# Patient Record
Sex: MALE | Race: WHITE | NOT HISPANIC OR LATINO | Employment: FULL TIME | URBAN - METROPOLITAN AREA
[De-identification: names, ages, dates, MRNs, and addresses within clinical notes are randomized per-mention and may not be internally consistent; named-entity substitution may affect disease eponyms.]

---

## 2017-03-28 ENCOUNTER — OFFICE VISIT (OUTPATIENT)
Dept: INTERNAL MEDICINE | Facility: CLINIC | Age: 57
End: 2017-03-28
Payer: COMMERCIAL

## 2017-03-28 VITALS
HEART RATE: 69 BPM | DIASTOLIC BLOOD PRESSURE: 74 MMHG | OXYGEN SATURATION: 96 % | BODY MASS INDEX: 33.79 KG/M2 | RESPIRATION RATE: 12 BRPM | HEIGHT: 74 IN | SYSTOLIC BLOOD PRESSURE: 118 MMHG | WEIGHT: 263.25 LBS

## 2017-03-28 DIAGNOSIS — Z72.0 TOBACCO ABUSE: ICD-10-CM

## 2017-03-28 DIAGNOSIS — D68.61 ANTIPHOSPHOLIPID SYNDROME: ICD-10-CM

## 2017-03-28 DIAGNOSIS — Z71.6 TOBACCO ABUSE COUNSELING: ICD-10-CM

## 2017-03-28 DIAGNOSIS — E78.5 HYPERLIPIDEMIA LDL GOAL <70: ICD-10-CM

## 2017-03-28 DIAGNOSIS — Z86.010 HISTORY OF COLON POLYPS: ICD-10-CM

## 2017-03-28 DIAGNOSIS — E66.9 OBESITY (BMI 30-39.9): ICD-10-CM

## 2017-03-28 PROCEDURE — 99204 OFFICE O/P NEW MOD 45 MIN: CPT | Mod: S$GLB,,, | Performed by: NURSE PRACTITIONER

## 2017-03-28 PROCEDURE — 99999 PR PBB SHADOW E&M-EST. PATIENT-LVL IV: CPT | Mod: PBBFAC,,, | Performed by: NURSE PRACTITIONER

## 2017-03-28 PROCEDURE — 3046F HEMOGLOBIN A1C LEVEL >9.0%: CPT | Mod: S$GLB,,, | Performed by: NURSE PRACTITIONER

## 2017-03-28 PROCEDURE — 4010F ACE/ARB THERAPY RXD/TAKEN: CPT | Mod: S$GLB,,, | Performed by: NURSE PRACTITIONER

## 2017-03-28 PROCEDURE — 1160F RVW MEDS BY RX/DR IN RCRD: CPT | Mod: S$GLB,,, | Performed by: NURSE PRACTITIONER

## 2017-03-28 PROCEDURE — 2022F DILAT RTA XM EVC RTNOPTHY: CPT | Mod: S$GLB,,, | Performed by: NURSE PRACTITIONER

## 2017-03-28 RX ORDER — ATORVASTATIN CALCIUM 20 MG/1
20 TABLET, FILM COATED ORAL NIGHTLY
Qty: 30 TABLET | Refills: 5 | Status: SHIPPED | OUTPATIENT
Start: 2017-03-28 | End: 2017-04-25 | Stop reason: SDUPTHER

## 2017-03-28 RX ORDER — ACETAMINOPHEN 500 MG
1 TABLET ORAL DAILY
COMMUNITY
End: 2023-06-22

## 2017-03-28 RX ORDER — AMOXICILLIN 500 MG
2 CAPSULE ORAL DAILY
COMMUNITY
End: 2023-06-19

## 2017-03-28 RX ORDER — METFORMIN HYDROCHLORIDE 500 MG/1
500 TABLET ORAL 2 TIMES DAILY WITH MEALS
COMMUNITY
End: 2017-04-28

## 2017-03-28 RX ORDER — RIVAROXABAN 20 MG/1
20 TABLET, FILM COATED ORAL DAILY
Refills: 3 | COMMUNITY
Start: 2017-02-17 | End: 2018-01-04 | Stop reason: SDUPTHER

## 2017-03-28 RX ORDER — LISINOPRIL 5 MG/1
5 TABLET ORAL DAILY
Qty: 30 TABLET | Refills: 5 | Status: SHIPPED | OUTPATIENT
Start: 2017-03-28 | End: 2017-09-15 | Stop reason: SDUPTHER

## 2017-03-28 RX ORDER — NAPROXEN SODIUM 220 MG/1
81 TABLET, FILM COATED ORAL DAILY
COMMUNITY
End: 2017-04-28

## 2017-03-28 NOTE — PROGRESS NOTES
Subjective:       Patient ID: North Najera is a 56 y.o. male.    Chief Complaint: Establish Care and Diabetes    Patient is unknown, to me and presents with   Chief Complaint   Patient presents with    Establish Care    Diabetes   .  Denies chest pain and shortness of breath.  Patient who is new to me presents to be established to clinic. He was seeing doctors in [facility] and now wants to be seen here. He was recently diagnosed with DM and wants to really try with diet and exercise but will stay on medications for now. Patient also would like to see diabetes instructor and also tobacco cessation. Needs colonoscopy and also is up to date with prevnar He does see cardiology and is on xerelto for antiphospholipid syndrome    HPI  Review of Systems   Constitutional: Negative.  Negative for activity change, appetite change, chills, diaphoresis, fatigue, fever and unexpected weight change.   HENT: Negative.  Negative for congestion, ear discharge, ear pain, facial swelling, hearing loss, nosebleeds, postnasal drip, rhinorrhea, sinus pressure, sneezing, sore throat, tinnitus, trouble swallowing and voice change.    Eyes: Negative.  Negative for photophobia, pain, discharge, redness, itching and visual disturbance.   Respiratory: Negative.  Negative for apnea, cough, choking, chest tightness, shortness of breath, wheezing and stridor.    Cardiovascular: Negative.  Negative for chest pain, palpitations and leg swelling.   Gastrointestinal: Negative for abdominal distention, abdominal pain, anal bleeding, blood in stool, constipation, diarrhea, nausea and vomiting.   Genitourinary: Negative.  Negative for difficulty urinating, discharge, dysuria, enuresis, flank pain, frequency, hematuria, penile pain, penile swelling, scrotal swelling, testicular pain and urgency.   Musculoskeletal: Negative.  Negative for arthralgias, back pain, gait problem, joint swelling, myalgias, neck pain and neck stiffness.   Skin: Negative.   Negative for color change, pallor, rash and wound.   Neurological: Negative for dizziness, tremors, seizures, syncope, facial asymmetry, speech difficulty, weakness, light-headedness, numbness and headaches.   Hematological: Negative for adenopathy. Does not bruise/bleed easily.   Psychiatric/Behavioral: Negative.  Negative for agitation, sleep disturbance and suicidal ideas. The patient is not nervous/anxious.        Objective:      Physical Exam   Constitutional: He is oriented to person, place, and time. He appears well-developed and well-nourished. No distress.   HENT:   Head: Normocephalic and atraumatic.   Right Ear: External ear normal.   Left Ear: External ear normal.   Nose: Nose normal.   Mouth/Throat: Oropharynx is clear and moist. No oropharyngeal exudate.   Eyes: Conjunctivae and EOM are normal. Pupils are equal, round, and reactive to light. Right eye exhibits no discharge. Left eye exhibits no discharge. No scleral icterus.   Neck: Normal range of motion. No JVD present. No tracheal deviation present. No thyromegaly present.   Cardiovascular: Normal rate, regular rhythm, normal heart sounds and intact distal pulses.  Exam reveals no gallop and no friction rub.    No murmur heard.  Pulmonary/Chest: Effort normal and breath sounds normal. No stridor. No respiratory distress. He has no wheezes. He has no rales. He exhibits no tenderness.   Abdominal: Soft. Bowel sounds are normal. He exhibits no distension and no mass. There is no tenderness. There is no rebound and no guarding.   Musculoskeletal: Normal range of motion. He exhibits no edema or tenderness.   Lymphadenopathy:     He has no cervical adenopathy.   Neurological: He is alert and oriented to person, place, and time. He has normal reflexes. He displays normal reflexes. No cranial nerve deficit. He exhibits normal muscle tone. Coordination normal.   Skin: Skin is warm and dry. No rash noted. He is not diaphoretic. No erythema. No pallor.  "  Psychiatric: He has a normal mood and affect. His behavior is normal. Judgment and thought content normal.   Nursing note and vitals reviewed.      Assessment:       1. Uncontrolled type 2 diabetes mellitus without complication, without long-term current use of insulin    2. Hyperlipidemia LDL goal <70    3. Antiphospholipid syndrome    4. Tobacco abuse    5. Tobacco abuse counseling    6. Obesity (BMI 30-39.9)    7. History of colon polyps        Plan:   North was seen today for establish care and diabetes.    Diagnoses and all orders for this visit:    Uncontrolled type 2 diabetes mellitus without complication, without long-term current use of insulin  -     CBC auto differential; Future  -     Comprehensive metabolic panel; Future  -     Microalbumin/creatinine urine ratio; Future  -     Hemoglobin A1c; Future  -     Ambulatory consult to Diabetic Education  -     lisinopril (PRINIVIL,ZESTRIL) 5 MG tablet; Take 1 tablet (5 mg total) by mouth once daily.    Hyperlipidemia LDL goal <70  -     CBC auto differential; Future  -     Comprehensive metabolic panel; Future  -     TSH; Future  -     Lipid panel; Future  -     atorvastatin (LIPITOR) 20 MG tablet; Take 1 tablet (20 mg total) by mouth every evening.  -     Hepatic function panel; Future    Antiphospholipid syndrome  -     CBC auto differential; Future  -     Comprehensive metabolic panel; Future    Tobacco abuse  -     CBC auto differential; Future  -     Comprehensive metabolic panel; Future  -     Ambulatory referral to Smoking Cessation Program    Tobacco abuse counseling  -     Ambulatory referral to Smoking Cessation Program    Obesity (BMI 30-39.9)  Sending to diabetes educator and he will begin exercising and eating healthier     History of colon polyps  -     Case request GI: COLONOSCOPY    "This note will not be shared with the patient."  Check CBC, CMP, TSH, Fasting lipid profile, HgA1c, Microalbuminuria in three months.  Medication compliance was " discussed with the patient.  The patient was instructed to monitor glucoses closely using glucometer at home and to record the values in a log.  The patient was instructed to obtain an Optometry exam and microalbumin q year.  The patient was instructed to obtain a hemoglobin A1C q 3-4 months.  The patient was instructed to check the feet visually daily and to monitor for infection.  The patient was instructed to exercise at least 3 times a week.  The patient was instructed to follow a 1800 ADA diet.  The patient was instructed to monitor weight daily and try to keep it as close to ideal body weight as possible.  The patient was instructed on using exercise frequently to reduce BP.  The patient was instructed on using a low salt diet.  Monitor BP at home and to record the values in a log.  Will get glucometer with Sarah DOMINIQUE in three months.

## 2017-03-28 NOTE — PATIENT INSTRUCTIONS
4 Steps for Eating Healthier  Changing the way you eat can improve your health. It can lower your cholesterol and blood pressure, and help you stay at a healthy weight. Your diet doesnt have to be bland and boring to be healthy. Just watch your calories and follow these steps:    1. Eat fewer unhealthy fats  · Choose more fish and lean meats instead of fatty cuts of meat.  · Skip butter and lard, and use less margarine.  · Pass on foods that have palm, coconut, or hydrogenated oils.  · Eat fewer high-fat dairy foods like cheese, ice cream, and whole milk.  · Get a heart-healthy cookbook and try some low-fat recipes.  2. Go light on salt  · Keep the saltshaker off the table.  · Limit high-salt ingredients, such as soy sauce, bouillon, and garlic salt.  · Instead of adding salt when cooking, season your food with herbs and flavorings. Try lemon, garlic, and onion.  · Limit convenience foods, such as boxed or canned foods and restaurant food.  · Read food labels and choose lower-sodium options.  3. Limit sugar  · Pause before you add sugars to pancakes, cereal, coffee, or tea. This includes white and brown table sugar, syrup, honey, and molasses. Cut your usual amount by half.  · Use non-sugar sweeteners. Stevia, aspartame, and sucralose can satisfy a sweet tooth without adding calories.  · Swap out sugar-filled soda and other drinks. Buy sugar-free or low-calorie beverages. Remember water is always the best choice.  · Read labels and choose foods with less added sugar. Keep in mind that dairy foods and foods with fruit will have some natural sugar.  · Cut the sugar in recipes by 1/3 to 1/2. Boost the flavor with extracts like almond, vanilla, or orange. Or add spices such as cinnamon or nutmeg.  4. Eat more fiber  · Eat fresh fruits and vegetables every day.  · Boost your diet with whole grains. Go for oats, whole-grain rice, and bran.  · Add beans and lentils to your meals.  · Drink more water to match your fiber  increase. This is to help prevent constipation.  Date Last Reviewed: 5/11/2015  © 3387-6177 [employer]. [address] [place] [address], [place] [address]. All rights reserved. This information is not intended as a substitute for professional medical care. Always follow your healthcare professional's instructions.        Diabetes and Heart Disease     Take your medicines as directed each day, even if you feel fine.   If you have diabetes, you are two to four times more likely to have heart disease than someone without diabetes. This higher risk is due to diabetes, but it is also due to other risk factors for heart disease that happen in people with diabetes. But theres good news. You can help control your health risks by making some changes in your life. You can take steps to reduce your risk of heart disease by half--similar to the risk in people who don't have diabetes.  Your main risk factors  Three major risk factors for heart disease are high blood sugar, high blood pressure, and high levels of lipids. By keeping risk factors under control, you can help keep your heart and arteries healthy. This may reduce your chances of a heart attack.  · Blood sugar. High blood sugar can make artery walls tough and rough. Plaque (waxy material in the blood) can then build up along the artery walls, making it harder for blood to flow through the arteries. Having high blood sugar increases the chances of having high blood pressure and high cholesterol.  · Blood pressure. When blood pressure is high all the time it causes your heart to work harder to pump blood. Artery walls become damaged. This increases the risk for plaque build up.  · Lipids. The body needs some lipids in the blood to stay healthy. But lipid levels that are too high can damage the artery walls. Lipids include cholesterol and triglycerides. There are two kinds of cholesterol. LDL (bad) cholesterol can damage the arteries. But HDL (good) cholesterol  helps clear LDL cholesterol from the blood vessels. This helps keep the arteries healthy. When blood sugar is high, the level of triglycerides in the blood may also be high. High blood triglyceride levels can cause plaque to form.   Other risk factors  Certain lifestyle factors can increase levels of your blood sugar, blood pressure, and lipids. Such increases raise your risk of heart disease:  · Smoking damages the lining of your arteries. This allows plaque to build up in the artery walls. Smoking also constricts (narrows) the arteries. This can raise blood pressure and cause chest pain or angina. Smoking also increases your risk of getting type 2 diabetes.  · Not being active makes it harder for your heart to do its work. Inactivity is linked to many other risk factors, such as high blood pressure and poor cholesterol levels. Inactivity also increases your risk of getting type 2 diabetes.  · Being overweight makes it harder for your body to use insulin. It also makes your heart work too hard. Being overweight is also the main contributor to the development of type 2 diabetes,   Changes you can make  Following a few simple steps can help keep your risk factors under control. Work with your healthcare team to reach your goals.  · Quitting smoking could save your life. Smoking damages the lining of the blood vessels and raises blood pressure. Smoking also affects how your body uses insulin. This makes it harder to keep blood sugar under control. If you smoke and need help quitting, talk to your healthcare team.   · Testing your blood sugar is the only way to know whether it is under control. Be sure to test your blood sugar yourself. Also get your blood tested in the lab, as directed.  · Monitoring your blood pressure and lipid levels can help you achieve safe levels. Visit your healthcare team as scheduled.  · Taking medicines as directed can help control blood sugar, blood pressure, blood clotting, and/or  cholesterol levels.  · Eating right can reduce your risk factors and help you lose weight. Try to limit the amount of processed or refined carbohydrates you eat at one time. Cut back on your total calorie intake. Eat foods low in saturated fat and cholesterol. Eat fiber, including vegetables and whole grains, and cut down on salt. A dietitian or diabetes educator can help form a meal plan that works for you--even if you are on a low budget.   · Being active can help reduce your weight, strengthen your heart, and lower your lipid levels and blood pressure. Exercise and activity are good for your whole body. Talk to your healthcare team about increasing your activity safely over time.  · Keeping your appointments with your healthcare provider helps you stay healthy. Go in for checkups and lab tests as scheduled.  Date Last Reviewed: 5/19/2016  © 2741-0489 [employer]. [address] [place] [address], [place] [address]. All rights reserved. This information is not intended as a substitute for professional medical care. Always follow your healthcare professional's instructions.

## 2017-03-28 NOTE — MR AVS SNAPSHOT
[facility]  [address]  [facility] [address]  Phone: [phone]  Fax: [phone]                  [place]   3/28/2017 11:15 AM   Office Visit    Description:  Male : 1960   Provider:  Irma Biswas NP   Department:  [facility]           Reason for Visit     Establish Care     Diabetes           Diagnoses this Visit        Comments    Uncontrolled type 2 diabetes mellitus without complication, without long-term current use of insulin    -  Primary     Hyperlipidemia LDL goal <70         Antiphospholipid syndrome         Tobacco abuse         Tobacco abuse counseling         Obesity (BMI 30-39.9)         History of colon polyps                To Do List           Goals (5 Years of Data)     None      Follow-Up and Disposition     Return in about 3 months (around 2017).       These Medications        Disp Refills Start End    lisinopril (PRINIVIL,ZESTRIL) 5 MG tablet 30 tablet 5 3/28/2017 3/28/2018    Take 1 tablet (5 mg total) by mouth once daily. - Oral    Pharmacy: [id number] - [place] - [address] Ph #: [phone]       atorvastatin (LIPITOR) 20 MG tablet 30 tablet 5 3/28/2017 2017    Take 1 tablet (20 mg total) by mouth every evening. - Oral    Pharmacy: [id number] - [place] - [address] Ph #: [phone]         [facility]     [facility] Care [facility] -  Assistance  Registered nurses in the [facility] provide clinical advisement, health education, appointment booking, and other advisory services.  Call for this free service at [phone].             Medications           Message regarding Medications     Verify the changes and/or additions to your medication regime listed below are the same as discussed with your clinician today.  If any of these changes or additions are incorrect, please notify your healthcare provider.        START taking these NEW  "medications        Refills    lisinopril (PRINIVIL,ZESTRIL) 5 MG tablet 5    Sig: Take 1 tablet (5 mg total) by mouth once daily.    Class: Normal    Route: Oral    atorvastatin (LIPITOR) 20 MG tablet 5    Sig: Take 1 tablet (20 mg total) by mouth every evening.    Class: Normal    Route: Oral           Verify that the below list of medications is an accurate representation of the medications you are currently taking.  If none reported, the list may be blank. If incorrect, please contact your healthcare provider. Carry this list with you in case of emergency.           Current Medications     aspirin 81 MG Chew Take 81 mg by mouth once daily.    cholecalciferol, vitamin D3, (VITAMIN D3) 2,000 unit Cap Take 1 capsule by mouth once daily.    fish oil-omega-3 fatty acids 300-1,000 mg capsule Take 2 g by mouth once daily.    metformin (GLUCOPHAGE) 500 MG tablet Take 500 mg by mouth 2 (two) times daily with meals.    atorvastatin (LIPITOR) 20 MG tablet Take 1 tablet (20 mg total) by mouth every evening.    lisinopril (PRINIVIL,ZESTRIL) 5 MG tablet Take 1 tablet (5 mg total) by mouth once daily.    XARELTO 20 mg Tab Take 20 mg by mouth once daily.           Clinical Reference Information           Your Vitals Were     BP Pulse Resp Height Weight SpO2    118/74 (BP Location: Right arm, Patient Position: Sitting, BP Method: Manual) 69 12 6' 2" (1.88 m) 119.4 kg (263 lb 3.7 oz) 96%    BMI                33.8 kg/m2          Blood Pressure          Most Recent Value    BP  118/74      Allergies as of 3/28/2017     Codeine      Immunizations Administered on Date of Encounter - 3/28/2017     None      Orders Placed During Today's Visit      Normal Orders This Visit    Ambulatory consult to Diabetic Education     Ambulatory referral to Smoking Cessation Program     Case request GI: COLONOSCOPY     Future Labs/Procedures Expected by Expires    Hepatic function panel  5/9/2017 5/27/2018    CBC auto differential  6/26/2017 5/27/2018 "    Comprehensive metabolic panel  6/26/2017 5/27/2018    Hemoglobin A1c  6/26/2017 5/27/2018    Lipid panel  6/26/2017 5/27/2018    Microalbumin/creatinine urine ratio  6/26/2017 3/28/2018    TSH  6/26/2017 5/27/2018      [facility] Sign-Up     Activating your [facility] account is as easy as 1-2-3!     1) Visit [email or web address], select Sign Up Now, enter this activation code and your date of birth, then select Next.  [email or web address]  Expires: 5/12/2017 11:42 AM      2) Create a username and password to use when you visit [facility] in the future and select a security question in case you lose your password and select Next.    3) Enter your e-mail address and click Sign Up!    Additional Information  If you have questions, please e-mail [email or web address] or call [phone] to talk to our [facility] staff. Remember, [facility] is NOT to be used for urgent needs. For medical emergencies, dial 911.         Instructions      4 Steps for Eating Healthier  Changing the way you eat can improve your health. It can lower your cholesterol and blood pressure, and help you stay at a healthy weight. Your diet doesnt have to be bland and boring to be healthy. Just watch your calories and follow these steps:    1. Eat fewer unhealthy fats  · Choose more fish and lean meats instead of fatty cuts of meat.  · Skip butter and lard, and use less margarine.  · Pass on foods that have palm, coconut, or hydrogenated oils.  · Eat fewer high-fat dairy foods like cheese, ice cream, and whole milk.  · Get a heart-healthy cookbook and try some low-fat recipes.  2. Go light on salt  · Keep the saltshaker off the table.  · Limit high-salt ingredients, such as soy sauce, bouillon, and garlic salt.  · Instead of adding salt when cooking, season your food with herbs and flavorings. Try lemon, garlic, and onion.  · Limit convenience foods, such as boxed or canned foods and restaurant food.  · Read food labels and choose lower-sodium options.  3.  Limit sugar  · Pause before you add sugars to pancakes, cereal, coffee, or tea. This includes white and brown table sugar, syrup, honey, and molasses. Cut your usual amount by half.  · Use non-sugar sweeteners. Stevia, aspartame, and sucralose can satisfy a sweet tooth without adding calories.  · Swap out sugar-filled soda and other drinks. Buy sugar-free or low-calorie beverages. Remember water is always the best choice.  · Read labels and choose foods with less added sugar. Keep in mind that dairy foods and foods with fruit will have some natural sugar.  · Cut the sugar in recipes by 1/3 to 1/2. Boost the flavor with extracts like almond, vanilla, or orange. Or add spices such as cinnamon or nutmeg.  4. Eat more fiber  · Eat fresh fruits and vegetables every day.  · Boost your diet with whole grains. Go for oats, whole-grain rice, and bran.  · Add beans and lentils to your meals.  · Drink more water to match your fiber increase. This is to help prevent constipation.  Date Last Reviewed: 5/11/2015  © 4093-3775 [employer]. [address] [place] [address], [place] [address]. All rights reserved. This information is not intended as a substitute for professional medical care. Always follow your healthcare professional's instructions.        Diabetes and Heart Disease     Take your medicines as directed each day, even if you feel fine.   If you have diabetes, you are two to four times more likely to have heart disease than someone without diabetes. This higher risk is due to diabetes, but it is also due to other risk factors for heart disease that happen in people with diabetes. But theres good news. You can help control your health risks by making some changes in your life. You can take steps to reduce your risk of heart disease by half--similar to the risk in people who don't have diabetes.  Your main risk factors  Three major risk factors for heart disease are high blood sugar, high blood pressure, and high  levels of lipids. By keeping risk factors under control, you can help keep your heart and arteries healthy. This may reduce your chances of a heart attack.  · Blood sugar. High blood sugar can make artery walls tough and rough. Plaque (waxy material in the blood) can then build up along the artery walls, making it harder for blood to flow through the arteries. Having high blood sugar increases the chances of having high blood pressure and high cholesterol.  · Blood pressure. When blood pressure is high all the time it causes your heart to work harder to pump blood. Artery walls become damaged. This increases the risk for plaque build up.  · Lipids. The body needs some lipids in the blood to stay healthy. But lipid levels that are too high can damage the artery walls. Lipids include cholesterol and triglycerides. There are two kinds of cholesterol. LDL (bad) cholesterol can damage the arteries. But HDL (good) cholesterol helps clear LDL cholesterol from the blood vessels. This helps keep the arteries healthy. When blood sugar is high, the level of triglycerides in the blood may also be high. High blood triglyceride levels can cause plaque to form.   Other risk factors  Certain lifestyle factors can increase levels of your blood sugar, blood pressure, and lipids. Such increases raise your risk of heart disease:  · Smoking damages the lining of your arteries. This allows plaque to build up in the artery walls. Smoking also constricts (narrows) the arteries. This can raise blood pressure and cause chest pain or angina. Smoking also increases your risk of getting type 2 diabetes.  · Not being active makes it harder for your heart to do its work. Inactivity is linked to many other risk factors, such as high blood pressure and poor cholesterol levels. Inactivity also increases your risk of getting type 2 diabetes.  · Being overweight makes it harder for your body to use insulin. It also makes your heart work too hard.  Being overweight is also the main contributor to the development of type 2 diabetes,   Changes you can make  Following a few simple steps can help keep your risk factors under control. Work with your healthcare team to reach your goals.  · Quitting smoking could save your life. Smoking damages the lining of the blood vessels and raises blood pressure. Smoking also affects how your body uses insulin. This makes it harder to keep blood sugar under control. If you smoke and need help quitting, talk to your healthcare team.   · Testing your blood sugar is the only way to know whether it is under control. Be sure to test your blood sugar yourself. Also get your blood tested in the lab, as directed.  · Monitoring your blood pressure and lipid levels can help you achieve safe levels. Visit your healthcare team as scheduled.  · Taking medicines as directed can help control blood sugar, blood pressure, blood clotting, and/or cholesterol levels.  · Eating right can reduce your risk factors and help you lose weight. Try to limit the amount of processed or refined carbohydrates you eat at one time. Cut back on your total calorie intake. Eat foods low in saturated fat and cholesterol. Eat fiber, including vegetables and whole grains, and cut down on salt. A dietitian or diabetes educator can help form a meal plan that works for you--even if you are on a low budget.   · Being active can help reduce your weight, strengthen your heart, and lower your lipid levels and blood pressure. Exercise and activity are good for your whole body. Talk to your healthcare team about increasing your activity safely over time.  · Keeping your appointments with your healthcare provider helps you stay healthy. Go in for checkups and lab tests as scheduled.  Date Last Reviewed: 5/19/2016 © 2000-2016 [employer]. [address] [place] [address], [place] [address]. All rights reserved. This information is not intended as a substitute for  professional medical care. Always follow your healthcare professional's instructions.             Smoking Cessation     If you would like to quit smoking:   You may be eligible for free services if you are a [place] resident and started smoking cigarettes before September 1, 1988.  Call the Smoking Cessation Trust (SCT) toll free at (118) 401-9567 or (134) 327-1084.   Call 1-800-QUIT-NOW if you do not meet the above criteria.            Language Assistance Services     ATTENTION: Language assistance services are available, free of charge. Please call [phone].      ATENCIÓN: Si habla español, tiene a felipe disposición servicios gratuitos de asistencia lingüística. Llame al [phone].     CHÚ Ý: N?u b?n nói Ti?ng Vi?t, có các d?ch v? h? tr? ngôn ng? mi?n phí dành cho b?n. G?i s? [phone].         [facility] complies with applicable Federal civil rights laws and does not discriminate on the basis of race, color, national origin, age, disability, or sex.

## 2017-04-03 ENCOUNTER — CLINICAL SUPPORT (OUTPATIENT)
Dept: SMOKING CESSATION | Facility: CLINIC | Age: 57
End: 2017-04-03
Payer: COMMERCIAL

## 2017-04-03 DIAGNOSIS — F17.200 NICOTINE DEPENDENCE: Primary | ICD-10-CM

## 2017-04-03 PROCEDURE — 99404 PREV MED CNSL INDIV APPRX 60: CPT | Mod: S$GLB,,,

## 2017-04-03 RX ORDER — BUPROPION HYDROCHLORIDE 150 MG/1
150 TABLET, EXTENDED RELEASE ORAL 2 TIMES DAILY
Qty: 60 TABLET | Refills: 0 | Status: SHIPPED | OUTPATIENT
Start: 2017-04-03 | End: 2017-05-03 | Stop reason: SDUPTHER

## 2017-04-04 ENCOUNTER — HOSPITAL ENCOUNTER (OUTPATIENT)
Dept: DIABETES | Facility: HOSPITAL | Age: 57
Discharge: HOME | End: 2017-04-04
Attending: NURSE PRACTITIONER
Payer: COMMERCIAL

## 2017-04-04 ENCOUNTER — TELEPHONE (OUTPATIENT)
Dept: INTERNAL MEDICINE | Facility: CLINIC | Age: 57
End: 2017-04-04

## 2017-04-04 RX ORDER — LANCETS
1 EACH MISCELLANEOUS 2 TIMES DAILY
Qty: 100 EACH | Refills: 3 | Status: SHIPPED | OUTPATIENT
Start: 2017-04-04 | End: 2018-07-06

## 2017-04-04 NOTE — PROGRESS NOTES
04/04/17 0000   Diabetes Education Visit   Diabetes Education Record Assessment/Progress Initial   Diabetes Type   Diabetes Type  Type II   Diabetes History   Diabetes Diagnosis 0-1 year  (dx 2 weeks ago)   Nutrition   Meal Planning water;3 meals per day;eats out often;snacks between meal   Monitoring    Monitoring Con Next EZ  (Meter has been ordered for him. 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 Is currently enrolled in [facility])   Alcohol Use Rarely   Barriers to Change   Barriers to Change None   Learning Challenges  None   Readiness to Learn    Readiness to Learn  Acceptance   Cultural Influences   Cultural Influences No   Diabetes Education Assessment/Progress   Acute Complications (preventing, detecting, and treating acute complications) L;DC;IS;1;W   Chronic Complications (preventing, detecting, and treating chronic complications) L;DC;IS;1;W   Diabetes Disease Process (diabetes disease process and treatment options) L;DC;IS;1;W   Nutrition (Incorporating nutritional management into one's lifestyle) DC  (Is meeting with Dietician following this session.)   Physical Activity (incorporating physical activity into one's lifestyle) L;DC;D;IS;1;W   Medications (states correct name, dose, onset, peak, duration, side effects & timing of meds)  L;DC;IS;1;W   Monitoring (monitoring blood glucose/other parameters & using results) L;DC;IS;1;W   Goal Setting and Problem Solving (verbalizes behavior change strategies & sets realistic goals) L;DC;IS;1;W   Behavior Change (developing personal strategies to health & behavior change) L;DC;IS;1   Psychosocial Issues (developing personal srategies to address psychosocial concerns) L;DC;1;IS   Goals   Physical Activity Set  (will walk 30/minutes/day at least 5 days per week)   Start Date 04/04/17   Target Date 04/04/17   Diabetes Self-Management Support Plan   Diabetes Learning DM websites   Exercise/Nutrition TOPS/Weight Watchers  (has knowledge from his college degree in  Management)   Stress Management family;M[employer]onism   Medication (Medication is currently covered by his insurance)   Review Status Patient has selected and agrees to support plan.   Diabetes Care Plan/Intervention   Education Plan/Intervention In F/U MNT;Eye Exam   Education Units of Time    Time Spent 60 min

## 2017-04-04 NOTE — TELEPHONE ENCOUNTER
Sarah called from hospital regarding pt visit with her stating she is out of meter to check his diabetes so needing a Rx for Contour EZ mete with test stripes and lancets  Sent to [facility]  Please advise  Thanks!

## 2017-04-05 ENCOUNTER — TELEPHONE (OUTPATIENT)
Dept: INTERNAL MEDICINE | Facility: CLINIC | Age: 57
End: 2017-04-05

## 2017-04-05 ENCOUNTER — ANESTHESIA EVENT (OUTPATIENT)
Dept: ENDOSCOPY | Facility: HOSPITAL | Age: 57
End: 2017-04-05
Payer: COMMERCIAL

## 2017-04-05 NOTE — TELEPHONE ENCOUNTER
----- Message from Kg Gutiérrez sent at 2017  1:29 PM CDT -----  Contact: SELF  [place]  MRN: [id number]  : 1960  PCP: Irma Biswas  Home Phone      [phone]  Work Phone      Not on file.  Mobile          [phone]      MESSAGE: PT IS STILL HAVING TROUBLE WITH DIABETES MEDICATION. FEELS NAUSEATED AND HAS HEADACHES. PLEASE CALL    PHONE: [phone]

## 2017-04-05 NOTE — TELEPHONE ENCOUNTER
Pt states he has been having headahces, dizziness and nausea with Metformin. He is currently taking 500mg BID.   He states doesn't know if dose can be adjusted.     He just received meter yesterday, states BS have been within normal range.   Fasting this AM 92  2 hours after lunch 114    Please advise

## 2017-04-06 NOTE — TELEPHONE ENCOUNTER
Patient notified and voiced understanding. Will monitor blood sugars and will let us know if the new dosage works OK for him

## 2017-04-11 ENCOUNTER — CLINICAL SUPPORT (OUTPATIENT)
Dept: SMOKING CESSATION | Facility: CLINIC | Age: 57
End: 2017-04-11
Payer: COMMERCIAL

## 2017-04-11 DIAGNOSIS — F17.200 NICOTINE DEPENDENCE: Primary | ICD-10-CM

## 2017-04-11 PROCEDURE — 90853 GROUP PSYCHOTHERAPY: CPT | Mod: S$GLB,,,

## 2017-04-12 NOTE — PROGRESS NOTES
Site: [facility]  Date:  4/11/2017  Clinical Status of Patient: Outpatient   Length of Service and Code: 90 minutes - 65792   Number in Attendance: 9  Group Activities/Focus of Group:  orientation, client introductions, completion of TCRS (Tobacco Cessation Rating Scale) learned addiction model, cues/triggers, personal reasons for quitting, medications, goals, quit date    Target symptoms:  withdrawal and medication side effects             The following were rated moderate (3) to severe (4) on TCRS:       Moderate 3:  Desire or crave tobacco; discussed with patient this is a normal withdrawal symptom. Headaches; discussed with patient this is a normal side effect of Wellbutrin.      Severe 4:   None   Patient's Response to Intervention:  Patient is smoking 1 pack per day and remains on the prescribed tobacco cessation medication regimen of 10 mg nicotine inhaler and 150 mg Wellbutrin BID without any negative side effects at this time. CO level = 12 ppm.   Progress Toward Goals and Other Mental Status Changes: The patient denies any abnormal behavioral or mental changes at this time.       Diagnosis: Z72.0  Plan:The patient will continue with group therapy sessions and medication monitoring by CTTS. Prescribed medication management will be by physician.     Return to Clinic: 1 week

## 2017-04-18 ENCOUNTER — CLINICAL SUPPORT (OUTPATIENT)
Dept: SMOKING CESSATION | Facility: CLINIC | Age: 57
End: 2017-04-18
Payer: COMMERCIAL

## 2017-04-18 DIAGNOSIS — F17.200 NICOTINE DEPENDENCE: Primary | ICD-10-CM

## 2017-04-18 PROCEDURE — 90853 GROUP PSYCHOTHERAPY: CPT | Mod: S$GLB,,,

## 2017-04-19 NOTE — PROGRESS NOTES
Smoking Cessation Group Session #2    Site: [facility]  Date:  4/18/2017  Clinical Status of Patient: Outpatient   Length of Service and Code: 90 minutes - 68935   Number in Attendance: 6  Group Activities/Focus of Group: completion of TCRS (Tobacco Cessation Rating Scale) reviewed strategies, cues, and triggers. Introduced the negative impact of tobacco on health, the health advantages of discontinuing the use of tobacco, time line improved health changes after a quit, withdrawal issues to expect from nicotine and habit, and ways to achieve the goal of a quit.      Target symptoms:  withdrawal and medication side effects             The following were rated moderate (3) to severe (4) on TCRS:       Moderate 3:  Desire or crave tobacco; discussed with patient this is a normal withdrawal symptom.      Severe 4:   None   Patient's Response to Intervention:  Patient is smoke free since 4/12/2017 and remains on the prescribed tobacco cessation medication regimen of 10 mg nicotine inhaler and 150 mg Wellbutrin BID without any negative side effects at this time. CO level = 6 ppm.  Progress Toward Goals and Other Mental Status Changes: The patient denies any abnormal behavioral or mental changes at this time.     Diagnosis: Z72.0  Plan: The patient will continue with group therapy sessions and medication monitoring by CTTS. Prescribed medication management will be by physician.     Return to Clinic: 1 week    Quit Date: 4/12/2017

## 2017-04-25 DIAGNOSIS — E78.5 HYPERLIPIDEMIA LDL GOAL <70: ICD-10-CM

## 2017-04-26 RX ORDER — ATORVASTATIN CALCIUM 20 MG/1
TABLET, FILM COATED ORAL
Qty: 30 TABLET | Refills: 5 | Status: SHIPPED | OUTPATIENT
Start: 2017-04-26 | End: 2017-10-24 | Stop reason: SDUPTHER

## 2017-04-28 ENCOUNTER — TELEPHONE (OUTPATIENT)
Dept: INTERNAL MEDICINE | Facility: CLINIC | Age: 57
End: 2017-04-28

## 2017-04-28 ENCOUNTER — HOSPITAL ENCOUNTER (OUTPATIENT)
Dept: PULMONOLOGY | Facility: HOSPITAL | Age: 57
Discharge: HOME | End: 2017-04-28
Attending: NURSE PRACTITIONER
Payer: COMMERCIAL

## 2017-04-28 ENCOUNTER — HOSPITAL ENCOUNTER (OUTPATIENT)
Dept: PREADMISSION TESTING | Facility: HOSPITAL | Age: 57
Discharge: HOME | End: 2017-04-28
Attending: NURSE PRACTITIONER
Payer: COMMERCIAL

## 2017-04-28 VITALS — HEIGHT: 74 IN | BODY MASS INDEX: 33.37 KG/M2 | WEIGHT: 260 LBS

## 2017-04-28 DIAGNOSIS — Z01.818 PREOP TESTING: ICD-10-CM

## 2017-04-28 DIAGNOSIS — Z01.818 PREOP TESTING: Primary | ICD-10-CM

## 2017-04-28 PROCEDURE — 93005 ELECTROCARDIOGRAM TRACING: CPT

## 2017-04-28 PROCEDURE — 93010 ELECTROCARDIOGRAM REPORT: CPT | Mod: ,,, | Performed by: INTERNAL MEDICINE

## 2017-04-28 RX ORDER — ASPIRIN 81 MG/1
81 TABLET ORAL DAILY
COMMUNITY
End: 2023-11-08

## 2017-04-28 NOTE — DISCHARGE INSTRUCTIONS
Colonoscopy Outpatient procedure instructions    Prep Review  Nothing to eat or drink after midnight unless your doctor tells you differently.    Bring your medication in the original containers.   Take medications as instructed by your doctor.    Wear something comfortable that is easy for you to take off and put on.   Do not wear any makeup, jewelry, or body piercings. Leave valuables at home or let your family member keep them for you. Do not bring them to the Surgery area.     Date/Day of Procedure: Thursday 5-4-2017    Arrival time: Someone will call you the workday day before the procedure between 1pm and 4pm to give you an arrival time   If asked to report to the hospital before 7:00 am report to the Emergency Room.  If asked to report to the hospital at 7:00 a.m. or later report to Patient Registration  It is not necessary to report earlier than the time you are told.   Ignore any automated/computer generated calls telling to what time to report to the hospital.   Plan to be at the hospital for about 4 hours, however, it could be longer.       Diabetics  If you are diabetic do not take your diabetes medication the morning of the procedure unless otherwise instructed by your doctor.  It is important to monitor your blood sugar levels the day you are doing your prep to make sure your blood sugar levels are maintained.

## 2017-04-28 NOTE — IP AVS SNAPSHOT
[address] [address]  Phone: [phone]           Patient Discharge Instructions   Our goal is to set you up for success. This packet includes information on your condition, medications, and your home care.  It will help you care for yourself to prevent having to return to the hospital.     Please ask your nurse if you have any questions.      There are many details to remember when preparing to leave the hospital. Here is what you will need to do:    1. Take your medicine. If you are prescribed medications, review your Medication List on the following pages. You may have new medications to  at the pharmacy and others that you'll need to stop taking. Review the instructions for how and when to take your medications. Talk with your doctor or nurses if you are unsure of what to do.     2. Go to your follow-up appointments. Specific follow-up information is listed in the following pages. Your may be contacted by a nurse or clinical provider about future appointments. Be sure we have all of the phone numbers to reach you. Please contact your provider's office if you are unable to make an appointment.     3. Watch for warning signs. Your doctor or nurse will give you detailed warning signs to watch for and when to call for assistance. These instructions may also include educational information about your condition. If you experience any of warning signs to your health, call your doctor.           [facility]  Unless otherwise directed by your provider, please   contact [facility], our nurse care line   that is available for 24/7 assistance.     [phone] (toll-free)     Registered nurses in the [facility]   provide: appointment scheduling, clinical advisement, health education, and other advisory services.                  ** Verify the list of medication(s) below is accurate and up to date. Carry this with you in case of emergency. If  your medications have changed, please notify your healthcare provider.             Medication List      TAKE these medications        Additional Info                      ASPIRIN LOW DOSE 81 MG EC tablet   Refills:  0   Dose:  81 mg   What changed:  Another medication with the same name was removed. Continue taking this medication, and follow the directions you see here.   Generic drug:  aspirin    Instructions:  Take 81 mg by mouth once daily.     Begin Date    AM    Noon    PM    Bedtime       atorvastatin 20 MG tablet   Commonly known as:  LIPITOR   Quantity:  30 tablet   Refills:  5    Instructions:  TAKE 1 TABLET (20 MG TOTAL) BY MOUTH EVERY EVENING.     Begin Date    AM    Noon    PM    Bedtime       blood sugar diagnostic Strp   Quantity:  100 each   Refills:  3   Dose:  1 strip    Instructions:  1 strip by Misc.(Non-Drug; Combo Route) route 2 (two) times daily.     Begin Date    AM    Noon    PM    Bedtime       buPROPion 150 MG TBSR 12 hr tablet   Commonly known as:  WELLBUTRIN SR   Quantity:  60 tablet   Refills:  0   Dose:  150 mg    Instructions:  Take 1 tablet (150 mg total) by mouth 2 (two) times daily.     Begin Date    AM    Noon    PM    Bedtime       fish oil-omega-3 fatty acids 300-1,000 mg capsule   Refills:  0   Dose:  2 g    Instructions:  Take 2 g by mouth once daily.     Begin Date    AM    Noon    PM    Bedtime       lancets Misc   Commonly known as:  LANCETS, SUPER THIN   Quantity:  100 each   Refills:  3   Dose:  1 Stick    Instructions:  1 Stick by Misc.(Non-Drug; Combo Route) route 2 (two) times daily.     Begin Date    AM    Noon    PM    Bedtime       lisinopril 5 MG tablet   Commonly known as:  PRINIVIL,ZESTRIL   Quantity:  30 tablet   Refills:  5   Dose:  5 mg    Instructions:  Take 1 tablet (5 mg total) by mouth once daily.     Begin Date    AM    Noon    PM    Bedtime       VITAMIN D3 2,000 unit Cap   Refills:  0   Dose:  1 capsule   Generic drug:  cholecalciferol (vitamin D3)     Instructions:  Take 1 capsule by mouth once daily.     Begin Date    AM    Noon    PM    Bedtime       XARELTO 20 mg Tab   Refills:  3   Dose:  20 mg   Generic drug:  rivaroxaban    Instructions:  Take 20 mg by mouth once daily.     Begin Date    AM    Noon    PM    Bedtime                  Please bring to all follow up appointments:    1. A copy of your discharge instructions.  2. All medicines you are currently taking in their original bottles.  3. Identification and insurance card.    Please arrive 15 minutes ahead of scheduled appointment time.    Please call 24 hours in advance if you must reschedule your appointment and/or time.        Your Scheduled Appointments     Apr 28, 2017 12:00 PM CDT   Pre-Admit Testing Visit with [facility]-ADMIT, [facility] ([facility])    [address] [place] [id number]   [phone]            Jun 29, 2017  8:45 AM CDT   Nurse Visit with [facility]   [place] [facility] ([facility])    [address] [facility] [address]   [phone]            Jul 06, 2017 10:30 AM CDT   Established Patient Visit with [facility] [place] [facility] ([facility])    [address] [facility] [address]   [phone]              Your Future Surgeries/Procedures     May 04, 2017   Surgery with Gabriel Saini MD   [facility] ([facility])    [address] [place] [address]   [phone]                  Discharge Instructions       Colonoscopy Outpatient procedure instructions    Prep Review  Nothing to eat or drink after midnight unless your doctor tells you differently.    Bring your medication in the original containers.   Take medications as instructed by your doctor.    Wear something comfortable that is easy for you to take off and put on.   Do not wear any makeup, jewelry, or body piercings. Leave valuables at home or let your family  "member keep them for you. Do not bring them to the Surgery area.     Date/Day of Procedure: Thursday 5-4-2017    Arrival time: Someone will call you the workday day before the procedure between 1pm and 4pm to give you an arrival time   If asked to report to the hospital before 7:00 am report to the Emergency Room.  If asked to report to the hospital at 7:00 a.m. or later report to Patient Registration  It is not necessary to report earlier than the time you are told.   Ignore any automated/computer generated calls telling to what time to report to the hospital.   Plan to be at the hospital for about 4 hours, however, it could be longer.       Diabetics  If you are diabetic do not take your diabetes medication the morning of the procedure unless otherwise instructed by your doctor.  It is important to monitor your blood sugar levels the day you are doing your prep to make sure your blood sugar levels are maintained.       Admission Information     Date & Time Provider Department CSN    4/28/2017 12:00 PM Irma Biswas NP [facility] [id number]      Care Providers     Provider Role Specialty Primary office phone    Irma Biswas NP Attending Provider Family Medicine [phone]      Your Vitals Were     Height Weight BMI          6' 2" (1.88 m) 117.9 kg (260 lb) 33.38 kg/m2        Recent Lab Values     No lab values to display.      Allergies as of 4/28/2017        Reactions    Codeine Diarrhea    Patient can not tolerate Tylenol #3, states he does okay with Codeine with cough medications       Advance Directives     An advance directive is a document which, in the event you are no longer able to make decisions for yourself, tells your healthcare team what kind of treatment you do or do not want to receive, or who you would like to make those decisions for you.  If you do not currently have an advance directive, [facility] encourages you to create one.  For more information call:  [phone]), [phone] ([phone]),  or log on to [email or web address].        Smoking Cessation     If you would like to quit smoking:   You may be eligible for free services if you are a [place] resident and started smoking cigarettes before September 1, 1988.  Call the Smoking Cessation [employer] ([facility]) toll free at (914) 787-4644 or (250) 214-1318.   Call 3-800-QUIT-NOW if you do not meet the above criteria.   Contact us via email: [email or web address]   View our website for more information: [email or web address]        Language Assistance Services     ATTENTION: Language assistance services are available, free of charge. Please call [phone].      ATENCIÓN: Si habla joe, tiene a felipe disposición servicios gratuitos de asistencia lingüística. Llame al [phone].     CHÚ Ý: N?u b?n nói Ti?ng Vi?t, có các d?ch v? h? tr? ngôn ng? mi?n phí dành cho b?n. G?i s? [phone].        Diabetes Discharge Instructions                                   [facility] Information           [facility] Sign-Up     Activating your [facility] account is as easy as 1-2-3!     1) Visit [email or web address], select Sign Up Now, enter this activation code and your date of birth, then select Next.  [email or web address]  Expires: 5/12/2017 11:42 AM      2) Create a username and password to use when you visit [facility] in the future and select a security question in case you lose your password and select Next.    3) Enter your e-mail address and click Sign Up!    Additional Information  If you have questions, please e-mail [email or web address] or call [phone] to talk to our [facility] staff. Remember, [facility] is NOT to be used for urgent needs. For medical emergencies, dial 911.          [facility] complies with applicable Federal civil rights laws and does not discriminate on the basis of race, color, national origin, age, disability, or sex.

## 2017-04-28 NOTE — ANESTHESIA PREPROCEDURE EVALUATION
04/28/2017  North Najera is a 57 y.o., male.    Anesthesia Evaluation    I have reviewed the Patient Summary Reports.    I have reviewed the Nursing Notes.   I have reviewed the Medications.     Review of Systems  Anesthesia Hx:  No problems with previous Anesthesia    Social:  Former Smoker, No Alcohol Use    Hematology/Oncology:  Hematology Normal   Oncology Normal     EENT/Dental:EENT/Dental Normal   Cardiovascular:   Exercise tolerance: good    Pulmonary:  Pulmonary Normal    Renal/:  Renal/ Normal     Hepatic/GI:  Hepatic/GI Normal    Musculoskeletal:  Musculoskeletal Normal    Neurological:  Neurology Normal    Endocrine:   Diabetes, well controlled    Dermatological:  Skin Normal    Psych:  Psychiatric Normal           Physical Exam  General:  Well nourished    Airway/Jaw/Neck:  Airway Findings: Mouth Opening: Normal Tongue: Normal  General Airway Assessment: Adult  Mallampati: II  TM Distance: Normal, at least 6 cm  Jaw/Neck Findings:  Limited Ability to Prognath  Neck ROM: Normal ROM      Dental:  Dental Findings: In tact        Mental Status:  Mental Status Findings:  Cooperative         Anesthesia Plan  Type of Anesthesia, risks & benefits discussed:  Anesthesia Type:  general  Patient's Preference:   Intra-op Monitoring Plan: standard ASA monitors  Intra-op Monitoring Plan Comments:   Post Op Pain Control Plan:   Post Op Pain Control Plan Comments:   Induction:   IV  Beta Blocker:  Patient is not currently on a Beta-Blocker (No further documentation required).       Informed Consent: Patient understands risks and agrees with Anesthesia plan.  Questions answered. Anesthesia consent signed with patient.  ASA Score: 2     Day of Surgery Review of History & Physical: I have interviewed and examined the patient. I have reviewed the patient's H&P dated: 5/4/17. There are no significant changes.   H&P update referred to the surgeon.         Ready For Surgery From Anesthesia Perspective.      patient has Antiphospholipid syndrome and hx of DVT, currrently taking xarelto managed by [facility], will have [facility] give recommendations for xarelto prior to colonoscopy

## 2017-04-28 NOTE — TELEPHONE ENCOUNTER
----- Message from Lisa Robledo MA sent at 2017  1:51 PM CDT -----  Contact: Patient  North Najera  MRN: [id number]  : 1960  PCP: Irma Biswas  Home Phone      [phone]  Work Phone      Not on file.  Mobile          [phone]      MESSAGE: Patient is scheduled for a colonoscopy on Wednesday, May 4, 2017.  A letter needs to be faxed to the hospital stating he can stop Coumadin.  Fax to Erika in Pre-Admit @ [facility]

## 2017-05-02 ENCOUNTER — CLINICAL SUPPORT (OUTPATIENT)
Dept: SMOKING CESSATION | Facility: CLINIC | Age: 57
End: 2017-05-02
Payer: COMMERCIAL

## 2017-05-02 DIAGNOSIS — F17.200 NICOTINE DEPENDENCE: Primary | ICD-10-CM

## 2017-05-02 PROCEDURE — 90853 GROUP PSYCHOTHERAPY: CPT | Mod: S$GLB,,,

## 2017-05-03 RX ORDER — BUPROPION HYDROCHLORIDE 150 MG/1
150 TABLET, EXTENDED RELEASE ORAL 2 TIMES DAILY
Qty: 60 TABLET | Refills: 0 | Status: SHIPPED | OUTPATIENT
Start: 2017-05-03 | End: 2017-05-24 | Stop reason: SDUPTHER

## 2017-05-03 NOTE — PROGRESS NOTES
Smoking Cessation Group Session #3    Site: [facility]  Date:  5/2/2017  Clinical Status of Patient: Outpatient   Length of Service and Code: 90 minutes - 19562   Number in Attendance: 7  Group Activities/Focus of Group:  completion of TCRS (Tobacco Cessation Rating Scale) reviewed strategies, controlling environment, cues, triggers, new goals set. Introduced high risk situations with preparation interventions, caffeine similarities with withdrawal issues of habit and nicotine, Alcohol, Understanding urges, cravings, stress and relaxation. Open discussion with intervention discussion.      Target symptoms:  withdrawal and medication side effects             The following were rated moderate (3) to severe (4) on TCRS:       Moderate 3:  Desire or crave tobacco; discussed with patient this is a normal withdrawal symptom     Severe 4:   None   Patient's Response to Intervention:  Patient is smoke free since 4/12/2017 and remains on the prescribed tobacco cessation medication regimen of 10 mg nicotine inhaler and 150 mg Wellbutrin BID without any negative side effects at this time. CO level = 6 ppm.   Progress Toward Goals and Other Mental Status Changes: The patient denies any abnormal behavioral or mental changes at this time.       Diagnosis: Z72.0  Plan: The patient will continue with group therapy sessions and medication monitoring by CTTS. Prescribed medication management will be by physician.     Return to Clinic: 1 week    Quit Date: 4/12/2017

## 2017-05-04 ENCOUNTER — HOSPITAL ENCOUNTER (OUTPATIENT)
Facility: HOSPITAL | Age: 57
Discharge: HOME | End: 2017-05-04
Attending: COLON & RECTAL SURGERY | Admitting: COLON & RECTAL SURGERY
Payer: COMMERCIAL

## 2017-05-04 ENCOUNTER — ANESTHESIA (OUTPATIENT)
Dept: ENDOSCOPY | Facility: HOSPITAL | Age: 57
End: 2017-05-04
Payer: COMMERCIAL

## 2017-05-04 ENCOUNTER — SURGERY (OUTPATIENT)
Age: 57
End: 2017-05-04

## 2017-05-04 VITALS — RESPIRATION RATE: 29 BRPM

## 2017-05-04 DIAGNOSIS — Z12.11 SCREENING FOR COLON CANCER: ICD-10-CM

## 2017-05-04 PROCEDURE — 37000008 HC ANESTHESIA 1ST 15 MINUTES: Performed by: COLON & RECTAL SURGERY

## 2017-05-04 PROCEDURE — 27200120 HC KIT IV START (RUSH ONLY): Performed by: NURSE ANESTHETIST, CERTIFIED REGISTERED

## 2017-05-04 PROCEDURE — 37000009 HC ANESTHESIA EA ADD 15 MINS: Performed by: COLON & RECTAL SURGERY

## 2017-05-04 PROCEDURE — 25000003 PHARM REV CODE 250: Performed by: COLON & RECTAL SURGERY

## 2017-05-04 PROCEDURE — 00810 *PRA ANESTH,INTESTINE,SCOPE,LOW: CPT | Mod: QZ,,P2 | Performed by: NURSE ANESTHETIST, CERTIFIED REGISTERED

## 2017-05-04 PROCEDURE — G0105 COLORECTAL SCRN; HI RISK IND: HCPCS | Performed by: COLON & RECTAL SURGERY

## 2017-05-04 PROCEDURE — G0105 COLORECTAL SCRN; HI RISK IND: HCPCS | Mod: ,,, | Performed by: COLON & RECTAL SURGERY

## 2017-05-04 PROCEDURE — 25000003 PHARM REV CODE 250: Performed by: NURSE ANESTHETIST, CERTIFIED REGISTERED

## 2017-05-04 PROCEDURE — 63600175 PHARM REV CODE 636 W HCPCS: Performed by: NURSE ANESTHETIST, CERTIFIED REGISTERED

## 2017-05-04 PROCEDURE — 27000494 *HC OXYGEN SET UP (STAH ONLY): Performed by: NURSE ANESTHETIST, CERTIFIED REGISTERED

## 2017-05-04 RX ORDER — PROPOFOL 10 MG/ML
INJECTION, EMULSION INTRAVENOUS
Status: DISCONTINUED | OUTPATIENT
Start: 2017-05-04 | End: 2017-05-04

## 2017-05-04 RX ORDER — HYDROCORTISONE ACETATE PRAMOXINE HCL 2.5; 1 G/100G; G/100G
CREAM TOPICAL 2 TIMES DAILY
Qty: 30 G | Refills: 5 | Status: SHIPPED | OUTPATIENT
Start: 2017-05-04 | End: 2017-05-14

## 2017-05-04 RX ORDER — SODIUM CHLORIDE, SODIUM LACTATE, POTASSIUM CHLORIDE, CALCIUM CHLORIDE 600; 310; 30; 20 MG/100ML; MG/100ML; MG/100ML; MG/100ML
INJECTION, SOLUTION INTRAVENOUS CONTINUOUS
Status: DISCONTINUED | OUTPATIENT
Start: 2017-05-04 | End: 2017-05-04 | Stop reason: HOSPADM

## 2017-05-04 RX ORDER — LIDOCAINE HCL/PF 100 MG/5ML
SYRINGE (ML) INTRAVENOUS
Status: DISCONTINUED | OUTPATIENT
Start: 2017-05-04 | End: 2017-05-04

## 2017-05-04 RX ADMIN — PROPOFOL 30 MG: 10 INJECTION, EMULSION INTRAVENOUS at 10:05

## 2017-05-04 RX ADMIN — PROPOFOL 50 MG: 10 INJECTION, EMULSION INTRAVENOUS at 10:05

## 2017-05-04 RX ADMIN — PROPOFOL 70 MG: 10 INJECTION, EMULSION INTRAVENOUS at 10:05

## 2017-05-04 RX ADMIN — PROPOFOL 100 MG: 10 INJECTION, EMULSION INTRAVENOUS at 10:05

## 2017-05-04 RX ADMIN — SODIUM CHLORIDE, SODIUM LACTATE, POTASSIUM CHLORIDE, AND CALCIUM CHLORIDE 500 ML: .6; .31; .03; .02 INJECTION, SOLUTION INTRAVENOUS at 09:05

## 2017-05-04 RX ADMIN — LIDOCAINE HYDROCHLORIDE 80 MG: 20 INJECTION, SOLUTION INTRAVENOUS at 10:05

## 2017-05-04 NOTE — OP NOTE
Patient Name: North Najera   Procedure Date: 2017  MRN: [id number]  : 1960  Age: 57 y.o.  Gender: male   Referring Physician :  Irma Biswas NP  Plan for Procedure: Monitored anaesthesia care  Indication: personal history of colon polyps  Procedure:   Colonoscopy    Surgeon(s) and Role:     * Gabriel Saini MD - Primary    Complications: None     Medicines: monitored anesthesia care    Procedure:  Prior to the procedure, a History and Physical was performed, and patient medications, allergies and sensitivities were reviewed.  The patient's tolerance of previous anesthesia was reviewed. The risks and benefits of the procedure and the sedation options and risks were discussed with the patient.  All questions were answered and informed consent was obtained.    After I obtained informed consent, the scope was passed under direct vision.  Throughout the procedure, the patient's blood pressure, pulse, and oxygen saturations were monitored continuously.  The Olympus scope CF - [id number] was introduced through the anus and advanced to the cecum, identified by appendiceal orifice and ileocecal valve.  The colonoscopy was performed without difficulty.  The patient tolerated the procedure well.  The quality of the bowel preparation was good     Findings:  hemorrhoids internal and external, Moderate in size    EBL: none  Impression:  Moderate internal hemorrhoids  Moderate external hemorrhoids  Otherwise normal colonoscopy to the cecum.    Recommendation:  Repeat exam: 5 years  Return to my office prn  Analpram bid for hemorrhoids  High fiber diet

## 2017-05-04 NOTE — H&P
Procedure : Colonoscopy    Indication(s):  personal history of colon polyps    Review of patient's allergies indicates:   Allergen Reactions    Codeine Diarrhea     Patient can not tolerate Tylenol #3, states he does okay with Codeine with cough medications        Past Medical History:   Diagnosis Date    Anticoagulant long-term use     APS (antiphospholipid syndrome)     Deep vein thrombosis     Diabetes mellitus     Elevated homocysteine     History of DVT (deep vein thrombosis) 2002    Obesity     Screening for colon cancer 5/4/2017       Prior to Admission medications    Medication Sig Start Date End Date Taking? Authorizing Provider   aspirin (ASPIRIN LOW DOSE) 81 MG EC tablet Take 81 mg by mouth once daily.   Yes Historical Provider, MD   atorvastatin (LIPITOR) 20 MG tablet TAKE 1 TABLET (20 MG TOTAL) BY MOUTH EVERY EVENING. 4/26/17  Yes Irma Biswas NP   buPROPion (WELLBUTRIN SR) 150 MG TBSR 12 hr tablet Take 1 tablet (150 mg total) by mouth 2 (two) times daily. 5/3/17 6/2/17 Yes Rox Begum NP   cholecalciferol, vitamin D3, (VITAMIN D3) 2,000 unit Cap Take 1 capsule by mouth once daily.   Yes Historical Provider, MD   fish oil-omega-3 fatty acids 300-1,000 mg capsule Take 2 g by mouth once daily.   Yes Historical Provider, MD   lancets (LANCETS, SUPER THIN) Misc 1 Stick by Misc.(Non-Drug; Combo Route) route 2 (two) times daily. 4/4/17  Yes Irma Biswas NP   lisinopril (PRINIVIL,ZESTRIL) 5 MG tablet Take 1 tablet (5 mg total) by mouth once daily. 3/28/17 3/28/18 Yes Iram Biswas NP   XARELTO 20 mg Tab Take 20 mg by mouth once daily. 2/17/17  Yes Historical Provider, MD   blood sugar diagnostic Strp 1 strip by Misc.(Non-Drug; Combo Route) route 2 (two) times daily. 4/4/17   Irma Biswas NP       Sedation Problems: NO    Family History   Problem Relation Age of Onset    No Known Problems Mother     No Known Problems Father     Diabetes Maternal Grandmother         Fam Hx of Sedation Problems: NO    Social History     Social History    Marital status: Single     Spouse name: N/A    Number of children: N/A    Years of education: N/A     Occupational History    Not on file.     Social History Main Topics    Smoking status: Former Smoker     Packs/day: 1.00     Years: 30.00     Types: Cigarettes     Start date: 3/28/1987     Quit date: 4/12/2017    Smokeless tobacco: Never Used    Alcohol use Yes      Comment: Occasionally 3-4 drinks per month at the most    Drug use: No    Sexual activity: Yes      Comment: single-in a relationship     Other Topics Concern    Not on file     Social History Narrative       Review of Systems -     Respiratory ROS: no cough, shortness of breath, or wheezing  Cardiovascular ROS: no chest pain or dyspnea on exertion  Gastrointestinal ROS: no abdominal pain, change in bowel habits, or black or bloody stools  Musculoskeletal ROS: negative  Neurological ROS: no TIA or stroke symptoms        Physical Exam:  General: no distress  Head: normocephalic  Airway:  normal oropharynx, airway normal  Neck: supple, symmetrical, trachea midline  Lungs:  normal respiratory effort  Heart: regular rate and rhythm  Abdomen: soft, non-tender non-distented; bowel sounds normal; no masses,  no organomegaly  Extremities: no cyanosis or edema, or clubbing       Deep Sedation: Mallampati Score per anesthesia     SedationPlan :Moderate     ASA : II    Patient is medically cleared for anesthesia.    Anesthesia/Surgery risks, benefits and alternative options discussed and understood by patient/family.

## 2017-05-04 NOTE — ANESTHESIA POSTPROCEDURE EVALUATION
Anesthesia Post Evaluation    Patient: North Najera    Procedure(s) Performed: Procedure(s) (LRB):  COLONOSCOPY (N/A)    Final Anesthesia Type: general  Patient location during evaluation: PACU  Patient participation: Yes- Able to Participate  Level of consciousness: awake and alert and oriented  Post-procedure vital signs: reviewed and stable  Pain management: adequate  Airway patency: patent  PONV status at discharge: No PONV  Anesthetic complications: no      Cardiovascular status: blood pressure returned to baseline  Respiratory status: unassisted  Hydration status: euvolemic  Follow-up not needed.        Visit Vitals    /78    Temp 36.1 °C (97 °F)    Resp 18    SpO2 96%       Pain/Jackeline Score: Presence of Pain: denies (5/4/2017 11:25 AM)  Jackeline Score: 10 (5/4/2017 11:25 AM)

## 2017-05-04 NOTE — IP AVS SNAPSHOT
[address] [address]  Phone: [phone]           Patient Discharge Instructions   Our goal is to set you up for success. This packet includes information on your condition, medications, and your home care.  It will help you care for yourself to prevent having to return to the hospital.     Please ask your nurse if you have any questions.      There are many details to remember when preparing to leave the hospital. Here is what you will need to do:    1. Take your medicine. If you are prescribed medications, review your Medication List on the following pages. You may have new medications to  at the pharmacy and others that you'll need to stop taking. Review the instructions for how and when to take your medications. Talk with your doctor or nurses if you are unsure of what to do.     2. Go to your follow-up appointments. Specific follow-up information is listed in the following pages. Your may be contacted by a nurse or clinical provider about future appointments. Be sure we have all of the phone numbers to reach you. Please contact your provider's office if you are unable to make an appointment.     3. Watch for warning signs. Your doctor or nurse will give you detailed warning signs to watch for and when to call for assistance. These instructions may also include educational information about your condition. If you experience any of warning signs to your health, call your doctor.           [facility]  Unless otherwise directed by your provider, please   contact [facility], our nurse care line   that is available for 24/7 assistance.     [phone] (toll-free)     Registered nurses in the [facility]   provide: appointment scheduling, clinical advisement, health education, and other advisory services.                  ** Verify the list of medication(s) below is accurate and up to date. Carry this with you in case of emergency. If  your medications have changed, please notify your healthcare provider.             Medication List      START taking these medications        Additional Info                      hydrocortisone-pramoxine 2.5-1 % Crea   Commonly known as:  ANALPRAM-HC   Quantity:  30 g   Refills:  5    Instructions:  Place rectally 2 (two) times daily.     Begin Date    AM    Noon    PM    Bedtime         CONTINUE taking these medications        Additional Info                      ASPIRIN LOW DOSE 81 MG EC tablet   Refills:  0   Dose:  81 mg   Generic drug:  aspirin    Instructions:  Take 81 mg by mouth once daily.     Begin Date    AM    Noon    PM    Bedtime       atorvastatin 20 MG tablet   Commonly known as:  LIPITOR   Quantity:  30 tablet   Refills:  5    Instructions:  TAKE 1 TABLET (20 MG TOTAL) BY MOUTH EVERY EVENING.     Begin Date    AM    Noon    PM    Bedtime       blood sugar diagnostic Strp   Quantity:  100 each   Refills:  3   Dose:  1 strip    Instructions:  1 strip by Misc.(Non-Drug; Combo Route) route 2 (two) times daily.     Begin Date    AM    Noon    PM    Bedtime       buPROPion 150 MG TBSR 12 hr tablet   Commonly known as:  WELLBUTRIN SR   Quantity:  60 tablet   Refills:  0   Dose:  150 mg    Instructions:  Take 1 tablet (150 mg total) by mouth 2 (two) times daily.     Begin Date    AM    Noon    PM    Bedtime       fish oil-omega-3 fatty acids 300-1,000 mg capsule   Refills:  0   Dose:  2 g    Instructions:  Take 2 g by mouth once daily.     Begin Date    AM    Noon    PM    Bedtime       lancets Misc   Commonly known as:  LANCETS, SUPER THIN   Quantity:  100 each   Refills:  3   Dose:  1 Stick    Instructions:  1 Stick by Misc.(Non-Drug; Combo Route) route 2 (two) times daily.     Begin Date    AM    Noon    PM    Bedtime       lisinopril 5 MG tablet   Commonly known as:  PRINIVIL,ZESTRIL   Quantity:  30 tablet   Refills:  5   Dose:  5 mg    Instructions:  Take 1 tablet (5 mg total) by mouth once daily.      Begin Date    AM    Noon    PM    Bedtime       VITAMIN D3 2,000 unit Cap   Refills:  0   Dose:  1 capsule   Generic drug:  cholecalciferol (vitamin D3)    Instructions:  Take 1 capsule by mouth once daily.     Begin Date    AM    Noon    PM    Bedtime       XARELTO 20 mg Tab   Refills:  3   Dose:  20 mg   Generic drug:  rivaroxaban    Instructions:  Take 20 mg by mouth once daily.     Begin Date    AM    Noon    PM    Bedtime            Where to Get Your Medications      These medications were sent to [facility]/pharmacy #[id number] - [place] - [address] [address]     Phone:  [phone]     hydrocortisone-pramoxine 2.5-1 % Crea                  Please bring to all follow up appointments:    1. A copy of your discharge instructions.  2. All medicines you are currently taking in their original bottles.  3. Identification and insurance card.    Please arrive 15 minutes ahead of scheduled appointment time.    Please call 24 hours in advance if you must reschedule your appointment and/or time.        Your Scheduled Appointments     Jun 29, 2017  8:45 AM CDT   Nurse Visit with [facility]   [place] [facility] ([facility])    [address] [facility] [address]   [phone]            Jul 06, 2017 10:30 AM CDT   Established Patient Visit with Irma Biswas NP   [place] [facility] ([facility])    [address] [facility] [address]   [phone]                Discharge Instructions     Future Orders    Activity as tolerated     Diet general     Questions:    Total calories:      Fat restriction, if any:      Protein restriction, if any:      Na restriction, if any:      Fluid restriction:      Additional restrictions:          Discharge Instructions         If sedated do not drive, smoke or drink alcohol for 10 hours  Avoid heavy lifting,straining or running for 3 days  You may not have a bowel movement for 1-3 days after procedure  You may return to  normal activities the day after  You may experience some bloating and excessive gas.  This can be relieved by walking, eating lightly,or a heating pad can be applied to the abdomen.   A small amount of blood from the return is not serious, especially if hemorrhoids are present,  Go to ER if you notice any;   Chills and/or fever over 101   Persistent vomiting or vomiting blood   Severe abdominal pain, other gas cramp   Severe chest pain   Black tarry stools   Bright red bleeding from your rectum (greater than 1 tablespoon    If EGD, please do not eat or drink until  _________________    Call your doctor for any unusual pain,bleeding or fever.      Admission Information     Date & Time Provider Department CSN    5/4/2017  8:12 AM Gabriel Saini MD [facility] [id number]      Care Providers     Provider Role Specialty Primary office phone    Gabriel Saini MD Attending Provider Colon and Rectal Surgery [phone]    Gabriel Saini MD Surgeon  Colon and Rectal Surgery [phone]      Your Vitals Were     BP Temp Resp SpO2          117/72 97 °F (36.1 °C) 17 96%        Recent Lab Values     No lab values to display.      Allergies as of 5/4/2017        Reactions    Codeine Diarrhea    Patient can not tolerate Tylenol #3, states he does okay with Codeine with cough medications       Advance Directives     An advance directive is a document which, in the event you are no longer able to make decisions for yourself, tells your healthcare team what kind of treatment you do or do not want to receive, or who you would like to make those decisions for you.  If you do not currently have an advance directive, [facility] encourages you to create one.  For more information call:  [phone]), [phone] ([phone]),  or log on to [email or web address].        Smoking Cessation     If you would like to quit smoking:   You may be eligible for free services if you are a [place] resident and  started smoking cigarettes before September 1, 1988.  Call the [employer]oking [employer]essation [employer] (SCT) toll free at (159) 603-7225 or (829) 165-0151.   Call 1-800-QUIT-NOW if you do not meet the above criteria.   Contact us via email: [email or web address]   View our website for more information: [email or web address]        Language Assistance Services     ATTENTION: Language assistance services are available, free of charge. Please call [phone].      ATENCIÓN: Si habla español, tiene a felipe disposición servicios gratuitos de asistencia lingüística. Llame al [phone].     CHÚ Ý: N?u b?n nói Ti?ng Vi?t, có các d?ch v? h? tr? ngôn ng? mi?n phí dành cho b?n. G?i s? [phone].        Diabetes Discharge Instructions                                   [facility] Information           [facility] Sign-Up     Activating your [facility] account is as easy as 1-2-3!     1) Visit [email or web address], select Sign Up Now, enter this activation code and your date of birth, then select Next.  [email or web address]  Expires: 5/12/2017 11:42 AM      2) Create a username and password to use when you visit [facility] in the future and select a security question in case you lose your password and select Next.    3) Enter your e-mail address and click Sign Up!    Additional Information  If you have questions, please e-mail [email or web address] or call [phone] to talk to our [facility] staff. Remember, [facility] is NOT to be used for urgent needs. For medical emergencies, dial 911.          [facility] complies with applicable Federal civil rights laws and does not discriminate on the basis of race, color, national origin, age, disability, or sex.

## 2017-05-04 NOTE — DISCHARGE SUMMARY
Discharge Note  Short Stay      SUMMARY     Admit Date: 5/4/2017    Attending Physician: Gabriel Saini MD     Discharge Physician: Gabriel Saini MD    Discharge Date: 5/4/2017 11:21 AM    Admission Diagnosis: personal history of colon polyps    Final Diagnosis:  Hemorrhoids    Procedures Performed:    Colonoscopy    Disposition: Home or Self Care    Condition at Discharge:  Stable    Patient Instructions:   Current Discharge Medication List      START taking these medications    Details   hydrocortisone-pramoxine (ANALPRAM-HC) 2.5-1 % Crea Place rectally 2 (two) times daily.  Qty: 30 g, Refills: 5         CONTINUE these medications which have NOT CHANGED    Details   aspirin (ASPIRIN LOW DOSE) 81 MG EC tablet Take 81 mg by mouth once daily.      atorvastatin (LIPITOR) 20 MG tablet TAKE 1 TABLET (20 MG TOTAL) BY MOUTH EVERY EVENING.  Qty: 30 tablet, Refills: 5    Associated Diagnoses: Hyperlipidemia LDL goal <70      buPROPion (WELLBUTRIN SR) 150 MG TBSR 12 hr tablet Take 1 tablet (150 mg total) by mouth 2 (two) times daily.  Qty: 60 tablet, Refills: 0    Associated Diagnoses: Nicotine dependence      cholecalciferol, vitamin D3, (VITAMIN D3) 2,000 unit Cap Take 1 capsule by mouth once daily.      fish oil-omega-3 fatty acids 300-1,000 mg capsule Take 2 g by mouth once daily.      lancets (LANCETS, SUPER THIN) Misc 1 Stick by Misc.(Non-Drug; Combo Route) route 2 (two) times daily.  Qty: 100 each, Refills: 3    Associated Diagnoses: Uncontrolled type 2 diabetes mellitus without complication, without long-term current use of insulin      lisinopril (PRINIVIL,ZESTRIL) 5 MG tablet Take 1 tablet (5 mg total) by mouth once daily.  Qty: 30 tablet, Refills: 5    Associated Diagnoses: Uncontrolled type 2 diabetes mellitus without complication, without long-term current use of insulin      XARELTO 20 mg Tab Take 20 mg by mouth once daily.  Refills: 3      blood sugar diagnostic Strp 1 strip by Misc.(Non-Drug; Combo Route)  route 2 (two) times daily.  Qty: 100 each, Refills: 3    Associated Diagnoses: Uncontrolled type 2 diabetes mellitus without complication, without long-term current use of insulin             Discharge Procedure Orders (must include Diet, Follow-up, Activity)    Discharge Procedure Orders (must include Diet, Follow-up, Activity)  Diet general     Activity as tolerated          Repeat colonoscopy 5 years.  High fiber diet  Analpram bid for hemorrhoids  RTO prn

## 2017-05-04 NOTE — TRANSFER OF CARE
Anesthesia Transfer of Care Note    Patient: North Najera    Procedure(s) Performed: Procedure(s) (LRB):  COLONOSCOPY (N/A)    Patient location: PACU    Anesthesia Type: MAC    Transport from OR: Transported from OR on room air with adequate spontaneous ventilation    Post pain: adequate analgesia    Post assessment: no apparent anesthetic complications    Post vital signs: stable    Level of consciousness: awake    Nausea/Vomiting: no nausea/vomiting    Complications: none    Transfer of care protocol was followed      Last vitals:   Visit Vitals    /66    Temp 36.1 °C (97 °F)    Resp 16    SpO2 (!) 94%

## 2017-05-09 ENCOUNTER — CLINICAL SUPPORT (OUTPATIENT)
Dept: SMOKING CESSATION | Facility: CLINIC | Age: 57
End: 2017-05-09
Payer: COMMERCIAL

## 2017-05-09 VITALS
OXYGEN SATURATION: 96 % | RESPIRATION RATE: 18 BRPM | DIASTOLIC BLOOD PRESSURE: 78 MMHG | TEMPERATURE: 97 F | SYSTOLIC BLOOD PRESSURE: 118 MMHG

## 2017-05-09 DIAGNOSIS — F17.200 NICOTINE DEPENDENCE: Primary | ICD-10-CM

## 2017-05-09 PROCEDURE — 90853 GROUP PSYCHOTHERAPY: CPT | Mod: S$GLB,,,

## 2017-05-10 NOTE — PROGRESS NOTES
Smoking Cessation Group Session #4    Site: [facility]  Date:  5/9/2017  Clinical Status of Patient: Outpatient   Length of Service and Code: 90 minutes - 07922   Number in Attendance: 7  Group Activities/Focus of Group:  completion of TCRS (Tobacco Cessation Rating Scale) reviewed strategies, habitual behavior, stress, and high risk situations. Introduced stress with addition interventions, SOLVE, relaxation with interventions, nutrition, exercise, weight gain, and the importance of rewarding oneself for accomplishments toward becoming tobacco free. Open discussion of all items with interventions.     Target symptoms:  withdrawal and medication side effects             The following were rated moderate (3) to severe (4) on TCRS:       Moderate 3:  None      Severe 4:    None   Patient's Response to Intervention:  Patient is smoke free since 4/12/2017 and remains on the prescribed tobacco cessation medication regimen of 10 mg nicotine inhaler and 150 mg Wellbutrin BID without any negative side effects at this time. CO level = 4 ppm.   Progress Toward Goals and Other Mental Status Changes: The patient denies any abnormal behavioral or mental changes at this time.     Diagnosis: Z72.0  Plan: The patient will continue with group therapy sessions and medication monitoring by CTTS. Prescribed medication management will be by physician.     Return to Clinic: 1 week    Quit Date: 4/12/2017

## 2017-05-15 ENCOUNTER — HOSPITAL ENCOUNTER (OUTPATIENT)
Dept: RADIOLOGY | Facility: HOSPITAL | Age: 57
Discharge: HOME | End: 2017-05-15
Attending: NURSE PRACTITIONER
Payer: COMMERCIAL

## 2017-05-15 ENCOUNTER — OFFICE VISIT (OUTPATIENT)
Dept: INTERNAL MEDICINE | Facility: CLINIC | Age: 57
End: 2017-05-15
Payer: COMMERCIAL

## 2017-05-15 ENCOUNTER — TELEPHONE (OUTPATIENT)
Dept: INTERNAL MEDICINE | Facility: CLINIC | Age: 57
End: 2017-05-15

## 2017-05-15 VITALS
HEART RATE: 60 BPM | BODY MASS INDEX: 32.98 KG/M2 | OXYGEN SATURATION: 97 % | HEIGHT: 74 IN | DIASTOLIC BLOOD PRESSURE: 74 MMHG | WEIGHT: 257 LBS | RESPIRATION RATE: 20 BRPM | SYSTOLIC BLOOD PRESSURE: 112 MMHG

## 2017-05-15 DIAGNOSIS — J34.89 FRONTAL SINUS PAIN: ICD-10-CM

## 2017-05-15 DIAGNOSIS — T14.90XA INJURY: ICD-10-CM

## 2017-05-15 DIAGNOSIS — R51.9 FACE PAIN: Primary | ICD-10-CM

## 2017-05-15 DIAGNOSIS — R51.9 FACE PAIN: ICD-10-CM

## 2017-05-15 PROCEDURE — 3046F HEMOGLOBIN A1C LEVEL >9.0%: CPT | Mod: 8P,S$GLB,, | Performed by: NURSE PRACTITIONER

## 2017-05-15 PROCEDURE — 70150 X-RAY EXAM OF FACIAL BONES: CPT | Mod: TC

## 2017-05-15 PROCEDURE — 99213 OFFICE O/P EST LOW 20 MIN: CPT | Mod: S$GLB,,, | Performed by: NURSE PRACTITIONER

## 2017-05-15 PROCEDURE — 4010F ACE/ARB THERAPY RXD/TAKEN: CPT | Mod: S$GLB,,, | Performed by: NURSE PRACTITIONER

## 2017-05-15 PROCEDURE — 1160F RVW MEDS BY RX/DR IN RCRD: CPT | Mod: S$GLB,,, | Performed by: NURSE PRACTITIONER

## 2017-05-15 PROCEDURE — 99999 PR PBB SHADOW E&M-EST. PATIENT-LVL IV: CPT | Mod: PBBFAC,,, | Performed by: NURSE PRACTITIONER

## 2017-05-15 PROCEDURE — 70150 X-RAY EXAM OF FACIAL BONES: CPT | Mod: 26,,, | Performed by: RADIOLOGY

## 2017-05-15 RX ORDER — BUTALBITAL, ACETAMINOPHEN AND CAFFEINE 50; 325; 40 MG/1; MG/1; MG/1
1 TABLET ORAL EVERY 4 HOURS PRN
Qty: 30 TABLET | Refills: 0 | Status: SHIPPED | OUTPATIENT
Start: 2017-05-15 | End: 2017-06-14

## 2017-05-15 RX ORDER — PIOGLITAZONEHYDROCHLORIDE 15 MG/1
15 TABLET ORAL DAILY
Qty: 30 TABLET | Refills: 5 | Status: SHIPPED | OUTPATIENT
Start: 2017-05-15 | End: 2017-07-06

## 2017-05-15 RX ORDER — LANCETS 28 GAUGE
EACH MISCELLANEOUS
Refills: 3 | COMMUNITY
Start: 2017-04-04 | End: 2021-12-03
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
## 2017-05-15 NOTE — MR AVS SNAPSHOT
[facility]  [address] [place] [address]  Phone: [phone]  Fax: [phone]                  [place]   5/15/2017 10:45 AM   Office Visit    Description:  Male : 1960   Provider:  Irma Biswas NP   Department:  [facility]           Reason for Visit     Facial Injury           Diagnoses this Visit        Comments    Face pain    -  Primary     Frontal sinus pain         Injury         Uncontrolled type 2 diabetes mellitus without complication, without long-term current use of insulin                To Do List           Future Appointments        Provider Department Dept Phone    2017 8:45 AM [place], [facility] [phone]    2017 10:30 AM Irma Biswas NP [facility] [phone]      Goals (5 Years of Data)     None      Follow-Up and Disposition     Return if symptoms worsen or fail to improve.       These Medications        Disp Refills Start End    pioglitazone (ACTOS) 15 MG tablet 30 tablet 5 5/15/2017 2017    Take 1 tablet (15 mg total) by mouth once daily. - Oral    Pharmacy: [facility]/pharmacy #[id number] - [place] - [address]  #: [phone]         [facility]     [facility] -  Assistance  Unless otherwise directed by your provider, please contact [facility], our nurse care line that is available for  assistance.     Registered nurses in the [facility] provide: appointment scheduling, clinical advisement, health education, and other advisory services.  Call: [phone] (toll free)               Medications           Message regarding Medications     Verify the changes and/or additions to your medication regime listed below are the same as discussed with your clinician today.  If any of these changes or additions are incorrect, please notify your healthcare provider.        START taking these NEW medications   "      Refills    pioglitazone (ACTOS) 15 MG tablet 5    Sig: Take 1 tablet (15 mg total) by mouth once daily.    Class: Normal    Route: Oral           Verify that the below list of medications is an accurate representation of the medications you are currently taking.  If none reported, the list may be blank. If incorrect, please contact your healthcare provider. Carry this list with you in case of emergency.           Current Medications     aspirin (ASPIRIN LOW DOSE) 81 MG EC tablet Take 81 mg by mouth once daily.    atorvastatin (LIPITOR) 20 MG tablet TAKE 1 TABLET (20 MG TOTAL) BY MOUTH EVERY EVENING.    blood sugar diagnostic Strp 1 strip by Misc.(Non-Drug; Combo Route) route 2 (two) times daily.    buPROPion (WELLBUTRIN SR) 150 MG TBSR 12 hr tablet Take 1 tablet (150 mg total) by mouth 2 (two) times daily.    cholecalciferol, vitamin D3, (VITAMIN D3) 2,000 unit Cap Take 1 capsule by mouth once daily.    fish oil-omega-3 fatty acids 300-1,000 mg capsule Take 2 g by mouth once daily.    FREESTYLE LANCETS 28 gauge lancets 1 STICK BY MISC.(NON-DRUG COMBO ROUTE) ROUTE 2 (TWO) TIMES DAILY.    lancets (LANCETS, SUPER THIN) Misc 1 Stick by Misc.(Non-Drug; Combo Route) route 2 (two) times daily.    lisinopril (PRINIVIL,ZESTRIL) 5 MG tablet Take 1 tablet (5 mg total) by mouth once daily.    XARELTO 20 mg Tab Take 20 mg by mouth once daily.    pioglitazone (ACTOS) 15 MG tablet Take 1 tablet (15 mg total) by mouth once daily.           Clinical Reference Information           Your Vitals Were     BP Pulse Resp Height Weight SpO2    112/74 60 20 6' 2" (1.88 m) 116.6 kg (257 lb) 97%    BMI                33 kg/m2          Blood Pressure          Most Recent Value    BP  112/74      Allergies as of 5/15/2017     Codeine      Immunizations Administered on Date of Encounter - 5/15/2017     None      Orders Placed During Today's Visit     Future Labs/Procedures Expected by Expires    X-Ray Facial Bones  3 Or More View  " 5/15/2017 5/15/2018      Instructions      Diabetes and Heart Disease     Take your medicines as directed each day, even if you feel fine.   If you have diabetes, you are two to four times more likely to have heart disease than someone without diabetes. This higher risk is due to diabetes, but it is also due to other risk factors for heart disease that happen in people with diabetes. But theres good news. You can help control your health risks by making some changes in your life. You can take steps to reduce your risk of heart disease by half--similar to the risk in people who don't have diabetes.  Your main risk factors  Three major risk factors for heart disease are high blood sugar, high blood pressure, and high levels of lipids. By keeping risk factors under control, you can help keep your heart and arteries healthy. This may reduce your chances of a heart attack.  · Blood sugar. High blood sugar can make artery walls tough and rough. Plaque (waxy material in the blood) can then build up along the artery walls, making it harder for blood to flow through the arteries. Having high blood sugar increases the chances of having high blood pressure and high cholesterol.  · Blood pressure. When blood pressure is high all the time it causes your heart to work harder to pump blood. Artery walls become damaged. This increases the risk for plaque build up.  · Lipids. The body needs some lipids in the blood to stay healthy. But lipid levels that are too high can damage the artery walls. Lipids include cholesterol and triglycerides. There are two kinds of cholesterol. LDL (bad) cholesterol can damage the arteries. But HDL (good) cholesterol helps clear LDL cholesterol from the blood vessels. This helps keep the arteries healthy. When blood sugar is high, the level of triglycerides in the blood may also be high. High blood triglyceride levels can cause plaque to form.   Other risk factors  Certain lifestyle factors can  increase levels of your blood sugar, blood pressure, and lipids. Such increases raise your risk of heart disease:  · Smoking damages the lining of your arteries. This allows plaque to build up in the artery walls. Smoking also constricts (narrows) the arteries. This can raise blood pressure and cause chest pain or angina. Smoking also increases your risk of getting type 2 diabetes.  · Not being active makes it harder for your heart to do its work. Inactivity is linked to many other risk factors, such as high blood pressure and poor cholesterol levels. Inactivity also increases your risk of getting type 2 diabetes.  · Being overweight makes it harder for your body to use insulin. It also makes your heart work too hard. Being overweight is also the main contributor to the development of type 2 diabetes,   Changes you can make  Following a few simple steps can help keep your risk factors under control. Work with your healthcare team to reach your goals.  · Quitting smoking could save your life. Smoking damages the lining of the blood vessels and raises blood pressure. Smoking also affects how your body uses insulin. This makes it harder to keep blood sugar under control. If you smoke and need help quitting, talk to your healthcare team.   · Testing your blood sugar is the only way to know whether it is under control. Be sure to test your blood sugar yourself. Also get your blood tested in the lab, as directed.  · Monitoring your blood pressure and lipid levels can help you achieve safe levels. Visit your healthcare team as scheduled.  · Taking medicines as directed can help control blood sugar, blood pressure, blood clotting, and/or cholesterol levels.  · Eating right can reduce your risk factors and help you lose weight. Try to limit the amount of processed or refined carbohydrates you eat at one time. Cut back on your total calorie intake. Eat foods low in saturated fat and cholesterol. Eat fiber, including  vegetables and whole grains, and cut down on salt. A dietitian or diabetes educator can help form a meal plan that works for you--even if you are on a low budget.   · Being active can help reduce your weight, strengthen your heart, and lower your lipid levels and blood pressure. Exercise and activity are good for your whole body. Talk to your healthcare team about increasing your activity safely over time.  · Keeping your appointments with your healthcare provider helps you stay healthy. Go in for checkups and lab tests as scheduled.  Date Last Reviewed: 5/19/2016  © 4640-9702 [employer]. [address] [place] [address]. All rights reserved. This information is not intended as a substitute for professional medical care. Always follow your healthcare professional's instructions.             Language Assistance Services     ATTENTION: Language assistance services are available, free of charge. Please call [phone].      ATENCIÓN: Si habla español, tiene a felipe disposición servicios gratuitos de asistencia lingüística. Llame al [phone].     SARAVANAN Ý: N?u b?n nói Ti?ng Vi?t, có các d?ch v? h? tr? ngôn ng? mi?n phí dành cho b?n. G?i s? [phone].         [facility] complies with applicable Federal civil rights laws and does not discriminate on the basis of race, color, national origin, age, disability, or sex.

## 2017-05-15 NOTE — PATIENT INSTRUCTIONS
Diabetes and Heart Disease     Take your medicines as directed each day, even if you feel fine.   If you have diabetes, you are two to four times more likely to have heart disease than someone without diabetes. This higher risk is due to diabetes, but it is also due to other risk factors for heart disease that happen in people with diabetes. But theres good news. You can help control your health risks by making some changes in your life. You can take steps to reduce your risk of heart disease by half--similar to the risk in people who don't have diabetes.  Your main risk factors  Three major risk factors for heart disease are high blood sugar, high blood pressure, and high levels of lipids. By keeping risk factors under control, you can help keep your heart and arteries healthy. This may reduce your chances of a heart attack.  · Blood sugar. High blood sugar can make artery walls tough and rough. Plaque (waxy material in the blood) can then build up along the artery walls, making it harder for blood to flow through the arteries. Having high blood sugar increases the chances of having high blood pressure and high cholesterol.  · Blood pressure. When blood pressure is high all the time it causes your heart to work harder to pump blood. Artery walls become damaged. This increases the risk for plaque build up.  · Lipids. The body needs some lipids in the blood to stay healthy. But lipid levels that are too high can damage the artery walls. Lipids include cholesterol and triglycerides. There are two kinds of cholesterol. LDL (bad) cholesterol can damage the arteries. But HDL (good) cholesterol helps clear LDL cholesterol from the blood vessels. This helps keep the arteries healthy. When blood sugar is high, the level of triglycerides in the blood may also be high. High blood triglyceride levels can cause plaque to form.   Other risk factors  Certain lifestyle factors can increase levels of your blood sugar, blood  pressure, and lipids. Such increases raise your risk of heart disease:  · Smoking damages the lining of your arteries. This allows plaque to build up in the artery walls. Smoking also constricts (narrows) the arteries. This can raise blood pressure and cause chest pain or angina. Smoking also increases your risk of getting type 2 diabetes.  · Not being active makes it harder for your heart to do its work. Inactivity is linked to many other risk factors, such as high blood pressure and poor cholesterol levels. Inactivity also increases your risk of getting type 2 diabetes.  · Being overweight makes it harder for your body to use insulin. It also makes your heart work too hard. Being overweight is also the main contributor to the development of type 2 diabetes,   Changes you can make  Following a few simple steps can help keep your risk factors under control. Work with your healthcare team to reach your goals.  · Quitting smoking could save your life. Smoking damages the lining of the blood vessels and raises blood pressure. Smoking also affects how your body uses insulin. This makes it harder to keep blood sugar under control. If you smoke and need help quitting, talk to your healthcare team.   · Testing your blood sugar is the only way to know whether it is under control. Be sure to test your blood sugar yourself. Also get your blood tested in the lab, as directed.  · Monitoring your blood pressure and lipid levels can help you achieve safe levels. Visit your healthcare team as scheduled.  · Taking medicines as directed can help control blood sugar, blood pressure, blood clotting, and/or cholesterol levels.  · Eating right can reduce your risk factors and help you lose weight. Try to limit the amount of processed or refined carbohydrates you eat at one time. Cut back on your total calorie intake. Eat foods low in saturated fat and cholesterol. Eat fiber, including vegetables and whole grains, and cut down on salt. A  dietitian or diabetes educator can help form a meal plan that works for you--even if you are on a low budget.   · Being active can help reduce your weight, strengthen your heart, and lower your lipid levels and blood pressure. Exercise and activity are good for your whole body. Talk to your healthcare team about increasing your activity safely over time.  · Keeping your appointments with your healthcare provider helps you stay healthy. Go in for checkups and lab tests as scheduled.  Date Last Reviewed: 5/19/2016  © 4789-3630 [employer]. [address] [place] [address], [place] [address]. All rights reserved. This information is not intended as a substitute for professional medical care. Always follow your healthcare professional's instructions.

## 2017-05-16 ENCOUNTER — CLINICAL SUPPORT (OUTPATIENT)
Dept: SMOKING CESSATION | Facility: CLINIC | Age: 57
End: 2017-05-16
Payer: COMMERCIAL

## 2017-05-16 DIAGNOSIS — F17.200 NICOTINE DEPENDENCE: Primary | ICD-10-CM

## 2017-05-16 PROCEDURE — 90853 GROUP PSYCHOTHERAPY: CPT | Mod: S$GLB,,,

## 2017-05-17 NOTE — PROGRESS NOTES
Smoking Cessation Group Session #5    Site: [facility]  Date:  5/16/2017  Clinical Status of Patient: Outpatient   Length of Service and Code: 90 minutes - 11740   Number in Attendance: 6  Group Activities/Focus of Group: completion of TCRS (Tobacco Cessation Rating Scale) reviewed strategies, habitual behavior, high risks situations, understanding urges and cravings, stress and relaxation with open discussion and additional interventions, Introduced lapses, relapses, understanding them and analyzing the situation of a lapse, conflict issues that may be linked to a lapse.     Target symptoms:  withdrawal and medication side effects             The following were rated moderate (3) to severe (4) on TCRS:       Moderate 3:  None      Severe 4:    None   Patient's Response to Intervention:  Patient is smoke free since 4/12/2017 and remains on the prescribed tobacco cessation medication regimen of 150 mg Wellbutrin BID and 10 mg nicotine inhaler without any negative side effects at this time. CO level = 4 ppm.   Progress Toward Goals and Other Mental Status Changes: The patient denies any abnormal behavioral or mental changes at this time.     Diagnosis: Z72.0  Plan: The patient will continue with group therapy sessions and medication monitoring by CTTS. Prescribed medication management will be by physician.     Return to Clinic: 1 week    Quit Date: 4/12/2017

## 2017-05-23 ENCOUNTER — CLINICAL SUPPORT (OUTPATIENT)
Dept: SMOKING CESSATION | Facility: CLINIC | Age: 57
End: 2017-05-23
Payer: COMMERCIAL

## 2017-05-23 DIAGNOSIS — F17.200 NICOTINE DEPENDENCE: Primary | ICD-10-CM

## 2017-05-23 PROCEDURE — 90853 GROUP PSYCHOTHERAPY: CPT | Mod: S$GLB,,,

## 2017-05-24 RX ORDER — BUPROPION HYDROCHLORIDE 150 MG/1
150 TABLET, EXTENDED RELEASE ORAL 2 TIMES DAILY
Qty: 60 TABLET | Refills: 0 | Status: SHIPPED | OUTPATIENT
Start: 2017-05-24 | End: 2017-06-23

## 2017-05-24 NOTE — PROGRESS NOTES
Smoking Cessation Group Session #6    Site: [facility]  Date:  5/23/2017  Clinical Status of Patient: Outpatient   Length of Service and Code: 90 minutes - 94991   Number in Attendance: 7  Group Activities/Focus of Group:  completion of TCRS (Tobacco Cessation Rating Scale) reviewed strategies, cues, triggers, high risk situations, lapses, relapses, diet, exercise, stress, relaxation, sleep, habitual behavior, and life style changes.    Target symptoms:  withdrawal and medication side effects             The following were rated moderate (3) to severe (4) on TCRS:       Moderate 3:  Desire or crave tobacco; discussed with patient this is a normal withdrawal symptom.      Severe 4:   None   Patient's Response to Intervention:  Patient is smoke free since 4/12/2017 and remains on the prescribed tobacco cessation medication regimen of 10 mg nicotine inhaler and 150 mg Wellbutrin BID without any negative side effects at this time. CO level = 4 ppm.   Progress Toward Goals and Other Mental Status Changes: The patient denies any abnormal behavioral or mental changes at this time.       Diagnosis: Z72.0  Plan: The patient will continue with group therapy sessions and medication monitoring by CTTS. Prescribed medication management will be by physician.     Return to Clinic: 2 weeks    Quit Date: 4/12/2017

## 2017-06-06 ENCOUNTER — CLINICAL SUPPORT (OUTPATIENT)
Dept: SMOKING CESSATION | Facility: CLINIC | Age: 57
End: 2017-06-06
Payer: COMMERCIAL

## 2017-06-06 DIAGNOSIS — F17.200 NICOTINE DEPENDENCE: Primary | ICD-10-CM

## 2017-06-06 PROCEDURE — 99407 BEHAV CHNG SMOKING > 10 MIN: CPT | Mod: S$GLB,,,

## 2017-06-06 RX ORDER — VARENICLINE TARTRATE 1 MG/1
1 TABLET, FILM COATED ORAL 2 TIMES DAILY
Qty: 60 TABLET | Refills: 0 | Status: SHIPPED | OUTPATIENT
Start: 2017-06-06 | End: 2017-07-06

## 2017-06-24 DIAGNOSIS — F17.200 NICOTINE DEPENDENCE: ICD-10-CM

## 2017-06-26 RX ORDER — BUPROPION HYDROCHLORIDE 150 MG/1
150 TABLET, EXTENDED RELEASE ORAL 2 TIMES DAILY
Qty: 60 TABLET | Refills: 0 | Status: SHIPPED | OUTPATIENT
Start: 2017-06-26 | End: 2017-07-31 | Stop reason: SDUPTHER

## 2017-06-29 ENCOUNTER — CLINICAL SUPPORT (OUTPATIENT)
Dept: INTERNAL MEDICINE | Facility: CLINIC | Age: 57
End: 2017-06-29
Payer: COMMERCIAL

## 2017-06-29 ENCOUNTER — TELEPHONE (OUTPATIENT)
Dept: INTERNAL MEDICINE | Facility: CLINIC | Age: 57
End: 2017-06-29

## 2017-06-29 ENCOUNTER — CLINICAL SUPPORT (OUTPATIENT)
Dept: SMOKING CESSATION | Facility: CLINIC | Age: 57
End: 2017-06-29
Payer: COMMERCIAL

## 2017-06-29 DIAGNOSIS — D68.61 ANTIPHOSPHOLIPID SYNDROME: ICD-10-CM

## 2017-06-29 DIAGNOSIS — E78.5 HYPERLIPIDEMIA LDL GOAL <70: ICD-10-CM

## 2017-06-29 DIAGNOSIS — F17.200 NICOTINE DEPENDENCE: Primary | ICD-10-CM

## 2017-06-29 DIAGNOSIS — Z72.0 TOBACCO ABUSE: ICD-10-CM

## 2017-06-29 LAB
ALBUMIN SERPL BCP-MCNC: 4.3 G/DL
ALP SERPL-CCNC: 60 U/L
ALT SERPL W/O P-5'-P-CCNC: 29 U/L
ANION GAP SERPL CALC-SCNC: 7 MMOL/L
AST SERPL-CCNC: 29 U/L
BASOPHILS # BLD AUTO: 0.02 K/UL
BASOPHILS NFR BLD: 0.2 %
BILIRUB SERPL-MCNC: 1.2 MG/DL
BUN SERPL-MCNC: 17 MG/DL
CALCIUM SERPL-MCNC: 10.1 MG/DL
CHLORIDE SERPL-SCNC: 103 MMOL/L
CHOLEST/HDLC SERPL: 3.5 {RATIO}
CO2 SERPL-SCNC: 26 MMOL/L
CREAT SERPL-MCNC: 0.9 MG/DL
CREAT UR-MCNC: 105 MG/DL
DIFFERENTIAL METHOD: ABNORMAL
EOSINOPHIL # BLD AUTO: 0.2 K/UL
EOSINOPHIL NFR BLD: 2.1 %
ERYTHROCYTE [DISTWIDTH] IN BLOOD BY AUTOMATED COUNT: 13.8 %
EST. GFR  (AFRICAN AMERICAN): >60 ML/MIN/1.73 M^2
EST. GFR  (NON AFRICAN AMERICAN): >60 ML/MIN/1.73 M^2
GLUCOSE SERPL-MCNC: 112 MG/DL
HCT VFR BLD AUTO: 44.2 %
HDL/CHOLESTEROL RATIO: 28.2 %
HDLC SERPL-MCNC: 124 MG/DL
HDLC SERPL-MCNC: 35 MG/DL
HGB BLD-MCNC: 14.9 G/DL
LDLC SERPL CALC-MCNC: 67 MG/DL
LYMPHOCYTES # BLD AUTO: 3 K/UL
LYMPHOCYTES NFR BLD: 31.6 %
MCH RBC QN AUTO: 32 PG
MCHC RBC AUTO-ENTMCNC: 33.7 %
MCV RBC AUTO: 95 FL
MICROALBUMIN UR DL<=1MG/L-MCNC: 33 UG/ML
MICROALBUMIN/CREATININE RATIO: 31.4 UG/MG
MONOCYTES # BLD AUTO: 0.9 K/UL
MONOCYTES NFR BLD: 10 %
NEUTROPHILS # BLD AUTO: 5.2 K/UL
NEUTROPHILS NFR BLD: 55.7 %
NONHDLC SERPL-MCNC: 89 MG/DL
PLATELET # BLD AUTO: 255 K/UL
PMV BLD AUTO: 9.9 FL
POTASSIUM SERPL-SCNC: 5.4 MMOL/L
PROT SERPL-MCNC: 8.1 G/DL
RBC # BLD AUTO: 4.65 M/UL
SODIUM SERPL-SCNC: 136 MMOL/L
TRIGL SERPL-MCNC: 110 MG/DL
TSH SERPL DL<=0.005 MIU/L-ACNC: 2.02 UIU/ML
WBC # BLD AUTO: 9.39 K/UL

## 2017-06-29 PROCEDURE — 36415 COLL VENOUS BLD VENIPUNCTURE: CPT

## 2017-06-29 PROCEDURE — 82570 ASSAY OF URINE CREATININE: CPT

## 2017-06-29 PROCEDURE — 85025 COMPLETE CBC W/AUTO DIFF WBC: CPT

## 2017-06-29 PROCEDURE — 99407 BEHAV CHNG SMOKING > 10 MIN: CPT | Mod: S$GLB,,,

## 2017-06-29 PROCEDURE — 84443 ASSAY THYROID STIM HORMONE: CPT

## 2017-06-29 PROCEDURE — 80053 COMPREHEN METABOLIC PANEL: CPT

## 2017-06-29 PROCEDURE — 83036 HEMOGLOBIN GLYCOSYLATED A1C: CPT

## 2017-06-29 PROCEDURE — 80061 LIPID PANEL: CPT

## 2017-06-29 PROCEDURE — 36415 COLL VENOUS BLD VENIPUNCTURE: CPT | Mod: S$GLB,,, | Performed by: NURSE PRACTITIONER

## 2017-06-29 RX ORDER — VARENICLINE TARTRATE 1 MG/1
1 TABLET, FILM COATED ORAL 2 TIMES DAILY
Qty: 60 TABLET | Refills: 0 | Status: SHIPPED | OUTPATIENT
Start: 2017-06-29 | End: 2017-07-06

## 2017-06-29 NOTE — TELEPHONE ENCOUNTER
----- Message from Lisa Robledo MA sent at 2017  8:49 AM CDT -----  Contact: Patient  `[place]  MRN: [id number]  : 1960  PCP: Irma Biswas  Home Phone      [phone]  Work Phone      Not on file.  Mobile          [phone]      MESSAGE: Patient needs a new script for test strips, 90 day script.  Free Style Lite..  Send to [facility]

## 2017-06-30 LAB
ESTIMATED AVG GLUCOSE: 154 MG/DL
HBA1C MFR BLD HPLC: 7 %

## 2017-07-06 ENCOUNTER — OFFICE VISIT (OUTPATIENT)
Dept: INTERNAL MEDICINE | Facility: CLINIC | Age: 57
End: 2017-07-06
Payer: COMMERCIAL

## 2017-07-06 ENCOUNTER — TELEPHONE (OUTPATIENT)
Dept: INTERNAL MEDICINE | Facility: CLINIC | Age: 57
End: 2017-07-06

## 2017-07-06 VITALS
HEIGHT: 74 IN | BODY MASS INDEX: 33.11 KG/M2 | SYSTOLIC BLOOD PRESSURE: 124 MMHG | DIASTOLIC BLOOD PRESSURE: 78 MMHG | HEART RATE: 71 BPM | RESPIRATION RATE: 20 BRPM | WEIGHT: 258 LBS | OXYGEN SATURATION: 97 %

## 2017-07-06 DIAGNOSIS — R17 ELEVATED BILIRUBIN: ICD-10-CM

## 2017-07-06 DIAGNOSIS — Z86.718 HISTORY OF DVT (DEEP VEIN THROMBOSIS): ICD-10-CM

## 2017-07-06 DIAGNOSIS — E78.5 HYPERLIPIDEMIA LDL GOAL <70: ICD-10-CM

## 2017-07-06 DIAGNOSIS — E87.5 HYPERKALEMIA: ICD-10-CM

## 2017-07-06 DIAGNOSIS — D68.61 ANTIPHOSPHOLIPID SYNDROME: ICD-10-CM

## 2017-07-06 DIAGNOSIS — E66.9 OBESITY (BMI 30-39.9): ICD-10-CM

## 2017-07-06 LAB — POTASSIUM SERPL-SCNC: 4.3 MMOL/L

## 2017-07-06 PROCEDURE — 4010F ACE/ARB THERAPY RXD/TAKEN: CPT | Mod: S$GLB,,, | Performed by: NURSE PRACTITIONER

## 2017-07-06 PROCEDURE — 3045F PR MOST RECENT HEMOGLOBIN A1C LEVEL 7.0-9.0%: CPT | Mod: S$GLB,,, | Performed by: NURSE PRACTITIONER

## 2017-07-06 PROCEDURE — 99214 OFFICE O/P EST MOD 30 MIN: CPT | Mod: S$GLB,,, | Performed by: NURSE PRACTITIONER

## 2017-07-06 PROCEDURE — 99999 PR PBB SHADOW E&M-EST. PATIENT-LVL V: CPT | Mod: PBBFAC,,, | Performed by: NURSE PRACTITIONER

## 2017-07-06 PROCEDURE — 84132 ASSAY OF SERUM POTASSIUM: CPT

## 2017-07-06 RX ORDER — METFORMIN HYDROCHLORIDE 500 MG/1
500 TABLET ORAL
Qty: 30 TABLET | Refills: 5 | Status: SHIPPED | OUTPATIENT
Start: 2017-07-06 | End: 2017-10-24 | Stop reason: SDUPTHER

## 2017-07-06 RX ORDER — PIOGLITAZONEHYDROCHLORIDE 30 MG/1
30 TABLET ORAL DAILY
Qty: 30 TABLET | Refills: 5 | Status: SHIPPED | OUTPATIENT
Start: 2017-07-06 | End: 2017-10-24 | Stop reason: SDUPTHER

## 2017-07-06 NOTE — TELEPHONE ENCOUNTER
----- Message from Lisa Robledo MA sent at 2017  2:26 PM CDT -----  Contact: Patient  [place]  MRN: [id number]  : 1960  PCP: Irma Biswas  Home Phone      [phone]  Work Phone      Not on file.  Mobile          [phone]      MESSAGE: Patient states that Irma told him that his potassium was high.  He says he eats greek yogurt every morning for breakfast. He thinks that's causing his potassium to be elevated.  Please [phone]

## 2017-07-06 NOTE — PATIENT INSTRUCTIONS
4 Steps for Eating Healthier  Changing the way you eat can improve your health. It can lower your cholesterol and blood pressure, and help you stay at a healthy weight. Your diet doesnt have to be bland and boring to be healthy. Just watch your calories and follow these steps:    1. Eat fewer unhealthy fats  · Choose more fish and lean meats instead of fatty cuts of meat.  · Skip butter and lard, and use less margarine.  · Pass on foods that have palm, coconut, or hydrogenated oils.  · Eat fewer high-fat dairy foods like cheese, ice cream, and whole milk.  · Get a heart-healthy cookbook and try some low-fat recipes.  2. Go light on salt  · Keep the saltshaker off the table.  · Limit high-salt ingredients, such as soy sauce, bouillon, and garlic salt.  · Instead of adding salt when cooking, season your food with herbs and flavorings. Try lemon, garlic, and onion.  · Limit convenience foods, such as boxed or canned foods and restaurant food.  · Read food labels and choose lower-sodium options.  3. Limit sugar  · Pause before you add sugars to pancakes, cereal, coffee, or tea. This includes white and brown table sugar, syrup, honey, and molasses. Cut your usual amount by half.  · Use non-sugar sweeteners. Stevia, aspartame, and sucralose can satisfy a sweet tooth without adding calories.  · Swap out sugar-filled soda and other drinks. Buy sugar-free or low-calorie beverages. Remember water is always the best choice.  · Read labels and choose foods with less added sugar. Keep in mind that dairy foods and foods with fruit will have some natural sugar.  · Cut the sugar in recipes by 1/3 to 1/2. Boost the flavor with extracts like almond, vanilla, or orange. Or add spices such as cinnamon or nutmeg.  4. Eat more fiber  · Eat fresh fruits and vegetables every day.  · Boost your diet with whole grains. Go for oats, whole-grain rice, and bran.  · Add beans and lentils to your meals.  · Drink more water to match your fiber  increase. This is to help prevent constipation.  Date Last Reviewed: 5/11/2015  © 3958-3491 [employer]. [address] [place] [address], [place] [address]. All rights reserved. This information is not intended as a substitute for professional medical care. Always follow your healthcare professional's instructions.

## 2017-07-06 NOTE — PROGRESS NOTES
Subjective:       Patient ID: North Najera is a 57 y.o. male.    Chief Complaint: Follow-up (3 month check up with results)    Patient is known, to me and presents with   Chief Complaint   Patient presents with    Follow-up     3 month check up with results   .  Denies chest pain and shortness of breath.  Patient presents with check up and lab review. A1c is down to 7. He is up to date with screenings. Not due to have psa until next spring He does see [facility] annually. Also has been told he has a fatty liver before.  No  Longer smoking. Has not been eating foods high in potassium. He is having issues with his fasting blood sugars being elevated and evening is much better. Keeps a very good log of am and pm logs. See scanned sheet on logs  HPI  Review of Systems   Constitutional: Negative.  Negative for activity change, appetite change, chills, diaphoresis, fatigue, fever and unexpected weight change.   HENT: Negative.  Negative for congestion, ear discharge, ear pain, facial swelling, hearing loss, nosebleeds, postnasal drip, rhinorrhea, sinus pressure, sneezing, sore throat, tinnitus, trouble swallowing and voice change.    Eyes: Negative.  Negative for photophobia, pain, discharge, redness, itching and visual disturbance.   Respiratory: Negative.  Negative for apnea, cough, choking, chest tightness, shortness of breath, wheezing and stridor.    Cardiovascular: Negative.  Negative for chest pain, palpitations and leg swelling.   Gastrointestinal: Negative for abdominal distention, abdominal pain, anal bleeding, blood in stool, constipation, diarrhea, nausea and vomiting.   Genitourinary: Negative.  Negative for difficulty urinating, discharge, dysuria, enuresis, flank pain, frequency, hematuria, penile pain, penile swelling, scrotal swelling, testicular pain and urgency.   Musculoskeletal: Negative.  Negative for arthralgias, back pain, gait problem, joint swelling, myalgias, neck pain and neck stiffness.    Skin: Negative.  Negative for color change, pallor, rash and wound.   Neurological: Negative for dizziness, tremors, seizures, syncope, facial asymmetry, speech difficulty, weakness, light-headedness, numbness and headaches.   Hematological: Negative for adenopathy. Does not bruise/bleed easily.   Psychiatric/Behavioral: Negative.  Negative for agitation, sleep disturbance and suicidal ideas. The patient is not nervous/anxious.        Objective:      Physical Exam   Constitutional: He is oriented to person, place, and time. He appears well-developed and well-nourished. No distress.   HENT:   Head: Normocephalic and atraumatic.   Right Ear: External ear normal.   Left Ear: External ear normal.   Nose: Nose normal.   Mouth/Throat: Oropharynx is clear and moist. No oropharyngeal exudate.   Eyes: Conjunctivae and EOM are normal. Pupils are equal, round, and reactive to light. Right eye exhibits no discharge. Left eye exhibits no discharge. No scleral icterus.   Neck: Normal range of motion. No JVD present. No tracheal deviation present. No thyromegaly present.   Cardiovascular: Normal rate, regular rhythm, normal heart sounds and intact distal pulses.  Exam reveals no gallop and no friction rub.    No murmur heard.  Pulmonary/Chest: Effort normal and breath sounds normal. No stridor. No respiratory distress. He has no wheezes. He has no rales. He exhibits no tenderness.   Abdominal: Soft. Bowel sounds are normal. He exhibits no distension and no mass. There is no tenderness. There is no rebound and no guarding.   Musculoskeletal: Normal range of motion. He exhibits no edema or tenderness.   Lymphadenopathy:     He has no cervical adenopathy.   Neurological: He is alert and oriented to person, place, and time. He has normal reflexes. He displays normal reflexes. No cranial nerve deficit. He exhibits normal muscle tone. Coordination normal.   Skin: Skin is warm and dry. Capillary refill takes less than 2 seconds. No  "rash noted. He is not diaphoretic. No erythema. No pallor.   Psychiatric: He has a normal mood and affect. His behavior is normal. Judgment and thought content normal.   Nursing note and vitals reviewed.      Assessment:       1. Uncontrolled type 2 diabetes mellitus without complication, without long-term current use of insulin    2. Hyperlipidemia LDL goal <70    3. Antiphospholipid syndrome    4. Hyperkalemia    5. Obesity (BMI 30-39.9)    6. History of DVT (deep vein thrombosis)        Plan:   North was seen today for follow-up.    Diagnoses and all orders for this visit:    Uncontrolled type 2 diabetes mellitus without complication, without long-term current use of insulin  -     CBC auto differential; Future  -     Comprehensive metabolic panel; Future  -     Microalbumin/creatinine urine ratio; Future  -     Hemoglobin A1c; Future  -     pioglitazone (ACTOS) 30 MG tablet; Take 1 tablet (30 mg total) by mouth once daily.  -     metformin (GLUCOPHAGE) 500 MG tablet; Take 1 tablet (500 mg total) by mouth daily with breakfast.    Hyperlipidemia LDL goal <70  -     CBC auto differential; Future  -     Comprehensive metabolic panel; Future  -     TSH; Future  -     Lipid panel; Future    Antiphospholipid syndrome  -     CBC auto differential; Future  -     Comprehensive metabolic panel; Future    Hyperkalemia  -     Potassium; Future  -     Potassium    Obesity (BMI 30-39.9)  -     CBC auto differential; Future  -     Comprehensive metabolic panel; Future    History of DVT (deep vein thrombosis)  -     CBC auto differential; Future  -     Comprehensive metabolic panel; Future    Elevated bilirubin  -     Hepatic function panel; Future    "This note will not be shared with the patient."  Check CBC, CMP, TSH, Fasting lipid profile, HgA1c, Microalbuminuria in 6 months.  Medication compliance was discussed with the patient.  The patient was instructed to monitor glucoses closely using glucometer at home and to record " the values in a log.  The patient was instructed to obtain an Optometry exam and microalbumin q year.  The patient was instructed to obtain a hemoglobin A1C q 3-4 months.  The patient was instructed to check the feet visually daily and to monitor for infection.  The patient was instructed to exercise at least 3 times a week.  The patient was instructed to follow a 1800 ADA diet.  The patient was instructed to monitor weight daily and try to keep it as close to ideal body weight as possible.  The patient was instructed on using exercise frequently to reduce BP.  The patient was instructed on using a low salt diet.  Monitor BP at home and to record the values in a log.  Will increase actos and take metformin 250 mg bid to see if this helps him not have headaches  Will recheck bilirubin in one month  Also will recheck potassium today  RTC as scheduled

## 2017-07-31 DIAGNOSIS — F17.200 NICOTINE DEPENDENCE: ICD-10-CM

## 2017-07-31 RX ORDER — BUPROPION HYDROCHLORIDE 150 MG/1
150 TABLET, EXTENDED RELEASE ORAL 2 TIMES DAILY
Qty: 60 TABLET | Refills: 0 | Status: SHIPPED | OUTPATIENT
Start: 2017-07-31 | End: 2018-08-27 | Stop reason: SDUPTHER

## 2017-08-07 ENCOUNTER — TELEPHONE (OUTPATIENT)
Dept: INTERNAL MEDICINE | Facility: CLINIC | Age: 57
End: 2017-08-07

## 2017-08-07 NOTE — TELEPHONE ENCOUNTER
----- Message from Lisa Robledo MA sent at 2017  1:46 PM CDT -----  Contact: Patient  North Najera  MRN: [id number]  : 1960  PCP: Irma Biswas  Home Phone      [phone]  Work Phone      Not on file.  Mobile          [phone]      MESSAGE: Patient is requesting medication for anxiety.   Patient informed that he may need to come in to talk with her, Please advise.  Send to [facility] ([facility])   His phone # [phone]

## 2017-08-08 ENCOUNTER — CLINICAL SUPPORT (OUTPATIENT)
Dept: SMOKING CESSATION | Facility: CLINIC | Age: 57
End: 2017-08-08
Payer: COMMERCIAL

## 2017-08-08 DIAGNOSIS — F17.200 NICOTINE DEPENDENCE: Primary | ICD-10-CM

## 2017-08-08 PROCEDURE — 99407 BEHAV CHNG SMOKING > 10 MIN: CPT | Mod: S$GLB,,,

## 2017-08-09 ENCOUNTER — CLINICAL SUPPORT (OUTPATIENT)
Dept: INTERNAL MEDICINE | Facility: CLINIC | Age: 57
End: 2017-08-09
Payer: COMMERCIAL

## 2017-08-09 DIAGNOSIS — R17 ELEVATED BILIRUBIN: ICD-10-CM

## 2017-08-09 LAB
ALBUMIN SERPL BCP-MCNC: 4.2 G/DL
ALP SERPL-CCNC: 52 U/L
ALT SERPL W/O P-5'-P-CCNC: 22 U/L
AST SERPL-CCNC: 23 U/L
BILIRUB DIRECT SERPL-MCNC: 0.5 MG/DL
BILIRUB SERPL-MCNC: 1.3 MG/DL
PROT SERPL-MCNC: 7.5 G/DL

## 2017-08-09 PROCEDURE — 80076 HEPATIC FUNCTION PANEL: CPT

## 2017-08-09 PROCEDURE — 36415 COLL VENOUS BLD VENIPUNCTURE: CPT | Mod: S$GLB,,, | Performed by: NURSE PRACTITIONER

## 2017-09-15 ENCOUNTER — OFFICE VISIT (OUTPATIENT)
Dept: INTERNAL MEDICINE | Facility: CLINIC | Age: 57
End: 2017-09-15
Payer: COMMERCIAL

## 2017-09-15 VITALS
HEIGHT: 74 IN | WEIGHT: 270.5 LBS | DIASTOLIC BLOOD PRESSURE: 64 MMHG | BODY MASS INDEX: 34.72 KG/M2 | SYSTOLIC BLOOD PRESSURE: 124 MMHG | OXYGEN SATURATION: 95 % | RESPIRATION RATE: 18 BRPM | HEART RATE: 62 BPM

## 2017-09-15 DIAGNOSIS — R06.02 SOB (SHORTNESS OF BREATH): Primary | ICD-10-CM

## 2017-09-15 DIAGNOSIS — H10.9 CONJUNCTIVITIS OF LEFT EYE, UNSPECIFIED CONJUNCTIVITIS TYPE: ICD-10-CM

## 2017-09-15 DIAGNOSIS — R06.2 WHEEZE: ICD-10-CM

## 2017-09-15 PROCEDURE — 99214 OFFICE O/P EST MOD 30 MIN: CPT | Mod: S$GLB,,, | Performed by: NURSE PRACTITIONER

## 2017-09-15 PROCEDURE — 3008F BODY MASS INDEX DOCD: CPT | Mod: S$GLB,,, | Performed by: NURSE PRACTITIONER

## 2017-09-15 PROCEDURE — 99999 PR PBB SHADOW E&M-EST. PATIENT-LVL IV: CPT | Mod: PBBFAC,,, | Performed by: NURSE PRACTITIONER

## 2017-09-15 RX ORDER — LISINOPRIL 5 MG/1
5 TABLET ORAL DAILY
Qty: 30 TABLET | Refills: 5 | Status: SHIPPED | OUTPATIENT
Start: 2017-09-15 | End: 2018-03-01 | Stop reason: SDUPTHER

## 2017-09-15 RX ORDER — MOXIFLOXACIN 5 MG/ML
1 SOLUTION/ DROPS OPHTHALMIC 3 TIMES DAILY
Qty: 3 ML | Refills: 0 | Status: SHIPPED | OUTPATIENT
Start: 2017-09-15 | End: 2017-12-05

## 2017-09-15 RX ORDER — ALBUTEROL SULFATE 90 UG/1
2 AEROSOL, METERED RESPIRATORY (INHALATION) EVERY 6 HOURS PRN
Qty: 18 G | Refills: 1 | Status: SHIPPED | OUTPATIENT
Start: 2017-09-15 | End: 2018-03-21

## 2017-09-15 NOTE — PROGRESS NOTES
Subjective:       Patient ID: North Najera is a 57 y.o. male.    Chief Complaint: Shortness of Breath (promblems for about a week) and Eye Pain (feels likt trash in left eye)    Patient is known, to me and presents with   Chief Complaint   Patient presents with    Shortness of Breath     promblems for about a week    Eye Pain     feels likt trash in left eye   .  Denies chest pain and shortness of breath.  Patient presents with itchy watery eyes and some sob with cough. Patient states that he could be sitting down and has sob. States that he has issues with exhaling at times.    HPI  Review of Systems   Constitutional: Negative.  Negative for activity change, appetite change, chills, diaphoresis, fatigue, fever and unexpected weight change.   HENT: Positive for ear pain, rhinorrhea and sore throat. Negative for congestion, ear discharge, facial swelling, hearing loss, nosebleeds, postnasal drip, sinus pressure, sneezing, tinnitus, trouble swallowing and voice change.    Eyes: Negative.  Negative for photophobia, pain, discharge, redness, itching and visual disturbance.   Respiratory: Positive for cough and shortness of breath. Negative for apnea, choking, chest tightness, wheezing and stridor.    Cardiovascular: Negative.  Negative for chest pain, palpitations and leg swelling.   Gastrointestinal: Negative for abdominal distention, abdominal pain, anal bleeding, blood in stool, constipation, diarrhea, nausea and vomiting.   Genitourinary: Negative.  Negative for difficulty urinating, discharge, dysuria, enuresis, flank pain, frequency, hematuria, penile pain, penile swelling, scrotal swelling, testicular pain and urgency.   Musculoskeletal: Negative.  Negative for arthralgias, back pain, gait problem, joint swelling, myalgias, neck pain and neck stiffness.   Skin: Negative.  Negative for color change, pallor, rash and wound.   Neurological: Negative for dizziness, tremors, seizures, syncope, facial asymmetry,  speech difficulty, weakness, light-headedness, numbness and headaches.   Hematological: Negative for adenopathy. Does not bruise/bleed easily.   Psychiatric/Behavioral: Negative.  Negative for agitation, sleep disturbance and suicidal ideas. The patient is not nervous/anxious.        Objective:      Physical Exam   Constitutional: He is oriented to person, place, and time. He appears well-developed and well-nourished. No distress.   HENT:   Head: Normocephalic and atraumatic.   Mouth/Throat: No oropharyngeal exudate.   Eyes: EOM are normal. Pupils are equal, round, and reactive to light. Right eye exhibits no discharge. Left eye exhibits no discharge. Right conjunctiva is not injected. Right conjunctiva has no hemorrhage. Left conjunctiva is injected. Left conjunctiva has no hemorrhage. No scleral icterus.   Neck: Normal range of motion. No JVD present. No tracheal deviation present. No thyromegaly present.   Cardiovascular: Normal rate, regular rhythm, normal heart sounds and intact distal pulses.  Exam reveals no gallop and no friction rub.    No murmur heard.  Pulmonary/Chest: Effort normal. No stridor. No respiratory distress. He has wheezes in the right lower field and the left lower field. He has no rales. He exhibits no tenderness.   Abdominal: Soft. Bowel sounds are normal. He exhibits no distension and no mass. There is no tenderness. There is no rebound and no guarding.   Musculoskeletal: Normal range of motion. He exhibits no edema or tenderness.   Lymphadenopathy:     He has no cervical adenopathy.   Neurological: He is alert and oriented to person, place, and time. He has normal reflexes. He displays normal reflexes. No cranial nerve deficit. He exhibits normal muscle tone. Coordination normal.   Skin: Skin is warm and dry. No rash noted. He is not diaphoretic. No erythema. No pallor.   Psychiatric: He has a normal mood and affect. His behavior is normal. Judgment and thought content normal.   Nursing  "note and vitals reviewed.      Assessment:       1. SOB (shortness of breath)    2. Wheeze    3. Conjunctivitis of left eye, unspecified conjunctivitis type        Plan:   North was seen today for shortness of breath and eye pain.    Diagnoses and all orders for this visit:    SOB (shortness of breath)  -     Complete PFT with bronchodilator; Future  -     PULSE OXIMETRY WITH REST - PULM; Future  -     albuterol 90 mcg/actuation inhaler; Inhale 2 puffs into the lungs every 6 (six) hours as needed for Wheezing. Rescue    Wheeze  -     Complete PFT with bronchodilator; Future  -     PULSE OXIMETRY WITH REST - PULM; Future  -     albuterol 90 mcg/actuation inhaler; Inhale 2 puffs into the lungs every 6 (six) hours as needed for Wheezing. Rescue    Conjunctivitis of left eye, unspecified conjunctivitis type  -     moxifloxacin (VIGAMOX) 0.5 % ophthalmic solution; Place 1 drop into the left eye 3 (three) times daily.    "This note will not be shared with the patient."  Will do pft's to get a baseline on lungs  Start albuterol prn  RTC as scheduled    "

## 2017-09-21 RX ORDER — BLOOD-GLUCOSE METER
1 KIT MISCELLANEOUS 2 TIMES DAILY
Qty: 100 STRIP | Refills: 3 | Status: SHIPPED | OUTPATIENT
Start: 2017-09-21 | End: 2018-03-02 | Stop reason: SDUPTHER

## 2017-10-24 DIAGNOSIS — E78.5 HYPERLIPIDEMIA LDL GOAL <70: ICD-10-CM

## 2017-10-24 RX ORDER — PIOGLITAZONEHYDROCHLORIDE 30 MG/1
30 TABLET ORAL DAILY
Qty: 90 TABLET | Refills: 1 | Status: SHIPPED | OUTPATIENT
Start: 2017-10-24 | End: 2018-05-04 | Stop reason: SDUPTHER

## 2017-10-24 RX ORDER — ATORVASTATIN CALCIUM 20 MG/1
20 TABLET, FILM COATED ORAL NIGHTLY
Qty: 90 TABLET | Refills: 1 | Status: SHIPPED | OUTPATIENT
Start: 2017-10-24 | End: 2018-04-19 | Stop reason: SDUPTHER

## 2017-10-24 RX ORDER — METFORMIN HYDROCHLORIDE 500 MG/1
500 TABLET ORAL
Qty: 90 TABLET | Refills: 1 | Status: SHIPPED | OUTPATIENT
Start: 2017-10-24 | End: 2018-03-07 | Stop reason: SDUPTHER

## 2017-12-05 ENCOUNTER — OFFICE VISIT (OUTPATIENT)
Dept: INTERNAL MEDICINE | Facility: CLINIC | Age: 57
End: 2017-12-05
Payer: COMMERCIAL

## 2017-12-05 VITALS
RESPIRATION RATE: 16 BRPM | HEART RATE: 61 BPM | SYSTOLIC BLOOD PRESSURE: 128 MMHG | OXYGEN SATURATION: 98 % | BODY MASS INDEX: 34.86 KG/M2 | DIASTOLIC BLOOD PRESSURE: 84 MMHG | WEIGHT: 271.63 LBS | HEIGHT: 74 IN

## 2017-12-05 DIAGNOSIS — B97.89 VIRAL RESPIRATORY INFECTION: Primary | ICD-10-CM

## 2017-12-05 DIAGNOSIS — J98.8 VIRAL RESPIRATORY INFECTION: Primary | ICD-10-CM

## 2017-12-05 DIAGNOSIS — L30.9 DERMATITIS: ICD-10-CM

## 2017-12-05 DIAGNOSIS — M25.551 RIGHT HIP PAIN: ICD-10-CM

## 2017-12-05 PROCEDURE — 99999 PR PBB SHADOW E&M-EST. PATIENT-LVL IV: CPT | Mod: PBBFAC,,, | Performed by: NURSE PRACTITIONER

## 2017-12-05 PROCEDURE — 96372 THER/PROPH/DIAG INJ SC/IM: CPT | Mod: S$GLB,,, | Performed by: INTERNAL MEDICINE

## 2017-12-05 PROCEDURE — 99213 OFFICE O/P EST LOW 20 MIN: CPT | Mod: S$GLB,,, | Performed by: NURSE PRACTITIONER

## 2017-12-05 RX ORDER — METHYLPREDNISOLONE ACETATE 80 MG/ML
80 INJECTION, SUSPENSION INTRA-ARTICULAR; INTRALESIONAL; INTRAMUSCULAR; SOFT TISSUE
Status: COMPLETED | OUTPATIENT
Start: 2017-12-05 | End: 2017-12-05

## 2017-12-05 RX ORDER — CLOTRIMAZOLE AND BETAMETHASONE DIPROPIONATE 10; .64 MG/G; MG/G
CREAM TOPICAL 2 TIMES DAILY
Qty: 45 G | Refills: 1 | Status: SHIPPED | OUTPATIENT
Start: 2017-12-05 | End: 2018-10-29 | Stop reason: SDUPTHER

## 2017-12-05 RX ADMIN — METHYLPREDNISOLONE ACETATE 80 MG: 80 INJECTION, SUSPENSION INTRA-ARTICULAR; INTRALESIONAL; INTRAMUSCULAR; SOFT TISSUE at 02:12

## 2017-12-05 NOTE — PROGRESS NOTES
Subjective:       Patient ID: North Najera is a 57 y.o. male.    Chief Complaint: Hip Pain (Right side ); Sinus Problem; and Rash (Umbilical area)    Patient is known, to me and presents with   Chief Complaint   Patient presents with    Hip Pain     Right side     Sinus Problem    Rash     Umbilical area   .  Denies chest pain and shortness of breath.  Patient presents with right hip pain secondary to stepping down a ladder and missed the step. Landed on the right hip. Also with dermatitis to mid abdomen from nickel allergy and sinus congestion    HPI  Review of Systems   Constitutional: Negative.    HENT: Positive for congestion, postnasal drip and sore throat.    Eyes: Negative.    Respiratory: Positive for cough.    Cardiovascular: Negative.    Musculoskeletal: Positive for arthralgias.   Skin: Positive for rash.   Hematological: Negative.        Objective:      Physical Exam   Constitutional: He is oriented to person, place, and time. He appears well-developed and well-nourished. No distress.   HENT:   Head: Normocephalic and atraumatic.   Right Ear: Tympanic membrane mobility is abnormal.   Left Ear: Tympanic membrane mobility is abnormal.   Nose: Mucosal edema present.   Mouth/Throat: Posterior oropharyngeal erythema present. No oropharyngeal exudate.   Eyes: Conjunctivae are normal. Right eye exhibits no discharge. Left eye exhibits no discharge.   Neck: Normal range of motion. Neck supple. No JVD present. No tracheal deviation present. No thyromegaly present.   Cardiovascular: Normal rate, regular rhythm and normal heart sounds.    No murmur heard.  Pulmonary/Chest: Effort normal and breath sounds normal. No stridor. He has no wheezes.   Musculoskeletal: He exhibits tenderness. He exhibits no edema or deformity.   Tenderness palpated to right hip with some limitations in right hip. No bruising or swelling   Lymphadenopathy:     He has no cervical adenopathy.   Neurological: He is alert and oriented to  "person, place, and time. He displays normal reflexes. No cranial nerve deficit or sensory deficit. He exhibits normal muscle tone. Coordination normal.   Skin: Skin is dry. Capillary refill takes less than 2 seconds. Rash noted. He is not diaphoretic. There is erythema. No pallor.       Assessment:       1. Viral respiratory infection    2. Dermatitis    3. Right hip pain        Plan:   North was seen today for hip pain, sinus problem and rash.    Diagnoses and all orders for this visit:    Viral respiratory infection  -     methylPREDNISolone acetate injection 80 mg; Inject 1 mL (80 mg total) into the muscle one time.    Dermatitis  -     methylPREDNISolone acetate injection 80 mg; Inject 1 mL (80 mg total) into the muscle one time.  -     clotrimazole-betamethasone 1-0.05% (LOTRISONE) cream; Apply topically 2 (two) times daily.    Right hip pain  -     methylPREDNISolone acetate injection 80 mg; Inject 1 mL (80 mg total) into the muscle one time.    "This note will not be shared with the patient."  Take claritin and flonase  Watch diet due to steroid shot  Take naprosyn for hip pain and if no improvement in four weeks may need imaging    rtc as scheduled  "

## 2018-01-02 ENCOUNTER — CLINICAL SUPPORT (OUTPATIENT)
Dept: INTERNAL MEDICINE | Facility: CLINIC | Age: 58
End: 2018-01-02
Payer: COMMERCIAL

## 2018-01-02 DIAGNOSIS — E66.9 OBESITY (BMI 30-39.9): ICD-10-CM

## 2018-01-02 DIAGNOSIS — E78.5 HYPERLIPIDEMIA LDL GOAL <70: ICD-10-CM

## 2018-01-02 DIAGNOSIS — D68.61 ANTIPHOSPHOLIPID SYNDROME: ICD-10-CM

## 2018-01-02 DIAGNOSIS — Z86.718 HISTORY OF DVT (DEEP VEIN THROMBOSIS): ICD-10-CM

## 2018-01-02 LAB
ALBUMIN SERPL BCP-MCNC: 4 G/DL
ALP SERPL-CCNC: 60 U/L
ALT SERPL W/O P-5'-P-CCNC: 22 U/L
ANION GAP SERPL CALC-SCNC: 11 MMOL/L
AST SERPL-CCNC: 24 U/L
BASOPHILS # BLD AUTO: 0.06 K/UL
BASOPHILS NFR BLD: 0.5 %
BILIRUB SERPL-MCNC: 1.4 MG/DL
BUN SERPL-MCNC: 15 MG/DL
CALCIUM SERPL-MCNC: 9.5 MG/DL
CHLORIDE SERPL-SCNC: 106 MMOL/L
CHOLEST SERPL-MCNC: 117 MG/DL
CHOLEST/HDLC SERPL: 3 {RATIO}
CO2 SERPL-SCNC: 23 MMOL/L
CREAT SERPL-MCNC: 0.8 MG/DL
CREAT UR-MCNC: 240 MG/DL
DIFFERENTIAL METHOD: ABNORMAL
EOSINOPHIL # BLD AUTO: 0.2 K/UL
EOSINOPHIL NFR BLD: 1.9 %
ERYTHROCYTE [DISTWIDTH] IN BLOOD BY AUTOMATED COUNT: 13.8 %
EST. GFR  (AFRICAN AMERICAN): >60 ML/MIN/1.73 M^2
EST. GFR  (NON AFRICAN AMERICAN): >60 ML/MIN/1.73 M^2
ESTIMATED AVG GLUCOSE: 134 MG/DL
GLUCOSE SERPL-MCNC: 99 MG/DL
HBA1C MFR BLD HPLC: 6.3 %
HCT VFR BLD AUTO: 44.5 %
HDLC SERPL-MCNC: 39 MG/DL
HDLC SERPL: 33.3 %
HGB BLD-MCNC: 14.6 G/DL
IMM GRANULOCYTES # BLD AUTO: 0.09 K/UL
IMM GRANULOCYTES NFR BLD AUTO: 0.8 %
LDLC SERPL CALC-MCNC: 54.6 MG/DL
LYMPHOCYTES # BLD AUTO: 3 K/UL
LYMPHOCYTES NFR BLD: 26.1 %
MCH RBC QN AUTO: 31.5 PG
MCHC RBC AUTO-ENTMCNC: 32.8 G/DL
MCV RBC AUTO: 96 FL
MICROALBUMIN UR DL<=1MG/L-MCNC: 48 UG/ML
MICROALBUMIN/CREATININE RATIO: 20 UG/MG
MONOCYTES # BLD AUTO: 1.1 K/UL
MONOCYTES NFR BLD: 9.3 %
NEUTROPHILS # BLD AUTO: 6.9 K/UL
NEUTROPHILS NFR BLD: 61.4 %
NONHDLC SERPL-MCNC: 78 MG/DL
NRBC BLD-RTO: 0 /100 WBC
PLATELET # BLD AUTO: 304 K/UL
PMV BLD AUTO: 9.6 FL
POTASSIUM SERPL-SCNC: 4.5 MMOL/L
PROT SERPL-MCNC: 7.4 G/DL
RBC # BLD AUTO: 4.63 M/UL
SODIUM SERPL-SCNC: 140 MMOL/L
TRIGL SERPL-MCNC: 117 MG/DL
TSH SERPL DL<=0.005 MIU/L-ACNC: 1.2 UIU/ML
WBC # BLD AUTO: 11.32 K/UL

## 2018-01-02 PROCEDURE — 36415 COLL VENOUS BLD VENIPUNCTURE: CPT | Mod: S$GLB,,, | Performed by: NURSE PRACTITIONER

## 2018-01-02 PROCEDURE — 36415 COLL VENOUS BLD VENIPUNCTURE: CPT

## 2018-01-02 PROCEDURE — 80061 LIPID PANEL: CPT

## 2018-01-02 PROCEDURE — 80053 COMPREHEN METABOLIC PANEL: CPT

## 2018-01-02 PROCEDURE — 83036 HEMOGLOBIN GLYCOSYLATED A1C: CPT

## 2018-01-02 PROCEDURE — 84443 ASSAY THYROID STIM HORMONE: CPT

## 2018-01-02 PROCEDURE — 82043 UR ALBUMIN QUANTITATIVE: CPT

## 2018-01-02 PROCEDURE — 85025 COMPLETE CBC W/AUTO DIFF WBC: CPT

## 2018-01-04 ENCOUNTER — OFFICE VISIT (OUTPATIENT)
Dept: INTERNAL MEDICINE | Facility: CLINIC | Age: 58
End: 2018-01-04
Payer: COMMERCIAL

## 2018-01-04 VITALS
DIASTOLIC BLOOD PRESSURE: 72 MMHG | RESPIRATION RATE: 18 BRPM | WEIGHT: 273.38 LBS | BODY MASS INDEX: 35.08 KG/M2 | HEART RATE: 63 BPM | HEIGHT: 74 IN | OXYGEN SATURATION: 96 % | SYSTOLIC BLOOD PRESSURE: 116 MMHG

## 2018-01-04 DIAGNOSIS — Z72.0 TOBACCO ABUSE: ICD-10-CM

## 2018-01-04 DIAGNOSIS — E66.9 OBESITY (BMI 30-39.9): ICD-10-CM

## 2018-01-04 DIAGNOSIS — Z86.718 HISTORY OF DVT (DEEP VEIN THROMBOSIS): ICD-10-CM

## 2018-01-04 DIAGNOSIS — D68.61 ANTIPHOSPHOLIPID SYNDROME: ICD-10-CM

## 2018-01-04 DIAGNOSIS — E78.5 HYPERLIPIDEMIA LDL GOAL <70: ICD-10-CM

## 2018-01-04 DIAGNOSIS — Z71.6 TOBACCO ABUSE COUNSELING: ICD-10-CM

## 2018-01-04 DIAGNOSIS — Z12.5 SCREENING FOR PROSTATE CANCER: ICD-10-CM

## 2018-01-04 PROCEDURE — 99999 PR PBB SHADOW E&M-EST. PATIENT-LVL V: CPT | Mod: PBBFAC,,, | Performed by: NURSE PRACTITIONER

## 2018-01-04 PROCEDURE — 99214 OFFICE O/P EST MOD 30 MIN: CPT | Mod: S$GLB,,, | Performed by: NURSE PRACTITIONER

## 2018-01-04 RX ORDER — RIVAROXABAN 20 MG/1
20 TABLET, FILM COATED ORAL DAILY
Qty: 30 TABLET | Refills: 5 | Status: SHIPPED | OUTPATIENT
Start: 2018-01-04 | End: 2018-07-06 | Stop reason: SDUPTHER

## 2018-01-04 RX ORDER — BENZONATATE 100 MG/1
100 CAPSULE ORAL 3 TIMES DAILY PRN
Refills: 0 | COMMUNITY
Start: 2017-12-27 | End: 2018-03-21

## 2018-01-04 RX ORDER — MOXIFLOXACIN HYDROCHLORIDE 400 MG/1
TABLET ORAL
Refills: 0 | COMMUNITY
Start: 2017-12-27 | End: 2018-03-21

## 2018-01-04 RX ORDER — VARENICLINE TARTRATE 0.5 (11)-1
KIT ORAL
Qty: 1 PACKAGE | Refills: 0 | Status: SHIPPED | OUTPATIENT
Start: 2018-01-04 | End: 2018-01-31 | Stop reason: SDUPTHER

## 2018-01-04 NOTE — PROGRESS NOTES
Subjective:       Patient ID: North Najera is a 57 y.o. male.    Chief Complaint: Follow-up (x 6 months- results)    Patient is known, to me and presents with   Chief Complaint   Patient presents with    Follow-up     x 6 months- results   .  Denies chest pain and shortness of breath.  Patient presents with check up and lab review. He is currently up to date with screenings. He is having postnasal drip and did see ENT. Does drink once a week but not more than that. Requesting to take chantix   HPI  Review of Systems   Constitutional: Negative.  Negative for activity change, appetite change, chills, diaphoresis, fatigue, fever and unexpected weight change.   HENT: Positive for rhinorrhea. Negative for congestion, ear discharge, ear pain, facial swelling, hearing loss, nosebleeds, postnasal drip, sinus pressure, sneezing, sore throat, tinnitus, trouble swallowing and voice change.    Eyes: Negative.  Negative for photophobia, pain, discharge, redness, itching and visual disturbance.   Respiratory: Negative.  Negative for apnea, cough, choking, chest tightness, shortness of breath, wheezing and stridor.    Cardiovascular: Negative.  Negative for chest pain, palpitations and leg swelling.   Gastrointestinal: Negative for abdominal distention, abdominal pain, anal bleeding, blood in stool, constipation, diarrhea, nausea and vomiting.   Endocrine: Negative for polydipsia and polyuria.   Genitourinary: Negative.  Negative for difficulty urinating, discharge, dysuria, enuresis, flank pain, frequency, hematuria, penile pain, penile swelling, scrotal swelling, testicular pain and urgency.   Musculoskeletal: Negative.  Negative for arthralgias, back pain, gait problem, joint swelling, myalgias, neck pain and neck stiffness.   Skin: Negative.  Negative for color change, pallor, rash and wound.   Neurological: Negative for dizziness, tremors, seizures, syncope, facial asymmetry, speech difficulty, weakness,  light-headedness, numbness and headaches.   Hematological: Negative for adenopathy. Does not bruise/bleed easily.   Psychiatric/Behavioral: Negative.  Negative for agitation, confusion, dysphoric mood, sleep disturbance and suicidal ideas. The patient is not nervous/anxious.        Objective:      Physical Exam   Constitutional: He is oriented to person, place, and time. He appears well-developed and well-nourished. No distress.   HENT:   Head: Normocephalic and atraumatic.   Right Ear: External ear normal.   Left Ear: External ear normal.   Nose: Nose normal.   Mouth/Throat: Oropharynx is clear and moist. No oropharyngeal exudate.   Eyes: Conjunctivae and EOM are normal. Pupils are equal, round, and reactive to light. Right eye exhibits no discharge. Left eye exhibits no discharge.   Neck: Normal range of motion. Neck supple. No JVD present. No tracheal deviation present. No thyromegaly present.   Cardiovascular: Normal rate, regular rhythm, normal heart sounds and intact distal pulses.  Exam reveals no gallop and no friction rub.    No murmur heard.  Pulmonary/Chest: Effort normal and breath sounds normal. No stridor. No respiratory distress. He has no wheezes. He has no rales. He exhibits no tenderness.   Abdominal: Soft. Bowel sounds are normal. He exhibits no distension. There is no tenderness. There is no rebound and no guarding.   Musculoskeletal: Normal range of motion. He exhibits no edema or tenderness.   Lymphadenopathy:     He has no cervical adenopathy.   Neurological: He is alert and oriented to person, place, and time. He has normal reflexes. He displays normal reflexes. No cranial nerve deficit. He exhibits normal muscle tone. Coordination normal.   Skin: Skin is warm and dry. Capillary refill takes less than 2 seconds. No rash noted. He is not diaphoretic. No erythema. No pallor.   Psychiatric: He has a normal mood and affect. His behavior is normal. Judgment and thought content normal.   Nursing  "note and vitals reviewed.      Assessment:       1. Uncontrolled type 2 diabetes mellitus without complication, without long-term current use of insulin    2. Hyperlipidemia LDL goal <70    3. Antiphospholipid syndrome    4. History of DVT (deep vein thrombosis)    5. Obesity (BMI 30-39.9)    6. Screening for prostate cancer    7. Tobacco abuse    8. Tobacco abuse counseling        Plan:   North was seen today for follow-up.    Diagnoses and all orders for this visit:    Uncontrolled type 2 diabetes mellitus without complication, without long-term current use of insulin  -     CBC auto differential; Future  -     Comprehensive metabolic panel; Future  -     Microalbumin/creatinine urine ratio; Future  -     Hemoglobin A1c; Future    Hyperlipidemia LDL goal <70  -     CBC auto differential; Future  -     Comprehensive metabolic panel; Future  -     TSH; Future  -     Lipid panel; Future    Antiphospholipid syndrome  -     CBC auto differential; Future  -     Comprehensive metabolic panel; Future  -     XARELTO 20 mg Tab; Take 1 tablet (20 mg total) by mouth once daily.    History of DVT (deep vein thrombosis)  -     CBC auto differential; Future  -     Comprehensive metabolic panel; Future  -     XARELTO 20 mg Tab; Take 1 tablet (20 mg total) by mouth once daily.    Obesity (BMI 30-39.9)  -     CBC auto differential; Future  -     Comprehensive metabolic panel; Future    Screening for prostate cancer  -     PSA, Screening; Future    Tobacco abuse  -     varenicline (CHANTIX STARTING MONTH BOX) 0.5 mg (11)- 1 mg (42) tablet; Take one 0.5mg tab by mouth once daily X3 days,then increase to one 0.5mg tab twice daily X4 days,then increase to one 1mg tab twice daily    Tobacco abuse counseling  -     varenicline (CHANTIX STARTING MONTH BOX) 0.5 mg (11)- 1 mg (42) tablet; Take one 0.5mg tab by mouth once daily X3 days,then increase to one 0.5mg tab twice daily X4 days,then increase to one 1mg tab twice daily    "This note " "will not be shared with the patient."  Check CBC, CMP, TSH, Fasting lipid profile, HgA1c, Microalbuminuria in 6 months.  Medication compliance was discussed with the patient.  The patient was instructed to monitor glucoses closely using glucometer at home and to record the values in a log.  The patient was instructed to obtain an Optometry exam and microalbumin q year.  The patient was instructed to obtain a hemoglobin A1C q 3-4 months.  The patient was instructed to check the feet visually daily and to monitor for infection.  The patient was instructed to exercise at least 3 times a week.  The patient was instructed to follow a 1800 ADA diet.  The patient was instructed to monitor weight daily and try to keep it as close to ideal body weight as possible.  The patient was instructed on using exercise frequently to reduce BP.  The patient was instructed on using a low salt diet.  Monitor BP at home and to record the values in a log.  Continue with plan of care   if any thoughts of suicide or homicide occurs with chantix stop meds and go to ER  RTC in 6 months.  "

## 2018-01-04 NOTE — PATIENT INSTRUCTIONS
4 Steps for Eating Healthier  Changing the way you eat can improve your health. It can lower your cholesterol and blood pressure, and help you stay at a healthy weight. Your diet doesnt have to be bland and boring to be healthy. Just watch your calories and follow these steps:    1. Eat fewer unhealthy fats  · Choose more fish and lean meats instead of fatty cuts of meat.  · Skip butter and lard, and use less margarine.  · Pass on foods that have palm, coconut, or hydrogenated oils.  · Eat fewer high-fat dairy foods like cheese, ice cream, and whole milk.  · Get a heart-healthy cookbook and try some low-fat recipes.  2. Go light on salt  · Keep the saltshaker off the table.  · Limit high-salt ingredients, such as soy sauce, bouillon, and garlic salt.  · Instead of adding salt when cooking, season your food with herbs and flavorings. Try lemon, garlic, and onion.  · Limit convenience foods, such as boxed or canned foods and restaurant food.  · Read food labels and choose lower-sodium options.  3. Limit sugar  · Pause before you add sugars to pancakes, cereal, coffee, or tea. This includes white and brown table sugar, syrup, honey, and molasses. Cut your usual amount by half.  · Use non-sugar sweeteners. Stevia, aspartame, and sucralose can satisfy a sweet tooth without adding calories.  · Swap out sugar-filled soda and other drinks. Buy sugar-free or low-calorie beverages. Remember water is always the best choice.  · Read labels and choose foods with less added sugar. Keep in mind that dairy foods and foods with fruit will have some natural sugar.  · Cut the sugar in recipes by 1/3 to 1/2. Boost the flavor with extracts like almond, vanilla, or orange. Or add spices such as cinnamon or nutmeg.  4. Eat more fiber  · Eat fresh fruits and vegetables every day.  · Boost your diet with whole grains. Go for oats, whole-grain rice, and bran.  · Add beans and lentils to your meals.  · Drink more water to match your fiber  increase. This is to help prevent constipation.  Date Last Reviewed: 5/11/2015  © 1973-3457 [employer]. [address] [place] [address], [place] [address]. All rights reserved. This information is not intended as a substitute for professional medical care. Always follow your healthcare professional's instructions.        Tips for Quitting Smoking (Cardiovascular)  Quitting smoking is a gift to yourself, one of the best things you can do to keep your heart disease from getting worse. Smoking reduces oxygen flow to your heart by speeding the buildup of plaque and changing the health of your blood vessels. This increases your risk for heart attack, also known as acute myocardial infarction, or AMI. Quitting helps reduce smoking's harmful effects. You may have tried to quit before, but dont give up. Try again. Many smokers try 4 or 5 times before they succeed. It is never too early to benefit from smoking cessation, especially if you already have chronic conditions such as high blood pressure and high cholesterol that put you at increased risk for cardiovascular disease.     Youll have the best chance of success if you join a stop-smoking group and have the support of your doctor, family and friends.     Line up help  · Ask for the support of your family and friends.  · Join a smoking cessation class, or ask your healthcare provider for a referral to a psychologist who specializes in helping people quit smoking.   · Ask your healthcare provider about nicotine replacement products and prescription medicines that can help you quit.  Set a quit date  · Choose a date within the next 2 to 4 weeks.  · After picking a day, amn it in bold letters on a calendar.     Your quit list  Ideas to stop smoking include:  1. Start by giving up cigarettes at the times you least need them.  2. Keeping a piece of fruit close by at the times you are most vulnerable to reach for a cigarette.  3. Using a nicotine replacement product  instead of a cigarette.  Write down a few more ideas.     Set limits  · Limit where you can smoke. Pick one room or a porch, and smoke only in that place.  · Make smoking outdoors a house rule. Other smokers wont tempt you as much.  · Speak to smokers around you about your intent to stop smoking so they can show consideration for you and limit their smoking around you.  · Hang a list of  quit benefits in the spot where you smoke. Put one on the refrigerator and one on your car dashboard.     For more information  · smokefree.gov/bkqf-fa-su-expert  · National [employer] [place] Smoking Quitline: 877-44U-QUIT (242-109-3481)      Date Last Reviewed: 3/26/2016  © 2851-2294 [employer], [employer]. [address] [place] [address], [place] [address]. All rights reserved. This information is not intended as a substitute for professional medical care. Always follow your healthcare professional's instructions.

## 2018-01-31 DIAGNOSIS — Z72.0 TOBACCO ABUSE: ICD-10-CM

## 2018-01-31 DIAGNOSIS — Z71.6 TOBACCO ABUSE COUNSELING: ICD-10-CM

## 2018-01-31 RX ORDER — VARENICLINE TARTRATE 0.5 (11)-1
KIT ORAL
Qty: 53 TABLET | Refills: 0 | Status: SHIPPED | OUTPATIENT
Start: 2018-01-31 | End: 2018-02-27 | Stop reason: SDUPTHER

## 2018-02-27 DIAGNOSIS — Z72.0 TOBACCO ABUSE: ICD-10-CM

## 2018-02-27 DIAGNOSIS — Z71.6 TOBACCO ABUSE COUNSELING: ICD-10-CM

## 2018-02-27 RX ORDER — VARENICLINE TARTRATE 0.5 (11)-1
KIT ORAL
Qty: 53 TABLET | Refills: 0 | Status: SHIPPED | OUTPATIENT
Start: 2018-02-27 | End: 2018-04-05 | Stop reason: SDUPTHER

## 2018-03-01 RX ORDER — LISINOPRIL 5 MG/1
5 TABLET ORAL DAILY
Qty: 30 TABLET | Refills: 5 | Status: SHIPPED | OUTPATIENT
Start: 2018-03-01 | End: 2018-08-14 | Stop reason: SDUPTHER

## 2018-03-02 RX ORDER — BLOOD-GLUCOSE METER
1 KIT MISCELLANEOUS 2 TIMES DAILY
Qty: 100 STRIP | Refills: 3 | Status: SHIPPED | OUTPATIENT
Start: 2018-03-02 | End: 2018-06-18 | Stop reason: SDUPTHER

## 2018-03-07 RX ORDER — METFORMIN HYDROCHLORIDE 500 MG/1
500 TABLET ORAL
Qty: 90 TABLET | Refills: 0 | Status: SHIPPED | OUTPATIENT
Start: 2018-03-07 | End: 2018-07-06 | Stop reason: SDUPTHER

## 2018-03-21 ENCOUNTER — TELEPHONE (OUTPATIENT)
Dept: INTERNAL MEDICINE | Facility: CLINIC | Age: 58
End: 2018-03-21

## 2018-03-21 ENCOUNTER — OFFICE VISIT (OUTPATIENT)
Dept: INTERNAL MEDICINE | Facility: CLINIC | Age: 58
End: 2018-03-21
Payer: COMMERCIAL

## 2018-03-21 ENCOUNTER — LAB VISIT (OUTPATIENT)
Dept: LAB | Facility: HOSPITAL | Age: 58
End: 2018-03-21
Attending: INTERNAL MEDICINE
Payer: COMMERCIAL

## 2018-03-21 VITALS
RESPIRATION RATE: 20 BRPM | HEART RATE: 74 BPM | OXYGEN SATURATION: 98 % | BODY MASS INDEX: 32.98 KG/M2 | WEIGHT: 257 LBS | DIASTOLIC BLOOD PRESSURE: 78 MMHG | TEMPERATURE: 98 F | SYSTOLIC BLOOD PRESSURE: 122 MMHG | HEIGHT: 74 IN

## 2018-03-21 DIAGNOSIS — N30.01 ACUTE CYSTITIS WITH HEMATURIA: ICD-10-CM

## 2018-03-21 DIAGNOSIS — N30.01 ACUTE CYSTITIS WITH HEMATURIA: Primary | ICD-10-CM

## 2018-03-21 PROCEDURE — 99213 OFFICE O/P EST LOW 20 MIN: CPT | Mod: S$GLB,,, | Performed by: INTERNAL MEDICINE

## 2018-03-21 PROCEDURE — 87086 URINE CULTURE/COLONY COUNT: CPT

## 2018-03-21 PROCEDURE — 99999 PR PBB SHADOW E&M-EST. PATIENT-LVL III: CPT | Mod: PBBFAC,,, | Performed by: INTERNAL MEDICINE

## 2018-03-21 RX ORDER — IBUPROFEN 800 MG/1
TABLET ORAL
Refills: 0 | COMMUNITY
Start: 2018-01-05 | End: 2018-03-21

## 2018-03-21 RX ORDER — CIPROFLOXACIN 500 MG/1
500 TABLET ORAL EVERY 12 HOURS
Qty: 28 TABLET | Refills: 0 | Status: SHIPPED | OUTPATIENT
Start: 2018-03-21 | End: 2018-03-31

## 2018-03-21 RX ORDER — HYDROCODONE BITARTRATE AND ACETAMINOPHEN 5; 325 MG/1; MG/1
TABLET ORAL
Refills: 0 | COMMUNITY
Start: 2018-01-05 | End: 2018-03-21

## 2018-03-21 NOTE — TELEPHONE ENCOUNTER
Got it. OK. Continue for now and let the antibiotics work. If still bleeding after antibiotics we may stop it and see if it clears off the medications but for now continue it.

## 2018-03-21 NOTE — PROGRESS NOTES
"Subjective:       Patient ID: North Najera is a 57 y.o. male.    Chief Complaint: Fever (x sunday- chills and diarrhea- ); Hematuria; and Ear Fullness (fluid in L. ear )      HPI:  Patient is new to me but known to clinic and presents with fevers and myalgias. Sx started 4 days ago. He is feeling better today but he reports he was having difficulty urinating and then "it was like a champagne cork" and then started urinating blood. No dysuria.     Past Medical History:   Diagnosis Date    Anticoagulant long-term use     APS (antiphospholipid syndrome)     Deep vein thrombosis     Diabetes mellitus     Elevated homocysteine     History of DVT (deep vein thrombosis) 2002    Obesity     Screening for colon cancer 5/4/2017       Family History   Problem Relation Age of Onset    No Known Problems Mother     No Known Problems Father     Diabetes Maternal Grandmother        Social History     Social History    Marital status: Single     Spouse name: N/A    Number of children: N/A    Years of education: N/A     Occupational History    Not on file.     Social History Main Topics    Smoking status: Current Every Day Smoker     Packs/day: 1.00     Years: 30.00     Types: Cigarettes     Start date: 3/28/1987     Last attempt to quit: 4/12/2017    Smokeless tobacco: Never Used    Alcohol use Yes      Comment: Occasionally 3-4 drinks per month at the most    Drug use: No    Sexual activity: Yes      Comment: single-in a relationship     Other Topics Concern    Not on file     Social History Narrative    No narrative on file       Review of Systems   Constitutional: Positive for fever. Negative for activity change, fatigue and unexpected weight change.   HENT: Negative for congestion, ear pain, hearing loss, rhinorrhea and sore throat.    Eyes: Negative for redness and visual disturbance.   Respiratory: Negative for cough, shortness of breath and wheezing.    Cardiovascular: Negative for chest pain, " palpitations and leg swelling.   Gastrointestinal: Negative for abdominal pain, constipation, diarrhea, nausea and vomiting.   Genitourinary: Positive for hematuria. Negative for decreased urine volume, dysuria, frequency and urgency.   Musculoskeletal: Negative for back pain, joint swelling and neck pain.   Skin: Negative for color change, rash and wound.   Neurological: Negative for dizziness, light-headedness and headaches.         Objective:      Physical Exam   Constitutional: He is oriented to person, place, and time. He appears well-developed and well-nourished. No distress.   HENT:   Head: Normocephalic and atraumatic.   Right Ear: External ear normal.   Left Ear: External ear normal.   Eyes: Conjunctivae and EOM are normal. Pupils are equal, round, and reactive to light. Right eye exhibits no discharge. Left eye exhibits no discharge.   Neck: Neck supple. No tracheal deviation present.   Cardiovascular: Normal rate and regular rhythm.  Exam reveals no gallop and no friction rub.    No murmur heard.  Pulmonary/Chest: Effort normal and breath sounds normal. No respiratory distress. He has no wheezes. He has no rales.   Abdominal: Soft. Bowel sounds are normal. He exhibits no distension. There is no tenderness. There is no rebound and no guarding.   Musculoskeletal: He exhibits no tenderness.   Neurological: He is alert and oriented to person, place, and time. No cranial nerve deficit.   Skin: Skin is warm and dry.   Psychiatric: He has a normal mood and affect. His behavior is normal.   Vitals reviewed.      Assessment:       1. Acute cystitis with hematuria        Plan:       North was seen today for fever, hematuria and ear fullness.    Diagnoses and all orders for this visit:    Acute cystitis with hematuria  -     ciprofloxacin HCl (CIPRO) 500 MG tablet; Take 1 tablet (500 mg total) by mouth every 12 (twelve) hours.  -     Urine culture; Future     UA +LE, nitrite and blood  Send for cx  Treat with  cipro  Call if no improvement despite antibx treatment

## 2018-03-21 NOTE — TELEPHONE ENCOUNTER
He can continue for now. If the blood doesn't stop then we can consider stopping but it depends why he is taking it. Can we confirm why he takes it--is it just for history of DVT because that's what I see in his chart. If it is how long ago was the DVT and how many has he had? That way if the bleeding doesn't stop I know what to do with his meds.Thanks!

## 2018-03-21 NOTE — TELEPHONE ENCOUNTER
----- Message from Lisa Robledo MA sent at 3/21/2018  3:02 PM CDT -----  Contact: Patient  North Najera  MRN: [id number]  : 1960  PCP: Irma Biswas  Home Phone      [phone]  Work Phone      Not on file.  Mobile          Not on file.      MESSAGE: Patient wants to know if he should continue Xarelto or stop until this problem clears up?  Please Advise--[phone]

## 2018-03-21 NOTE — TELEPHONE ENCOUNTER
Pt is on it for h/o DVT. DVT was in 2002, but then they found out he has antiphospholipid syndrome (i may have botched that) and that he is prone to clotting. Please advise.

## 2018-03-22 LAB — BACTERIA UR CULT: NO GROWTH

## 2018-04-05 ENCOUNTER — CLINICAL SUPPORT (OUTPATIENT)
Dept: SMOKING CESSATION | Facility: CLINIC | Age: 58
End: 2018-04-05
Payer: COMMERCIAL

## 2018-04-05 DIAGNOSIS — Z71.6 TOBACCO ABUSE COUNSELING: ICD-10-CM

## 2018-04-05 DIAGNOSIS — F17.200 NICOTINE DEPENDENCE: Primary | ICD-10-CM

## 2018-04-05 DIAGNOSIS — Z72.0 TOBACCO ABUSE: ICD-10-CM

## 2018-04-05 PROCEDURE — 99407 BEHAV CHNG SMOKING > 10 MIN: CPT | Mod: S$GLB,,,

## 2018-04-05 RX ORDER — VARENICLINE TARTRATE 0.5 (11)-1
KIT ORAL
Qty: 53 TABLET | Refills: 0 | Status: SHIPPED | OUTPATIENT
Start: 2018-04-05 | End: 2018-07-06

## 2018-04-09 ENCOUNTER — CLINICAL SUPPORT (OUTPATIENT)
Dept: SMOKING CESSATION | Facility: CLINIC | Age: 58
End: 2018-04-09
Payer: COMMERCIAL

## 2018-04-09 VITALS — OXYGEN SATURATION: 98 % | HEART RATE: 76 BPM

## 2018-04-09 DIAGNOSIS — F17.210 HEAVY SMOKER (MORE THAN 20 CIGARETTES PER DAY): Primary | ICD-10-CM

## 2018-04-09 PROCEDURE — 99404 PREV MED CNSL INDIV APPRX 60: CPT | Mod: S$GLB,,,

## 2018-04-09 PROCEDURE — 99999 PR PBB SHADOW E&M-EST. PATIENT-LVL II: CPT | Mod: PBBFAC,,,

## 2018-04-09 RX ORDER — IBUPROFEN 200 MG
1 TABLET ORAL DAILY
Qty: 14 PATCH | Refills: 0 | Status: SHIPPED | OUTPATIENT
Start: 2018-04-09 | End: 2018-05-04 | Stop reason: SDUPTHER

## 2018-04-16 ENCOUNTER — TELEPHONE (OUTPATIENT)
Dept: SMOKING CESSATION | Facility: CLINIC | Age: 58
End: 2018-04-16

## 2018-04-19 DIAGNOSIS — E78.5 HYPERLIPIDEMIA LDL GOAL <70: ICD-10-CM

## 2018-04-19 RX ORDER — ATORVASTATIN CALCIUM 20 MG/1
20 TABLET, FILM COATED ORAL NIGHTLY
Qty: 90 TABLET | Refills: 1 | Status: SHIPPED | OUTPATIENT
Start: 2018-04-19 | End: 2018-09-14 | Stop reason: SDUPTHER

## 2018-04-30 ENCOUNTER — CLINICAL SUPPORT (OUTPATIENT)
Dept: SMOKING CESSATION | Facility: CLINIC | Age: 58
End: 2018-04-30
Payer: COMMERCIAL

## 2018-04-30 VITALS — HEART RATE: 84 BPM | OXYGEN SATURATION: 97 %

## 2018-04-30 DIAGNOSIS — F17.210 HEAVY SMOKER (MORE THAN 20 CIGARETTES PER DAY): Primary | ICD-10-CM

## 2018-04-30 PROCEDURE — 90853 GROUP PSYCHOTHERAPY: CPT | Mod: S$GLB,,,

## 2018-04-30 PROCEDURE — 99999 PR PBB SHADOW E&M-EST. PATIENT-LVL II: CPT | Mod: PBBFAC,,,

## 2018-04-30 NOTE — Clinical Note
he patient is smoking 10-15 cigarettes/day using nicotine patches 21 mg. The patient states he still has cravings for the morning cigarette x 2 with coffee. Suggested to try to delay the time between the first cigarette and the second and to learn to wait. The patient realizes most of this is habit and will start moving things and making changes to quit. Progress Toward Goals and Other Mental Status Changes: The patient denies any abnormal behavioral or mental changes at this time.rientation, client introductions, completion of TCRS (Tobacco Cessation Rating Scale) learned addiction model, cues/triggers, personal reasons for quitting, medications, goals, quit date

## 2018-05-01 NOTE — PROGRESS NOTES
Site: [facility]  Date:  4/30/2018  Clinical Status of Patient: Outpatient   Length of Service and Code: 90 minutes - 72417   Number in Attendance: 3  Group Activities/Focus of Group:  orientation, client introductions, completion of TCRS (Tobacco Cessation Rating Scale) learned addiction model, cues/triggers, personal reasons for quitting, medications, goals, quit date    Target symptoms:  withdrawal and medication side effects             The following were rated moderate (3) to severe (4) on TCRS:       Moderate 3: angry desire crave nicotine withdrawal habit     Severe 4:   none  Patient's Response to Intervention: The patient is smoking 10-15 cigarettes/day using nicotine patches 21 mg. The patient states he still has cravings for the morning cigarette x 2 with coffee. Suggested to try to delay the time between the first cigarette and the second and to learn to wait. The patient realizes most of this is habit and will start moving things and making changes to quit.  Progress Toward Goals and Other Mental Status Changes: The patient denies any abnormal behavioral or mental changes at this time.      Diagnosis: F17.210    Plan: The patient will continue with group therapy sessions and medication regimen prescribed with management by physician or Cessation Clinic Provider. Patient will inform Smoking Clinic Cessation Counselor of symptoms as rated high on TCRS.    Return to Clinic: 1 week

## 2018-05-04 DIAGNOSIS — F17.210 HEAVY SMOKER (MORE THAN 20 CIGARETTES PER DAY): ICD-10-CM

## 2018-05-04 RX ORDER — PIOGLITAZONEHYDROCHLORIDE 30 MG/1
30 TABLET ORAL DAILY
Qty: 90 TABLET | Refills: 1 | Status: SHIPPED | OUTPATIENT
Start: 2018-05-04 | End: 2018-10-28 | Stop reason: SDUPTHER

## 2018-05-08 ENCOUNTER — CLINICAL SUPPORT (OUTPATIENT)
Dept: SMOKING CESSATION | Facility: CLINIC | Age: 58
End: 2018-05-08
Payer: COMMERCIAL

## 2018-05-08 DIAGNOSIS — F17.210 HEAVY SMOKER (MORE THAN 20 CIGARETTES PER DAY): Primary | ICD-10-CM

## 2018-05-08 PROCEDURE — 90853 GROUP PSYCHOTHERAPY: CPT | Mod: S$GLB,,,

## 2018-05-08 PROCEDURE — 99999 PR PBB SHADOW E&M-EST. PATIENT-LVL I: CPT | Mod: PBBFAC,,,

## 2018-05-08 RX ORDER — IBUPROFEN 200 MG
1 TABLET ORAL DAILY
Qty: 14 PATCH | Refills: 0 | Status: SHIPPED | OUTPATIENT
Start: 2018-05-08 | End: 2018-07-06

## 2018-05-08 NOTE — Clinical Note
The patient is smoking 25 cigarettes/day using the 1mg Chantix BID, Wellbutrin 150 mg BID, 21 mg nicotine patches. The patient states he is very stressed at work. He feels things are piling up on him, but has a great desire to quit. Will reorder patches, ok on Chantix and Wellbutrin. Will work on diversion and relaxation for next visit . Try to reduce at lease 3 cigarettes by next session.

## 2018-05-09 RX ORDER — IBUPROFEN 200 MG
1 TABLET ORAL DAILY
Qty: 14 PATCH | Refills: 0 | Status: SHIPPED | OUTPATIENT
Start: 2018-05-09 | End: 2018-07-06

## 2018-05-09 NOTE — PROGRESS NOTES
Smoking Cessation Group Session #3    Site: [facility]  Date:  5/8/18  Clinical Status of Patient: Outpatient   Length of Service and Code: 90 minutes - 80323   Number in Attendance: 5  Group Activities/Focus of Group:  Sharing last weeks challenges, triggers, and coping activities to remain quit and/ or keep making progress toward cessation, completion of TCRS (Tobacco Cessation Rating Scale) learned addiction model, personal reasons for quitting, medications, goals, quit date.    Specific session focus: completion of TCRS (Tobacco Cessation Rating Scale) reviewed strategies, cues, and triggers. Introduced the negative impact of tobacco on health, the health advantages of discontinuing the use of tobacco, time line improved health changes after a quit, withdrawal issues to expect from nicotine and habit, and ways to achieve the goal of a quit.      Target symptoms:  withdrawal and medication side effects             The following were rated moderate (3) to severe (4) on TCRS:       Moderate 3: depressed anxious, tremor     Severe 4:   none  Patient's Response to Intervention: The patient is smoking 25 cigarettes/day using the 1mg Chantix BID, Wellbutrin 150 mg BID, 21 mg nicotine patches. The patient states he is very stressed at work. He feels things are piling up on him, but has a great desire to quit. Will reorder patches, ok on Chantix and Wellbutrin. Will work on diversion and relaxation for next visit requested pt. Try to reduce at lease 3 cigarettes by next session.    Progress Toward Goals and Other Mental Status Changes: The patient denies any abnormal behavioral or mental changes at this time.      Diagnosis: F17.210    Plan: The patient will continue with group therapy sessions and medication regimen prescribed with management by physician or by the Cessation Clinic Provider. Patient will inform Smoking Cessation Counselor of symptoms as rated high on TCRS.    Return to Clinic: 1 week    Quit Date: TBD

## 2018-05-15 ENCOUNTER — CLINICAL SUPPORT (OUTPATIENT)
Dept: SMOKING CESSATION | Facility: CLINIC | Age: 58
End: 2018-05-15
Payer: COMMERCIAL

## 2018-05-15 DIAGNOSIS — F17.210 HEAVY SMOKER (MORE THAN 20 CIGARETTES PER DAY): Primary | ICD-10-CM

## 2018-05-15 PROCEDURE — 90853 GROUP PSYCHOTHERAPY: CPT | Mod: S$GLB,,,

## 2018-05-15 PROCEDURE — 99999 PR PBB SHADOW E&M-EST. PATIENT-LVL I: CPT | Mod: PBBFAC,,,

## 2018-05-15 NOTE — Clinical Note
completion of TCRS (Tobacco Cessation Rating Scale) reviewed strategies, controlling environment, cues, triggers, new goals set. Introduced high risk situations with preparation interventions, caffeine similarities with withdrawal issues of habit and nicotine, Alcohol, Understanding urges, cravings, stress and relaxation. Open discussion with intervention discussion.The patient is smoking a 1.5 pk /day and sill has desires. He states boredom is a cause and not focusing on the cues. He  Is certain he wants to quit and will continue with the sessions.The patient denies any abnormal behavioral or mental changes at this time.

## 2018-05-16 NOTE — PROGRESS NOTES
Smoking Cessation Group Session #3    Site: [facility]  Date:  5/15/2018  Clinical Status of Patient: Outpatient   Length of Service and Code: 90 minutes - 88769   Number in Attendance: 9  Group Activities/Focus of Group:  Sharing last weeks challenges, triggers, and coping activities to remain quit and/ or keep making progress toward cessation, completion of TCRS (Tobacco Cessation Rating Scale) learned addiction model, personal reasons for quitting, medications, goals, quit date.    Specific session focus: completion of TCRS (Tobacco Cessation Rating Scale) reviewed strategies, controlling environment, cues, triggers, new goals set. Introduced high risk situations with preparation interventions, caffeine similarities with withdrawal issues of habit and nicotine, Alcohol, Understanding urges, cravings, stress and relaxation. Open discussion with intervention discussion.      Target symptoms:  withdrawal and medication side effects             The following were rated moderate (3) to severe (4) on TCRS:       Moderate 3: desire/crave habit withdrawal symptoms     Severe 4:   none  Patient's Response to Intervention: The patient is smoking a 1.5 pk /day and sill has desires. He states boredom is a cause and not focusing on the cues. He  Is certain he wants to quit and will continue with the sessions.  Progress Toward Goals and Other Mental Status Changes: The patient denies any abnormal behavioral or mental changes at this time.    Diagnosis: 17.210  Plan: The patient will continue with group therapy sessions and medication regimen prescribed with management by physician or by the Cessation Clinic Provider. Patient will inform Smoking Cessation Counselor of symptoms as rated high on TCRS.    Return to Clinic: 1 week    Quit Date: TBD

## 2018-05-30 ENCOUNTER — CLINICAL SUPPORT (OUTPATIENT)
Dept: SMOKING CESSATION | Facility: CLINIC | Age: 58
End: 2018-05-30
Payer: COMMERCIAL

## 2018-05-30 DIAGNOSIS — F17.210 HEAVY SMOKER (MORE THAN 20 CIGARETTES PER DAY): Primary | ICD-10-CM

## 2018-05-30 PROCEDURE — 99406 BEHAV CHNG SMOKING 3-10 MIN: CPT | Mod: S$GLB,,,

## 2018-05-30 NOTE — PROGRESS NOTES
Called patient missed group session. The patient stated he has dropped to 1 pack/day and actually had 2 days where he di not smoke, but the cravings got very intense. He is trying to remodel a house and may have some frustrations with this. The patient is taking 1 mg Chantix BID and does have plenty of Chantix. Was still using the 21 mg patch, but will ask patient to discontinue since  He has  The Chantix on board. He expressed frustration about not being able to quir smoking and was disappointed he could not go further than 2  Days. Explained going from a pack to 0 is rough and is  Too much of a drop to let his body adjust to being nicotine free. Suggested trying to cut back to 5 then 0 per day first, then try for a trial run. Congratulated and praised pateint for coming this far and for attempting the trial run. Will try to attend next group session.

## 2018-06-05 ENCOUNTER — CLINICAL SUPPORT (OUTPATIENT)
Dept: SMOKING CESSATION | Facility: CLINIC | Age: 58
End: 2018-06-05
Payer: COMMERCIAL

## 2018-06-05 DIAGNOSIS — F17.210 HEAVY SMOKER (MORE THAN 20 CIGARETTES PER DAY): Primary | ICD-10-CM

## 2018-06-05 PROCEDURE — 90853 GROUP PSYCHOTHERAPY: CPT | Mod: S$GLB,,,

## 2018-06-05 PROCEDURE — 99999 PR PBB SHADOW E&M-EST. PATIENT-LVL I: CPT | Mod: PBBFAC,,,

## 2018-06-05 NOTE — Clinical Note
The patient is smoking 1 pack/day and is frustrated with progress,but is stressed at work. He can smoke at work freely with a co-worker , which makes quit difficult. Has been able to do a trial for 2 days at 0, but ended back at a pack. He is not using the 21 mg patches and is uisng the 1 mg Chantix BID. He is going to explore  hypopnosis via teena. To relax and to add positive thoughts. He stated this helped him before an he knows he has got to start relaxing to be able to quit.

## 2018-06-06 NOTE — PROGRESS NOTES
Smoking Cessation Group Session #4    Site: [facility]     Date: 6/5/18  Date: Clinical Status of Patient: Outpatient   Length of Service and Code: 90 minutes - 67593   Number in Attendance: 5  Group Activities/Focus of Group:  Sharing last weeks challenges, triggers, and coping activities to remain quit and/ or keep making progress toward cessation, completion of TCRS (Tobacco Cessation Rating Scale) learned addiction model, personal reasons for quitting, medications, goals, quit date.    Specific session focus: completion of TCRS (Tobacco Cessation Rating Scale) reviewed strategies, habitual behavior, stress, and high risk situations. Introduced stress with addition interventions, SOLVE, relaxation with interventions, nutrition, exercise, weight gain, and the importance of rewarding oneself for accomplishments toward becoming tobacco free. Open discussion of all items with interventions.       Target symptoms:  withdrawal and medication side effects             The following were rated moderate (3) to severe (4) on TCRS:       Moderate 3: none     Severe 4:   none  Patient's Response to Intervention: The patient is smoking 1 pack/day and is frustrated with progress,but is stressed at work. He can smoke at work freely with a co-worker , which makes quit difficult. Has been able to do a trial for 2 days at 0, but ended back at a pack. He is not using the 21 mg patches and is uisng the 1 mg Chantix BID. He is going to explore  hypopnosis via teena. To relax and to add positive thoughts. He stated this helped him before an he knows he has got to start relaxing to be able to quit.   Progress Toward Goals and Other Mental Status Changes: The patient denies any abnormal behavioral or mental changes at this time.    Diagnosis: F17.210      Plan: The patient will continue with group therapy sessions and medication regimen prescribed with management by physician or by the Cessation Clinic Provider. Patient will inform Smoking  Cessation Counselor of symptoms as rated high on TCRS.    Return to Clinic: 1 week    Quit Date: TBD

## 2018-06-07 DIAGNOSIS — Z13.5 DIABETIC RETINOPATHY SCREENING: ICD-10-CM

## 2018-06-12 ENCOUNTER — CLINICAL SUPPORT (OUTPATIENT)
Dept: SMOKING CESSATION | Facility: CLINIC | Age: 58
End: 2018-06-12
Payer: COMMERCIAL

## 2018-06-12 DIAGNOSIS — F17.210 HEAVY SMOKER (MORE THAN 20 CIGARETTES PER DAY): Primary | ICD-10-CM

## 2018-06-12 PROCEDURE — 90853 GROUP PSYCHOTHERAPY: CPT | Mod: S$GLB,,,

## 2018-06-12 PROCEDURE — 99999 PR PBB SHADOW E&M-EST. PATIENT-LVL II: CPT | Mod: PBBFAC,,,

## 2018-06-12 NOTE — Clinical Note
he patient is smoking 10 cigarettes/day using the Chantix 150 mg BID, 21 mg Nicotine patch till Chantix working, and hypinosis through an teena that helps him feel relaxed. The Chantix was refilled. He was to think about a quit date and how to prepare for quitting. He does have a smoker at work  And some stressful situations he will need to devise a plan.   Progress Toward Goals and Other Mental Status Change: The patient denies any abnormal behavioral or mental changes at this time.

## 2018-06-13 VITALS — OXYGEN SATURATION: 98 % | HEART RATE: 76 BPM

## 2018-06-13 RX ORDER — VARENICLINE TARTRATE 1 MG/1
1 TABLET, FILM COATED ORAL 2 TIMES DAILY
Qty: 60 TABLET | Refills: 0 | Status: SHIPPED | OUTPATIENT
Start: 2018-06-13 | End: 2018-07-06

## 2018-06-13 NOTE — PROGRESS NOTES
Smoking Cessation Group Session #5    Site: [facility]  Date:  6/13/2018  Clinical Status of Patient: Outpatient   Length of Service and Code: 90 minutes - 13904   Number in Attendance: 8  Group Activities/Focus of Group:  Sharing last weeks challenges, triggers, and coping activities to remain quit and/ or keep making progress toward cessation, completion of TCRS (Tobacco Cessation Rating Scale) learned addiction model, personal reasons for quitting, medications, goals, quit date.    Specific session focus: completion of TCRS (Tobacco Cessation Rating Scale) reviewed strategies, habitual behavior, high risks situations, understanding urges and cravings, stress and relaxation with open discussion and additional interventions, Introduced lapses, relapses, understanding them and analyzing the situation of a lapse, conflict issues that may be linked to a lapse.       Target symptoms:  withdrawal and medication side effects             The following were rated moderate (3) to severe (4) on TCRS:       Moderate 3: none     Severe 4:   none  Patient's Response to Intervention: The patient is smoking 10 cigarettes/day using the Chantix 150 mg BID, 21 mg Nicotine patch till Chantix working, and hypinosis through an teena that helps him feel relaxed. The Chantix was refilled. He was to think about a quit date and how to prepare for quitting. He does have a smoker at work  And some stressful situations he will need to devise a plan.    Progress Toward Goals and Other Mental Status Change: The patient denies any abnormal behavioral or mental changes at this time.     Diagnosis: F17.210      Plan: The patient will continue with group therapy sessions and medication regimen prescribed with management by physician or by the Cessation Clinic Provider. Patient will inform Smoking Cessation Counselor of symptoms as rated high on TCRS.    Return to Clinic: 1 week    Quit Date: TBD

## 2018-06-18 RX ORDER — BLOOD-GLUCOSE METER
1 KIT MISCELLANEOUS 2 TIMES DAILY
Qty: 100 STRIP | Refills: 3 | Status: SHIPPED | OUTPATIENT
Start: 2018-06-18 | End: 2018-10-10 | Stop reason: SDUPTHER

## 2018-06-19 ENCOUNTER — CLINICAL SUPPORT (OUTPATIENT)
Dept: SMOKING CESSATION | Facility: CLINIC | Age: 58
End: 2018-06-19
Payer: COMMERCIAL

## 2018-06-19 DIAGNOSIS — F17.210 HEAVY SMOKER (MORE THAN 20 CIGARETTES PER DAY): Primary | ICD-10-CM

## 2018-06-19 PROCEDURE — 99999 PR PBB SHADOW E&M-EST. PATIENT-LVL I: CPT | Mod: PBBFAC,,,

## 2018-06-19 PROCEDURE — 99404 PREV MED CNSL INDIV APPRX 60: CPT | Mod: S$GLB,,,

## 2018-06-19 NOTE — Clinical Note
The patient is smoking 10 cigarettes/day and is still having lots of stress at work and  Remodeling his house. The patient states he is trying more relaxation/ hypnosis at work where he can smoke inside to quit. He finds it very relaxing. NOt using the 21 mg nicotine patches due to Chantix. Was able to sit with the group for a period. completion of TCRS (Tobacco Cessation Rating Scale) reviewed strategies, controlling environment, cues, triggers, new goals set. Introduced high risk situations with preparation interventions, caffeine similarities with withdrawal issues of habit and nicotine, Alcohol, Understanding urges, cravings, stress and relaxation. The patient denies any abnormal behavioral or mental changes at this time.The patient will continue individual sessions and medication monitoring by CTTS. Prescribed medication management will be by practitoner.

## 2018-06-20 NOTE — PROGRESS NOTES
Individual Follow-Up Form    6/19/18    Quit Date: TBD    Clinical Status of Patient: Outpatient    Length of Service: 60 minutes    Continuing Medication: yes  Chantix    Other Medications: none     Target Symptoms: Withdrawal and medication side effects. The following were  rated moderate (3) to severe (4) on TCRS:  · Moderate (3): none   · Severe (4): none    Comments: The patient is smoking 10 cigarettes/day and is still having lots of stress at work and  Remodeling his house. The patient states he is trying more relaxation/ hypnosis at work where he can smoke inside to quit. He finds it very relaxing. Not using the 21 mg nicotine patches due to Chantix. Was able to sit with the group for a period. completion of TCRS (Tobacco Cessation Rating Scale) reviewed strategies, controlling environment, cues, triggers, new goals set. Introduced high risk situations with preparation interventions, caffeine similarities with withdrawal issues of habit and nicotine, Alcohol, Understanding urges, cravings, stress and relaxation. The patient denies any abnormal behavioral or mental changes at this time.The patient will continue individual sessions and medication monitoring by CTTS. Prescribed medication management will be by practitoner.    Diagnosis: F17.210    Next Visit: 1 week

## 2018-06-20 NOTE — PROGRESS NOTES
Smoking Cessation Group Session #{NUMBERS; 1-6:90662}    Site: ***  Date:  6/20/2018  Clinical Status of Patient: Outpatient   Length of Service and Code: {minutes 60 90 120:34420} - 37461   Number in Attendance: {numbers 2-16:70691}  Group Activities/Focus of Group:  Sharing last weeks challenges, triggers, and coping activities to remain quit and/ or keep making progress toward cessation, completion of TCRS (Tobacco Cessation Rating Scale) learned addiction model, personal reasons for quitting, medications, goals, quit date.    Specific session focus: ***    Target symptoms:  withdrawal and medication side effects             The following were rated moderate (3) to severe (4) on TCRS:       Moderate 3: ***     Severe 4:   ***  Patient's Response to Intervention: ***  Progress Toward Goals and Other Mental Status Changes: ***  Interval History: ***    Diagnosis: Z72.0  Plan: The patient will continue with group therapy sessions and medication regimen prescribed with management by physician or by the [facility]essation [facility] Provider. Patient will inform Smoking Cessation Counselor of symptoms as rated high on TCRS.  ***  Return to Clinic: {1-6 weeks:29721}    Quit Date: ***   Planned Quit Date: ***

## 2018-06-26 ENCOUNTER — CLINICAL SUPPORT (OUTPATIENT)
Dept: SMOKING CESSATION | Facility: CLINIC | Age: 58
End: 2018-06-26
Payer: COMMERCIAL

## 2018-06-26 DIAGNOSIS — F17.210 MODERATE SMOKER (20 OR LESS PER DAY): Primary | ICD-10-CM

## 2018-06-26 PROCEDURE — 90853 GROUP PSYCHOTHERAPY: CPT | Mod: S$GLB,,,

## 2018-06-26 PROCEDURE — 99999 PR PBB SHADOW E&M-EST. PATIENT-LVL II: CPT | Mod: PBBFAC,,,

## 2018-06-26 NOTE — Clinical Note
The patient is smoking 15 cigarettes/day up from 10 cigarettes. He states he is frustrated at work and with remodeling his home. Concerned about the lack of progress, but will try to decrease by 2- Possibly might consider added the Wellbutrin 150 mg BID iwith the Chantix  that he was taking at one time.to help him reduce and quit.-- completion of TCRS (Tobacco Cessation Rating Scale) reviewed strategies, habitual behavior, stress, and high risk situations. Introduced stress with addition interventions, SOLVE, relaxation with interventions, nutrition, exercise, weight gain, and the importance of rewarding oneself for accomplishments toward becoming tobacco free. Open discussion of all items with interventions. The CO measurement was 19 ppm. The patient denies any abnormal behavioral or mental changes at this time.The patient will continue individual sessions and medication monitoring by CTTS. Prescribed medication management will be by practitoner.

## 2018-06-27 VITALS — OXYGEN SATURATION: 97 % | HEART RATE: 79 BPM

## 2018-06-27 NOTE — PROGRESS NOTES
Individual Follow-Up Form    6/26/2018    Quit Date: TBD    Clinical Status of Patient: Outpatient    Length of Service: 60 minutes    Continuing Medication: yes  Chantix    Other Medications: none     Target Symptoms: Withdrawal and medication side effects. The following were  rated moderate (3) to severe (4) on TCRS:  · Moderate (3): angry, desire/crave withdrawal symptoms  · Severe (4): none    Comments: The patient is smoking 15 cigarettes/day up from 10 cigarettes. He states he is frustrated at work and with remodeling his home. Concerned about the lack of progress, but will try to decrease by 2. Possibly might consider added the Wellbutrin 150 mg BID iwith the Chantix  that he was taking at one time.to help him reduce and quit.-- completion of TCRS (Tobacco Cessation Rating Scale) reviewed strategies, habitual behavior, stress, and high risk situations. Introduced stress with addition interventions, SOLVE, relaxation with interventions, nutrition, exercise, weight gain, and the importance of rewarding oneself for accomplishments toward becoming tobacco free. Open discussion of all items with interventions. The CO measurement was 19 ppm. The patient denies any abnormal behavioral or mental changes at this time.The patient will continue individual sessions and medication monitoring by CTTS. Prescribed medication management will be by practitoner.          Diagnosis: F17.210    Next Visit: 2 weeks

## 2018-06-29 ENCOUNTER — CLINICAL SUPPORT (OUTPATIENT)
Dept: INTERNAL MEDICINE | Facility: CLINIC | Age: 58
End: 2018-06-29
Payer: COMMERCIAL

## 2018-06-29 DIAGNOSIS — D68.61 ANTIPHOSPHOLIPID SYNDROME: ICD-10-CM

## 2018-06-29 DIAGNOSIS — Z12.5 SCREENING FOR PROSTATE CANCER: ICD-10-CM

## 2018-06-29 DIAGNOSIS — E78.5 HYPERLIPIDEMIA LDL GOAL <70: ICD-10-CM

## 2018-06-29 DIAGNOSIS — E66.9 OBESITY (BMI 30-39.9): ICD-10-CM

## 2018-06-29 DIAGNOSIS — Z86.718 HISTORY OF DVT (DEEP VEIN THROMBOSIS): ICD-10-CM

## 2018-06-29 LAB
ALBUMIN SERPL BCP-MCNC: 3.8 G/DL
ALP SERPL-CCNC: 56 U/L
ALT SERPL W/O P-5'-P-CCNC: 19 U/L
ANION GAP SERPL CALC-SCNC: 7 MMOL/L
AST SERPL-CCNC: 20 U/L
BASOPHILS # BLD AUTO: 0.04 K/UL
BASOPHILS NFR BLD: 0.5 %
BILIRUB SERPL-MCNC: 1 MG/DL
BUN SERPL-MCNC: 13 MG/DL
CALCIUM SERPL-MCNC: 9.2 MG/DL
CHLORIDE SERPL-SCNC: 109 MMOL/L
CHOLEST SERPL-MCNC: 122 MG/DL
CHOLEST/HDLC SERPL: 3.9 {RATIO}
CO2 SERPL-SCNC: 24 MMOL/L
COMPLEXED PSA SERPL-MCNC: 0.6 NG/ML
CREAT SERPL-MCNC: 0.9 MG/DL
CREAT UR-MCNC: 203 MG/DL
DIFFERENTIAL METHOD: ABNORMAL
EOSINOPHIL # BLD AUTO: 0.2 K/UL
EOSINOPHIL NFR BLD: 2.3 %
ERYTHROCYTE [DISTWIDTH] IN BLOOD BY AUTOMATED COUNT: 14 %
EST. GFR  (AFRICAN AMERICAN): >60 ML/MIN/1.73 M^2
EST. GFR  (NON AFRICAN AMERICAN): >60 ML/MIN/1.73 M^2
ESTIMATED AVG GLUCOSE: 134 MG/DL
GLUCOSE SERPL-MCNC: 97 MG/DL
HBA1C MFR BLD HPLC: 6.3 %
HCT VFR BLD AUTO: 44.9 %
HDLC SERPL-MCNC: 31 MG/DL
HDLC SERPL: 25.4 %
HGB BLD-MCNC: 14.8 G/DL
IMM GRANULOCYTES # BLD AUTO: 0.04 K/UL
IMM GRANULOCYTES NFR BLD AUTO: 0.5 %
LDLC SERPL CALC-MCNC: 57.8 MG/DL
LYMPHOCYTES # BLD AUTO: 2.9 K/UL
LYMPHOCYTES NFR BLD: 34.9 %
MCH RBC QN AUTO: 32.4 PG
MCHC RBC AUTO-ENTMCNC: 33 G/DL
MCV RBC AUTO: 98 FL
MICROALBUMIN UR DL<=1MG/L-MCNC: 38 UG/ML
MICROALBUMIN/CREATININE RATIO: 18.7 UG/MG
MONOCYTES # BLD AUTO: 0.7 K/UL
MONOCYTES NFR BLD: 8.5 %
NEUTROPHILS # BLD AUTO: 4.4 K/UL
NEUTROPHILS NFR BLD: 53.3 %
NONHDLC SERPL-MCNC: 91 MG/DL
NRBC BLD-RTO: 0 /100 WBC
PLATELET # BLD AUTO: 240 K/UL
PMV BLD AUTO: 10.2 FL
POTASSIUM SERPL-SCNC: 4.3 MMOL/L
PROT SERPL-MCNC: 6.8 G/DL
RBC # BLD AUTO: 4.57 M/UL
SODIUM SERPL-SCNC: 140 MMOL/L
TRIGL SERPL-MCNC: 166 MG/DL
TSH SERPL DL<=0.005 MIU/L-ACNC: 1.49 UIU/ML
WBC # BLD AUTO: 8.33 K/UL

## 2018-06-29 PROCEDURE — 82043 UR ALBUMIN QUANTITATIVE: CPT

## 2018-06-29 PROCEDURE — 83036 HEMOGLOBIN GLYCOSYLATED A1C: CPT

## 2018-06-29 PROCEDURE — 80053 COMPREHEN METABOLIC PANEL: CPT

## 2018-06-29 PROCEDURE — 36415 COLL VENOUS BLD VENIPUNCTURE: CPT

## 2018-06-29 PROCEDURE — 80061 LIPID PANEL: CPT

## 2018-06-29 PROCEDURE — 85025 COMPLETE CBC W/AUTO DIFF WBC: CPT

## 2018-06-29 PROCEDURE — 84153 ASSAY OF PSA TOTAL: CPT

## 2018-06-29 PROCEDURE — 84443 ASSAY THYROID STIM HORMONE: CPT

## 2018-06-29 PROCEDURE — 99999 PR PBB SHADOW E&M-EST. PATIENT-LVL I: CPT | Mod: PBBFAC,,,

## 2018-07-03 ENCOUNTER — TELEPHONE (OUTPATIENT)
Dept: SMOKING CESSATION | Facility: CLINIC | Age: 58
End: 2018-07-03

## 2018-07-03 NOTE — TELEPHONE ENCOUNTER
Called to get update on how Chantix is working Check to seeif possibly Wellbutrin may be good. No answer, could not leave message

## 2018-07-06 ENCOUNTER — OFFICE VISIT (OUTPATIENT)
Dept: INTERNAL MEDICINE | Facility: CLINIC | Age: 58
End: 2018-07-06
Payer: COMMERCIAL

## 2018-07-06 VITALS
DIASTOLIC BLOOD PRESSURE: 80 MMHG | HEIGHT: 74 IN | HEART RATE: 65 BPM | SYSTOLIC BLOOD PRESSURE: 122 MMHG | WEIGHT: 277 LBS | RESPIRATION RATE: 20 BRPM | BODY MASS INDEX: 35.55 KG/M2 | OXYGEN SATURATION: 97 %

## 2018-07-06 DIAGNOSIS — Z71.6 TOBACCO ABUSE COUNSELING: ICD-10-CM

## 2018-07-06 DIAGNOSIS — R42 VERTIGO: ICD-10-CM

## 2018-07-06 DIAGNOSIS — E66.9 OBESITY (BMI 30-39.9): ICD-10-CM

## 2018-07-06 DIAGNOSIS — Z72.0 TOBACCO ABUSE: ICD-10-CM

## 2018-07-06 DIAGNOSIS — E78.5 HYPERLIPIDEMIA LDL GOAL <70: ICD-10-CM

## 2018-07-06 DIAGNOSIS — D68.61 ANTIPHOSPHOLIPID SYNDROME: ICD-10-CM

## 2018-07-06 DIAGNOSIS — Z86.718 HISTORY OF DVT (DEEP VEIN THROMBOSIS): ICD-10-CM

## 2018-07-06 PROCEDURE — 3008F BODY MASS INDEX DOCD: CPT | Mod: CPTII,S$GLB,, | Performed by: NURSE PRACTITIONER

## 2018-07-06 PROCEDURE — 3044F HG A1C LEVEL LT 7.0%: CPT | Mod: CPTII,S$GLB,, | Performed by: NURSE PRACTITIONER

## 2018-07-06 PROCEDURE — 99999 PR PBB SHADOW E&M-EST. PATIENT-LVL IV: CPT | Mod: PBBFAC,,, | Performed by: NURSE PRACTITIONER

## 2018-07-06 PROCEDURE — 99214 OFFICE O/P EST MOD 30 MIN: CPT | Mod: S$GLB,,, | Performed by: NURSE PRACTITIONER

## 2018-07-06 RX ORDER — RIVAROXABAN 20 MG/1
20 TABLET, FILM COATED ORAL DAILY
Qty: 90 TABLET | Refills: 1 | Status: SHIPPED | OUTPATIENT
Start: 2018-07-06 | End: 2019-01-14 | Stop reason: SDUPTHER

## 2018-07-06 RX ORDER — MECLIZINE HYDROCHLORIDE 25 MG/1
25 TABLET ORAL 3 TIMES DAILY PRN
Qty: 30 TABLET | Refills: 2 | Status: SHIPPED | OUTPATIENT
Start: 2018-07-06 | End: 2019-01-14

## 2018-07-06 RX ORDER — METFORMIN HYDROCHLORIDE 500 MG/1
500 TABLET ORAL
Qty: 90 TABLET | Refills: 0 | Status: SHIPPED | OUTPATIENT
Start: 2018-07-06 | End: 2018-10-20 | Stop reason: SDUPTHER

## 2018-07-06 NOTE — PATIENT INSTRUCTIONS
4 Steps for Eating Healthier  Changing the way you eat can improve your health. It can lower your cholesterol and blood pressure, and help you stay at a healthy weight. Your diet doesnt have to be bland and boring to be healthy. Just watch your calories and follow these steps:    1. Eat fewer unhealthy fats  · Choose more fish and lean meats instead of fatty cuts of meat.  · Skip butter and lard, and use less margarine.  · Pass on foods that have palm, coconut, or hydrogenated oils.  · Eat fewer high-fat dairy foods like cheese, ice cream, and whole milk.  · Get a heart-healthy cookbook and try some low-fat recipes.  2. Go light on salt  · Keep the saltshaker off the table.  · Limit high-salt ingredients, such as soy sauce, bouillon, and garlic salt.  · Instead of adding salt when cooking, season your food with herbs and flavorings. Try lemon, garlic, and onion.  · Limit convenience foods, such as boxed or canned foods and restaurant food.  · Read food labels and choose lower-sodium options.  3. Limit sugar  · Pause before you add sugars to pancakes, cereal, coffee, or tea. This includes white and brown table sugar, syrup, honey, and molasses. Cut your usual amount by half.  · Use non-sugar sweeteners. Stevia, aspartame, and sucralose can satisfy a sweet tooth without adding calories.  · Swap out sugar-filled soda and other drinks. Buy sugar-free or low-calorie beverages. Remember water is always the best choice.  · Read labels and choose foods with less added sugar. Keep in mind that dairy foods and foods with fruit will have some natural sugar.  · Cut the sugar in recipes by 1/3 to 1/2. Boost the flavor with extracts like almond, vanilla, or orange. Or add spices such as cinnamon or nutmeg.  4. Eat more fiber  · Eat fresh fruits and vegetables every day.  · Boost your diet with whole grains. Go for oats, whole-grain rice, and bran.  · Add beans and lentils to your meals.  · Drink more water to match your fiber  increase. This is to help prevent constipation.  Date Last Reviewed: 5/11/2015  © 5383-4847 [employer]. [address] [place] [address], [place] [address]. All rights reserved. This information is not intended as a substitute for professional medical care. Always follow your healthcare professional's instructions.

## 2018-07-06 NOTE — PROGRESS NOTES
Subjective:       Patient ID: North Najera is a 58 y.o. male.    Chief Complaint: Follow-up (x 6 months- results) and Dizziness (x 1 month- with sudden movements)    Patient is known, to me and presents with   Chief Complaint   Patient presents with    Follow-up     x 6 months- results    Dizziness     x 1 month- with sudden movements   .  Denies chest pain and shortness of breath.  Patient presents with check up and labs. Up to date with screenings. Having some issues with dizziness at times and is requesting antivert. Otherwise doing well.    HPI  Review of Systems   Constitutional: Negative for activity change, appetite change, chills, diaphoresis, fatigue, fever and unexpected weight change.   HENT: Negative.  Negative for congestion, ear discharge, ear pain, facial swelling, hearing loss, nosebleeds, postnasal drip, rhinorrhea, sinus pressure, sneezing, sore throat, tinnitus, trouble swallowing and voice change.    Eyes: Negative.  Negative for photophobia, pain, discharge, redness, itching and visual disturbance.   Respiratory: Negative.  Negative for apnea, cough, choking, chest tightness, shortness of breath, wheezing and stridor.    Cardiovascular: Negative.  Negative for chest pain, palpitations and leg swelling.   Gastrointestinal: Negative for abdominal distention, abdominal pain, anal bleeding, blood in stool, constipation, diarrhea, nausea and vomiting.   Endocrine: Positive for polyuria. Negative for polydipsia and polyphagia.   Genitourinary: Negative.  Negative for difficulty urinating, discharge, dysuria, enuresis, flank pain, frequency, hematuria, penile pain, penile swelling, scrotal swelling, testicular pain and urgency.   Musculoskeletal: Negative.  Negative for arthralgias, back pain, gait problem, joint swelling, myalgias, neck pain and neck stiffness.   Skin: Negative.  Negative for color change, pallor, rash and wound.   Neurological: Positive for dizziness, weakness and headaches.  Negative for tremors, seizures, syncope, facial asymmetry, speech difficulty, light-headedness and numbness.   Hematological: Negative for adenopathy. Does not bruise/bleed easily.   Psychiatric/Behavioral: Negative.  Negative for agitation, confusion, sleep disturbance and suicidal ideas. The patient is not nervous/anxious.        Objective:      Physical Exam   Constitutional: He is oriented to person, place, and time. He appears well-developed and well-nourished. No distress.   HENT:   Head: Normocephalic and atraumatic.   Right Ear: External ear normal.   Left Ear: External ear normal.   Nose: Nose normal.   Mouth/Throat: Oropharynx is clear and moist. No oropharyngeal exudate.   Eyes: Conjunctivae and EOM are normal. Pupils are equal, round, and reactive to light. Right eye exhibits no discharge. Left eye exhibits no discharge.   Neck: Normal range of motion. Neck supple. No JVD present. No tracheal deviation present. No thyromegaly present.   Cardiovascular: Normal rate, regular rhythm, normal heart sounds and intact distal pulses.  Exam reveals no gallop and no friction rub.    No murmur heard.  Pulmonary/Chest: Effort normal and breath sounds normal. No stridor. No respiratory distress. He has no wheezes. He has no rales. He exhibits no tenderness.   Abdominal: Soft. Bowel sounds are normal. He exhibits no distension. There is no tenderness. There is no rebound and no guarding.   Musculoskeletal: Normal range of motion. He exhibits no edema or tenderness.   Lymphadenopathy:     He has no cervical adenopathy.   Neurological: He is alert and oriented to person, place, and time. He has normal reflexes. He displays normal reflexes. No cranial nerve deficit. He exhibits normal muscle tone. Coordination normal.   Skin: Skin is warm and dry. Capillary refill takes less than 2 seconds. No rash noted. He is not diaphoretic. No erythema. No pallor.   Psychiatric: He has a normal mood and affect. His behavior is  "normal. Judgment and thought content normal.   Nursing note and vitals reviewed.      Assessment:       1. Uncontrolled type 2 diabetes mellitus without complication, without long-term current use of insulin    2. Hyperlipidemia LDL goal <70    3. Antiphospholipid syndrome    4. History of DVT (deep vein thrombosis)    5. Vertigo    6. Obesity (BMI 30-39.9)    7. Tobacco abuse    8. Tobacco abuse counseling        Plan:   North was seen today for follow-up and dizziness.    Diagnoses and all orders for this visit:    Uncontrolled type 2 diabetes mellitus without complication, without long-term current use of insulin  -     CBC auto differential; Future  -     Comprehensive metabolic panel; Future  -     Microalbumin/creatinine urine ratio; Future  -     Hemoglobin A1c; Future  -     metFORMIN (GLUCOPHAGE) 500 MG tablet; Take 1 tablet (500 mg total) by mouth daily with breakfast.    Hyperlipidemia LDL goal <70  -     CBC auto differential; Future  -     Comprehensive metabolic panel; Future  -     TSH; Future  -     Lipid panel; Future    Antiphospholipid syndrome  -     CBC auto differential; Future  -     Comprehensive metabolic panel; Future  -     XARELTO 20 mg Tab; Take 1 tablet (20 mg total) by mouth once daily.    History of DVT (deep vein thrombosis)  -     CBC auto differential; Future  -     Comprehensive metabolic panel; Future  -     XARELTO 20 mg Tab; Take 1 tablet (20 mg total) by mouth once daily.    Vertigo  -     meclizine (ANTIVERT) 25 mg tablet; Take 1 tablet (25 mg total) by mouth 3 (three) times daily as needed.    Obesity (BMI 30-39.9)  -     CBC auto differential; Future  -     Comprehensive metabolic panel; Future    Tobacco abuse    Tobacco abuse counseling    "This note will not be shared with the patient."  Will place on antivert  Check CBC, CMP, TSH, Fasting lipid profile, HgA1c, Microalbuminuria in 6 months.  Medication compliance was discussed with the patient.  The patient was " instructed to monitor glucoses closely using glucometer at home and to record the values in a log.  The patient was instructed to obtain an Optometry exam and microalbumin q year.  The patient was instructed to obtain a hemoglobin A1C q 3-4 months.  The patient was instructed to check the feet visually daily and to monitor for infection.  The patient was instructed to exercise at least 3 times a week.  The patient was instructed to follow a 1800 ADA diet.  The patient was instructed to monitor weight daily and try to keep it as close to ideal body weight as possible.  The patient was instructed on using exercise frequently to reduce BP.  The patient was instructed on using a low salt diet.  Monitor BP at home and to record the values in a log.  RTC in 6 months.

## 2018-07-10 ENCOUNTER — TELEPHONE (OUTPATIENT)
Dept: SMOKING CESSATION | Facility: CLINIC | Age: 58
End: 2018-07-10

## 2018-07-23 ENCOUNTER — CLINICAL SUPPORT (OUTPATIENT)
Dept: SMOKING CESSATION | Facility: CLINIC | Age: 58
End: 2018-07-23
Payer: COMMERCIAL

## 2018-07-23 DIAGNOSIS — F17.200 NICOTINE DEPENDENCE: Primary | ICD-10-CM

## 2018-07-23 PROCEDURE — 99406 BEHAV CHNG SMOKING 3-10 MIN: CPT | Mod: S$GLB,,,

## 2018-07-23 RX ORDER — VARENICLINE TARTRATE 1 MG/1
1 TABLET, FILM COATED ORAL 2 TIMES DAILY
Qty: 60 TABLET | Refills: 0 | Status: SHIPPED | OUTPATIENT
Start: 2018-07-23 | End: 2018-08-27 | Stop reason: SDUPTHER

## 2018-07-23 NOTE — PROGRESS NOTES
Called patient missed appt.and needed follow -up. The patient has not had a cigarette in 15 days using the 1mg Chantix BID. He has been very stressed at work and can smoke at the workplace which made it difficult for him to quit. He stated the cigarettes do not taste as good and he quit for 3 days and decided to try 1 and after 2 puffs put it out cause it made him sick. He feels very good about the position he is in with the quit, but would like to go Chantix again. He did ask about the Wellbutrin that was not reordered after 3/21/18. Explained usually at this point only Chantix or Wellbutrin are indicated. Congratulated on quit and pateint said he wants to continue group. Appointment made for 7/24/18.

## 2018-07-24 ENCOUNTER — CLINICAL SUPPORT (OUTPATIENT)
Dept: SMOKING CESSATION | Facility: CLINIC | Age: 58
End: 2018-07-24
Payer: COMMERCIAL

## 2018-07-24 DIAGNOSIS — F17.200 NICOTINE DEPENDENCE: Primary | ICD-10-CM

## 2018-07-24 PROCEDURE — 90853 GROUP PSYCHOTHERAPY: CPT | Mod: S$GLB,,,

## 2018-07-24 PROCEDURE — 99999 PR PBB SHADOW E&M-EST. PATIENT-LVL I: CPT | Mod: PBBFAC,,,

## 2018-07-24 NOTE — Clinical Note
The patient has been smoke free since 7/13/18 and did have 1 lapse. He stated he took 1 puff and it made him sick. He states the smell of the cigarette is terrible. He has  a co-worker who smokes inside at work and he asked him not too. He is still taking 1 mg of Chantix and 150 mg Wellbutrin that he had left over before this quit. Congratulated on his success and  discussed that when he comes off the medication, the urges may come back so get prepared now. Asked about getting more Wellbutrin, but needs to try to wean of medication he has now. CO  Measurement = 2 ppm.

## 2018-07-25 NOTE — PROGRESS NOTES
Smoking Cessation Group Session #6    Site: [facility]  Date:  7/24/2018  Clinical Status of Patient: Outpatient   Length of Service and Code: 90 minutes - 91856   Number in Attendance: 4  Group Activities/Focus of Group:  Sharing last weeks challenges, triggers, and coping activities to remain quit and/ or keep making progress toward cessation, completion of TCRS (Tobacco Cessation Rating Scale) learned addiction model, personal reasons for quitting, medications, goals, quit date.    Specific session focus: completion of TCRS (Tobacco Cessation Rating Scale) reviewed strategies, cues, triggers, high risk situations, lapses, relapses, diet, exercise, stress, relaxation, sleep, habitual behavior, and life style changes.      Target symptoms:  withdrawal and medication side effects             The following were rated moderate (3) to severe (4) on TCRS:       Moderate 3: none     Severe 4:   none  Patient's Response to Intervention:The patient has been smoke free since 7/13/18 and did have 1 lapse. He stated he took 1 puff and it made him sick. He states the smell of the cigarette is terrible. He has  a co-worker who smokes inside at work and he asked him not too. He is still taking 1 mg of Chantix and 150 mg Wellbutrin that he had left over before this quit. Congratulated on his success and  discussed that when he comes off the medication, the urges may come back so get prepared now. Asked about getting more Wellbutrin, but needs to try to wean of medication he has now. CO  Measurement = 2 ppm.            Progress Toward Goals and Other Mental Status Changes: The patient denies any abnormal behavioral or mental changes at this time.    Diagnosis: F17.210    Plan: The patient will continue with group therapy sessions and medication regimen prescribed with management by physician or by the Cessation Clinic Provider. Patient will inform Smoking Cessation Counselor of symptoms as rated high on TCRS.    Return to Clinic: 1  week    Quit Date: 7/13/18

## 2018-08-06 ENCOUNTER — TELEPHONE (OUTPATIENT)
Dept: SMOKING CESSATION | Facility: CLINIC | Age: 58
End: 2018-08-06

## 2018-08-06 NOTE — TELEPHONE ENCOUNTER
Spoke with patient . Trust had . Pt . Is not smoking x 3 weeks but states the urges are there. Is using Chantix with out complications. Does attend sessions when can due to work .

## 2018-08-14 ENCOUNTER — CLINICAL SUPPORT (OUTPATIENT)
Dept: SMOKING CESSATION | Facility: CLINIC | Age: 58
End: 2018-08-14
Payer: COMMERCIAL

## 2018-08-14 DIAGNOSIS — F17.200 NICOTINE DEPENDENCE: Primary | ICD-10-CM

## 2018-08-14 PROCEDURE — 99407 BEHAV CHNG SMOKING > 10 MIN: CPT | Mod: S$GLB,,,

## 2018-08-14 RX ORDER — LISINOPRIL 5 MG/1
5 TABLET ORAL DAILY
Qty: 30 TABLET | Refills: 5 | Status: SHIPPED | OUTPATIENT
Start: 2018-08-14 | End: 2018-11-26 | Stop reason: SDUPTHER

## 2018-08-15 NOTE — PROGRESS NOTES
The patient had been smoke free since 7/13/18 till the loss of his mentor and good friend. He was very depressed and sad and smoked 20 cigarettes a day.for several days. He got where said the taste was bad and he got where the smell was bothering him again. He had cut back to 5-10 cigarettes again. He is ready to not smoke again. Discussed cures and triggers that involve emotions. And how to deal with coping. The patient will continue with group therapy sessions and medication monitoring by CTTS. Prescribed medication management will be by practitioner.

## 2018-08-22 DIAGNOSIS — F17.200 NICOTINE DEPENDENCE: ICD-10-CM

## 2018-08-22 RX ORDER — VARENICLINE TARTRATE 1 MG/1
TABLET, FILM COATED ORAL
Qty: 60 TABLET | Refills: 0 | OUTPATIENT
Start: 2018-08-22

## 2018-08-27 ENCOUNTER — TELEPHONE (OUTPATIENT)
Dept: SMOKING CESSATION | Facility: CLINIC | Age: 58
End: 2018-08-27

## 2018-08-27 ENCOUNTER — CLINICAL SUPPORT (OUTPATIENT)
Dept: SMOKING CESSATION | Facility: CLINIC | Age: 58
End: 2018-08-27
Payer: COMMERCIAL

## 2018-08-27 DIAGNOSIS — F17.200 NICOTINE DEPENDENCE: ICD-10-CM

## 2018-08-27 DIAGNOSIS — F17.210 CIGARETTE NICOTINE DEPENDENCE WITHOUT COMPLICATION: ICD-10-CM

## 2018-08-27 PROCEDURE — 99406 BEHAV CHNG SMOKING 3-10 MIN: CPT | Mod: S$GLB,,, | Performed by: INTERNAL MEDICINE

## 2018-08-27 RX ORDER — VARENICLINE TARTRATE 1 MG/1
1 TABLET, FILM COATED ORAL 2 TIMES DAILY
Qty: 60 TABLET | Refills: 0 | Status: SHIPPED | OUTPATIENT
Start: 2018-08-27 | End: 2018-10-24 | Stop reason: SDUPTHER

## 2018-08-27 RX ORDER — BUPROPION HYDROCHLORIDE 150 MG/1
150 TABLET, EXTENDED RELEASE ORAL 2 TIMES DAILY
Qty: 60 TABLET | Refills: 0 | Status: SHIPPED | OUTPATIENT
Start: 2018-08-27 | End: 2018-10-01 | Stop reason: SDUPTHER

## 2018-08-27 NOTE — PROGRESS NOTES
The patient had to miss meeting due to working late. Tried to get Chantix refilled.but [facility] stated insurance would not pay for it, had exceeded benefit limit. Will try one more time. The patient had been smoke free and is only smoking 5-6 cigarettes/day, but continues to have cravings. Would like to try 150 mg Wellbutrin BID which did help before, if ok with AILYN Leon, EMILEE. If cannot get Chantix. He states he is feeling calmer after the death of his friend and feels it is time to get back on track with his smoking. Will attend group meeting 8/28/18.

## 2018-08-28 ENCOUNTER — CLINICAL SUPPORT (OUTPATIENT)
Dept: SMOKING CESSATION | Facility: CLINIC | Age: 58
End: 2018-08-28
Payer: COMMERCIAL

## 2018-08-28 DIAGNOSIS — F17.200 NICOTINE DEPENDENCE: Primary | ICD-10-CM

## 2018-08-28 PROCEDURE — 99999 PR PBB SHADOW E&M-EST. PATIENT-LVL I: CPT | Mod: PBBFAC,,,

## 2018-08-28 PROCEDURE — 90853 GROUP PSYCHOTHERAPY: CPT | Mod: S$GLB,,,

## 2018-08-28 RX ORDER — MICONAZOLE NITRATE 2 %
2 CREAM (GRAM) TOPICAL
Qty: 310 EACH | Refills: 0 | Status: SHIPPED | OUTPATIENT
Start: 2018-08-28 | End: 2019-01-14

## 2018-08-28 NOTE — Clinical Note
The patient is smoking (  15   ) cigarettes / day using ( 1 mg Chantix BID,  )The patient is smoking 15 cigarettes/day and feels he is getting back on track from the loss of his friend. He realizes he was not prepared for increased stress, anxiety, and depression that can come from unpleasant events. Discussed how to get prepared now, how will you cope with emotional stress in the future. Had requested to use Wellbutrin 150 mg BID with the 1 mg Chantix again and 2 mg nicotine gum to help get back on track and when quitting. Will be working on quit date. Medications were ordered. The patient denies any abnormal behavioral or mental changes at this time.

## 2018-08-29 NOTE — PROGRESS NOTES
Smoking Cessation Group Session #2    Site: [facility]  Date:  8/28/2018  Clinical Status of Patient: Outpatient   Length of Service and Code: 90 minutes - 49529   Number in Attendance: 3  Group Activities/Focus of Group:  Sharing last weeks challenges, triggers, and coping activities to remain quit and/ or keep making progress toward cessation, completion of TCRS (Tobacco Cessation Rating Scale) learned addiction model, personal reasons for quitting, medications, goals, quit date.    Specific session focus: completion of TCRS (Tobacco Cessation Rating Scale) reviewed strategies, cues, and triggers. Introduced the negative impact of tobacco on health, the health advantages of discontinuing the use of tobacco, time line improved health changes after a quit, withdrawal issues to expect from nicotine and habit, and ways to achieve the goal of a quit.      Target symptoms:  withdrawal and medication side effects             The following were rated moderate (3) to severe (4) on TCRS:       Moderate 3: desire/crave, withdrawal symptoms     Severe 4:   none  Patient's Response to Intervention: The patient is smoking (  15   ) cigarettes / day using ( 1 mg Chantix BID,  )  The patient is smoking 15 cigarettes/day and feels he is getting back on track from the loss of his friend. He realizes he was not prepared for increased stress, anxiety, and depression that can come from unpleasant events. Discussed how to get prepared now, how will you cope with emotional stress in the future. Had requested to use Wellbutrin 150 mg BID with the 1 mg Chantix again and 2 mg nicotine gum to help get back on track and when quitting. Will be working on quit date. Medications were ordered. The patient denies any abnormal behavioral or mental changes at this time.      Diagnosis: F17.210    Plan: The patient will continue with group therapy sessions and medication regimen prescribed with management by physician or by the Cessation Clinic Provider.  Patient will inform Smoking Cessation Counselor of symptoms as rated high on TCRS.    Return to Clinic: 1 week    Quit Date: TBD

## 2018-09-04 ENCOUNTER — CLINICAL SUPPORT (OUTPATIENT)
Dept: SMOKING CESSATION | Facility: CLINIC | Age: 58
End: 2018-09-04
Payer: COMMERCIAL

## 2018-09-04 DIAGNOSIS — F17.210 MODERATE SMOKER (20 OR LESS PER DAY): Primary | ICD-10-CM

## 2018-09-04 PROCEDURE — 99406 BEHAV CHNG SMOKING 3-10 MIN: CPT | Mod: S$GLB,,,

## 2018-09-04 NOTE — PROGRESS NOTES
The patient is not coming to session tonight due to bad weather. Did phone follow-up. He is still smoking 15 cigarettes/day but wants to quit by this week-end using the 150 mg Wellbutrin BID. and 1 mg Chantix BID. He states the urges are getting less, but not like when he quit 7/13/18. He states he feels anxious about smoking and quitting. He does realize now that he was not prepared to cope for things in the future and does need to stay with sessions. If he cannot quit this week-end ,set max limit at 10 cigarettes/ day Encouraged to look for cues and to get ready to quit such as get rifd of ashtrays, etc. . The patient will continue with group therapy sessions and medication monitoring by CTTS. Prescribed medication management will be by practioner..

## 2018-09-14 DIAGNOSIS — E78.5 HYPERLIPIDEMIA LDL GOAL <70: ICD-10-CM

## 2018-09-14 RX ORDER — ATORVASTATIN CALCIUM 20 MG/1
20 TABLET, FILM COATED ORAL NIGHTLY
Qty: 90 TABLET | Refills: 1 | Status: SHIPPED | OUTPATIENT
Start: 2018-09-14 | End: 2018-11-26 | Stop reason: SDUPTHER

## 2018-09-22 DIAGNOSIS — F17.210 CIGARETTE NICOTINE DEPENDENCE WITHOUT COMPLICATION: ICD-10-CM

## 2018-09-24 RX ORDER — BUPROPION HYDROCHLORIDE 150 MG/1
TABLET, EXTENDED RELEASE ORAL
Qty: 60 TABLET | Refills: 0 | OUTPATIENT
Start: 2018-09-24

## 2018-09-25 ENCOUNTER — CLINICAL SUPPORT (OUTPATIENT)
Dept: SMOKING CESSATION | Facility: CLINIC | Age: 58
End: 2018-09-25
Payer: COMMERCIAL

## 2018-09-25 DIAGNOSIS — F17.210 MODERATE SMOKER (20 OR LESS PER DAY): Primary | ICD-10-CM

## 2018-09-25 PROCEDURE — 99403 PREV MED CNSL INDIV APPRX 45: CPT | Mod: S$GLB,,,

## 2018-09-25 NOTE — PROGRESS NOTES
Individual Follow-Up Form    9/25/2018    Quit Date: TBD    Clinical Status of Patient: Outpatient    Length of Service: 45 minutes    Continuing Medication: yes  Chantix    Other Medications: 150 mg Wellbutrin BID 2 mg nicotine gum     Target Symptoms: Withdrawal and medication side effects. The following were  rated moderate (3) to severe (4) on TCRS:  · Moderate (3): desire/craving, habit   · Severe (4): none    Comments: The patient is smoking 15 cigarettes/day using the above medications. Discussed the need to quit smoking to give the Chantix time to work since it is a nicotine blocker. The patient is frustrated with some work aspects, but admits he is not really trying. Will decrease to 10 next week. completion of TCRS (Tobacco Cessation Rating Scale) reviewed strategies, habitual behavior, stress, and high risk situations. Introduced stress with addition interventions, SOLVE, relaxation with interventions, nutrition, exercise, weight gain, and the importance of rewarding oneself for accomplishments toward becoming tobacco free. Open discussion of all items with interventions. The patient denies any abnormal behavioral or mental changes at this time.The patient denies any abnormal behavioral or mental changes at this time.        Diagnosis: F17.210    Next Visit: 1 week

## 2018-09-25 NOTE — Clinical Note
The patient is smoking 15 cigarettes/day using the above medications. Discussed the need to quit smoking to give the Chantix time to work since it is a nicotine blocker. The patient is frustrated with some work aspects, but admits he is not really trying. Will decrease to 10 next week. completion of TCRS (Tobacco Cessation Rating Scale) reviewed strategies, habitual behavior, stress, and high risk situations. Introduced stress with addition interventions, SOLVE, relaxation with interventions, nutrition, exercise, weight gain, and the importance of rewarding oneself for accomplishments toward becoming tobacco free. Open discussion of all items with interventions. The patient denies any abnormal behavioral or mental changes at this time.The patient denies any abnormal behavioral or mental changes at this time.

## 2018-10-01 ENCOUNTER — TELEPHONE (OUTPATIENT)
Dept: SMOKING CESSATION | Facility: CLINIC | Age: 58
End: 2018-10-01

## 2018-10-01 DIAGNOSIS — F17.210 CIGARETTE NICOTINE DEPENDENCE WITHOUT COMPLICATION: ICD-10-CM

## 2018-10-01 RX ORDER — BUPROPION HYDROCHLORIDE 150 MG/1
150 TABLET, EXTENDED RELEASE ORAL 2 TIMES DAILY
Qty: 60 TABLET | Refills: 5 | Status: SHIPPED | OUTPATIENT
Start: 2018-10-01 | End: 2018-10-31

## 2018-10-02 ENCOUNTER — CLINICAL SUPPORT (OUTPATIENT)
Dept: SMOKING CESSATION | Facility: CLINIC | Age: 58
End: 2018-10-02
Payer: COMMERCIAL

## 2018-10-02 VITALS — HEART RATE: 65 BPM | OXYGEN SATURATION: 96 %

## 2018-10-02 DIAGNOSIS — F17.210 MODERATE SMOKER (20 OR LESS PER DAY): Primary | ICD-10-CM

## 2018-10-02 PROCEDURE — 99999 PR PBB SHADOW E&M-EST. PATIENT-LVL II: CPT | Mod: PBBFAC,,,

## 2018-10-02 PROCEDURE — 99403 PREV MED CNSL INDIV APPRX 45: CPT | Mod: S$GLB,,,

## 2018-10-02 NOTE — PROGRESS NOTES
Individual Follow-Up Form    10/2/2018    Quit Date: 10/15/18    Clinical Status of Patient: Outpatient    Length of Service: 45 minutes    Continuing Medication: yes  Chantix    Other Medications: 150 mg Wellbutrin 2 mg nicotine gum     Target Symptoms: Withdrawal and medication side effects. The following were  rated moderate (3) to severe (4) on TCRS:  · Moderate (3): none  · Severe (4): none    Comments: The patient has decrease to 8 cigarettes/day using the 1 mg Chantix BID and 150 mg Wellbutrin BID. He states he went 1 day smoke free then started back at 8 cigarettes/day. Will try to decrease to 5 cigarettes/dy then try another dry run. The patient would like to be smoke free in 2 weeks. Discussed being prepared not to smoke, how to handle stress, and when to leave or avoid stressful situations. The patient will continue individual sessions and medication monitoring by CTTS. Prescribed medication management will be by practitoner. The patient denies any abnormal behavioral or mental changes at this time.        Diagnosis: F17.210    Next Visit: 3 weeks .

## 2018-10-02 NOTE — PROGRESS NOTES
Individual Follow-Up Form    10/2/2018    Quit Date: ***    Clinical Status of Patient: Outpatient    Length of Service: {Smoking Length of Service:67311}    Continuing Medication: {Smoking Cessation Yes/No with medication:06871}    Other Medications: ***     Target Symptoms: Withdrawal and medication side effects. The following were  rated moderate (3) to severe (4) on TCRS:  · Moderate (3): ***  · Severe (4): ***    Comments: ***    Diagnosis: {Smoking Cessation Diagnosis:05236}    Next Visit: {Smoking Cessation Next Phone Intervention:67215}

## 2018-10-02 NOTE — Clinical Note
The patient has decrease to 8 cigarettes/day using the 1 mg Chantix BID and 150 mg Wellbutrin BID. He states he went 1 day smoke free then started back at 8 cigarettes/day. Will try to decrease to 5 cigarettes/dy then try another dry run. The patient would like to be smoke free in 2 weeks. Discussed being prepared not to smoke, how to handle stress, and when to leave or avoid stressful situations. The patient will continue individual sessions and medication monitoring by CTTS. Prescribed medication management will be by practitoner. The patient denies any abnormal behavioral or mental changes at this time.

## 2018-10-11 RX ORDER — BLOOD-GLUCOSE METER
1 KIT MISCELLANEOUS 2 TIMES DAILY
Qty: 100 STRIP | Refills: 3 | Status: SHIPPED | OUTPATIENT
Start: 2018-10-11 | End: 2019-02-10 | Stop reason: SDUPTHER

## 2018-10-18 ENCOUNTER — CLINICAL SUPPORT (OUTPATIENT)
Dept: SMOKING CESSATION | Facility: CLINIC | Age: 58
End: 2018-10-18
Payer: COMMERCIAL

## 2018-10-18 DIAGNOSIS — F17.200 NICOTINE DEPENDENCE: Primary | ICD-10-CM

## 2018-10-18 PROCEDURE — 99407 BEHAV CHNG SMOKING > 10 MIN: CPT | Mod: S$GLB,,,

## 2018-10-18 NOTE — PROGRESS NOTES
Successful contact with patient regarding tobacco cessastion quit #2. Pt states, he continue to work on quitting, he has cut down tremendously, and he remain in the program. Pt is scheduled to follow up with his CTTS on 10/23/2018. Pt commended on the accomplishment thus far, informed of his benefit status and future telephone follow ups. Will update the tobacco cessation smart form for 6 months on quit #2.

## 2018-10-22 RX ORDER — METFORMIN HYDROCHLORIDE 500 MG/1
TABLET ORAL
Qty: 90 TABLET | Refills: 0 | Status: SHIPPED | OUTPATIENT
Start: 2018-10-22 | End: 2018-11-25 | Stop reason: SDUPTHER

## 2018-10-23 ENCOUNTER — CLINICAL SUPPORT (OUTPATIENT)
Dept: SMOKING CESSATION | Facility: CLINIC | Age: 58
End: 2018-10-23
Payer: COMMERCIAL

## 2018-10-23 DIAGNOSIS — F17.210 CIGARETTE NICOTINE DEPENDENCE WITHOUT COMPLICATION: ICD-10-CM

## 2018-10-23 DIAGNOSIS — F17.200 NICOTINE DEPENDENCE: ICD-10-CM

## 2018-10-23 PROCEDURE — 99406 BEHAV CHNG SMOKING 3-10 MIN: CPT | Mod: S$GLB,,,

## 2018-10-24 RX ORDER — VARENICLINE TARTRATE 1 MG/1
1 TABLET, FILM COATED ORAL 2 TIMES DAILY
Qty: 60 TABLET | Refills: 0 | Status: SHIPPED | OUTPATIENT
Start: 2018-10-24 | End: 2018-11-05 | Stop reason: SDUPTHER

## 2018-10-24 NOTE — PROGRESS NOTES
The patient could not come to session, had to take relative to MD. States he is doing better, smoking 5-10 cigarettes/day again and feels the combination of  150 mg Wellbutrin BID and 1 mg  Chantix BID has helped. He has been stating busy and admits he smokes more when he is not. He feels he is loosing interest in smoking again. He started back due to the death of a friend and admits to not really trying to quit before. He is actively trying to quit and is ready. He still ahs trouble at work with other smokers, but has asked them to smoke outside. Reordered 1 mg Chantix BID, but should have 4 refills on the Wellbutrin from JAYLEN Biswas NP. By looking at medication record. Will attend next session and appt. Made.

## 2018-10-29 ENCOUNTER — TELEPHONE (OUTPATIENT)
Dept: SMOKING CESSATION | Facility: CLINIC | Age: 58
End: 2018-10-29

## 2018-10-29 DIAGNOSIS — L30.9 DERMATITIS: ICD-10-CM

## 2018-10-29 RX ORDER — CLOTRIMAZOLE AND BETAMETHASONE DIPROPIONATE 10; .64 MG/G; MG/G
CREAM TOPICAL 2 TIMES DAILY
Qty: 45 G | Refills: 1 | Status: SHIPPED | OUTPATIENT
Start: 2018-10-29 | End: 2021-12-03

## 2018-10-29 RX ORDER — PIOGLITAZONEHYDROCHLORIDE 30 MG/1
30 TABLET ORAL DAILY
Qty: 90 TABLET | Refills: 1 | Status: SHIPPED | OUTPATIENT
Start: 2018-10-29 | End: 2019-04-11 | Stop reason: SDUPTHER

## 2018-10-29 NOTE — TELEPHONE ENCOUNTER
Patient called and said he could not come 10/29/18 due to prolonged meeting in [place]. Chantix not refilled, May not have refills left from insurance. Same with Wellbutrin which was denied. Will check with pharmacy. reschduled for 11/5/18

## 2018-11-05 ENCOUNTER — CLINICAL SUPPORT (OUTPATIENT)
Dept: SMOKING CESSATION | Facility: CLINIC | Age: 58
End: 2018-11-05
Payer: COMMERCIAL

## 2018-11-05 VITALS — HEART RATE: 69 BPM | OXYGEN SATURATION: 98 %

## 2018-11-05 DIAGNOSIS — F17.210 MODERATE SMOKER (20 OR LESS PER DAY): Primary | ICD-10-CM

## 2018-11-05 DIAGNOSIS — F17.200 NICOTINE DEPENDENCE: ICD-10-CM

## 2018-11-05 PROCEDURE — 99403 PREV MED CNSL INDIV APPRX 45: CPT | Mod: S$GLB,,,

## 2018-11-05 PROCEDURE — 99999 PR PBB SHADOW E&M-EST. PATIENT-LVL II: CPT | Mod: PBBFAC,,,

## 2018-11-05 RX ORDER — VARENICLINE TARTRATE 1 MG/1
1 TABLET, FILM COATED ORAL 2 TIMES DAILY
Qty: 60 TABLET | Refills: 0 | Status: SHIPPED | OUTPATIENT
Start: 2018-11-05 | End: 2019-01-14

## 2018-11-05 RX ORDER — NICOTINE 7MG/24HR
1 PATCH, TRANSDERMAL 24 HOURS TRANSDERMAL DAILY
Qty: 14 PATCH | Refills: 0 | Status: SHIPPED | OUTPATIENT
Start: 2018-11-05 | End: 2019-01-14

## 2018-11-05 NOTE — Clinical Note
The patient has a  plateau at 10 cigarettes/day using the 150 mg Wellbutrin, no Chantix and no patches. The 21 mg nicotine  patches were making him jittery and nervous. Discussed stress and boredom are factors. Does not feel Wellbutrin is working like Chantix. Decided to add 7 mg nicotine patches to aid quit. completion of TCRS (Tobacco Cessation Rating Scale) reviewed strategies, habitual behavior, stress, and high risk situations. Introduced stress with addition interventions, SOLVE, relaxation with interventions, nutrition, exercise, weight gain, and the importance of rewarding oneself for accomplishments toward becoming tobacco free. Open discussion of all items with interventions. The patient will continue individual sessions and medication monitoring by CTTS. Prescribed medication management will be by practitoner. The patient denies any abnormal behavioral or mental changes at this time.

## 2018-11-05 NOTE — PROGRESS NOTES
Individual Follow-Up Form    11/5/2018    Quit Date: TBD    Clinical Status of Patient: Outpatient    Length of Service: 45 minutes    Continuing Medication: yes  Wellbutrin    Other Medications: 2 mg lozenges.      Target Symptoms: Withdrawal and medication side effects. The following were  rated moderate (3) to severe (4) on TCRS:  · Moderate (3): desire/crave, nicotine withdrawal habit  · Severe (4): non2    Comments: The patient has a  plateau at 10 cigarettes/day using the 150 mg Wellbutrin, no Chantix and no patches. The 21 mg nicotine  patches were making him jittery and nervous. Discussed stress and boredom are factors. Does not feel Wellbutrin is working like Chantix. Decided to add 7 mg nicotine patches to aid quit. completion of TCRS (Tobacco Cessation Rating Scale) reviewed strategies, habitual behavior, stress, and high risk situations. Introduced stress with addition interventions, SOLVE, relaxation with interventions, nutrition, exercise, weight gain, and the importance of rewarding oneself for accomplishments toward becoming tobacco free. Open discussion of all items with interventions. The patient will continue individual sessions and medication monitoring by CTTS. Prescribed medication management will be by practitoner. The patient denies any abnormal behavioral or mental changes at this time.           Diagnosis: F17.210    Next Visit: 2 weeks

## 2018-11-25 RX ORDER — METFORMIN HYDROCHLORIDE 500 MG/1
TABLET ORAL
Qty: 90 TABLET | Refills: 0 | Status: SHIPPED | OUTPATIENT
Start: 2018-11-25 | End: 2018-11-29

## 2018-11-26 DIAGNOSIS — E78.5 HYPERLIPIDEMIA LDL GOAL <70: ICD-10-CM

## 2018-11-27 ENCOUNTER — CLINICAL SUPPORT (OUTPATIENT)
Dept: SMOKING CESSATION | Facility: CLINIC | Age: 58
End: 2018-11-27
Payer: COMMERCIAL

## 2018-11-27 ENCOUNTER — TELEPHONE (OUTPATIENT)
Dept: INTERNAL MEDICINE | Facility: CLINIC | Age: 58
End: 2018-11-27

## 2018-11-27 VITALS — OXYGEN SATURATION: 98 % | HEART RATE: 73 BPM

## 2018-11-27 DIAGNOSIS — F17.210 MODERATE SMOKER (20 OR LESS PER DAY): Primary | ICD-10-CM

## 2018-11-27 PROCEDURE — 99403 PREV MED CNSL INDIV APPRX 45: CPT | Mod: S$GLB,,,

## 2018-11-27 PROCEDURE — 99999 PR PBB SHADOW E&M-EST. PATIENT-LVL I: CPT | Mod: PBBFAC,,,

## 2018-11-27 RX ORDER — ATORVASTATIN CALCIUM 20 MG/1
20 TABLET, FILM COATED ORAL NIGHTLY
Qty: 90 TABLET | Refills: 1 | Status: SHIPPED | OUTPATIENT
Start: 2018-11-27 | End: 2019-07-16 | Stop reason: SDUPTHER

## 2018-11-27 RX ORDER — LISINOPRIL 5 MG/1
5 TABLET ORAL DAILY
Qty: 30 TABLET | Refills: 5 | Status: SHIPPED | OUTPATIENT
Start: 2018-11-27 | End: 2019-05-09 | Stop reason: SDUPTHER

## 2018-11-27 RX ORDER — METFORMIN HYDROCHLORIDE 500 MG/1
500 TABLET ORAL DAILY
Qty: 90 TABLET | Refills: 0 | Status: SHIPPED | OUTPATIENT
Start: 2018-11-27 | End: 2018-11-29

## 2018-11-27 NOTE — Clinical Note
The patient is smoking 6-7 cigarettes/day using the left over Chantix he had a home. He is still doing the Wellbutrin but feels it is not giving him to motivation to quit like the Chantix. His insurance company will not pay for the Chantix after 3 rounds. He is using the  2 mg nicotine gum to help. Discussed why he smokes which is ritual and relaxation. Discussed to changing the ritual as time, where. Etc to make new habits. Feel the patient is getting back on track. He set his quit date for the middle of December. His benefits will run out December 14, 2018 and cannot renew till 4/19. Encouraged to stop smoking before bnenfits run out. The patient will continue individual sessions and medication monitoring by CTTS. Prescribed medication management will be by practitoner.The patient denies any abnormal behavioral or mental changes at this time.

## 2018-11-27 NOTE — PROGRESS NOTES
Individual Follow-Up Form    11/27/2018    Quit Date: TBD    Clinical Status of Patient: Outpatient    Length of Service: 45 minutes    Continuing Medication: yes  Chantix    Other Medications: 150 mg Wellbutrin BID , 2 mg nicotine gum     Target Symptoms: Withdrawal and medication side effects. The following were  rated moderate (3) to severe (4) on TCRS:  · Moderate (3): none  · Severe (4): none    Comments: The patient is smoking 6-7 cigarettes/day using the left over Chantix he had a home. He is still doing the Wellbutrin but feels it is not giving him to motivation to quit like the Chantix. His insurance company will not pay for the Chantix after 3 rounds. He is using the  2 mg nicotine gum to help. Discussed why he smokes which is ritual and relaxation. Discussed to changing the ritual as time, where. Etc to make new habits. Feel the patient is getting back on track. He set his quit date for the middle of December. His benefits will run out December 14, 2018 and cannot renew till 4/19. Encouraged to stop smoking before bnenfits run out. The patient will continue individual sessions and medication monitoring by CTTS. Prescribed medication management will be by practitoner.  The patient denies any abnormal behavioral or mental changes at this time.      Diagnosis: F17.210    Next Visit: 2 weeks

## 2018-12-10 ENCOUNTER — CLINICAL SUPPORT (OUTPATIENT)
Dept: SMOKING CESSATION | Facility: CLINIC | Age: 58
End: 2018-12-10
Payer: COMMERCIAL

## 2018-12-10 VITALS — OXYGEN SATURATION: 98 % | HEART RATE: 69 BPM

## 2018-12-10 DIAGNOSIS — F17.210 CIGARETTE NICOTINE DEPENDENCE WITHOUT COMPLICATION: Primary | ICD-10-CM

## 2018-12-10 PROCEDURE — 99999 PR PBB SHADOW E&M-EST. PATIENT-LVL II: CPT | Mod: PBBFAC,,,

## 2018-12-10 PROCEDURE — 99403 PREV MED CNSL INDIV APPRX 45: CPT | Mod: S$GLB,,,

## 2018-12-10 NOTE — PROGRESS NOTES
Individual Follow-Up Form    12/10/2018    Quit Date: 12/1/18    Clinical Status of Patient: Outpatient    Length of Service: 45 minutes    Continuing Medication: yes  Chantix    Other Medications: none     Target Symptoms: Withdrawal and medication side effects. The following were  rated moderate (3) to severe (4) on TCRS:  · Moderate (3): none  · Severe (4): none    Comments: The patient has not smoked since 12/1/18 using the 1 mg Chantix BID he has left over. He really was not making progress on the Wellbutrin and went back to his Chantix. HE stated this is the 3rd time and he is ready for it to be the last. He does have cravings, but is able to use distraction not to smoke. He will try to decrease the Chantix and was warned to be careful, because the Chantix give him a false sense of accomplishment. He states he feels he has learned from the program and has been through a crisis with the death of his friend which made him go back to smoking daily. He is out of benefits but can apply in 14 weeks. Congratulated on his success. Enjoyed having North in the program. The patient denies any abnormal behavioral or mental changes at this time.    Diagnosis: F17.210    Next Visit: Finished program

## 2018-12-10 NOTE — Clinical Note
The patient has not smoked since 12/1/18 using the 1 mg Chantix BID he has left over. He really was not making progress on the Wellbutrin and went back to his Chantix. HE stated this is the 3rd time and he is ready for it to be the last. He does have cravings, but is able to use distraction not to smoke. He will try to decrease the Chantix and was warned to be careful, because the Chantix give him a false sense of accomplishment. He states he feels he has learned from the program and has been through a crisis with the death of his friend which made him go back to smoking daily. He is out of benefits but can apply in 14 weeks. Congratulated on his success. Enjoyed having North in the program. The patient denies any abnormal behavioral or mental changes at this time.

## 2018-12-26 ENCOUNTER — TELEPHONE (OUTPATIENT)
Dept: INTERNAL MEDICINE | Facility: CLINIC | Age: 58
End: 2018-12-26

## 2018-12-26 DIAGNOSIS — Z72.52 HIGH RISK HOMOSEXUAL BEHAVIOR: Primary | ICD-10-CM

## 2018-12-26 NOTE — TELEPHONE ENCOUNTER
Pt is due for his routine BW beginning Jan. But needs his HIV BW order put in as well to get it all done together  Please advise  Thanks!

## 2019-01-07 ENCOUNTER — CLINICAL SUPPORT (OUTPATIENT)
Dept: INTERNAL MEDICINE | Facility: CLINIC | Age: 59
End: 2019-01-07
Payer: COMMERCIAL

## 2019-01-07 ENCOUNTER — LAB VISIT (OUTPATIENT)
Dept: LAB | Facility: HOSPITAL | Age: 59
End: 2019-01-07
Attending: NURSE PRACTITIONER
Payer: COMMERCIAL

## 2019-01-07 DIAGNOSIS — Z86.718 HISTORY OF DVT (DEEP VEIN THROMBOSIS): ICD-10-CM

## 2019-01-07 DIAGNOSIS — Z72.52 HIGH RISK HOMOSEXUAL BEHAVIOR: ICD-10-CM

## 2019-01-07 DIAGNOSIS — E78.5 HYPERLIPIDEMIA LDL GOAL <70: ICD-10-CM

## 2019-01-07 DIAGNOSIS — D68.61 ANTIPHOSPHOLIPID SYNDROME: ICD-10-CM

## 2019-01-07 DIAGNOSIS — E66.9 OBESITY (BMI 30-39.9): ICD-10-CM

## 2019-01-07 LAB
ALBUMIN SERPL BCP-MCNC: 4 G/DL
ALBUMIN/CREAT UR: 30.2 UG/MG
ALP SERPL-CCNC: 50 U/L
ALT SERPL W/O P-5'-P-CCNC: 21 U/L
ANION GAP SERPL CALC-SCNC: 8 MMOL/L
AST SERPL-CCNC: 18 U/L
BASOPHILS # BLD AUTO: 0.02 K/UL
BASOPHILS NFR BLD: 0.2 %
BILIRUB SERPL-MCNC: 1.1 MG/DL
BUN SERPL-MCNC: 11 MG/DL
CALCIUM SERPL-MCNC: 9.3 MG/DL
CHLORIDE SERPL-SCNC: 106 MMOL/L
CHOLEST SERPL-MCNC: 119 MG/DL
CHOLEST/HDLC SERPL: 3.7 {RATIO}
CO2 SERPL-SCNC: 25 MMOL/L
CREAT SERPL-MCNC: 0.9 MG/DL
CREAT UR-MCNC: 179 MG/DL
DIFFERENTIAL METHOD: ABNORMAL
EOSINOPHIL # BLD AUTO: 0.1 K/UL
EOSINOPHIL NFR BLD: 1.7 %
ERYTHROCYTE [DISTWIDTH] IN BLOOD BY AUTOMATED COUNT: 13.9 %
EST. GFR  (AFRICAN AMERICAN): >60 ML/MIN/1.73 M^2
EST. GFR  (NON AFRICAN AMERICAN): >60 ML/MIN/1.73 M^2
ESTIMATED AVG GLUCOSE: 137 MG/DL
GLUCOSE SERPL-MCNC: 105 MG/DL
HBA1C MFR BLD HPLC: 6.4 %
HCT VFR BLD AUTO: 44.8 %
HDLC SERPL-MCNC: 32 MG/DL
HDLC SERPL: 26.9 %
HGB BLD-MCNC: 14.9 G/DL
LDLC SERPL CALC-MCNC: 62.6 MG/DL
LYMPHOCYTES # BLD AUTO: 2.5 K/UL
LYMPHOCYTES NFR BLD: 29.5 %
MCH RBC QN AUTO: 32.1 PG
MCHC RBC AUTO-ENTMCNC: 33.3 G/DL
MCV RBC AUTO: 97 FL
MICROALBUMIN UR DL<=1MG/L-MCNC: 54 UG/ML
MONOCYTES # BLD AUTO: 0.8 K/UL
MONOCYTES NFR BLD: 9.1 %
NEUTROPHILS # BLD AUTO: 5 K/UL
NEUTROPHILS NFR BLD: 59.5 %
NONHDLC SERPL-MCNC: 87 MG/DL
PLATELET # BLD AUTO: 259 K/UL
PMV BLD AUTO: 9.2 FL
POTASSIUM SERPL-SCNC: 4.2 MMOL/L
PROT SERPL-MCNC: 7.1 G/DL
RBC # BLD AUTO: 4.64 M/UL
SODIUM SERPL-SCNC: 139 MMOL/L
TRIGL SERPL-MCNC: 122 MG/DL
TSH SERPL DL<=0.005 MIU/L-ACNC: 1.35 UIU/ML
WBC # BLD AUTO: 8.43 K/UL

## 2019-01-07 PROCEDURE — 85025 COMPLETE CBC W/AUTO DIFF WBC: CPT

## 2019-01-07 PROCEDURE — 84443 ASSAY THYROID STIM HORMONE: CPT

## 2019-01-07 PROCEDURE — 83036 HEMOGLOBIN GLYCOSYLATED A1C: CPT

## 2019-01-07 PROCEDURE — 80061 LIPID PANEL: CPT

## 2019-01-07 PROCEDURE — 82043 UR ALBUMIN QUANTITATIVE: CPT

## 2019-01-07 PROCEDURE — 36415 COLL VENOUS BLD VENIPUNCTURE: CPT

## 2019-01-07 PROCEDURE — 86703 HIV-1/HIV-2 1 RESULT ANTBDY: CPT

## 2019-01-07 PROCEDURE — 80053 COMPREHEN METABOLIC PANEL: CPT

## 2019-01-08 LAB — HIV 1+2 AB+HIV1 P24 AG SERPL QL IA: NEGATIVE

## 2019-01-14 ENCOUNTER — OFFICE VISIT (OUTPATIENT)
Dept: INTERNAL MEDICINE | Facility: CLINIC | Age: 59
End: 2019-01-14
Payer: COMMERCIAL

## 2019-01-14 VITALS
SYSTOLIC BLOOD PRESSURE: 120 MMHG | WEIGHT: 282.19 LBS | DIASTOLIC BLOOD PRESSURE: 82 MMHG | OXYGEN SATURATION: 98 % | HEART RATE: 62 BPM | BODY MASS INDEX: 36.22 KG/M2 | HEIGHT: 74 IN | RESPIRATION RATE: 18 BRPM

## 2019-01-14 DIAGNOSIS — E66.01 SEVERE OBESITY (BMI 35.0-35.9 WITH COMORBIDITY): ICD-10-CM

## 2019-01-14 DIAGNOSIS — L98.9 SKIN LESION: ICD-10-CM

## 2019-01-14 DIAGNOSIS — Z86.718 HISTORY OF DVT (DEEP VEIN THROMBOSIS): ICD-10-CM

## 2019-01-14 DIAGNOSIS — D68.61 ANTIPHOSPHOLIPID SYNDROME: ICD-10-CM

## 2019-01-14 DIAGNOSIS — Z72.0 TOBACCO ABUSE: ICD-10-CM

## 2019-01-14 DIAGNOSIS — N52.9 ERECTILE DYSFUNCTION, UNSPECIFIED ERECTILE DYSFUNCTION TYPE: ICD-10-CM

## 2019-01-14 DIAGNOSIS — R53.83 FATIGUE, UNSPECIFIED TYPE: ICD-10-CM

## 2019-01-14 DIAGNOSIS — Z23 NEED FOR VACCINATION: ICD-10-CM

## 2019-01-14 DIAGNOSIS — E78.5 HYPERLIPIDEMIA LDL GOAL <70: ICD-10-CM

## 2019-01-14 DIAGNOSIS — Z12.5 PROSTATE CANCER SCREENING: ICD-10-CM

## 2019-01-14 DIAGNOSIS — Z71.6 TOBACCO ABUSE COUNSELING: ICD-10-CM

## 2019-01-14 PROBLEM — E66.9 OBESITY (BMI 30-39.9): Status: RESOLVED | Noted: 2017-03-28 | Resolved: 2019-01-14

## 2019-01-14 PROCEDURE — 90670 PCV13 VACCINE IM: CPT | Mod: S$GLB,,, | Performed by: NURSE PRACTITIONER

## 2019-01-14 PROCEDURE — 3008F PR BODY MASS INDEX (BMI) DOCUMENTED: ICD-10-PCS | Mod: CPTII,S$GLB,, | Performed by: NURSE PRACTITIONER

## 2019-01-14 PROCEDURE — 99999 PR PBB SHADOW E&M-EST. PATIENT-LVL V: ICD-10-PCS | Mod: PBBFAC,,, | Performed by: NURSE PRACTITIONER

## 2019-01-14 PROCEDURE — 3008F BODY MASS INDEX DOCD: CPT | Mod: CPTII,S$GLB,, | Performed by: NURSE PRACTITIONER

## 2019-01-14 PROCEDURE — 3044F HG A1C LEVEL LT 7.0%: CPT | Mod: CPTII,S$GLB,, | Performed by: NURSE PRACTITIONER

## 2019-01-14 PROCEDURE — 82040 ASSAY OF SERUM ALBUMIN: CPT

## 2019-01-14 PROCEDURE — 3044F PR MOST RECENT HEMOGLOBIN A1C LEVEL <7.0%: ICD-10-PCS | Mod: CPTII,S$GLB,, | Performed by: NURSE PRACTITIONER

## 2019-01-14 PROCEDURE — 99214 PR OFFICE/OUTPT VISIT, EST, LEVL IV, 30-39 MIN: ICD-10-PCS | Mod: 25,S$GLB,, | Performed by: NURSE PRACTITIONER

## 2019-01-14 PROCEDURE — 90471 PNEUMOCOCCAL CONJUGATE VACCINE 13-VALENT LESS THAN 5YO & GREATER THAN: ICD-10-PCS | Mod: 59,S$GLB,, | Performed by: NURSE PRACTITIONER

## 2019-01-14 PROCEDURE — 99999 PR PBB SHADOW E&M-EST. PATIENT-LVL V: CPT | Mod: PBBFAC,,, | Performed by: NURSE PRACTITIONER

## 2019-01-14 PROCEDURE — 99214 OFFICE O/P EST MOD 30 MIN: CPT | Mod: 25,S$GLB,, | Performed by: NURSE PRACTITIONER

## 2019-01-14 PROCEDURE — 90471 IMMUNIZATION ADMIN: CPT | Mod: 59,S$GLB,, | Performed by: NURSE PRACTITIONER

## 2019-01-14 PROCEDURE — 90670 PNEUMOCOCCAL CONJUGATE VACCINE 13-VALENT LESS THAN 5YO & GREATER THAN: ICD-10-PCS | Mod: S$GLB,,, | Performed by: NURSE PRACTITIONER

## 2019-01-14 RX ORDER — BUPROPION HYDROCHLORIDE 150 MG/1
150 TABLET, EXTENDED RELEASE ORAL 2 TIMES DAILY
Refills: 5 | COMMUNITY
Start: 2018-11-26 | End: 2020-07-07

## 2019-01-14 RX ORDER — RIVAROXABAN 20 MG/1
TABLET, FILM COATED ORAL
Qty: 90 TABLET | Refills: 1 | Status: SHIPPED | OUTPATIENT
Start: 2019-01-14 | End: 2019-07-08 | Stop reason: SDUPTHER

## 2019-01-14 RX ORDER — SILDENAFIL CITRATE 20 MG/1
20 TABLET ORAL DAILY PRN
Qty: 30 TABLET | Refills: 2 | Status: SHIPPED | OUTPATIENT
Start: 2019-01-14 | End: 2021-12-03

## 2019-01-14 NOTE — PROGRESS NOTES
Subjective:       Patient ID: North Najera is a 58 y.o. male.    Chief Complaint: Follow-up (6 month check up-results)    Patient is known, to me and presents with   Chief Complaint   Patient presents with    Follow-up     6 month check up-results   .  Denies chest pain and shortness of breath.  Patient presents with check up and labs. Needs to have prevnar today. Also would like to have the shingrix as well. Complains of fatigue at times and would like to have testosterone levels drawn. Also wants to have ED meds. He feels fine otherwise and states that blood sugars stay controlled. Smokes off and on   HPI  Review of Systems   Constitutional: Negative.  Negative for activity change, appetite change, chills, diaphoresis, fatigue, fever and unexpected weight change.   HENT: Negative.  Negative for congestion, ear discharge, ear pain, facial swelling, hearing loss, nosebleeds, postnasal drip, rhinorrhea, sinus pressure, sneezing, sore throat, tinnitus, trouble swallowing and voice change.    Eyes: Negative.  Negative for photophobia, pain, discharge, redness, itching and visual disturbance.   Respiratory: Negative.  Negative for apnea, cough, choking, chest tightness, shortness of breath, wheezing and stridor.    Cardiovascular: Negative.  Negative for chest pain, palpitations and leg swelling.   Gastrointestinal: Negative for abdominal distention, abdominal pain, anal bleeding, blood in stool, constipation, diarrhea, nausea and vomiting.   Genitourinary: Negative.  Negative for difficulty urinating, discharge, dysuria, enuresis, flank pain, frequency, hematuria, penile pain, penile swelling, scrotal swelling, testicular pain and urgency.   Musculoskeletal: Negative.  Negative for arthralgias, back pain, gait problem, joint swelling, myalgias, neck pain and neck stiffness.   Skin: Negative.  Negative for color change, pallor, rash and wound.   Neurological: Negative for dizziness, tremors, seizures, syncope,  facial asymmetry, speech difficulty, weakness, light-headedness, numbness and headaches.   Hematological: Negative for adenopathy. Does not bruise/bleed easily.   Psychiatric/Behavioral: Negative.  Negative for agitation, dysphoric mood, sleep disturbance and suicidal ideas. The patient is not nervous/anxious.        Objective:      Physical Exam   Constitutional: He is oriented to person, place, and time. He appears well-developed and well-nourished. No distress.   HENT:   Head: Normocephalic and atraumatic.   Right Ear: External ear normal.   Left Ear: External ear normal.   Nose: Nose normal.   Mouth/Throat: Oropharynx is clear and moist. No oropharyngeal exudate.   Eyes: Conjunctivae and EOM are normal. Pupils are equal, round, and reactive to light. Right eye exhibits no discharge. Left eye exhibits no discharge.   Neck: Normal range of motion. Neck supple. No JVD present. No tracheal deviation present. No thyromegaly present.   Cardiovascular: Normal rate, regular rhythm, normal heart sounds and intact distal pulses. Exam reveals no gallop and no friction rub.   No murmur heard.  Pulmonary/Chest: Effort normal and breath sounds normal. No stridor. No respiratory distress. He has no wheezes. He has no rales. He exhibits no tenderness.   Abdominal: Soft. Bowel sounds are normal. He exhibits no distension. There is no tenderness. There is no rebound and no guarding.   Musculoskeletal: Normal range of motion. He exhibits no edema or tenderness.   Lymphadenopathy:     He has no cervical adenopathy.   Neurological: He is alert and oriented to person, place, and time. He has normal reflexes. He displays normal reflexes. No cranial nerve deficit. He exhibits normal muscle tone. Coordination normal.   Skin: Skin is warm and dry. Capillary refill takes less than 2 seconds. No rash noted. He is not diaphoretic. No erythema. No pallor.   Actinic keratosis to right temple region   Psychiatric: He has a normal mood and  affect. His behavior is normal. Judgment and thought content normal.   Nursing note and vitals reviewed.      Assessment:       1. Uncontrolled type 2 diabetes mellitus without complication, without long-term current use of insulin    2. Antiphospholipid syndrome    3. Hyperlipidemia LDL goal <70    4. Skin lesion    5. Fatigue, unspecified type    6. Erectile dysfunction, unspecified erectile dysfunction type    7. Severe obesity (BMI 35.0-35.9 with comorbidity)    8. Prostate cancer screening    9. Need for vaccination    10. Tobacco abuse    11. Tobacco abuse counseling        Plan:   North was seen today for follow-up.    Diagnoses and all orders for this visit:    Uncontrolled type 2 diabetes mellitus without complication, without long-term current use of insulin  -     CBC auto differential; Future  -     Comprehensive metabolic panel; Future  -     Microalbumin/creatinine urine ratio; Future  -     Hemoglobin A1c; Future    Antiphospholipid syndrome  -     CBC auto differential; Future  -     Comprehensive metabolic panel; Future    Hyperlipidemia LDL goal <70  -     CBC auto differential; Future  -     Comprehensive metabolic panel; Future  -     TSH; Future  -     Lipid panel; Future    Skin lesion  -     Ambulatory Referral to Dermatology    Fatigue, unspecified type  -     Testosterone Panel; Future    Erectile dysfunction, unspecified erectile dysfunction type  -     sildenafil (REVATIO) 20 mg Tab; Take 1 tablet (20 mg total) by mouth daily as needed.    Severe obesity (BMI 35.0-35.9 with comorbidity)  -     CBC auto differential; Future  -     Comprehensive metabolic panel; Future    Prostate cancer screening  -     PSA, Screening; Future    Need for vaccination  -     (In Office Administered) Pneumococcal Conjugate Vaccine (13 Valent) (IM)  -     varicella-zoster [id number], PF, (SHINGRIX, PF,) 50 mcg/0.5 mL injection; Inject 0.5 mLs into the muscle once. for 1 dose    Tobacco abuse    Tobacco abuse  "counseling    "This note will not be shared with the patient."  Check CBC, CMP, TSH, Fasting lipid profile, HgA1c, Microalbuminuria in 6 months.  Medication compliance was discussed with the patient.  The patient was instructed to monitor glucoses closely using glucometer at home and to record the values in a log.  The patient was instructed to obtain an Optometry exam and microalbumin q year.  The patient was instructed to obtain a hemoglobin A1C q 3-4 months.  The patient was instructed to check the feet visually daily and to monitor for infection.  The patient was instructed to exercise at least 3 times a week.  The patient was instructed to follow a 1800 ADA diet.  The patient was instructed to monitor weight daily and try to keep it as close to ideal body weight as possible.  The patient was instructed on using exercise frequently to reduce BP.  The patient was instructed on using a low salt diet.  Monitor BP at home and to record the values in a log.  RTC in 6 months.  "

## 2019-01-14 NOTE — PATIENT INSTRUCTIONS
Diabetes: Activity Tips    Being more active can help you manage your diabetes. The tips on this sheet can help you get the most from your exercise. They can also help you stay safe.  Staying Active  Its important for adults to spend less time sitting and being inactive. This is especially true if you have type 2 diabetes. When you are sitting for long periods of time, get up for short sessions of light activity every 30 minutes.  You should aim for at least 150 minutes a week of exercise or physical activity. Dont let more than 2 days go by without being active.  Benefit from briskness  Brisk activity gets your heart beating faster. This can help you increase your fitness, lose extra weight, and manage your blood sugar level. Try brisk walking. Or, if you have foot or leg problems, you can try swimming or bike riding. You can break up your exercise into chunks throughout the day. Work up to at least 30 minutes of steady, brisk exercise on most days.  Warm up and cool down  Warming up and cooling down reduce your risk of injury. They also help limit muscle soreness. Do a mild version of your activity for 5 minutes before and after your routine. You can also learn stretches that will help keep your muscles loose. Your healthcare provider may show you good ways to warm up and stretch.  Do the talk-sing test  The talk-sing test is a simple way to tell how hard youre exercising. If you can talk while exercising, youre in a safe range. If youre out of breath, slow down. If you can carry a tune, its time to  the pace. Walk up a hill. Increase the resistance on your stationary bike. Or swim faster.  What about eating?  You may be told to plan your exercise for 1 to 2 hours after a meal. In most cases, you dont need to eat while being active. If you take insulin or medicine that can cause low blood sugar, test your blood sugar before exercising. And carry a fast-acting sugar that will raise your blood sugar  level quickly. This includes glucose tablets or hard candy. Use it if you feel low blood sugar symptoms.  Safety tips  These tips can help you stay safe as you become fit:  · Exercise with a friend or carry a cell phone if you have one.  · Carry or wear identification, such as a necklace or bracelet, that says you have diabetes.  · Use the proper footwear and safety equipment for your activity.  · Drink water before, during, and after exercise.  · Dress properly for the weather.  · Dont exercise in very hot or very cold weather.  · Dont exercise if you are sick.  · If you are instructed to do so, test your blood sugar before and after you exercise. Have a small carbohydrate snack if your blood sugar is low before you start exercising.   When to stop exercising and call your healthcare provider  Stop exercising and call your healthcare provider right away if you notice any of the following:  · Pain, pressure, tightness, or heaviness in the chest  · Pain or heaviness in the neck, shoulders, back, arms, legs, or feet  · Unusual shortness of breath  · Dizziness or lightheadedness  · Unusually rapid or slow pulse  · Increased joint or muscle pain  · Nausea or vomiting  Date Last Reviewed: 5/1/2016  © 5179-9737 [employer]. [address] [place] [address], [place] [address]. All rights reserved. This information is not intended as a substitute for professional medical care. Always follow your healthcare professional's instructions.

## 2019-01-18 LAB
ALBUMIN SERPL-MCNC: 4.6 G/DL (ref 3.6–5.1)
SHBG SERPL-SCNC: 29 NMOL/L (ref 22–77)
TESTOST FREE SERPL-MCNC: 29.6 PG/ML (ref 46–224)
TESTOST SERPL-MCNC: 218 NG/DL (ref 250–1100)
TESTOSTERONE.FREE+WB SERPL-MCNC: 62.1 NG/DL (ref 110–575)

## 2019-01-21 ENCOUNTER — TELEPHONE (OUTPATIENT)
Dept: INTERNAL MEDICINE | Facility: CLINIC | Age: 59
End: 2019-01-21

## 2019-01-21 DIAGNOSIS — R79.89 LOW TESTOSTERONE: Primary | ICD-10-CM

## 2019-01-25 ENCOUNTER — TELEPHONE (OUTPATIENT)
Dept: INTERNAL MEDICINE | Facility: CLINIC | Age: 59
End: 2019-01-25

## 2019-01-25 DIAGNOSIS — R79.89 LOW TESTOSTERONE: Primary | ICD-10-CM

## 2019-01-28 ENCOUNTER — LAB VISIT (OUTPATIENT)
Dept: LAB | Facility: HOSPITAL | Age: 59
End: 2019-01-28
Attending: NURSE PRACTITIONER
Payer: COMMERCIAL

## 2019-01-28 DIAGNOSIS — R79.89 LOW TESTOSTERONE: ICD-10-CM

## 2019-01-28 LAB
FSH SERPL-ACNC: 13.6 MIU/ML
IRON SERPL-MCNC: 82 UG/DL
LH SERPL-ACNC: 6.1 MIU/ML
PROLACTIN SERPL IA-MCNC: 9.6 NG/ML
SATURATED IRON: 20 %
TOTAL IRON BINDING CAPACITY: 419 UG/DL
TRANSFERRIN SERPL-MCNC: 283 MG/DL

## 2019-01-28 PROCEDURE — 83540 ASSAY OF IRON: CPT

## 2019-01-28 PROCEDURE — 83001 ASSAY OF GONADOTROPIN (FSH): CPT

## 2019-01-28 PROCEDURE — 83002 ASSAY OF GONADOTROPIN (LH): CPT

## 2019-01-28 PROCEDURE — 36415 COLL VENOUS BLD VENIPUNCTURE: CPT

## 2019-01-28 PROCEDURE — 84146 ASSAY OF PROLACTIN: CPT

## 2019-02-11 RX ORDER — BLOOD-GLUCOSE METER
1 KIT MISCELLANEOUS 2 TIMES DAILY
Qty: 100 STRIP | Refills: 3 | Status: SHIPPED | OUTPATIENT
Start: 2019-02-11 | End: 2019-06-09 | Stop reason: SDUPTHER

## 2019-03-11 ENCOUNTER — TELEPHONE (OUTPATIENT)
Dept: INTERNAL MEDICINE | Facility: CLINIC | Age: 59
End: 2019-03-11

## 2019-03-11 NOTE — TELEPHONE ENCOUNTER
----- Message from Lisa Robledo MA sent at 3/11/2019 12:51 PM CDT -----  Contact: Patient  Patient wants to let Irma know that he started on the Keto diet.   His blood sugars are running 88-90 first thing in the morning.   114 after meals.

## 2019-03-14 RX ORDER — SITAGLIPTIN 50 MG/1
TABLET, FILM COATED ORAL
Qty: 30 TABLET | Refills: 3 | Status: SHIPPED | OUTPATIENT
Start: 2019-03-14 | End: 2019-10-24 | Stop reason: SDUPTHER

## 2019-03-15 NOTE — TELEPHONE ENCOUNTER
Does he needs to adjust meds? He stopped 2 diabetic meds and sugars were still good in 90s should he hold them completely?  Please advise  Thanks!

## 2019-04-11 RX ORDER — PIOGLITAZONEHYDROCHLORIDE 30 MG/1
30 TABLET ORAL DAILY
Qty: 90 TABLET | Refills: 1 | Status: SHIPPED | OUTPATIENT
Start: 2019-04-11 | End: 2019-10-24 | Stop reason: SDUPTHER

## 2019-05-09 ENCOUNTER — CLINICAL SUPPORT (OUTPATIENT)
Dept: SMOKING CESSATION | Facility: CLINIC | Age: 59
End: 2019-05-09
Payer: COMMERCIAL

## 2019-05-09 DIAGNOSIS — F17.200 NICOTINE DEPENDENCE: Primary | ICD-10-CM

## 2019-05-09 PROCEDURE — 99407 BEHAV CHNG SMOKING > 10 MIN: CPT | Mod: S$GLB,,,

## 2019-05-09 PROCEDURE — 99407 PR TOBACCO USE CESSATION INTENSIVE >10 MINUTES: ICD-10-PCS | Mod: S$GLB,,,

## 2019-05-09 RX ORDER — LISINOPRIL 5 MG/1
TABLET ORAL
Qty: 30 TABLET | Refills: 5 | Status: SHIPPED | OUTPATIENT
Start: 2019-05-09 | End: 2020-07-02 | Stop reason: SDUPTHER

## 2019-05-09 NOTE — PROGRESS NOTES
Successful contact with patient regarding tobacco cessation quit #2. Pt states, he was unable to remain tobacco free, he currently smoke 1.5 packs of cigarettes per day, and he's ready to return to the program. Pt scheduled for quit #3 on 5/20/2019. Pt informed of his benefit status, future telephone follow ups, and contact information. Will update the tobacco cessation smart form for 12 months on quit #2 and resolve the episode.

## 2019-05-27 ENCOUNTER — CLINICAL SUPPORT (OUTPATIENT)
Dept: SMOKING CESSATION | Facility: CLINIC | Age: 59
End: 2019-05-27
Payer: COMMERCIAL

## 2019-05-27 DIAGNOSIS — F17.200 NICOTINE DEPENDENCE: Primary | ICD-10-CM

## 2019-05-27 PROCEDURE — 99404 PR PREVENT COUNSEL,INDIV,60 MIN: ICD-10-PCS | Mod: S$GLB,,,

## 2019-05-27 PROCEDURE — 99404 PREV MED CNSL INDIV APPRX 60: CPT | Mod: S$GLB,,,

## 2019-05-27 RX ORDER — IBUPROFEN 200 MG
1 TABLET ORAL DAILY
Qty: 14 PATCH | Refills: 0 | Status: SHIPPED | OUTPATIENT
Start: 2019-05-27 | End: 2019-06-17

## 2019-05-27 RX ORDER — DM/P-EPHED/ACETAMINOPH/DOXYLAM 30-7.5/3
2 LIQUID (ML) ORAL
Qty: 216 LOZENGE | Refills: 0 | COMMUNITY
Start: 2019-05-27 | End: 2019-05-27

## 2019-05-27 RX ORDER — DM/P-EPHED/ACETAMINOPH/DOXYLAM 30-7.5/3
2 LIQUID (ML) ORAL
Qty: 216 LOZENGE | Refills: 0 | Status: SHIPPED | OUTPATIENT
Start: 2019-05-27 | End: 2019-06-17

## 2019-05-27 NOTE — Clinical Note
Patient currently smoke 1.5 packs of cigarettes per day and will participate in weekly tobacco cessation therapy sessions . Initial CO measurement is 18 ppm (0-5 ppm is a non smoker). Patient will begin prescribed tobacco cessation medication regimen of 21 mg nicotine patches and 2 mg nicotine lozenges. Prescribed medications will be managed by physician and monitored by CTTS. Patient is scheduled to follow up with CTTS on 6/3/2019.

## 2019-06-03 ENCOUNTER — CLINICAL SUPPORT (OUTPATIENT)
Dept: SMOKING CESSATION | Facility: CLINIC | Age: 59
End: 2019-06-03
Payer: COMMERCIAL

## 2019-06-03 DIAGNOSIS — F17.200 NICOTINE DEPENDENCE: Primary | ICD-10-CM

## 2019-06-03 PROCEDURE — 99406 BEHAV CHNG SMOKING 3-10 MIN: CPT | Mod: S$GLB,,,

## 2019-06-03 PROCEDURE — 99406 PR TOBACCO USE CESSATION INTERMEDIATE 3-10 MINUTES: ICD-10-PCS | Mod: S$GLB,,,

## 2019-06-11 ENCOUNTER — CLINICAL SUPPORT (OUTPATIENT)
Dept: SMOKING CESSATION | Facility: CLINIC | Age: 59
End: 2019-06-11
Payer: COMMERCIAL

## 2019-06-11 DIAGNOSIS — F17.200 NICOTINE DEPENDENCE: Primary | ICD-10-CM

## 2019-06-11 PROCEDURE — 99999 PR PBB SHADOW E&M-EST. PATIENT-LVL I: CPT | Mod: PBBFAC,,,

## 2019-06-11 PROCEDURE — 99404 PREV MED CNSL INDIV APPRX 60: CPT | Mod: S$GLB,,,

## 2019-06-11 PROCEDURE — 99404 PR PREVENT COUNSEL,INDIV,60 MIN: ICD-10-PCS | Mod: S$GLB,,,

## 2019-06-11 PROCEDURE — 99999 PR PBB SHADOW E&M-EST. PATIENT-LVL I: ICD-10-PCS | Mod: PBBFAC,,,

## 2019-06-11 NOTE — PROGRESS NOTES
Individual Follow-Up Form    6/11/2019    Quit Date: TBD    Clinical Status of Patient: Outpatient    Length of Service: 60 minutes    Continuing Medication: yes  Patches or Nicotine Lozenges    Other Medications: none     Target Symptoms: Withdrawal and medication side effects. The following were  rated moderate (3) to severe (4) on TCRS:  · Moderate (3): desire/crave  · Severe (4): none    Comments: Pt currently smoke 23-25 cigarettes per day; down from 30 per day. Current Co measurement=27 (0-5 ppm is a non smoker). Pt remain on prescribed tobacco cessation medication regimen of 21 mg nicotine patches and 2 mg nicotine lozenges without any negative side effects at this time. Pt agreed to a rate reduction of 20 cigs per da and delay of 15 minutes between urges and smoking. Pt denies any abnormal behavior or mental changes at this time. No refills needed. Pt will continue with individual therapy sessions and medication monitoring by CTTS. Prescribed medication management will be by physician.     Diagnosis: F17.210    Next Visit: 1 week

## 2019-06-11 NOTE — Clinical Note
Pt currently smoke 23-25 cigarettes per day; down from 30 per day. Current Co measurement=27 (0-5 ppm is a non smoker). Pt remain on prescribed tobacco cessation medication regimen of 21 mg nicotine patches and 2 mg nicotine lozenges without any negative side effects at this time. Pt agreed to a rate reduction of 20 cigs per da and delay of 15 minutes between urges and smoking. Pt denies any abnormal behavior or mental changes at this time. No refills needed. Pt will continue with individual therapy sessions and medication monitoring by CTTS. Prescribed medication management will be by physician.

## 2019-06-14 DIAGNOSIS — E11.9 TYPE 2 DIABETES MELLITUS WITHOUT COMPLICATION, UNSPECIFIED WHETHER LONG TERM INSULIN USE: ICD-10-CM

## 2019-06-17 ENCOUNTER — CLINICAL SUPPORT (OUTPATIENT)
Dept: SMOKING CESSATION | Facility: CLINIC | Age: 59
End: 2019-06-17
Payer: COMMERCIAL

## 2019-06-17 DIAGNOSIS — F17.200 NICOTINE DEPENDENCE: Primary | ICD-10-CM

## 2019-06-17 PROCEDURE — 99404 PR PREVENT COUNSEL,INDIV,60 MIN: ICD-10-PCS | Mod: S$GLB,,,

## 2019-06-17 PROCEDURE — 99404 PREV MED CNSL INDIV APPRX 60: CPT | Mod: S$GLB,,,

## 2019-06-17 RX ORDER — NICOTINE POLACRILEX 2 MG/1
2 LOZENGE ORAL
Qty: 270 EACH | Refills: 0 | Status: SHIPPED | OUTPATIENT
Start: 2019-06-17 | End: 2020-07-07

## 2019-06-17 RX ORDER — IBUPROFEN 200 MG
1 TABLET ORAL DAILY
Qty: 14 PATCH | Refills: 0 | Status: SHIPPED | OUTPATIENT
Start: 2019-06-17 | End: 2019-07-08 | Stop reason: SDUPTHER

## 2019-06-17 NOTE — Clinical Note
Pt currently smoke 16 cigarettes per day; down from 1.5 packs per day. Current CO measurement=24 ppm; with last cigarette smoked one hour before appointment. Pt commended for the accomplishment thus far. Pt remain on prescribed tobacco cessation medication of 21 mg nicotine patches and 2 mg nicotine patches without any negative side effects at this time. Completion of TCRS (Tobacco Cessation Rating Scale) reviewed strategies, controlling environment, cues, triggers, new goals set. Introduced high risk situations with preparation interventions, caffeine similarities with withdrawal issues of habit and nicotine, Alcohol, Understanding urges, cravings, stress and relaxation. Pt denies any abnormal behavior or mental changes at this time. Pt will continue with individual therapy sessions and medication monitoring by CTTS. Prescribed medication management will be by physician.

## 2019-06-17 NOTE — PROGRESS NOTES
Individual Follow-Up Form    6/17/2019    Quit Date: TBD    Clinical Status of Patient: Outpatient    Length of Service: 60 minutes    Continuing Medication: yes  Patches or Nicotine Lozenges    Other Medications: none     Target Symptoms: Withdrawal and medication side effects. The following were  rated moderate (3) to severe (4) on TCRS:  · Moderate (3): none  · Severe (4): none    Comments: Pt currently smoke 16 cigarettes per day; down from 1.5 packs per day. Current CO measurement=24 ppm; with last cigarette smoked one hour before appointment. Pt commended for the accomplishment thus far. Pt remain on prescribed tobacco cessation medication of 21 mg nicotine patches and 2 mg nicotine patches without any negative side effects at this time. Completion of TCRS (Tobacco Cessation Rating Scale) reviewed strategies, controlling environment, cues, triggers, new goals set. Introduced high risk situations with preparation interventions, caffeine similarities with withdrawal issues of habit and nicotine, Alcohol, Understanding urges, cravings, stress and relaxation. Pt denies any abnormal behavior or mental changes at this time. Pt will continue with individual therapy sessions and medication monitoring by CTTS. Prescribed medication management will be by physician.     Diagnosis: F17.200    Next Visit: 1 week

## 2019-06-24 ENCOUNTER — CLINICAL SUPPORT (OUTPATIENT)
Dept: SMOKING CESSATION | Facility: CLINIC | Age: 59
End: 2019-06-24
Payer: COMMERCIAL

## 2019-06-24 DIAGNOSIS — F17.200 NICOTINE DEPENDENCE: Primary | ICD-10-CM

## 2019-06-24 PROCEDURE — 99404 PR PREVENT COUNSEL,INDIV,60 MIN: ICD-10-PCS | Mod: S$GLB,,,

## 2019-06-24 PROCEDURE — 99404 PREV MED CNSL INDIV APPRX 60: CPT | Mod: S$GLB,,,

## 2019-06-24 PROCEDURE — 99999 PR PBB SHADOW E&M-EST. PATIENT-LVL I: CPT | Mod: PBBFAC,,,

## 2019-06-24 PROCEDURE — 99999 PR PBB SHADOW E&M-EST. PATIENT-LVL I: ICD-10-PCS | Mod: PBBFAC,,,

## 2019-06-24 RX ORDER — VARENICLINE TARTRATE 0.5 (11)-1
KIT ORAL
Qty: 53 TABLET | Refills: 0 | Status: SHIPPED | OUTPATIENT
Start: 2019-06-24 | End: 2019-07-16

## 2019-06-24 NOTE — PROGRESS NOTES
Individual Follow-Up Form    6/24/2019    Quit Date:TBD    Clinical Status of Patient: Outpatient    Length of Service: 60 minutes    Continuing Medication: yes  Patches or Nicotine Lozenges    Other Medications:none     Target Symptoms: Withdrawal and medication side effects. The following were  rated moderate (3) to severe (4) on TCRS:  · Moderate (3): none   · Severe (4): none    Comments: Pt currently smoke about 10-15 cigarettes per day. Current CO measurement=31 ppm (0-5 ppm is a non smoker). Rate reduction of 12 cigarettes per day and delay of 15-30 between the urge and smoking. Pt remain on prescribed tobacco cessation medication regimen of 21 mg nicotine patches and 2 mg nicotine lozenges without any negative side effects at this time. Discussed and reviewed strategies, controlling environment, cues, triggers, new goals set. Introduced high risk situations with preparation interventions, caffeine similarities with withdrawal issues of habit and nicotine, Alcohol, Understanding urges, cravings, stress and relaxation. Open discussion with intervention discussion. Pt denies any abnormal behavior or mental changes at this time. Pt will continue with individual therapy sessions and medication monitoring by CTTS. Prescribed medication management will be by physician.     Diagnosis: F17.200    Next Visit: 1 week

## 2019-06-24 NOTE — Clinical Note
Pt currently smoke about 10-15 cigarettes per day. Current CO measurement=31 ppm (0-5 ppm is a non smoker). Rate reduction of 12 cigarettes per day and delay of 15-30 between the urge and smoking. Pt remain on prescribed tobacco cessation medication regimen of 21 mg nicotine patches and 2 mg nicotine lozenges without any negative side effects at this time. Discussed and reviewed strategies, controlling environment, cues, triggers, new goals set. Introduced high risk situations with preparation interventions, caffeine similarities with withdrawal issues of habit and nicotine, Alcohol, Understanding urges, cravings, stress and relaxation. Open discussion with intervention discussion. Pt denies any abnormal behavior or mental changes at this time. Pt will continue with individual therapy sessions and medication monitoring by CTTS. Prescribed medication management will be by physician.

## 2019-07-01 ENCOUNTER — CLINICAL SUPPORT (OUTPATIENT)
Dept: SMOKING CESSATION | Facility: CLINIC | Age: 59
End: 2019-07-01
Payer: COMMERCIAL

## 2019-07-01 DIAGNOSIS — F17.200 NICOTINE DEPENDENCE: Primary | ICD-10-CM

## 2019-07-01 PROCEDURE — 99406 BEHAV CHNG SMOKING 3-10 MIN: CPT | Mod: S$GLB,,,

## 2019-07-01 PROCEDURE — 99406 PR TOBACCO USE CESSATION INTERMEDIATE 3-10 MINUTES: ICD-10-PCS | Mod: S$GLB,,,

## 2019-07-08 ENCOUNTER — CLINICAL SUPPORT (OUTPATIENT)
Dept: INTERNAL MEDICINE | Facility: CLINIC | Age: 59
End: 2019-07-08
Payer: COMMERCIAL

## 2019-07-08 ENCOUNTER — CLINICAL SUPPORT (OUTPATIENT)
Dept: SMOKING CESSATION | Facility: CLINIC | Age: 59
End: 2019-07-08
Payer: COMMERCIAL

## 2019-07-08 DIAGNOSIS — E78.5 HYPERLIPIDEMIA LDL GOAL <70: ICD-10-CM

## 2019-07-08 DIAGNOSIS — D68.61 ANTIPHOSPHOLIPID SYNDROME: ICD-10-CM

## 2019-07-08 DIAGNOSIS — Z12.5 PROSTATE CANCER SCREENING: ICD-10-CM

## 2019-07-08 DIAGNOSIS — Z86.718 HISTORY OF DVT (DEEP VEIN THROMBOSIS): ICD-10-CM

## 2019-07-08 DIAGNOSIS — F17.200 NICOTINE DEPENDENCE: Primary | ICD-10-CM

## 2019-07-08 DIAGNOSIS — E66.01 SEVERE OBESITY (BMI 35.0-35.9 WITH COMORBIDITY): ICD-10-CM

## 2019-07-08 LAB
ALBUMIN SERPL BCP-MCNC: 4 G/DL (ref 3.5–5.2)
ALBUMIN/CREAT UR: 20.1 UG/MG (ref 0–30)
ALP SERPL-CCNC: 58 U/L (ref 55–135)
ALT SERPL W/O P-5'-P-CCNC: 18 U/L (ref 10–44)
ANION GAP SERPL CALC-SCNC: 10 MMOL/L (ref 8–16)
AST SERPL-CCNC: 22 U/L (ref 10–40)
BASOPHILS # BLD AUTO: 0.03 K/UL (ref 0–0.2)
BASOPHILS NFR BLD: 0.3 % (ref 0–1.9)
BILIRUB SERPL-MCNC: 1.2 MG/DL (ref 0.1–1)
BUN SERPL-MCNC: 12 MG/DL (ref 6–20)
CALCIUM SERPL-MCNC: 9.1 MG/DL (ref 8.7–10.5)
CHLORIDE SERPL-SCNC: 107 MMOL/L (ref 95–110)
CHOLEST SERPL-MCNC: 104 MG/DL (ref 120–199)
CHOLEST/HDLC SERPL: 3.9 {RATIO} (ref 2–5)
CO2 SERPL-SCNC: 20 MMOL/L (ref 23–29)
COMPLEXED PSA SERPL-MCNC: 0.5 NG/ML (ref 0–4)
CREAT SERPL-MCNC: 0.8 MG/DL (ref 0.5–1.4)
CREAT UR-MCNC: 234 MG/DL (ref 23–375)
DIFFERENTIAL METHOD: ABNORMAL
EOSINOPHIL # BLD AUTO: 0.2 K/UL (ref 0–0.5)
EOSINOPHIL NFR BLD: 2.2 % (ref 0–8)
ERYTHROCYTE [DISTWIDTH] IN BLOOD BY AUTOMATED COUNT: 13.5 % (ref 11.5–14.5)
EST. GFR  (AFRICAN AMERICAN): >60 ML/MIN/1.73 M^2
EST. GFR  (NON AFRICAN AMERICAN): >60 ML/MIN/1.73 M^2
ESTIMATED AVG GLUCOSE: 134 MG/DL (ref 68–131)
GLUCOSE SERPL-MCNC: 108 MG/DL (ref 70–110)
HBA1C MFR BLD HPLC: 6.3 % (ref 4–5.6)
HCT VFR BLD AUTO: 46.4 % (ref 40–54)
HDLC SERPL-MCNC: 27 MG/DL (ref 40–75)
HDLC SERPL: 26 % (ref 20–50)
HGB BLD-MCNC: 15 G/DL (ref 14–18)
IMM GRANULOCYTES # BLD AUTO: 0.04 K/UL (ref 0–0.04)
IMM GRANULOCYTES NFR BLD AUTO: 0.5 % (ref 0–0.5)
LDLC SERPL CALC-MCNC: 61.4 MG/DL (ref 63–159)
LYMPHOCYTES # BLD AUTO: 2.6 K/UL (ref 1–4.8)
LYMPHOCYTES NFR BLD: 30.1 % (ref 18–48)
MCH RBC QN AUTO: 31.8 PG (ref 27–31)
MCHC RBC AUTO-ENTMCNC: 32.3 G/DL (ref 32–36)
MCV RBC AUTO: 98 FL (ref 82–98)
MICROALBUMIN UR DL<=1MG/L-MCNC: 47 UG/ML
MONOCYTES # BLD AUTO: 0.9 K/UL (ref 0.3–1)
MONOCYTES NFR BLD: 9.8 % (ref 4–15)
NEUTROPHILS # BLD AUTO: 5 K/UL (ref 1.8–7.7)
NEUTROPHILS NFR BLD: 57.1 % (ref 38–73)
NONHDLC SERPL-MCNC: 77 MG/DL
NRBC BLD-RTO: 0 /100 WBC
PLATELET # BLD AUTO: 248 K/UL (ref 150–350)
PMV BLD AUTO: 9.9 FL (ref 9.2–12.9)
POTASSIUM SERPL-SCNC: 4.1 MMOL/L (ref 3.5–5.1)
PROT SERPL-MCNC: 7 G/DL (ref 6–8.4)
RBC # BLD AUTO: 4.72 M/UL (ref 4.6–6.2)
SODIUM SERPL-SCNC: 137 MMOL/L (ref 136–145)
TRIGL SERPL-MCNC: 78 MG/DL (ref 30–150)
TSH SERPL DL<=0.005 MIU/L-ACNC: 1.11 UIU/ML (ref 0.4–4)
WBC # BLD AUTO: 8.74 K/UL (ref 3.9–12.7)

## 2019-07-08 PROCEDURE — 36415 COLL VENOUS BLD VENIPUNCTURE: CPT

## 2019-07-08 PROCEDURE — 85025 COMPLETE CBC W/AUTO DIFF WBC: CPT

## 2019-07-08 PROCEDURE — 99999 PR PBB SHADOW E&M-EST. PATIENT-LVL I: ICD-10-PCS | Mod: PBBFAC,,,

## 2019-07-08 PROCEDURE — 99403 PR PREVENT COUNSEL,INDIV,45 MIN: ICD-10-PCS | Mod: S$GLB,,,

## 2019-07-08 PROCEDURE — 99403 PREV MED CNSL INDIV APPRX 45: CPT | Mod: S$GLB,,,

## 2019-07-08 PROCEDURE — 80053 COMPREHEN METABOLIC PANEL: CPT

## 2019-07-08 PROCEDURE — 83036 HEMOGLOBIN GLYCOSYLATED A1C: CPT

## 2019-07-08 PROCEDURE — 82043 UR ALBUMIN QUANTITATIVE: CPT

## 2019-07-08 PROCEDURE — 84153 ASSAY OF PSA TOTAL: CPT

## 2019-07-08 PROCEDURE — 84443 ASSAY THYROID STIM HORMONE: CPT

## 2019-07-08 PROCEDURE — 99999 PR PBB SHADOW E&M-EST. PATIENT-LVL I: CPT | Mod: PBBFAC,,,

## 2019-07-08 PROCEDURE — 80061 LIPID PANEL: CPT

## 2019-07-08 RX ORDER — IBUPROFEN 200 MG
1 TABLET ORAL DAILY
Qty: 14 PATCH | Refills: 0 | Status: SHIPPED | OUTPATIENT
Start: 2019-07-08 | End: 2019-07-16

## 2019-07-08 NOTE — Clinical Note
Pt currently smoke 11-12 cigarettes per day. Current CO measurement=20 ppm (0-5 ppm is a non smoker).  Pt remain on prescribed tobacco cessation medication regimen of 1 mg Chantix and 21 mg nicotine patches without any negative side effects at this time. Pt denies any abnormal behavior or mental changes at this time. Pt will continue with individual herapy sessions and medication monitoring by CTTS. Prescribed medication management will be by physician.

## 2019-07-08 NOTE — PROGRESS NOTES
Individual Follow-Up Form    7/8/2019    Quit Date: TBD    Clinical Status of Patient: Outpatient    Length of Service: 45 minutes    Continuing Medication: yes  Chantix or Patches    Other Medications:      Target Symptoms: Withdrawal and medication side effects. The following were  rated moderate (3) to severe (4) on TCRS:  · Moderate (3): none  · Severe (4): none    Comments: Pt currently smoke 11-12 cigarettes per day. Current CO measurement=20 ppm (0-5 ppm is a non smoker).  Pt remain on prescribed tobacco cessation medication regimen of 1 mg Chantix and 21 mg nicotine patches without any negative side effects at this time. Discussed and  reviewed strategies, habitual behavior, stress, and high risk situations. Introduced stress with addition interventions, SOLVE, relaxation with interventions, nutrition, exercise, weight gain, and the importance of rewarding oneself for accomplishments toward becoming tobacco free. Pt denies any abnormal behavior or mental changes at this time. Pt will continue with individual herapy sessions and medication monitoring by CTTS. Prescribed medication management will be by physician.     Diagnosis: F17.200    Next Visit: 1 week

## 2019-07-15 ENCOUNTER — CLINICAL SUPPORT (OUTPATIENT)
Dept: SMOKING CESSATION | Facility: CLINIC | Age: 59
End: 2019-07-15
Payer: COMMERCIAL

## 2019-07-15 DIAGNOSIS — F17.200 NICOTINE DEPENDENCE: Primary | ICD-10-CM

## 2019-07-15 PROCEDURE — 99406 BEHAV CHNG SMOKING 3-10 MIN: CPT | Mod: S$GLB,,,

## 2019-07-15 PROCEDURE — 99406 PR TOBACCO USE CESSATION INTERMEDIATE 3-10 MINUTES: ICD-10-PCS | Mod: S$GLB,,,

## 2019-07-16 ENCOUNTER — CLINICAL SUPPORT (OUTPATIENT)
Dept: SMOKING CESSATION | Facility: CLINIC | Age: 59
End: 2019-07-16
Payer: COMMERCIAL

## 2019-07-16 ENCOUNTER — OFFICE VISIT (OUTPATIENT)
Dept: INTERNAL MEDICINE | Facility: CLINIC | Age: 59
End: 2019-07-16
Payer: COMMERCIAL

## 2019-07-16 VITALS
RESPIRATION RATE: 18 BRPM | DIASTOLIC BLOOD PRESSURE: 86 MMHG | HEIGHT: 74 IN | OXYGEN SATURATION: 95 % | SYSTOLIC BLOOD PRESSURE: 130 MMHG | WEIGHT: 266.75 LBS | HEART RATE: 60 BPM | BODY MASS INDEX: 34.23 KG/M2

## 2019-07-16 DIAGNOSIS — E66.9 OBESITY (BMI 30-39.9): ICD-10-CM

## 2019-07-16 DIAGNOSIS — E11.9 CONTROLLED TYPE 2 DIABETES MELLITUS WITHOUT COMPLICATION, WITHOUT LONG-TERM CURRENT USE OF INSULIN: ICD-10-CM

## 2019-07-16 DIAGNOSIS — E78.5 HYPERLIPIDEMIA LDL GOAL <70: ICD-10-CM

## 2019-07-16 DIAGNOSIS — F17.200 NICOTINE DEPENDENCE: Primary | ICD-10-CM

## 2019-07-16 DIAGNOSIS — D68.61 ANTIPHOSPHOLIPID SYNDROME: ICD-10-CM

## 2019-07-16 DIAGNOSIS — R79.89 LOW TESTOSTERONE IN MALE: Primary | ICD-10-CM

## 2019-07-16 PROBLEM — E66.01 SEVERE OBESITY (BMI 35.0-35.9 WITH COMORBIDITY): Status: RESOLVED | Noted: 2019-01-14 | Resolved: 2019-07-16

## 2019-07-16 PROCEDURE — 99999 PR PBB SHADOW E&M-EST. PATIENT-LVL I: ICD-10-PCS | Mod: PBBFAC,,,

## 2019-07-16 PROCEDURE — 99214 OFFICE O/P EST MOD 30 MIN: CPT | Mod: S$GLB,,, | Performed by: NURSE PRACTITIONER

## 2019-07-16 PROCEDURE — 99214 PR OFFICE/OUTPT VISIT, EST, LEVL IV, 30-39 MIN: ICD-10-PCS | Mod: S$GLB,,, | Performed by: NURSE PRACTITIONER

## 2019-07-16 PROCEDURE — 99403 PR PREVENT COUNSEL,INDIV,45 MIN: ICD-10-PCS | Mod: S$GLB,,,

## 2019-07-16 PROCEDURE — 99999 PR PBB SHADOW E&M-EST. PATIENT-LVL I: CPT | Mod: PBBFAC,,,

## 2019-07-16 PROCEDURE — 3008F PR BODY MASS INDEX (BMI) DOCUMENTED: ICD-10-PCS | Mod: CPTII,S$GLB,, | Performed by: NURSE PRACTITIONER

## 2019-07-16 PROCEDURE — 99999 PR PBB SHADOW E&M-EST. PATIENT-LVL III: ICD-10-PCS | Mod: PBBFAC,,, | Performed by: NURSE PRACTITIONER

## 2019-07-16 PROCEDURE — 99403 PREV MED CNSL INDIV APPRX 45: CPT | Mod: S$GLB,,,

## 2019-07-16 PROCEDURE — 3044F HG A1C LEVEL LT 7.0%: CPT | Mod: CPTII,S$GLB,, | Performed by: NURSE PRACTITIONER

## 2019-07-16 PROCEDURE — 99999 PR PBB SHADOW E&M-EST. PATIENT-LVL III: CPT | Mod: PBBFAC,,, | Performed by: NURSE PRACTITIONER

## 2019-07-16 PROCEDURE — 3044F PR MOST RECENT HEMOGLOBIN A1C LEVEL <7.0%: ICD-10-PCS | Mod: CPTII,S$GLB,, | Performed by: NURSE PRACTITIONER

## 2019-07-16 PROCEDURE — 3008F BODY MASS INDEX DOCD: CPT | Mod: CPTII,S$GLB,, | Performed by: NURSE PRACTITIONER

## 2019-07-16 RX ORDER — VARENICLINE TARTRATE 1 MG/1
1 TABLET, FILM COATED ORAL 2 TIMES DAILY
Qty: 60 TABLET | Refills: 0 | Status: SHIPPED | OUTPATIENT
Start: 2019-07-16 | End: 2019-08-12 | Stop reason: SDUPTHER

## 2019-07-16 RX ORDER — IBUPROFEN 200 MG
1 TABLET ORAL DAILY
Qty: 14 PATCH | Refills: 0 | Status: SHIPPED | OUTPATIENT
Start: 2019-07-16 | End: 2019-07-29 | Stop reason: SDUPTHER

## 2019-07-16 RX ORDER — ATORVASTATIN CALCIUM 20 MG/1
20 TABLET, FILM COATED ORAL NIGHTLY
Qty: 90 TABLET | Refills: 1 | Status: SHIPPED | OUTPATIENT
Start: 2019-07-16 | End: 2020-01-23

## 2019-07-16 NOTE — PATIENT INSTRUCTIONS
4 Steps for Eating Healthier  Changing the way you eat can improve your health. It can lower your cholesterol and blood pressure, and help you stay at a healthy weight. Your diet doesnt have to be bland and boring to be healthy. Just watch your calories and follow these steps:    1. Eat fewer unhealthy fats  · Choose more fish and lean meats instead of fatty cuts of meat.  · Skip butter and lard, and use less margarine.  · Pass on foods that have palm, coconut, or hydrogenated oils.  · Eat fewer high-fat dairy foods like cheese, ice cream, and whole milk.  · Get a heart-healthy cookbook and try some low-fat recipes.  2. Go light on salt  · Keep the saltshaker off the table.  · Limit high-salt ingredients, such as soy sauce, bouillon, and garlic salt.  · Instead of adding salt when cooking, season your food with herbs and flavorings. Try lemon, garlic, and onion.  · Limit convenience foods, such as boxed or canned foods and restaurant food.  · Read food labels and choose lower-sodium options.  3. Limit sugar  · Pause before you add sugars to pancakes, cereal, coffee, or tea. This includes white and brown table sugar, syrup, honey, and molasses. Cut your usual amount by half.  · Use non-sugar sweeteners. Stevia, aspartame, and sucralose can satisfy a sweet tooth without adding calories.  · Swap out sugar-filled soda and other drinks. Buy sugar-free or low-calorie beverages. Remember water is always the best choice.  · Read labels and choose foods with less added sugar. Keep in mind that dairy foods and foods with fruit will have some natural sugar.  · Cut the sugar in recipes by 1/3 to 1/2. Boost the flavor with extracts like almond, vanilla, or orange. Or add spices such as cinnamon or nutmeg.  4. Eat more fiber  · Eat fresh fruits and vegetables every day.  · Boost your diet with whole grains. Go for oats, whole-grain rice, and bran.  · Add beans and lentils to your meals.  · Drink more water to match your fiber  increase. This is to help prevent constipation.  Date Last Reviewed: 5/11/2015  © 3530-3749 [employer]. [address] [place] [address], [place] [address]. All rights reserved. This information is not intended as a substitute for professional medical care. Always follow your healthcare professional's instructions.        Diabetes: Activity Tips    Being more active can help you manage your diabetes. The tips on this sheet can help you get the most from your exercise. They can also help you stay safe.  Staying Active  Its important for adults to spend less time sitting and being inactive. This is especially true if you have type 2 diabetes. When you are sitting for long periods of time, get up for short sessions of light activity every 30 minutes.  You should aim for at least 150 minutes a week of exercise or physical activity. Dont let more than 2 days go by without being active.  Benefit from briskness  Brisk activity gets your heart beating faster. This can help you increase your fitness, lose extra weight, and manage your blood sugar level. Try brisk walking. Or, if you have foot or leg problems, you can try swimming or bike riding. You can break up your exercise into chunks throughout the day. Work up to at least 30 minutes of steady, brisk exercise on most days.  Warm up and cool down  Warming up and cooling down reduce your risk of injury. They also help limit muscle soreness. Do a mild version of your activity for 5 minutes before and after your routine. You can also learn stretches that will help keep your muscles loose. Your healthcare provider may show you good ways to warm up and stretch.  Do the talk-sing test  The talk-sing test is a simple way to tell how hard youre exercising. If you can talk while exercising, youre in a safe range. If youre out of breath, slow down. If you can carry a tune, its time to  the pace. Walk up a hill. Increase the resistance on your stationary bike. Or  swim faster.  What about eating?  You may be told to plan your exercise for 1 to 2 hours after a meal. In most cases, you dont need to eat while being active. If you take insulin or medicine that can cause low blood sugar, test your blood sugar before exercising. And carry a fast-acting sugar that will raise your blood sugar level quickly. This includes glucose tablets or hard candy. Use it if you feel low blood sugar symptoms.  Safety tips  These tips can help you stay safe as you become fit:  · Exercise with a friend or carry a cell phone if you have one.  · Carry or wear identification, such as a necklace or bracelet, that says you have diabetes.  · Use the proper footwear and safety equipment for your activity.  · Drink water before, during, and after exercise.  · Dress properly for the weather.  · Dont exercise in very hot or very cold weather.  · Dont exercise if you are sick.  · If you are instructed to do so, test your blood sugar before and after you exercise. Have a small carbohydrate snack if your blood sugar is low before you start exercising.   When to stop exercising and call your healthcare provider  Stop exercising and call your healthcare provider right away if you notice any of the following:  · Pain, pressure, tightness, or heaviness in the chest  · Pain or heaviness in the neck, shoulders, back, arms, legs, or feet  · Unusual shortness of breath  · Dizziness or lightheadedness  · Unusually rapid or slow pulse  · Increased joint or muscle pain  · Nausea or vomiting  Date Last Reviewed: 5/1/2016  © 3700-4686 [employer]. [address] [place] [address], [place] [address]. All rights reserved. This information is not intended as a substitute for professional medical care. Always follow your healthcare professional's instructions.

## 2019-07-16 NOTE — PROGRESS NOTES
Individual Follow-Up Form    7/16/2019    Quit Date: TBD    Clinical Status of Patient: Outpatient    Length of Service: 45 minutes    Continuing Medication: yes  Patches; Chantix    Other Medications: none     Target Symptoms: Withdrawal and medication side effects. The following were  rated moderate (3) to severe (4) on TCRS:  · Moderate (3): none   · Severe (4): none    Comments: Pt currently smoke 8-10 cigarettes per day; down from 1.5 packs per day. Current CO measurement=17 ppm (0-5 ppm is a non smoker). Pt remain on prescribed tobacco cessation medication regimen of 1 mg Chantix and 21 mg nicotine patches without any negative side effects at this time.  Discussed and reviewed strategies, habitual behavior, high risks situations, understanding urges and cravings, stress and relaxation with open discussion and additional interventions, Introduced lapses, relapses, understanding them and analyzing the situation of a lapse, conflict issues that may be linked to a lapse. Pt agreed to a rate reduction 8 cigarettes per day and a delay of 15-30 between the urge and smoking.Pt denies any abnormal behavior or mental changes at this time. Nicotine patches decreased to 14 mg during today's visit. Prescribed medications will be managed by physician and monitored by CTTS.     Diagnosis: F17.200    Next Visit: 1 week

## 2019-07-16 NOTE — Clinical Note
Pt currently smoke 8-10 cigarettes per day; down from 1.5 packs per day. Current CO measurement=17 ppm (0-5 ppm is a non smoker). Pt remain on prescribed tobacco cessation medication regimen of 1 mg Chantix and 21 mg nicotine patches without any negative side effects at this time.  Discussed and reviewed strategies, habitual behavior, high risks situations, understanding urges and cravings, stress and relaxation with open discussion and additional interventions, Introduced lapses, relapses, understanding them and analyzing the situation of a lapse, conflict issues that may be linked to a lapse. Pt agreed to a rate reduction 8 cigarettes per day and a delay of 15-30 between the urge and smoking.Pt denies any abnormal behavior or mental changes at this time. Nicotine patches decreased to 14 mg during today's visit. Prescribed medications will be managed by physician and monitored by CTTS.

## 2019-07-16 NOTE — PROGRESS NOTES
Subjective:       Patient ID: North Najera is a 59 y.o. male.    Chief Complaint: Follow-up (6 months- results)    Patient is known, to me and presents with   Chief Complaint   Patient presents with    Follow-up     6 months- results   .  Denies chest pain and shortness of breath. Patient presents with check up and labs. States that he no longer taking his diabetes meds since April and feels fine. Watching what he eats and no longer indulges in sweets.    HPI  Review of Systems   Constitutional: Negative.  Negative for activity change, appetite change, chills, diaphoresis, fatigue, fever and unexpected weight change.   HENT: Negative.  Negative for congestion, ear discharge, ear pain, facial swelling, hearing loss, nosebleeds, postnasal drip, rhinorrhea, sinus pressure, sneezing, sore throat, tinnitus, trouble swallowing and voice change.    Eyes: Negative.  Negative for photophobia, pain, discharge, redness, itching and visual disturbance.   Respiratory: Negative.  Negative for apnea, cough, choking, chest tightness, shortness of breath, wheezing and stridor.    Cardiovascular: Negative.  Negative for chest pain, palpitations and leg swelling.   Gastrointestinal: Negative for abdominal distention, abdominal pain, anal bleeding, blood in stool, constipation, diarrhea, nausea and vomiting.   Genitourinary: Negative.  Negative for difficulty urinating, discharge, dysuria, enuresis, flank pain, frequency, hematuria, penile pain, penile swelling, scrotal swelling, testicular pain and urgency.   Musculoskeletal: Negative.  Negative for arthralgias, back pain, gait problem, joint swelling, myalgias, neck pain and neck stiffness.   Skin: Negative.  Negative for color change, pallor, rash and wound.   Neurological: Negative for dizziness, tremors, seizures, syncope, facial asymmetry, speech difficulty, weakness, light-headedness, numbness and headaches.   Hematological: Negative for adenopathy. Does not bruise/bleed  easily.   Psychiatric/Behavioral: Negative.  Negative for agitation, dysphoric mood, sleep disturbance and suicidal ideas. The patient is not nervous/anxious.        Objective:      Physical Exam   Constitutional: He is oriented to person, place, and time. He appears well-developed and well-nourished. No distress.   HENT:   Head: Normocephalic and atraumatic.   Right Ear: External ear normal.   Left Ear: External ear normal.   Nose: Nose normal.   Mouth/Throat: Oropharynx is clear and moist. No oropharyngeal exudate.   Eyes: Pupils are equal, round, and reactive to light. Conjunctivae and EOM are normal. Right eye exhibits no discharge. Left eye exhibits no discharge.   Neck: Normal range of motion. Neck supple. No JVD present. No tracheal deviation present. No thyromegaly present.   Cardiovascular: Normal rate, regular rhythm, normal heart sounds and intact distal pulses. Exam reveals no gallop and no friction rub.   No murmur heard.  Pulmonary/Chest: Effort normal and breath sounds normal. No stridor. No respiratory distress. He has no wheezes. He has no rales. He exhibits no tenderness.   Abdominal: Soft. Bowel sounds are normal. He exhibits no distension. There is no tenderness. There is no rebound and no guarding.   Musculoskeletal: Normal range of motion. He exhibits no edema or tenderness.   Lymphadenopathy:     He has no cervical adenopathy.   Neurological: He is alert and oriented to person, place, and time. He has normal reflexes. He displays normal reflexes. No cranial nerve deficit. He exhibits normal muscle tone. Coordination normal.   Skin: Skin is warm and dry. Capillary refill takes less than 2 seconds. No rash noted. He is not diaphoretic. No erythema. No pallor.   Psychiatric: He has a normal mood and affect. His behavior is normal. Judgment and thought content normal.   Nursing note and vitals reviewed.      Assessment:       1. Low testosterone in male    2. Controlled type 2 diabetes mellitus  "without complication, without long-term current use of insulin    3. Hyperlipidemia LDL goal <70    4. Antiphospholipid syndrome    5. Obesity (BMI 30-39.9)        Plan:   [place] was seen today for follow-up.    Diagnoses and all orders for this visit:    Low testosterone in male  -     Testosterone Panel; Future    Controlled type 2 diabetes mellitus without complication, without long-term current use of insulin  -     CBC auto differential; Future  -     Comprehensive metabolic panel; Future  -     Microalbumin/creatinine urine ratio; Future  -     Hemoglobin A1c; Future    Hyperlipidemia LDL goal <70  -     CBC auto differential; Future  -     Comprehensive metabolic panel; Future  -     TSH; Future  -     Lipid panel; Future  -     atorvastatin (LIPITOR) 20 MG tablet; Take 1 tablet (20 mg total) by mouth every evening.    Antiphospholipid syndrome  -     CBC auto differential; Future  -     Comprehensive metabolic panel; Future    Obesity (BMI 30-39.9)  -     CBC auto differential; Future  -     Comprehensive metabolic panel; Future    "This note will not be shared with the patient."  Check CBC, CMP, TSH, Fasting lipid profile, HgA1c, Microalbuminuria in three months.  Medication compliance was discussed with the patient.  The patient was instructed to monitor glucoses closely using glucometer at home and to record the values in a log.  The patient was instructed to obtain an Optometry exam and microalbumin q year.  The patient was instructed to obtain a hemoglobin A1C q 3-4 months.  The patient was instructed to check the feet visually daily and to monitor for infection.  The patient was instructed to exercise at least 3 times a week.  The patient was instructed to follow a 1800 ADA diet.  The patient was instructed to monitor weight daily and try to keep it as close to ideal body weight as possible.  The patient was instructed on using exercise frequently to reduce BP.  The patient was instructed on using a low " salt diet.  Monitor BP at home and to record the values in a log.  Will recheck three months and see if still controlled  RTC in three months.

## 2019-07-22 ENCOUNTER — CLINICAL SUPPORT (OUTPATIENT)
Dept: SMOKING CESSATION | Facility: CLINIC | Age: 59
End: 2019-07-22
Payer: COMMERCIAL

## 2019-07-22 DIAGNOSIS — F17.200 NICOTINE DEPENDENCE: Primary | ICD-10-CM

## 2019-07-22 PROCEDURE — 99999 PR PBB SHADOW E&M-EST. PATIENT-LVL I: ICD-10-PCS | Mod: PBBFAC,,,

## 2019-07-22 PROCEDURE — 99403 PREV MED CNSL INDIV APPRX 45: CPT | Mod: S$GLB,,,

## 2019-07-22 PROCEDURE — 99403 PR PREVENT COUNSEL,INDIV,45 MIN: ICD-10-PCS | Mod: S$GLB,,,

## 2019-07-22 PROCEDURE — 99999 PR PBB SHADOW E&M-EST. PATIENT-LVL I: CPT | Mod: PBBFAC,,,

## 2019-07-22 NOTE — Clinical Note
Pt is down to 6 cigarettes per day. Current CO measurement =18 ppm (0-5 ppm is a non smoker); last cigarette smoked 2 hours before visit. Pt remain on prescribed tobacco cessation medication regimen of 1 mg Chantix starter box and 14 mg nicotine patches without any negative side effects at this time. Pt will attempt a dry run on 7/27/2019. Pt will continue with individual therapy sessions and medication monitoring by CTTS. Prescribed medication management will be by physician. Pt denies any abnormal behavior or mental changes at this time.

## 2019-07-22 NOTE — PROGRESS NOTES
Individual Follow-Up Form    7/22/2019    Quit Date:TBD    Clinical Status of Patient: Outpatient    Length of Service: 30 minutes    Continuing Medication: yes  Chantix or Patches    Other Medications: none     Target Symptoms: Withdrawal and medication side effects. The following were  rated moderate (3) to severe (4) on TCRS:  · Moderate (3): none  · Severe (4): none    Comments: Pt is down to 6 cigarettes per day. Current CO measurement =18 ppm (0-5 ppm is a non smoker); last cigarette smoked 2 hours before visit. Pt remain on prescribed tobacco cessation medication regimen of 1 mg Chantix starter box and 14 mg nicotine patches without any negative side effects at this time. Pt will attempt a dry run on 7/27/2019. Pt will continue with individual therapy sessions and medication monitoring by CTTS. Prescribed medication management will be by physician. Pt denies any abnormal behavior or mental changes at this time.     Diagnosis: F17.200    Next Visit: 1 week

## 2019-07-29 ENCOUNTER — CLINICAL SUPPORT (OUTPATIENT)
Dept: SMOKING CESSATION | Facility: CLINIC | Age: 59
End: 2019-07-29
Payer: COMMERCIAL

## 2019-07-29 DIAGNOSIS — F17.200 NICOTINE DEPENDENCE: Primary | ICD-10-CM

## 2019-07-29 PROCEDURE — 99404 PREV MED CNSL INDIV APPRX 60: CPT | Mod: S$GLB,,,

## 2019-07-29 PROCEDURE — 99999 PR PBB SHADOW E&M-EST. PATIENT-LVL I: ICD-10-PCS | Mod: PBBFAC,,,

## 2019-07-29 PROCEDURE — 99404 PR PREVENT COUNSEL,INDIV,60 MIN: ICD-10-PCS | Mod: S$GLB,,,

## 2019-07-29 PROCEDURE — 99999 PR PBB SHADOW E&M-EST. PATIENT-LVL I: CPT | Mod: PBBFAC,,,

## 2019-07-29 RX ORDER — IBUPROFEN 200 MG
1 TABLET ORAL DAILY
Qty: 14 PATCH | Refills: 0 | Status: SHIPPED | OUTPATIENT
Start: 2019-07-29 | End: 2019-10-03 | Stop reason: SDUPTHER

## 2019-07-29 NOTE — Clinical Note
Pt continue to smoke 5-6 cigarettes per day. Current CO measurement=24 ppm (0-5 ppm is a non smoker). Pt remain on prescribed tobacco cessation medication regimen of 1mg Chantix and 14 mg nicotine patches without any negative side effects at this time. Discussed and reviewed, stress management, habitual behavior and delay strategies. Pt agreed to attempt a dry run on 7/31/2019. Pt will continue with individual therapy sessions and medication monitoring by CTTS. Prescribed medication management will be by physician. Pt denies any abnormal behavior or mental changes at this time.

## 2019-08-02 NOTE — PROGRESS NOTES
Individual Follow-Up Form    7/29/2019    Quit Date: TBD    Clinical Status of Patient: Outpatient    Length of Service: 60 minutes    Continuing Medication: yes  Chantix or Patches    Other Medications: none     Target Symptoms: Withdrawal and medication side effects. The following were  rated moderate (3) to severe (4) on TCRS:  · Moderate (3): none  · Severe (4): none    Comments: Pt continue to smoke 5-6 cigarettes per day. Current CO measurement=24 ppm (0-5 ppm is a non smoker). Pt remain on prescribed tobacco cessation medication regimen of 1mg Chantix and 14 mg nicotine patches without any negative side effects at this time. Discussed and reviewed, stress management, habitual behavior and delay strategies. Pt agreed to attempt a dry run on 7/31/2019. Pt will continue with individual therapy sessions and medication monitoring by CTTS. Prescribed medication management will be by physician. Pt denies any abnormal behavior or mental changes at this time.    Diagnosis: F17.200    Next Visit: 1 week

## 2019-08-05 ENCOUNTER — CLINICAL SUPPORT (OUTPATIENT)
Dept: SMOKING CESSATION | Facility: CLINIC | Age: 59
End: 2019-08-05
Payer: COMMERCIAL

## 2019-08-05 DIAGNOSIS — F17.200 NICOTINE DEPENDENCE: Primary | ICD-10-CM

## 2019-08-05 PROCEDURE — 99403 PR PREVENT COUNSEL,INDIV,45 MIN: ICD-10-PCS | Mod: S$GLB,,,

## 2019-08-05 PROCEDURE — 99999 PR PBB SHADOW E&M-EST. PATIENT-LVL I: CPT | Mod: PBBFAC,,,

## 2019-08-05 PROCEDURE — 99403 PREV MED CNSL INDIV APPRX 45: CPT | Mod: S$GLB,,,

## 2019-08-05 PROCEDURE — 99999 PR PBB SHADOW E&M-EST. PATIENT-LVL I: ICD-10-PCS | Mod: PBBFAC,,,

## 2019-08-05 NOTE — PROGRESS NOTES
Individual Follow-Up Form    8/5/2019    Quit Date:     Clinical Status of Patient: Outpatient    Length of Service: 45 minutes    Continuing Medication: yes  Chantix    Other Medications: none     Target Symptoms: Withdrawal and medication side effects. The following were  rated moderate (3) to severe (4) on TCRS:  · Moderate (3): none   · Severe (4): none    Comments: Pt smoked his last cigarette on 8/3/2019. Current CO measurement=3 ppm (0-5 ppm is a non smoker). Pt commended for the accomplishment thus far. Pt remain on prescribed tobacco cessation medication regimen of 1 mg Chantix without any negative side effects at this time. Discussed and reviewed lapse, relapse, habitual behavior, and stress management. Pt denies any abnormal behavior or mental changes at this time. Pt will continue with group therapy sessions and medication monitoring by CTTS. Prescribed medication management will be by physician.     Diagnosis: F17.200    Next Visit: 1 week

## 2019-08-05 NOTE — Clinical Note
Pt smoked his last cigarette on 8/3/2019. Current CO measurement=3 ppm (0-5 ppm is a non smoker). Pt commended for the accomplishment thus far. Pt remain on prescribed tobacco cessation medication regimen of 1 mg Chantix without any negative side effects at this time. Discussed and reviewed lapse, relapse, habitual behavior, and stress management. Pt denies any abnormal behavior or mental changes at this time. Pt will continue with group therapy sessions and medication monitoring by CTTS. Prescribed medication management will be by physician.

## 2019-08-12 ENCOUNTER — CLINICAL SUPPORT (OUTPATIENT)
Dept: SMOKING CESSATION | Facility: CLINIC | Age: 59
End: 2019-08-12
Payer: COMMERCIAL

## 2019-08-12 DIAGNOSIS — F17.200 NICOTINE DEPENDENCE: Primary | ICD-10-CM

## 2019-08-12 PROCEDURE — 90853 PR GROUP PSYCHOTHERAPY: ICD-10-PCS | Mod: S$GLB,,,

## 2019-08-12 PROCEDURE — 90853 GROUP PSYCHOTHERAPY: CPT | Mod: S$GLB,,,

## 2019-08-12 RX ORDER — VARENICLINE TARTRATE 1 MG/1
1 TABLET, FILM COATED ORAL 2 TIMES DAILY
Qty: 60 TABLET | Refills: 0 | Status: SHIPPED | OUTPATIENT
Start: 2019-08-12 | End: 2019-09-16 | Stop reason: SDUPTHER

## 2019-08-12 NOTE — PROGRESS NOTES
Site: [facility]  Date:  8/12/2019  Clinical Status of Patient: Outpatient   Length of Service and Code: 60 minutes - 08863   Number in Attendance: 2  Group Activities/Focus of Group:  orientation, client introductions, completion of TCRS (Tobacco Cessation Rating Scale) learned addiction model, cues/triggers, personal reasons for quitting, medications, goals, quit date    Target symptoms:  withdrawal and medication side effects             The following were rated moderate (3) to severe (4) on TCRS:       Moderate 3:none     Severe 4:   none    Patient's Response to Intervention: Pt remain tobacco free since 8/3/2019. Current CO measurement=5 ppm (0-5 ppm is a non smoker). Pt remain on prescribed tobacco cessation medication regimen of 1 mg Chantix without any negative side effects at this time. Pt commended for the accomplishment thus far.     Progress Toward Goals and Other Mental Status Changes: Pt denies any abnormal behavioral or mental changes at this time.     Interval History: Completion of TCRS (Tobacco Cessation Rating Scale) reviewed strategies, cues, triggers, high risk situations, lapses, relapses, diet, exercise, stress, relaxation, sleep, habitual behavior, and life style changes.    Diagnosis: Z72.0    Plan: Pt will continue with group therapy sessions and medication regimen prescribed with management by physician or Cessation Clinic Provider. Patient will inform Smoking Clinic Cessation Counselor of symptoms as rated high on TCRS.    Return to Clinic: 3 weeks

## 2019-08-12 NOTE — Clinical Note
Pt remain tobacco free since 8/3/2019. Pt remain on prescribed tobacco cessation medication regimen of 1 mg Chantix without any negative side effects at this time. Pt commended for the accomplishment thus far. Pt denies any abnormal behavioral or mental changes at this time.  Pt will continue with group therapy sessions and medication regimen prescribed with management by physician or Cessation Clinic Provider.

## 2019-09-16 ENCOUNTER — IMMUNIZATION (OUTPATIENT)
Dept: PHARMACY | Facility: CLINIC | Age: 59
End: 2019-09-16
Payer: COMMERCIAL

## 2019-09-16 ENCOUNTER — CLINICAL SUPPORT (OUTPATIENT)
Dept: SMOKING CESSATION | Facility: CLINIC | Age: 59
End: 2019-09-16
Payer: COMMERCIAL

## 2019-09-16 DIAGNOSIS — F17.200 NICOTINE DEPENDENCE: Primary | ICD-10-CM

## 2019-09-16 PROCEDURE — 99999 PR PBB SHADOW E&M-EST. PATIENT-LVL I: CPT | Mod: PBBFAC,,,

## 2019-09-16 PROCEDURE — 90853 GROUP PSYCHOTHERAPY: CPT | Mod: S$GLB,,,

## 2019-09-16 PROCEDURE — 90853 PR GROUP PSYCHOTHERAPY: ICD-10-PCS | Mod: S$GLB,,,

## 2019-09-16 PROCEDURE — 99999 PR PBB SHADOW E&M-EST. PATIENT-LVL I: ICD-10-PCS | Mod: PBBFAC,,,

## 2019-09-16 RX ORDER — VARENICLINE TARTRATE 1 MG/1
1 TABLET, FILM COATED ORAL 2 TIMES DAILY
Qty: 60 TABLET | Refills: 0 | Status: SHIPPED | OUTPATIENT
Start: 2019-09-16 | End: 2019-10-14

## 2019-09-16 NOTE — PROGRESS NOTES
Smoking Cessation Group Session #3    Site: [facility]  Date:  9/16/2019  Clinical Status of Patient: Outpatient   Length of Service and Code: 60 minutes - 03119   Number in Attendance: 2  Group Activities/Focus of Group:  Sharing last weeks challenges, triggers, and coping activities to remain quit and/ or keep making progress toward cessation, completion of TCRS (Tobacco Cessation Rating Scale) learned addiction model, personal reasons for quitting, medications, goals, quit date.    Specific session focus: completion of TCRS (Tobacco Cessation Rating Scale) reviewed strategies, habitual behavior, stress, and high risk situations. Introduced stress with addition interventions, SOLVE, relaxation with interventions, nutrition, exercise, weight gain, and the importance of rewarding oneself for accomplishments toward becoming tobacco free. Open discussion of all items with interventions.     Target symptoms:  withdrawal and medication side effects             The following were rated moderate (3) to severe (4) on TCRS:       Moderate 3: none     Severe 4:   none    Patient's Response to Intervention: Pt reports smoking one cigarette since he quit smoking on 8/3/2019 and he's currently back on track. Pt commended for the accomplishment thus far. Pt remain on prescribed tobacco cessation medication regimen of 1 mg Chantix without any negative side effects at this time.    Progress Toward Goals and Other Mental Status Changes: Pt denies any abnormal behavioral or mental changes at this time.       Diagnosis: Z72.0  Plan: Pt will continue with group therapy sessions and medication regimen prescribed with management by physician or by the Cessation Clinic Provider. Patient will inform Smoking Cessation Counselor of symptoms as rated high on TCRS.    Return to Clinic: 1 week    Quit Date: 8/3/2019

## 2019-09-16 NOTE — Clinical Note
Pt reports smoking one cigarette since he quit smoking on 8/3/2019 and he's currently back on track. Pt commended for the accomplishment thus far. Pt remain on prescribed tobacco cessation medication regimen of 1 mg Chantix without any negative side effects at this time.Pt will continue with group therapy sessions and medication regimen prescribed with management by physician or by the Cessation Clinic Provider.  Pt denies any abnormal behavioral or mental changes at this time.

## 2019-09-30 ENCOUNTER — CLINICAL SUPPORT (OUTPATIENT)
Dept: SMOKING CESSATION | Facility: CLINIC | Age: 59
End: 2019-09-30
Payer: COMMERCIAL

## 2019-09-30 DIAGNOSIS — F17.200 NICOTINE DEPENDENCE: Primary | ICD-10-CM

## 2019-09-30 PROCEDURE — 99999 PR PBB SHADOW E&M-EST. PATIENT-LVL I: CPT | Mod: PBBFAC,,,

## 2019-09-30 PROCEDURE — 99999 PR PBB SHADOW E&M-EST. PATIENT-LVL I: ICD-10-PCS | Mod: PBBFAC,,,

## 2019-09-30 PROCEDURE — 99403 PR PREVENT COUNSEL,INDIV,45 MIN: ICD-10-PCS | Mod: S$GLB,,,

## 2019-09-30 PROCEDURE — 99403 PREV MED CNSL INDIV APPRX 45: CPT | Mod: S$GLB,,,

## 2019-09-30 NOTE — Clinical Note
Pt continue to remain tobacco free since 8/3/2019. Pt remain on prescribed tobacco cessation medication regimen of 1 mg Chantix and 14 mg nicotine patches without any negative side effects at this time. Pt commended for the accomplishment thus far. Pt will continue with individual therapy sessions and medication monitoring by CTTS. Prescribed medication management will be by physician. Pt denies any abnormal behavior or mental changes at this time.

## 2019-09-30 NOTE — PROGRESS NOTES
Individual Follow-Up Form    9/30/2019    Quit Date: TBD    Clinical Status of Patient: Outpatient    Length of Service: 45 minutes    Continuing Medication: yes, chantix    Other Medications: none     Target Symptoms: Withdrawal and medication side effects. The following were  rated moderate (3) to severe (4) on TCRS:  · Moderate (3): none   · Severe (4): none    Comments: Pt continue to remain tobacco free since 8/3/2019. Pt remain on prescribed tobacco cessation medication regimen of 1 mg Chantix and 14 mg nicotine patches without any negative side effects at this time. Pt commended for the accomplishment thus far. Pt will continue with individual therapy sessions and medication monitoring by CTTS. Prescribed medication management will be by physician. Pt denies any abnormal behavior or mental changes at this time.    Diagnosis: F17.200    Next Visit: 2 weeks

## 2019-10-03 RX ORDER — IBUPROFEN 200 MG
1 TABLET ORAL DAILY
Qty: 14 PATCH | Refills: 0 | Status: SHIPPED | OUTPATIENT
Start: 2019-10-03 | End: 2020-07-07

## 2019-10-07 RX ORDER — ZOSTER VACCINE RECOMBINANT, ADJUVANTED 50 MCG/0.5
KIT INTRAMUSCULAR
Qty: 1 ML | Refills: 0 | OUTPATIENT
Start: 2019-10-07 | End: 2023-06-09 | Stop reason: ALTCHOICE

## 2019-10-10 ENCOUNTER — PATIENT OUTREACH (OUTPATIENT)
Dept: ADMINISTRATIVE | Facility: HOSPITAL | Age: 59
End: 2019-10-10

## 2019-10-10 DIAGNOSIS — Z11.59 NEED FOR HEPATITIS C SCREENING TEST: Primary | ICD-10-CM

## 2019-10-14 DIAGNOSIS — F17.200 NICOTINE DEPENDENCE: ICD-10-CM

## 2019-10-14 RX ORDER — VARENICLINE TARTRATE 1 MG/1
1 TABLET, FILM COATED ORAL 2 TIMES DAILY
Qty: 60 TABLET | Refills: 0 | Status: SHIPPED | OUTPATIENT
Start: 2019-10-14 | End: 2019-11-04 | Stop reason: SDUPTHER

## 2019-10-18 ENCOUNTER — CLINICAL SUPPORT (OUTPATIENT)
Dept: INTERNAL MEDICINE | Facility: CLINIC | Age: 59
End: 2019-10-18
Payer: COMMERCIAL

## 2019-10-18 DIAGNOSIS — R79.89 LOW TESTOSTERONE IN MALE: ICD-10-CM

## 2019-10-18 DIAGNOSIS — E11.9 CONTROLLED TYPE 2 DIABETES MELLITUS WITHOUT COMPLICATION, WITHOUT LONG-TERM CURRENT USE OF INSULIN: ICD-10-CM

## 2019-10-18 DIAGNOSIS — E66.9 OBESITY (BMI 30-39.9): ICD-10-CM

## 2019-10-18 DIAGNOSIS — E78.5 HYPERLIPIDEMIA LDL GOAL <70: ICD-10-CM

## 2019-10-18 DIAGNOSIS — R79.89 LOW TESTOSTERONE: ICD-10-CM

## 2019-10-18 DIAGNOSIS — D68.61 ANTIPHOSPHOLIPID SYNDROME: ICD-10-CM

## 2019-10-18 LAB
ALBUMIN SERPL BCP-MCNC: 4.2 G/DL (ref 3.5–5.2)
ALBUMIN/CREAT UR: 76.7 UG/MG (ref 0–30)
ALP SERPL-CCNC: 64 U/L (ref 55–135)
ALT SERPL W/O P-5'-P-CCNC: 37 U/L (ref 10–44)
ANION GAP SERPL CALC-SCNC: 10 MMOL/L (ref 8–16)
AST SERPL-CCNC: 34 U/L (ref 10–40)
BASOPHILS # BLD AUTO: 0.04 K/UL (ref 0–0.2)
BASOPHILS NFR BLD: 0.5 % (ref 0–1.9)
BILIRUB SERPL-MCNC: 1.7 MG/DL (ref 0.1–1)
BUN SERPL-MCNC: 11 MG/DL (ref 6–20)
CALCIUM SERPL-MCNC: 9.2 MG/DL (ref 8.7–10.5)
CHLORIDE SERPL-SCNC: 103 MMOL/L (ref 95–110)
CHOLEST SERPL-MCNC: 144 MG/DL (ref 120–199)
CHOLEST/HDLC SERPL: 4.5 {RATIO} (ref 2–5)
CO2 SERPL-SCNC: 23 MMOL/L (ref 23–29)
CREAT SERPL-MCNC: 0.8 MG/DL (ref 0.5–1.4)
CREAT UR-MCNC: 258 MG/DL (ref 23–375)
DIFFERENTIAL METHOD: ABNORMAL
EOSINOPHIL # BLD AUTO: 0.2 K/UL (ref 0–0.5)
EOSINOPHIL NFR BLD: 2.3 % (ref 0–8)
ERYTHROCYTE [DISTWIDTH] IN BLOOD BY AUTOMATED COUNT: 13.8 % (ref 11.5–14.5)
EST. GFR  (AFRICAN AMERICAN): >60 ML/MIN/1.73 M^2
EST. GFR  (NON AFRICAN AMERICAN): >60 ML/MIN/1.73 M^2
ESTIMATED AVG GLUCOSE: 183 MG/DL (ref 68–131)
FSH SERPL-ACNC: 10 MIU/ML (ref 0.95–11.95)
GLUCOSE SERPL-MCNC: 156 MG/DL (ref 70–110)
HBA1C MFR BLD HPLC: 8 % (ref 4–5.6)
HCT VFR BLD AUTO: 49.4 % (ref 40–54)
HDLC SERPL-MCNC: 32 MG/DL (ref 40–75)
HDLC SERPL: 22.2 % (ref 20–50)
HGB BLD-MCNC: 15.9 G/DL (ref 14–18)
IMM GRANULOCYTES # BLD AUTO: 0.06 K/UL (ref 0–0.04)
IMM GRANULOCYTES NFR BLD AUTO: 0.7 % (ref 0–0.5)
IRON SERPL-MCNC: 107 UG/DL (ref 45–160)
LDLC SERPL CALC-MCNC: 83.8 MG/DL (ref 63–159)
LH SERPL-ACNC: 2.9 MIU/ML (ref 0.6–12.1)
LYMPHOCYTES # BLD AUTO: 2.7 K/UL (ref 1–4.8)
LYMPHOCYTES NFR BLD: 31.5 % (ref 18–48)
MCH RBC QN AUTO: 31.7 PG (ref 27–31)
MCHC RBC AUTO-ENTMCNC: 32.2 G/DL (ref 32–36)
MCV RBC AUTO: 99 FL (ref 82–98)
MICROALBUMIN UR DL<=1MG/L-MCNC: 198 UG/ML
MONOCYTES # BLD AUTO: 0.8 K/UL (ref 0.3–1)
MONOCYTES NFR BLD: 9.6 % (ref 4–15)
NEUTROPHILS # BLD AUTO: 4.7 K/UL (ref 1.8–7.7)
NEUTROPHILS NFR BLD: 55.4 % (ref 38–73)
NONHDLC SERPL-MCNC: 112 MG/DL
NRBC BLD-RTO: 0 /100 WBC
PLATELET # BLD AUTO: 233 K/UL (ref 150–350)
PMV BLD AUTO: 10.3 FL (ref 9.2–12.9)
POTASSIUM SERPL-SCNC: 4.4 MMOL/L (ref 3.5–5.1)
PROLACTIN SERPL IA-MCNC: 10.7 NG/ML (ref 3.5–19.4)
PROT SERPL-MCNC: 7.4 G/DL (ref 6–8.4)
RBC # BLD AUTO: 5.01 M/UL (ref 4.6–6.2)
SATURATED IRON: 27 % (ref 20–50)
SODIUM SERPL-SCNC: 136 MMOL/L (ref 136–145)
TOTAL IRON BINDING CAPACITY: 400 UG/DL (ref 250–450)
TRANSFERRIN SERPL-MCNC: 270 MG/DL (ref 200–375)
TRIGL SERPL-MCNC: 141 MG/DL (ref 30–150)
TSH SERPL DL<=0.005 MIU/L-ACNC: 1.61 UIU/ML (ref 0.4–4)
WBC # BLD AUTO: 8.53 K/UL (ref 3.9–12.7)

## 2019-10-18 PROCEDURE — 80061 LIPID PANEL: CPT

## 2019-10-18 PROCEDURE — 36415 COLL VENOUS BLD VENIPUNCTURE: CPT

## 2019-10-18 PROCEDURE — 84146 ASSAY OF PROLACTIN: CPT

## 2019-10-18 PROCEDURE — 84443 ASSAY THYROID STIM HORMONE: CPT

## 2019-10-18 PROCEDURE — 82040 ASSAY OF SERUM ALBUMIN: CPT

## 2019-10-18 PROCEDURE — 83002 ASSAY OF GONADOTROPIN (LH): CPT

## 2019-10-18 PROCEDURE — 82043 UR ALBUMIN QUANTITATIVE: CPT

## 2019-10-18 PROCEDURE — 85025 COMPLETE CBC W/AUTO DIFF WBC: CPT

## 2019-10-18 PROCEDURE — 80053 COMPREHEN METABOLIC PANEL: CPT

## 2019-10-18 PROCEDURE — 83001 ASSAY OF GONADOTROPIN (FSH): CPT

## 2019-10-18 PROCEDURE — 83036 HEMOGLOBIN GLYCOSYLATED A1C: CPT

## 2019-10-18 PROCEDURE — 83540 ASSAY OF IRON: CPT

## 2019-10-21 ENCOUNTER — CLINICAL SUPPORT (OUTPATIENT)
Dept: SMOKING CESSATION | Facility: CLINIC | Age: 59
End: 2019-10-21
Payer: COMMERCIAL

## 2019-10-21 DIAGNOSIS — F17.200 NICOTINE DEPENDENCE: Primary | ICD-10-CM

## 2019-10-21 PROCEDURE — 99999 PR PBB SHADOW E&M-EST. PATIENT-LVL I: ICD-10-PCS | Mod: PBBFAC,,,

## 2019-10-21 PROCEDURE — 99403 PR PREVENT COUNSEL,INDIV,45 MIN: ICD-10-PCS | Mod: S$GLB,,,

## 2019-10-21 PROCEDURE — 99999 PR PBB SHADOW E&M-EST. PATIENT-LVL I: CPT | Mod: PBBFAC,,,

## 2019-10-21 PROCEDURE — 99403 PREV MED CNSL INDIV APPRX 45: CPT | Mod: S$GLB,,,

## 2019-10-21 NOTE — Clinical Note
Pt returned to smoking and he currently smoke 5 cigarettes per day. Current CO measurement=24 ppm (0-5 ppm is a non smoker). Discussed and reviewed stress management, habitual behavior, rate reduction, and delay strategies. Pt will continue with individual therapy sessions and medication monitoring by CTTS. Prescribed medication management will be by physician. Pt denies any abnormal behavior or mental changes at this time.

## 2019-10-21 NOTE — PROGRESS NOTES
Individual Follow-Up Form    10/21/2019    Quit Date: TBD    Clinical Status of Patient: Outpatient    Length of Service: 45 minutes    Continuing Medication: Chantix    Other Medications: none     Target Symptoms: Withdrawal and medication side effects. The following were  rated moderate (3) to severe (4) on TCRS:  · Moderate (3): desire or crave  · Severe (4): none    Comments: Pt returned to smoking and he currently smoke 5 cigarettes per day. Current CO measurement=24 ppm (0-5 ppm is a non smoker). Discussed and reviewed stress management, habitual behavior, rate reduction, and delay strategies. Pt will continue with individual therapy sessions and medication monitoring by CTTS. Prescribed medication management will be by physician. Pt denies any abnormal behavior or mental changes at this time.    Diagnosis: F17.200    Next Visit: 1 week

## 2019-10-24 ENCOUNTER — OFFICE VISIT (OUTPATIENT)
Dept: INTERNAL MEDICINE | Facility: CLINIC | Age: 59
End: 2019-10-24
Payer: COMMERCIAL

## 2019-10-24 VITALS
OXYGEN SATURATION: 97 % | WEIGHT: 269.63 LBS | SYSTOLIC BLOOD PRESSURE: 126 MMHG | RESPIRATION RATE: 18 BRPM | BODY MASS INDEX: 34.6 KG/M2 | HEART RATE: 66 BPM | HEIGHT: 74 IN | DIASTOLIC BLOOD PRESSURE: 76 MMHG

## 2019-10-24 DIAGNOSIS — R79.89 LOW TESTOSTERONE: ICD-10-CM

## 2019-10-24 DIAGNOSIS — Z72.0 TOBACCO ABUSE: ICD-10-CM

## 2019-10-24 DIAGNOSIS — E78.5 HYPERLIPIDEMIA LDL GOAL <70: ICD-10-CM

## 2019-10-24 DIAGNOSIS — Z71.6 TOBACCO ABUSE COUNSELING: ICD-10-CM

## 2019-10-24 DIAGNOSIS — D68.61 ANTIPHOSPHOLIPID SYNDROME: ICD-10-CM

## 2019-10-24 DIAGNOSIS — E66.9 OBESITY (BMI 30.0-34.9): ICD-10-CM

## 2019-10-24 LAB
ALBUMIN SERPL-MCNC: 4.5 G/DL (ref 3.6–5.1)
SHBG SERPL-SCNC: 31 NMOL/L (ref 22–77)
TESTOST FREE SERPL-MCNC: 45 PG/ML (ref 46–224)
TESTOST SERPL-MCNC: 334 NG/DL (ref 250–1100)
TESTOSTERONE.FREE+WB SERPL-MCNC: 92.6 NG/DL (ref 110–575)

## 2019-10-24 PROCEDURE — 3008F BODY MASS INDEX DOCD: CPT | Mod: CPTII,S$GLB,, | Performed by: NURSE PRACTITIONER

## 2019-10-24 PROCEDURE — 99999 PR PBB SHADOW E&M-EST. PATIENT-LVL III: ICD-10-PCS | Mod: PBBFAC,,, | Performed by: NURSE PRACTITIONER

## 2019-10-24 PROCEDURE — 99214 PR OFFICE/OUTPT VISIT, EST, LEVL IV, 30-39 MIN: ICD-10-PCS | Mod: S$GLB,,, | Performed by: NURSE PRACTITIONER

## 2019-10-24 PROCEDURE — 99214 OFFICE O/P EST MOD 30 MIN: CPT | Mod: S$GLB,,, | Performed by: NURSE PRACTITIONER

## 2019-10-24 PROCEDURE — 99999 PR PBB SHADOW E&M-EST. PATIENT-LVL III: CPT | Mod: PBBFAC,,, | Performed by: NURSE PRACTITIONER

## 2019-10-24 PROCEDURE — 3008F PR BODY MASS INDEX (BMI) DOCUMENTED: ICD-10-PCS | Mod: CPTII,S$GLB,, | Performed by: NURSE PRACTITIONER

## 2019-10-24 RX ORDER — PIOGLITAZONEHYDROCHLORIDE 30 MG/1
30 TABLET ORAL DAILY
Qty: 90 TABLET | Refills: 1 | Status: SHIPPED | OUTPATIENT
Start: 2019-10-24 | End: 2020-04-14

## 2019-10-24 NOTE — PATIENT INSTRUCTIONS
4 Steps for Eating Healthier  Changing the way you eat can improve your health. It can lower your cholesterol and blood pressure, and help you stay at a healthy weight. Your diet doesnt have to be bland and boring to be healthy. Just watch your calories and follow these steps:    1. Eat fewer unhealthy fats  · Choose more fish and lean meats instead of fatty cuts of meat.  · Skip butter and lard, and use less margarine.  · Pass on foods that have palm, coconut, or hydrogenated oils.  · Eat fewer high-fat dairy foods like cheese, ice cream, and whole milk.  · Get a heart-healthy cookbook and try some low-fat recipes.  2. Go light on salt  · Keep the saltshaker off the table.  · Limit high-salt ingredients, such as soy sauce, bouillon, and garlic salt.  · Instead of adding salt when cooking, season your food with herbs and flavorings. Try lemon, garlic, and onion.  · Limit convenience foods, such as boxed or canned foods and restaurant food.  · Read food labels and choose lower-sodium options.  3. Limit sugar  · Pause before you add sugars to pancakes, cereal, coffee, or tea. This includes white and brown table sugar, syrup, honey, and molasses. Cut your usual amount by half.  · Use non-sugar sweeteners. Stevia, aspartame, and sucralose can satisfy a sweet tooth without adding calories.  · Swap out sugar-filled soda and other drinks. Buy sugar-free or low-calorie beverages. Remember water is always the best choice.  · Read labels and choose foods with less added sugar. Keep in mind that dairy foods and foods with fruit will have some natural sugar.  · Cut the sugar in recipes by 1/3 to 1/2. Boost the flavor with extracts like almond, vanilla, or orange. Or add spices such as cinnamon or nutmeg.  4. Eat more fiber  · Eat fresh fruits and vegetables every day.  · Boost your diet with whole grains. Go for oats, whole-grain rice, and bran.  · Add beans and lentils to your meals.  · Drink more water to match your fiber  increase. This is to help prevent constipation.  Date Last Reviewed: 5/11/2015  © 1159-7970 [employer]. [address] [place] [address], [place] [address]. All rights reserved. This information is not intended as a substitute for professional medical care. Always follow your healthcare professional's instructions.

## 2019-10-24 NOTE — PROGRESS NOTES
Subjective:       Patient ID: North Najera is a 59 y.o. male.    Chief Complaint: Follow-up (x 3 months- results)    Patient is known, to me and presents with   Chief Complaint   Patient presents with    Follow-up     x 3 months- results   .  Denies chest pain and shortness of breath.  Patient presents with check up and labs. He will need to get back on his meds. Was off diabetes meds to see if that he could do without it. Doing well otherwise   HPI  Review of Systems   Constitutional: Negative.  Negative for activity change, appetite change, chills, diaphoresis, fatigue, fever and unexpected weight change.   HENT: Negative.  Negative for congestion, ear discharge, ear pain, facial swelling, hearing loss, nosebleeds, postnasal drip, rhinorrhea, sinus pressure, sneezing, sore throat, tinnitus, trouble swallowing and voice change.    Eyes: Negative.  Negative for photophobia, pain, discharge, redness, itching and visual disturbance.   Respiratory: Negative.  Negative for apnea, cough, choking, chest tightness, shortness of breath, wheezing and stridor.    Cardiovascular: Negative.  Negative for chest pain, palpitations and leg swelling.   Gastrointestinal: Negative for abdominal distention, abdominal pain, anal bleeding, blood in stool, constipation, diarrhea, nausea and vomiting.   Genitourinary: Negative.  Negative for difficulty urinating, discharge, dysuria, enuresis, flank pain, frequency, hematuria, penile pain, penile swelling, scrotal swelling, testicular pain and urgency.   Musculoskeletal: Negative.  Negative for arthralgias, back pain, gait problem, joint swelling, myalgias, neck pain and neck stiffness.   Skin: Negative.  Negative for color change, pallor, rash and wound.   Neurological: Negative for dizziness, tremors, seizures, syncope, facial asymmetry, speech difficulty, weakness, light-headedness, numbness and headaches.   Hematological: Negative for adenopathy. Does not bruise/bleed easily.    Psychiatric/Behavioral: Negative.  Negative for agitation, dysphoric mood, sleep disturbance and suicidal ideas. The patient is not nervous/anxious.        Objective:      Physical Exam   Constitutional: He is oriented to person, place, and time. He appears well-developed and well-nourished. No distress.   HENT:   Head: Normocephalic and atraumatic.   Right Ear: External ear normal.   Left Ear: External ear normal.   Nose: Nose normal.   Mouth/Throat: Oropharynx is clear and moist. No oropharyngeal exudate.   Eyes: Pupils are equal, round, and reactive to light. Conjunctivae and EOM are normal. Right eye exhibits no discharge. Left eye exhibits no discharge.   Neck: Normal range of motion. Neck supple. No JVD present. No tracheal deviation present. No thyromegaly present.   Cardiovascular: Normal rate, regular rhythm, normal heart sounds and intact distal pulses. Exam reveals no gallop and no friction rub.   No murmur heard.  Pulmonary/Chest: Effort normal and breath sounds normal. No stridor. No respiratory distress. He has no wheezes. He has no rales. He exhibits no tenderness.   Abdominal: Soft. Bowel sounds are normal. He exhibits no distension. There is no tenderness. There is no rebound and no guarding.   Musculoskeletal: Normal range of motion. He exhibits no edema or tenderness.   Lymphadenopathy:     He has no cervical adenopathy.   Neurological: He is alert and oriented to person, place, and time. He has normal reflexes. He displays normal reflexes. No cranial nerve deficit. He exhibits normal muscle tone. Coordination normal.   Skin: Skin is warm and dry. Capillary refill takes less than 2 seconds. No rash noted. He is not diaphoretic. No erythema. No pallor.   Psychiatric: He has a normal mood and affect. His behavior is normal. Judgment and thought content normal.   Nursing note and vitals reviewed.      Assessment:       1. Uncontrolled type 2 diabetes mellitus without complication, without long-term  "current use of insulin    2. Hyperlipidemia LDL goal <70    3. Antiphospholipid syndrome    4. Tobacco abuse    5. Tobacco abuse counseling    6. Obesity (BMI 30.0-34.9)    7. Low testosterone        Plan:   North was seen today for follow-up.    Diagnoses and all orders for this visit:    Uncontrolled type 2 diabetes mellitus without complication, without long-term current use of insulin  -     SITagliptin (JANUVIA) 50 MG Tab; Take 1 tablet (50 mg total) by mouth once daily.  -     pioglitazone (ACTOS) 30 MG tablet; Take 1 tablet (30 mg total) by mouth once daily.  -     blood sugar diagnostic Strp; USE 1 STRIP BY MISC.(NON-DRUG COMBO ROUTE) ROUTE 2 (TWO) TIMES DAILY.  -     CBC auto differential; Future  -     Comprehensive metabolic panel; Future  -     Microalbumin/creatinine urine ratio; Future  -     Hemoglobin A1c; Future    Hyperlipidemia LDL goal <70  -     CBC auto differential; Future  -     Comprehensive metabolic panel; Future  -     TSH; Future  -     Lipid panel; Future    Antiphospholipid syndrome  -     CBC auto differential; Future  -     Comprehensive metabolic panel; Future    Tobacco abuse  -     CBC auto differential; Future  -     Comprehensive metabolic panel; Future    Tobacco abuse counseling    Obesity (BMI 30.0-34.9)  -     CBC auto differential; Future  -     Comprehensive metabolic panel; Future    Low testosterone  -     Testosterone Panel; Future    "This note will not be shared with the patient."  Check CBC, CMP, TSH, Fasting lipid profile, HgA1c, Microalbuminuria in three months.  Medication compliance was discussed with the patient.  The patient was instructed to monitor glucoses closely using glucometer at home and to record the values in a log.  The patient was instructed to obtain an Optometry exam and microalbumin q year.  The patient was instructed to obtain a hemoglobin A1C q 3-4 months.  The patient was instructed to check the feet visually daily and to monitor for " infection.  The patient was instructed to exercise at least 3 times a week.  The patient was instructed to follow a 1800 ADA diet.  The patient was instructed to monitor weight daily and try to keep it as close to ideal body weight as possible.  The patient was instructed on using exercise frequently to reduce BP.  The patient was instructed on using a low salt diet.  Monitor BP at home and to record the values in a log.  RTC in three months.

## 2019-10-25 NOTE — TELEPHONE ENCOUNTER
----- Message from Lisa Robledo MA sent at 5/15/2017 11:32 AM CDT -----  Contact: Patient  North Najera  MRN: [id number]  : 1960  PCP: Irma Biswas  Home Phone      [phone]  Work Phone      Not on file.  Mobile          [phone]      MESSAGE: Patient would like something sent in for the headaches,  Send to [facility] ([facility])  
no

## 2019-11-04 DIAGNOSIS — F17.200 NICOTINE DEPENDENCE: ICD-10-CM

## 2019-11-04 RX ORDER — VARENICLINE TARTRATE 1 MG/1
1 TABLET, FILM COATED ORAL 2 TIMES DAILY
Qty: 60 TABLET | Refills: 0 | Status: SHIPPED | OUTPATIENT
Start: 2019-11-04 | End: 2020-11-16

## 2019-11-06 ENCOUNTER — CLINICAL SUPPORT (OUTPATIENT)
Dept: SMOKING CESSATION | Facility: CLINIC | Age: 59
End: 2019-11-06
Payer: COMMERCIAL

## 2019-11-06 DIAGNOSIS — F17.200 NICOTINE DEPENDENCE: Primary | ICD-10-CM

## 2019-11-06 PROCEDURE — 99407 PR TOBACCO USE CESSATION INTENSIVE >10 MINUTES: ICD-10-PCS | Mod: S$GLB,,,

## 2019-11-06 PROCEDURE — 99407 BEHAV CHNG SMOKING > 10 MIN: CPT | Mod: S$GLB,,,

## 2019-11-06 NOTE — PROGRESS NOTES
Called pt to f/u on his 3 and 6 month smoking cessation quit status. Pt stated he goes tobacco free for a few weeks, but then relapses when he is stressed out. Patient is still actively enrolled in program. Stated he missed his appointment last week, but would like to reschedule. Rescheduled him for 11/18/19 at 4:30 pm. Completed 3 month smart form per visit and notes from CTTS on 8/12/19. Informed him of benefit period, phone follow ups, and contact information. Will complete 6 month smart form and will continue to follow up on quit #3 episode.

## 2020-01-23 DIAGNOSIS — E78.5 HYPERLIPIDEMIA LDL GOAL <70: ICD-10-CM

## 2020-01-23 RX ORDER — ATORVASTATIN CALCIUM 20 MG/1
20 TABLET, FILM COATED ORAL NIGHTLY
Qty: 90 TABLET | Refills: 1 | Status: SHIPPED | OUTPATIENT
Start: 2020-01-23 | End: 2020-07-13 | Stop reason: SDUPTHER

## 2020-01-28 ENCOUNTER — CLINICAL SUPPORT (OUTPATIENT)
Dept: INTERNAL MEDICINE | Facility: CLINIC | Age: 60
End: 2020-01-28
Payer: COMMERCIAL

## 2020-01-28 DIAGNOSIS — Z11.59 NEED FOR HEPATITIS C SCREENING TEST: ICD-10-CM

## 2020-01-28 DIAGNOSIS — D68.61 ANTIPHOSPHOLIPID SYNDROME: ICD-10-CM

## 2020-01-28 DIAGNOSIS — E66.9 OBESITY (BMI 30.0-34.9): ICD-10-CM

## 2020-01-28 DIAGNOSIS — Z72.0 TOBACCO ABUSE: ICD-10-CM

## 2020-01-28 DIAGNOSIS — E78.5 HYPERLIPIDEMIA LDL GOAL <70: ICD-10-CM

## 2020-01-28 DIAGNOSIS — R79.89 LOW TESTOSTERONE: ICD-10-CM

## 2020-01-28 LAB
ALBUMIN SERPL BCP-MCNC: 4.1 G/DL (ref 3.5–5.2)
ALBUMIN/CREAT UR: 36.9 UG/MG (ref 0–30)
ALP SERPL-CCNC: 52 U/L (ref 55–135)
ALT SERPL W/O P-5'-P-CCNC: 22 U/L (ref 10–44)
ANION GAP SERPL CALC-SCNC: 9 MMOL/L (ref 8–16)
AST SERPL-CCNC: 23 U/L (ref 10–40)
BASOPHILS # BLD AUTO: 0.03 K/UL (ref 0–0.2)
BASOPHILS NFR BLD: 0.4 % (ref 0–1.9)
BILIRUB SERPL-MCNC: 1 MG/DL (ref 0.1–1)
BUN SERPL-MCNC: 14 MG/DL (ref 6–20)
CALCIUM SERPL-MCNC: 9.2 MG/DL (ref 8.7–10.5)
CHLORIDE SERPL-SCNC: 106 MMOL/L (ref 95–110)
CHOLEST SERPL-MCNC: 113 MG/DL (ref 120–199)
CHOLEST/HDLC SERPL: 3.4 {RATIO} (ref 2–5)
CO2 SERPL-SCNC: 23 MMOL/L (ref 23–29)
CREAT SERPL-MCNC: 0.8 MG/DL (ref 0.5–1.4)
CREAT UR-MCNC: 141 MG/DL (ref 23–375)
DIFFERENTIAL METHOD: ABNORMAL
EOSINOPHIL # BLD AUTO: 0.2 K/UL (ref 0–0.5)
EOSINOPHIL NFR BLD: 2.6 % (ref 0–8)
ERYTHROCYTE [DISTWIDTH] IN BLOOD BY AUTOMATED COUNT: 13.9 % (ref 11.5–14.5)
EST. GFR  (AFRICAN AMERICAN): >60 ML/MIN/1.73 M^2
EST. GFR  (NON AFRICAN AMERICAN): >60 ML/MIN/1.73 M^2
ESTIMATED AVG GLUCOSE: 143 MG/DL (ref 68–131)
GLUCOSE SERPL-MCNC: 100 MG/DL (ref 70–110)
HBA1C MFR BLD HPLC: 6.6 % (ref 4–5.6)
HCT VFR BLD AUTO: 49.4 % (ref 40–54)
HDLC SERPL-MCNC: 33 MG/DL (ref 40–75)
HDLC SERPL: 29.2 % (ref 20–50)
HGB BLD-MCNC: 15.5 G/DL (ref 14–18)
IMM GRANULOCYTES # BLD AUTO: 0.04 K/UL (ref 0–0.04)
IMM GRANULOCYTES NFR BLD AUTO: 0.5 % (ref 0–0.5)
LDLC SERPL CALC-MCNC: 60.2 MG/DL (ref 63–159)
LYMPHOCYTES # BLD AUTO: 2.2 K/UL (ref 1–4.8)
LYMPHOCYTES NFR BLD: 30 % (ref 18–48)
MCH RBC QN AUTO: 31.4 PG (ref 27–31)
MCHC RBC AUTO-ENTMCNC: 31.4 G/DL (ref 32–36)
MCV RBC AUTO: 100 FL (ref 82–98)
MICROALBUMIN UR DL<=1MG/L-MCNC: 52 UG/ML
MONOCYTES # BLD AUTO: 0.7 K/UL (ref 0.3–1)
MONOCYTES NFR BLD: 9.2 % (ref 4–15)
NEUTROPHILS # BLD AUTO: 4.2 K/UL (ref 1.8–7.7)
NEUTROPHILS NFR BLD: 57.3 % (ref 38–73)
NONHDLC SERPL-MCNC: 80 MG/DL
NRBC BLD-RTO: 0 /100 WBC
PLATELET # BLD AUTO: 238 K/UL (ref 150–350)
PMV BLD AUTO: 10.3 FL (ref 9.2–12.9)
POTASSIUM SERPL-SCNC: 4.6 MMOL/L (ref 3.5–5.1)
PROT SERPL-MCNC: 7.3 G/DL (ref 6–8.4)
RBC # BLD AUTO: 4.94 M/UL (ref 4.6–6.2)
SODIUM SERPL-SCNC: 138 MMOL/L (ref 136–145)
TRIGL SERPL-MCNC: 99 MG/DL (ref 30–150)
TSH SERPL DL<=0.005 MIU/L-ACNC: 1.22 UIU/ML (ref 0.4–4)
WBC # BLD AUTO: 7.39 K/UL (ref 3.9–12.7)

## 2020-01-28 PROCEDURE — 80053 COMPREHEN METABOLIC PANEL: CPT

## 2020-01-28 PROCEDURE — 83036 HEMOGLOBIN GLYCOSYLATED A1C: CPT

## 2020-01-28 PROCEDURE — 80061 LIPID PANEL: CPT

## 2020-01-28 PROCEDURE — 82043 UR ALBUMIN QUANTITATIVE: CPT

## 2020-01-28 PROCEDURE — 85025 COMPLETE CBC W/AUTO DIFF WBC: CPT

## 2020-01-28 PROCEDURE — 82040 ASSAY OF SERUM ALBUMIN: CPT

## 2020-01-28 PROCEDURE — 84443 ASSAY THYROID STIM HORMONE: CPT

## 2020-01-31 ENCOUNTER — OFFICE VISIT (OUTPATIENT)
Dept: INTERNAL MEDICINE | Facility: CLINIC | Age: 60
End: 2020-01-31
Payer: COMMERCIAL

## 2020-01-31 VITALS
HEIGHT: 74 IN | RESPIRATION RATE: 16 BRPM | DIASTOLIC BLOOD PRESSURE: 70 MMHG | BODY MASS INDEX: 35.36 KG/M2 | WEIGHT: 275.56 LBS | SYSTOLIC BLOOD PRESSURE: 120 MMHG | HEART RATE: 58 BPM

## 2020-01-31 DIAGNOSIS — E66.01 SEVERE OBESITY (BMI 35.0-39.9) WITH COMORBIDITY: ICD-10-CM

## 2020-01-31 DIAGNOSIS — Z12.5 PROSTATE CANCER SCREENING: ICD-10-CM

## 2020-01-31 DIAGNOSIS — E78.5 HYPERLIPIDEMIA LDL GOAL <70: ICD-10-CM

## 2020-01-31 DIAGNOSIS — R79.89 LOW TESTOSTERONE IN MALE: ICD-10-CM

## 2020-01-31 DIAGNOSIS — Z71.6 TOBACCO ABUSE COUNSELING: ICD-10-CM

## 2020-01-31 DIAGNOSIS — D68.61 ANTIPHOSPHOLIPID SYNDROME: ICD-10-CM

## 2020-01-31 DIAGNOSIS — Z86.718 HISTORY OF DVT (DEEP VEIN THROMBOSIS): ICD-10-CM

## 2020-01-31 DIAGNOSIS — I49.3 PVC'S (PREMATURE VENTRICULAR CONTRACTIONS): ICD-10-CM

## 2020-01-31 DIAGNOSIS — Z72.0 TOBACCO ABUSE: ICD-10-CM

## 2020-01-31 DIAGNOSIS — E11.65 UNCONTROLLED TYPE 2 DIABETES MELLITUS WITH HYPERGLYCEMIA: Primary | ICD-10-CM

## 2020-01-31 DIAGNOSIS — R94.31 ABNORMAL EKG: ICD-10-CM

## 2020-01-31 PROCEDURE — 99214 OFFICE O/P EST MOD 30 MIN: CPT | Mod: S$GLB,,, | Performed by: NURSE PRACTITIONER

## 2020-01-31 PROCEDURE — 3044F PR MOST RECENT HEMOGLOBIN A1C LEVEL <7.0%: ICD-10-PCS | Mod: CPTII,S$GLB,, | Performed by: NURSE PRACTITIONER

## 2020-01-31 PROCEDURE — 3008F BODY MASS INDEX DOCD: CPT | Mod: CPTII,S$GLB,, | Performed by: NURSE PRACTITIONER

## 2020-01-31 PROCEDURE — 3044F HG A1C LEVEL LT 7.0%: CPT | Mod: CPTII,S$GLB,, | Performed by: NURSE PRACTITIONER

## 2020-01-31 PROCEDURE — 99999 PR PBB SHADOW E&M-EST. PATIENT-LVL III: ICD-10-PCS | Mod: PBBFAC,,, | Performed by: NURSE PRACTITIONER

## 2020-01-31 PROCEDURE — 99214 PR OFFICE/OUTPT VISIT, EST, LEVL IV, 30-39 MIN: ICD-10-PCS | Mod: S$GLB,,, | Performed by: NURSE PRACTITIONER

## 2020-01-31 PROCEDURE — 99999 PR PBB SHADOW E&M-EST. PATIENT-LVL III: CPT | Mod: PBBFAC,,, | Performed by: NURSE PRACTITIONER

## 2020-01-31 PROCEDURE — 3008F PR BODY MASS INDEX (BMI) DOCUMENTED: ICD-10-PCS | Mod: CPTII,S$GLB,, | Performed by: NURSE PRACTITIONER

## 2020-01-31 RX ORDER — METOPROLOL SUCCINATE 25 MG/1
25 TABLET, EXTENDED RELEASE ORAL DAILY
Qty: 30 TABLET | Refills: 2 | Status: SHIPPED | OUTPATIENT
Start: 2020-01-31 | End: 2020-06-24 | Stop reason: SDUPTHER

## 2020-01-31 NOTE — PROGRESS NOTES
Subjective:       Patient ID: North Najera is a 59 y.o. male.    Chief Complaint: Follow-up    Patient is known, to me and presents with   Chief Complaint   Patient presents with    Follow-up   .  Denies chest pain and shortness of breath.  Patient presents with check up and lab review. Complains of intermittent dizzy spells. No chest pain or sob. States that he does consume a lot of caffeine. Drinks up to 4 cokes a day.    HPI  Review of Systems   Constitutional: Negative for activity change, appetite change, chills, diaphoresis, fatigue, fever and unexpected weight change.   HENT: Negative.  Negative for congestion, ear discharge, ear pain, facial swelling, hearing loss, nosebleeds, postnasal drip, rhinorrhea, sinus pressure, sneezing, sore throat, tinnitus, trouble swallowing and voice change.    Eyes: Negative.  Negative for photophobia, pain, discharge, redness, itching and visual disturbance.   Respiratory: Negative.  Negative for apnea, cough, choking, chest tightness, shortness of breath, wheezing and stridor.    Cardiovascular: Negative.  Negative for chest pain, palpitations and leg swelling.   Gastrointestinal: Negative for abdominal distention, abdominal pain, anal bleeding, blood in stool, constipation, diarrhea, nausea and vomiting.   Genitourinary: Negative.  Negative for difficulty urinating, discharge, dysuria, enuresis, flank pain, frequency, hematuria, penile pain, penile swelling, scrotal swelling, testicular pain and urgency.   Musculoskeletal: Negative.  Negative for arthralgias, back pain, gait problem, joint swelling, myalgias, neck pain and neck stiffness.   Skin: Negative.  Negative for color change, pallor, rash and wound.   Neurological: Positive for dizziness. Negative for tremors, seizures, syncope, facial asymmetry, speech difficulty, weakness, light-headedness, numbness and headaches.   Hematological: Negative for adenopathy. Does not bruise/bleed easily.    Psychiatric/Behavioral: Negative.  Negative for agitation, dysphoric mood, sleep disturbance and suicidal ideas. The patient is not nervous/anxious.        Objective:      Physical Exam   Constitutional: He is oriented to person, place, and time. He appears well-developed and well-nourished. No distress.   HENT:   Head: Normocephalic and atraumatic.   Right Ear: External ear normal.   Left Ear: External ear normal.   Nose: Nose normal.   Mouth/Throat: Oropharynx is clear and moist. No oropharyngeal exudate.   Eyes: Pupils are equal, round, and reactive to light. Conjunctivae and EOM are normal. Right eye exhibits no discharge. Left eye exhibits no discharge.   Neck: Normal range of motion. Neck supple. No JVD present. No tracheal deviation present. No thyromegaly present.   Cardiovascular: Normal rate, regular rhythm, normal heart sounds and intact distal pulses. Frequent extrasystoles are present. Exam reveals no gallop and no friction rub.   No murmur heard.  Pulmonary/Chest: Effort normal and breath sounds normal. No stridor. No respiratory distress. He has no wheezes. He has no rales. He exhibits no tenderness.   Abdominal: Soft. Bowel sounds are normal. He exhibits no distension. There is no tenderness. There is no rebound and no guarding.   Musculoskeletal: Normal range of motion. He exhibits no edema or tenderness.   Lymphadenopathy:     He has no cervical adenopathy.   Neurological: He is alert and oriented to person, place, and time. He has normal reflexes. He displays normal reflexes. No cranial nerve deficit. He exhibits normal muscle tone. Coordination normal.   Skin: Skin is warm and dry. Capillary refill takes less than 2 seconds. No rash noted. He is not diaphoretic. No erythema. No pallor.   Psychiatric: He has a normal mood and affect. His behavior is normal. Judgment and thought content normal.   Nursing note and vitals reviewed.      Assessment:       1. Uncontrolled type 2 diabetes mellitus  with hyperglycemia    2. Hyperlipidemia LDL goal <70    3. Antiphospholipid syndrome    4. Low testosterone in male    5. Tobacco abuse    6. Tobacco abuse counseling    7. PVC's (premature ventricular contractions)    8. History of DVT (deep vein thrombosis)    9. Abnormal EKG    10. Severe obesity (BMI 35.0-39.9) with comorbidity    11. Prostate cancer screening        Plan:   North was seen today for follow-up.    Diagnoses and all orders for this visit:    Uncontrolled type 2 diabetes mellitus with hyperglycemia  -     CBC auto differential; Future  -     Comprehensive metabolic panel; Future  -     Microalbumin/creatinine urine ratio; Future  -     Hemoglobin A1c; Future    Hyperlipidemia LDL goal <70  -     CBC auto differential; Future  -     Comprehensive metabolic panel; Future  -     TSH; Future  -     Lipid panel; Future  -     Ambulatory referral to Cardiology    Antiphospholipid syndrome  -     CBC auto differential; Future  -     Comprehensive metabolic panel; Future  -     Ambulatory referral to Cardiology  -     rivaroxaban (XARELTO) 20 mg Tab; TAKE 1 TABLET BY MOUTH EVERY DAY.    Low testosterone in male  -     CBC auto differential; Future  -     Comprehensive metabolic panel; Future  -     Testosterone Panel; Future    Tobacco abuse  -     CBC auto differential; Future  -     Comprehensive metabolic panel; Future    Tobacco abuse counseling    PVC's (premature ventricular contractions)  -     CBC auto differential; Future  -     Comprehensive metabolic panel; Future  -     Ambulatory referral to Cardiology  -     metoprolol succinate (TOPROL-XL) 25 MG 24 hr tablet; Take 1 tablet (25 mg total) by mouth once daily.  -     EKG 12-lead; Future    History of DVT (deep vein thrombosis)  -     rivaroxaban (XARELTO) 20 mg Tab; TAKE 1 TABLET BY MOUTH EVERY DAY.    Abnormal EKG  -     CBC auto differential; Future  -     Comprehensive metabolic panel; Future  -     Ambulatory referral to Cardiology  -      "EKG 12-lead; Future    Severe obesity (BMI 35.0-39.9) with comorbidity  -     CBC auto differential; Future  -     Comprehensive metabolic panel; Future    Prostate cancer screening  -     PSA, Screening; Future    "This note will not be shared with the patient."  I believe that this is why he is having dizzy spells  Also will place on metoprolol for now  I will send to cardiology next week  ekg with frequent pvc's and pac's   If any worsening of dizziness or other cardiac s/s go to ER  Check CBC, CMP, TSH, Fasting lipid profile, HgA1c, Microalbuminuria in three months.  Medication compliance was discussed with the patient.  The patient was instructed to monitor glucoses closely using glucometer at home and to record the values in a log.  The patient was instructed to obtain an Optometry exam and microalbumin q year.  The patient was instructed to obtain a hemoglobin A1C q 3-4 months.  The patient was instructed to check the feet visually daily and to monitor for infection.  The patient was instructed to exercise at least 3 times a week.  The patient was instructed to follow a 1800 ADA diet.  The patient was instructed to monitor weight daily and try to keep it as close to ideal body weight as possible.  The patient was instructed on using exercise frequently to reduce BP.  The patient was instructed on using a low salt diet.  Monitor BP at home and to record the values in a log.  RTC in three months.  "

## 2020-01-31 NOTE — PATIENT INSTRUCTIONS
About Arrhythmias    Electrical impulses cause the normal heart to beat 60 to 100 times a minute while at rest. These impulses come from a natural pacemaker deep inside the heart muscle. Each impulse causes the heart muscle to contract. This causes the blood to flow through the heart and out to the tissues and organs of your body.  An arrhythmia is a change from the normal speed or pattern of these electrical impulses. This can cause the heart to beat too fast (tachycardia); or too slow (bradycardia); or in an unsteady pattern (irregular rhythm).  Symptoms of arrhythmias  Different people experience arrhythmias differently. Sometimes they may not have symptoms, but just notice a change in their pulse. Symptoms can include:  · Fluttering feeling in the chest  · Shortness of breath  · Chest pain or pressure  · Neck fullness  · Lightheadedness or dizziness  · Fainting or almost fainting  · Palpitations (the sense that your heart is fluttering or beating fast or hard or irregularly)  · Tiredness, fatigue, or weakness  · Cardiac arrest  Causes of arrhythmias  Arrhythmias are most often due to heart disease such as:  · Coronary artery disease  · Heart valve disease  · Enlarged heart  · High blood pressure  · Heart failure  Other causes of  arrhythmia include:  · Certain medicines (such as asthma inhalers and decongestants)  · Some herbal supplements  · Cardiac stimulant drugs (such as cocaine, amphetamine, diet pills, certain decongestant cold medicines, caffeine, and nicotine)  · Excessive alcohol use  · Anxiety and panic disorder  · Thyroid disease  · Anemia  · Diabetes  · Sleep apnea  · Obesity  · Congenital heart disease  · Cardiac genetic diseases  Arrhythmias can often be prevented. The cause and type of arrhythmia determines the best treatment. Sometimes your doctor may want to monitor your heart rate over a 24-hour period or longer. This can help identify the cause of your arrhythmia and find the best treatment.  This can be done with a Holter monitor, a portable EKG recording device attached by wires to your chest. Or you may get an event monitor, which you can place over the skin in front of your heart to record heart rhythms. You can carry this with you as you go about your routine activities during the monitoring period. Implantable loop recorders may also be used to monitor the heart rhythm for up to 2 years. This miniature device is placed underneath the skin overlying the heart.  Home care  The following guidelines will help you care for yourself at home:  · Avoid cardiac stimulants (such as cocaine, amphetamine, diet pills, certain decongestant cold medicines, caffeine, and nicotine).  · If you smoke, stop smoking. Contact your doctor or a local stop-smoking program for help.  · Tell your doctor about any prescription, over-the-counter, or herbal medicines you take. These may be affecting your heart rhythm.  Follow-up care  Follow up with your healthcare provider, or as advised. If a Holter monitor has been recommended, contact the cardiologist you have been referred to as soon as you can  the device. Other outpatient tests may also be arranged for you at that time.  Call 911  This is the fastest and safest way to get to the emergency department. The paramedics can also start treatment on the way to the hospital, if needed.  Don't wait until your symptoms are severe to call 911. Other reasons to call 911 besides chest pain include:  · Chest, shoulder, arm, neck, or back pain  · Shortness of breath  · Feeling lightheaded, faint, or dizzy  · Unexplained fainting  · Rapid heart beat  · Slower than usual heart rate compared to your normal  · Very irregular heartbeat  · Chest pain (angina) with weakness, dizziness, heavy sweating, nausea, or vomiting  · Extreme drowsiness, or confusion  · Weakness of an arm or leg or one side of the face  · Difficulty with speech or vision  When to seek medical advice  Remember,  "things are not always like they are on TV. Sometimes it is not so obvious. You may only feel weak or just "not right." If it is not clear or if you have any doubt, call for advice.  · Seek help for chest pain, or it feels different from usual, even if your symptoms are mild.  · Don't drive yourself. Have someone else drive. If no one can drive you, call 911.  · If your doctor has given you medicines to take when you have symptoms, take them, but do not delay getting help while trying to find them.  Date Last Reviewed: 4/25/2016  © 1364-8681 [employer]. [address] [place] [address], [place] [address]. All rights reserved. This information is not intended as a substitute for professional medical care. Always follow your healthcare professional's instructions.        "

## 2020-02-02 LAB
ALBUMIN SERPL-MCNC: 4.4 G/DL (ref 3.6–5.1)
SHBG SERPL-SCNC: 49 NMOL/L (ref 22–77)
TESTOST FREE SERPL-MCNC: 29 PG/ML (ref 46–224)
TESTOST SERPL-MCNC: 317 NG/DL (ref 250–1100)
TESTOSTERONE.FREE+WB SERPL-MCNC: 58.4 NG/DL (ref 110–575)

## 2020-04-16 RX ORDER — PIOGLITAZONEHYDROCHLORIDE 30 MG/1
30 TABLET ORAL DAILY
Qty: 90 TABLET | Refills: 1 | Status: SHIPPED | OUTPATIENT
Start: 2020-04-16 | End: 2020-07-08 | Stop reason: SDUPTHER

## 2020-04-23 ENCOUNTER — TELEPHONE (OUTPATIENT)
Dept: INTERNAL MEDICINE | Facility: CLINIC | Age: 60
End: 2020-04-23

## 2020-04-23 DIAGNOSIS — Z86.718 HISTORY OF DVT (DEEP VEIN THROMBOSIS): ICD-10-CM

## 2020-04-23 DIAGNOSIS — D68.61 ANTIPHOSPHOLIPID SYNDROME: ICD-10-CM

## 2020-04-23 NOTE — TELEPHONE ENCOUNTER
----- Message from Anu Pink sent at 4/23/2020  9:24 AM CDT -----  Contact: self  Pt requesting refill on RX of rivaroxaban (XARELTO) 20 mg Tab  Pharmacy: [facility]

## 2020-05-01 ENCOUNTER — CLINICAL SUPPORT (OUTPATIENT)
Dept: INTERNAL MEDICINE | Facility: CLINIC | Age: 60
End: 2020-05-01
Payer: COMMERCIAL

## 2020-05-01 DIAGNOSIS — Z72.0 TOBACCO ABUSE: ICD-10-CM

## 2020-05-01 DIAGNOSIS — R94.31 ABNORMAL EKG: ICD-10-CM

## 2020-05-01 DIAGNOSIS — E66.01 SEVERE OBESITY (BMI 35.0-39.9) WITH COMORBIDITY: ICD-10-CM

## 2020-05-01 DIAGNOSIS — I49.3 PVC'S (PREMATURE VENTRICULAR CONTRACTIONS): ICD-10-CM

## 2020-05-01 DIAGNOSIS — E78.5 HYPERLIPIDEMIA LDL GOAL <70: ICD-10-CM

## 2020-05-01 DIAGNOSIS — E11.65 UNCONTROLLED TYPE 2 DIABETES MELLITUS WITH HYPERGLYCEMIA: ICD-10-CM

## 2020-05-01 DIAGNOSIS — R79.89 LOW TESTOSTERONE IN MALE: ICD-10-CM

## 2020-05-01 DIAGNOSIS — D68.61 ANTIPHOSPHOLIPID SYNDROME: ICD-10-CM

## 2020-05-01 DIAGNOSIS — Z12.5 PROSTATE CANCER SCREENING: ICD-10-CM

## 2020-05-01 LAB
ALBUMIN SERPL BCP-MCNC: 4 G/DL (ref 3.5–5.2)
ALBUMIN/CREAT UR: 22.4 UG/MG (ref 0–30)
ALP SERPL-CCNC: 50 U/L (ref 55–135)
ALT SERPL W/O P-5'-P-CCNC: 20 U/L (ref 10–44)
ANION GAP SERPL CALC-SCNC: 10 MMOL/L (ref 8–16)
AST SERPL-CCNC: 23 U/L (ref 10–40)
BASOPHILS # BLD AUTO: 0.04 K/UL (ref 0–0.2)
BASOPHILS NFR BLD: 0.5 % (ref 0–1.9)
BILIRUB SERPL-MCNC: 1.1 MG/DL (ref 0.1–1)
BUN SERPL-MCNC: 11 MG/DL (ref 6–20)
CALCIUM SERPL-MCNC: 9 MG/DL (ref 8.7–10.5)
CHLORIDE SERPL-SCNC: 108 MMOL/L (ref 95–110)
CHOLEST SERPL-MCNC: 106 MG/DL (ref 120–199)
CHOLEST/HDLC SERPL: 3.8 {RATIO} (ref 2–5)
CO2 SERPL-SCNC: 19 MMOL/L (ref 23–29)
COMPLEXED PSA SERPL-MCNC: 0.86 NG/ML (ref 0–4)
CREAT SERPL-MCNC: 0.9 MG/DL (ref 0.5–1.4)
CREAT UR-MCNC: 205 MG/DL (ref 23–375)
DIFFERENTIAL METHOD: ABNORMAL
EOSINOPHIL # BLD AUTO: 0.2 K/UL (ref 0–0.5)
EOSINOPHIL NFR BLD: 2.5 % (ref 0–8)
ERYTHROCYTE [DISTWIDTH] IN BLOOD BY AUTOMATED COUNT: 14.2 % (ref 11.5–14.5)
EST. GFR  (AFRICAN AMERICAN): >60 ML/MIN/1.73 M^2
EST. GFR  (NON AFRICAN AMERICAN): >60 ML/MIN/1.73 M^2
ESTIMATED AVG GLUCOSE: 140 MG/DL (ref 68–131)
GLUCOSE SERPL-MCNC: 114 MG/DL (ref 70–110)
HBA1C MFR BLD HPLC: 6.5 % (ref 4–5.6)
HCT VFR BLD AUTO: 47.3 % (ref 40–54)
HDLC SERPL-MCNC: 28 MG/DL (ref 40–75)
HDLC SERPL: 26.4 % (ref 20–50)
HGB BLD-MCNC: 15.2 G/DL (ref 14–18)
IMM GRANULOCYTES # BLD AUTO: 0.05 K/UL (ref 0–0.04)
IMM GRANULOCYTES NFR BLD AUTO: 0.6 % (ref 0–0.5)
LDLC SERPL CALC-MCNC: 55.8 MG/DL (ref 63–159)
LYMPHOCYTES # BLD AUTO: 2.5 K/UL (ref 1–4.8)
LYMPHOCYTES NFR BLD: 28.4 % (ref 18–48)
MCH RBC QN AUTO: 32.3 PG (ref 27–31)
MCHC RBC AUTO-ENTMCNC: 32.1 G/DL (ref 32–36)
MCV RBC AUTO: 101 FL (ref 82–98)
MICROALBUMIN UR DL<=1MG/L-MCNC: 46 UG/ML
MONOCYTES # BLD AUTO: 0.8 K/UL (ref 0.3–1)
MONOCYTES NFR BLD: 8.7 % (ref 4–15)
NEUTROPHILS # BLD AUTO: 5.1 K/UL (ref 1.8–7.7)
NEUTROPHILS NFR BLD: 59.3 % (ref 38–73)
NONHDLC SERPL-MCNC: 78 MG/DL
NRBC BLD-RTO: 0 /100 WBC
PLATELET # BLD AUTO: 238 K/UL (ref 150–350)
PMV BLD AUTO: 10.6 FL (ref 9.2–12.9)
POTASSIUM SERPL-SCNC: 4.5 MMOL/L (ref 3.5–5.1)
PROT SERPL-MCNC: 6.9 G/DL (ref 6–8.4)
RBC # BLD AUTO: 4.7 M/UL (ref 4.6–6.2)
SODIUM SERPL-SCNC: 137 MMOL/L (ref 136–145)
T4 FREE SERPL-MCNC: 0.95 NG/DL (ref 0.71–1.51)
TRIGL SERPL-MCNC: 111 MG/DL (ref 30–150)
TSH SERPL DL<=0.005 MIU/L-ACNC: 0.11 UIU/ML (ref 0.4–4)
WBC # BLD AUTO: 8.66 K/UL (ref 3.9–12.7)

## 2020-05-01 PROCEDURE — 85025 COMPLETE CBC W/AUTO DIFF WBC: CPT

## 2020-05-01 PROCEDURE — 83036 HEMOGLOBIN GLYCOSYLATED A1C: CPT

## 2020-05-01 PROCEDURE — 84443 ASSAY THYROID STIM HORMONE: CPT

## 2020-05-01 PROCEDURE — 80053 COMPREHEN METABOLIC PANEL: CPT

## 2020-05-01 PROCEDURE — 82043 UR ALBUMIN QUANTITATIVE: CPT

## 2020-05-01 PROCEDURE — 80061 LIPID PANEL: CPT

## 2020-05-01 PROCEDURE — 84439 ASSAY OF FREE THYROXINE: CPT

## 2020-05-01 PROCEDURE — 36415 COLL VENOUS BLD VENIPUNCTURE: CPT

## 2020-05-01 PROCEDURE — 82040 ASSAY OF SERUM ALBUMIN: CPT

## 2020-05-01 PROCEDURE — 84153 ASSAY OF PSA TOTAL: CPT

## 2020-05-07 ENCOUNTER — OFFICE VISIT (OUTPATIENT)
Dept: INTERNAL MEDICINE | Facility: CLINIC | Age: 60
End: 2020-05-07
Payer: COMMERCIAL

## 2020-05-07 DIAGNOSIS — E11.65 UNCONTROLLED TYPE 2 DIABETES MELLITUS WITH HYPERGLYCEMIA: Primary | ICD-10-CM

## 2020-05-07 DIAGNOSIS — D68.61 ANTIPHOSPHOLIPID SYNDROME: ICD-10-CM

## 2020-05-07 DIAGNOSIS — E66.01 SEVERE OBESITY (BMI 35.0-39.9) WITH COMORBIDITY: ICD-10-CM

## 2020-05-07 DIAGNOSIS — Z72.0 TOBACCO ABUSE: ICD-10-CM

## 2020-05-07 DIAGNOSIS — Z71.6 TOBACCO ABUSE COUNSELING: ICD-10-CM

## 2020-05-07 DIAGNOSIS — R79.89 LOW TESTOSTERONE IN MALE: ICD-10-CM

## 2020-05-07 DIAGNOSIS — E78.5 HYPERLIPIDEMIA LDL GOAL <70: ICD-10-CM

## 2020-05-07 PROCEDURE — 99212 OFFICE O/P EST SF 10 MIN: CPT | Mod: 95,,, | Performed by: NURSE PRACTITIONER

## 2020-05-07 PROCEDURE — 3044F PR MOST RECENT HEMOGLOBIN A1C LEVEL <7.0%: ICD-10-PCS | Mod: CPTII,,, | Performed by: NURSE PRACTITIONER

## 2020-05-07 PROCEDURE — 3044F HG A1C LEVEL LT 7.0%: CPT | Mod: CPTII,,, | Performed by: NURSE PRACTITIONER

## 2020-05-07 PROCEDURE — 99212 PR OFFICE/OUTPT VISIT, EST, LEVL II, 10-19 MIN: ICD-10-PCS | Mod: 95,,, | Performed by: NURSE PRACTITIONER

## 2020-05-07 NOTE — PROGRESS NOTES
The patient location is: patient is at home  the chief complaint leading to consultation is: check up and labs  Visit type: audiovisual  Total time spent with patient: 5-10 minutes  Each patient to whom he or she provides medical services by telemedicine is:  (1) informed of the relationship between the physician and patient and the respective role of any other health care provider with respect to management of the patient; and (2) notified that he or she may decline to receive medical services by telemedicine and may withdraw from such care at any time.    Notes: patient presents for follo wup and labs. Doing well with no complaints. Does not need any refills    1. Uncontrolled type 2 diabetes mellitus with hyperglycemia  CBC auto differential    Comprehensive metabolic panel    Microalbumin/creatinine urine ratio    Hemoglobin A1C   2. Hyperlipidemia LDL goal <70  CBC auto differential    Comprehensive metabolic panel    TSH    Lipid Panel   3. Antiphospholipid syndrome  CBC auto differential    Comprehensive metabolic panel   4. Tobacco abuse  CBC auto differential    Comprehensive metabolic panel   5. Tobacco abuse counseling     6. Low testosterone in male  CBC auto differential    Comprehensive metabolic panel    Testosterone Panel   7. Severe obesity (BMI 35.0-39.9) with comorbidity  CBC auto differential    Comprehensive metabolic panel

## 2020-05-08 LAB
ALBUMIN SERPL-MCNC: 4.3 G/DL (ref 3.6–5.1)
SHBG SERPL-SCNC: 46 NMOL/L (ref 22–77)
TESTOST FREE SERPL-MCNC: 34.7 PG/ML (ref 46–224)
TESTOST SERPL-MCNC: 353 NG/DL (ref 250–1100)
TESTOSTERONE.FREE+WB SERPL-MCNC: 68.3 NG/DL (ref 110–575)

## 2020-06-10 ENCOUNTER — CLINICAL SUPPORT (OUTPATIENT)
Dept: SMOKING CESSATION | Facility: CLINIC | Age: 60
End: 2020-06-10
Payer: COMMERCIAL

## 2020-06-10 DIAGNOSIS — F17.200 NICOTINE DEPENDENCE: Primary | ICD-10-CM

## 2020-06-10 PROCEDURE — 99407 PR TOBACCO USE CESSATION INTENSIVE >10 MINUTES: ICD-10-PCS | Mod: S$GLB,,, | Performed by: INTERNAL MEDICINE

## 2020-06-10 PROCEDURE — 99407 BEHAV CHNG SMOKING > 10 MIN: CPT | Mod: S$GLB,,, | Performed by: INTERNAL MEDICINE

## 2020-06-10 NOTE — PROGRESS NOTES
Spoke with patient today in regard to smoking cessation progress for 12 month telephone follow up, he states not tobacco free. Patient has scheduled an appointment to return to the program for quit attempt #4 on 7/7/2020 @ 4:30 pm. Informed patient of benefit period, future follow ups, and contact information if any further help or support is needed. Will resolve episode and complete smart form for Quit attempt #3.

## 2020-06-24 DIAGNOSIS — I49.3 PVC'S (PREMATURE VENTRICULAR CONTRACTIONS): ICD-10-CM

## 2020-06-25 RX ORDER — METOPROLOL SUCCINATE 25 MG/1
25 TABLET, EXTENDED RELEASE ORAL DAILY
Qty: 30 TABLET | Refills: 2 | Status: SHIPPED | OUTPATIENT
Start: 2020-06-25 | End: 2020-07-08 | Stop reason: SDUPTHER

## 2020-07-02 RX ORDER — LISINOPRIL 5 MG/1
TABLET ORAL
Qty: 30 TABLET | Refills: 5 | Status: SHIPPED | OUTPATIENT
Start: 2020-07-02 | End: 2020-07-08 | Stop reason: SDUPTHER

## 2020-07-07 ENCOUNTER — CLINICAL SUPPORT (OUTPATIENT)
Dept: SMOKING CESSATION | Facility: CLINIC | Age: 60
End: 2020-07-07
Payer: COMMERCIAL

## 2020-07-07 DIAGNOSIS — F17.200 NICOTINE DEPENDENCE: Primary | ICD-10-CM

## 2020-07-07 PROCEDURE — 99404 PR PREVENT COUNSEL,INDIV,60 MIN: ICD-10-PCS | Mod: S$GLB,,,

## 2020-07-07 PROCEDURE — 99404 PREV MED CNSL INDIV APPRX 60: CPT | Mod: S$GLB,,,

## 2020-07-07 RX ORDER — MICONAZOLE NITRATE 2 %
2 CREAM (GRAM) TOPICAL
Qty: 300 EACH | Refills: 0 | Status: SHIPPED | OUTPATIENT
Start: 2020-07-07 | End: 2021-12-03

## 2020-07-07 RX ORDER — IBUPROFEN 200 MG
1 TABLET ORAL DAILY
Qty: 14 PATCH | Refills: 0 | Status: SHIPPED | OUTPATIENT
Start: 2020-07-07 | End: 2020-07-27 | Stop reason: SDUPTHER

## 2020-07-07 RX ORDER — BUPROPION HYDROCHLORIDE 150 MG/1
150 TABLET, EXTENDED RELEASE ORAL 2 TIMES DAILY
Qty: 60 TABLET | Refills: 0 | Status: SHIPPED | OUTPATIENT
Start: 2020-07-07 | End: 2021-09-06

## 2020-07-07 NOTE — Clinical Note
Pt currently smoke 1-1.5 packs of cigarettes per day and will begin weekly tobacco cessation sessions. Pt agreed to take prescribed tobacco cessation medication regimen of 21 mg nicotine patches, 2 mg nicotine gum, and 150 mg wellbutrin SR. Prescribed medications will be managed by physician and monitored by CTTS.

## 2020-07-08 DIAGNOSIS — I49.3 PVC'S (PREMATURE VENTRICULAR CONTRACTIONS): ICD-10-CM

## 2020-07-08 DIAGNOSIS — D68.61 ANTIPHOSPHOLIPID SYNDROME: ICD-10-CM

## 2020-07-08 DIAGNOSIS — Z86.718 HISTORY OF DVT (DEEP VEIN THROMBOSIS): ICD-10-CM

## 2020-07-08 RX ORDER — PIOGLITAZONEHYDROCHLORIDE 30 MG/1
30 TABLET ORAL DAILY
Qty: 90 TABLET | Refills: 1 | Status: SHIPPED | OUTPATIENT
Start: 2020-07-08 | End: 2021-06-30 | Stop reason: SDUPTHER

## 2020-07-08 RX ORDER — LISINOPRIL 5 MG/1
TABLET ORAL
Qty: 90 TABLET | Refills: 1 | Status: SHIPPED | OUTPATIENT
Start: 2020-07-08 | End: 2022-04-19

## 2020-07-08 RX ORDER — METOPROLOL SUCCINATE 25 MG/1
25 TABLET, EXTENDED RELEASE ORAL DAILY
Qty: 90 TABLET | Refills: 1 | Status: SHIPPED | OUTPATIENT
Start: 2020-07-08 | End: 2021-06-30 | Stop reason: SDUPTHER

## 2020-07-13 DIAGNOSIS — D68.61 ANTIPHOSPHOLIPID SYNDROME: ICD-10-CM

## 2020-07-13 DIAGNOSIS — I49.3 PVC'S (PREMATURE VENTRICULAR CONTRACTIONS): ICD-10-CM

## 2020-07-13 DIAGNOSIS — Z86.718 HISTORY OF DVT (DEEP VEIN THROMBOSIS): ICD-10-CM

## 2020-07-13 DIAGNOSIS — E78.5 HYPERLIPIDEMIA LDL GOAL <70: ICD-10-CM

## 2020-07-14 ENCOUNTER — TELEPHONE (OUTPATIENT)
Dept: SMOKING CESSATION | Facility: CLINIC | Age: 60
End: 2020-07-14

## 2020-07-14 RX ORDER — PIOGLITAZONEHYDROCHLORIDE 30 MG/1
30 TABLET ORAL DAILY
Qty: 90 TABLET | Refills: 1 | Status: SHIPPED | OUTPATIENT
Start: 2020-07-14 | End: 2021-03-12 | Stop reason: SDUPTHER

## 2020-07-14 RX ORDER — ATORVASTATIN CALCIUM 20 MG/1
20 TABLET, FILM COATED ORAL NIGHTLY
Qty: 90 TABLET | Refills: 1 | Status: SHIPPED | OUTPATIENT
Start: 2020-07-14 | End: 2020-11-23 | Stop reason: SDUPTHER

## 2020-07-14 RX ORDER — METOPROLOL SUCCINATE 25 MG/1
25 TABLET, EXTENDED RELEASE ORAL DAILY
Qty: 30 TABLET | Refills: 2 | Status: SHIPPED | OUTPATIENT
Start: 2020-07-14 | End: 2020-10-27 | Stop reason: SDUPTHER

## 2020-07-14 RX ORDER — LISINOPRIL 5 MG/1
TABLET ORAL
Qty: 30 TABLET | Refills: 5 | Status: SHIPPED | OUTPATIENT
Start: 2020-07-14 | End: 2021-01-21 | Stop reason: SDUPTHER

## 2020-07-14 NOTE — TELEPHONE ENCOUNTER
Unsuccessful contact with patient in regards to today's tobacco cessation session. Left a message with contact information to reschedule.

## 2020-07-27 ENCOUNTER — CLINICAL SUPPORT (OUTPATIENT)
Dept: SMOKING CESSATION | Facility: CLINIC | Age: 60
End: 2020-07-27
Payer: COMMERCIAL

## 2020-07-27 DIAGNOSIS — F17.200 NICOTINE DEPENDENCE: Primary | ICD-10-CM

## 2020-07-27 PROCEDURE — 99999 PR PBB SHADOW E&M-EST. PATIENT-LVL I: CPT | Mod: PBBFAC,,,

## 2020-07-27 PROCEDURE — 99402 PREV MED CNSL INDIV APPRX 30: CPT | Mod: S$GLB,,,

## 2020-07-27 PROCEDURE — 99402 PR PREVENT COUNSEL,INDIV,30 MIN: ICD-10-PCS | Mod: S$GLB,,,

## 2020-07-27 PROCEDURE — 99999 PR PBB SHADOW E&M-EST. PATIENT-LVL I: ICD-10-PCS | Mod: PBBFAC,,,

## 2020-07-27 RX ORDER — IBUPROFEN 200 MG
1 TABLET ORAL DAILY
Qty: 14 PATCH | Refills: 0 | Status: SHIPPED | OUTPATIENT
Start: 2020-07-27 | End: 2021-12-03

## 2020-07-27 NOTE — Clinical Note
Pt currently smoke one pack of cigarettes per day; down from 1.5 packs per day. Pt commended for the accomplishment thus far. Pt remain on prescribed tobacco cessation medication regimen of 21 mg nicotine patches and 2 mg nicotine gum without negative side effects at this time. Discussed and reviewed cues, triggers, rate reduction, and delay strategies. Pt denies any abnormal behavior or mental changes at this time. Pt will continue with individual therapy sessions and medication monitoring by CTTS. Prescribed medication management will be by physician.

## 2020-07-27 NOTE — PROGRESS NOTES
Individual Follow-Up Form    7/27/2020    Quit Date: TBD    Clinical Status of Patient: Outpatient    Length of Service: 30 minutes    Continuing Medication: yes  Patches or Nicotine gum    Other Medications: none     Target Symptoms: Withdrawal and medication side effects. The following were  rated moderate (3) to severe (4) on TCRS:  · Moderate (3): none  · Severe (4): none    Comments: Pt currently smoke one pack of cigarettes per day; down from 1.5 packs per day. Pt commended for the accomplishment thus far. Pt remain on prescribed tobacco cessation medication regimen of 21 mg nicotine patches and 2 mg nicotine gum without negative side effects at this time. Discussed and reviewed cues, triggers, rate reduction, and delay strategies. Pt denies any abnormal behavior or mental changes at this time. Pt will continue with individual therapy sessions and medication monitoring by CTTS. Prescribed medication management will be by physician.     Diagnosis: F17.200    Next Visit: 1 week

## 2020-08-03 ENCOUNTER — TELEPHONE (OUTPATIENT)
Dept: SMOKING CESSATION | Facility: CLINIC | Age: 60
End: 2020-08-03

## 2020-08-03 NOTE — TELEPHONE ENCOUNTER
Unsuccessful contact with patient in regards today's tobacco cessation session. Left a message with contact information to reschedule.

## 2020-10-05 ENCOUNTER — PATIENT MESSAGE (OUTPATIENT)
Dept: ADMINISTRATIVE | Facility: HOSPITAL | Age: 60
End: 2020-10-05

## 2020-10-16 DIAGNOSIS — E11.9 TYPE 2 DIABETES MELLITUS WITHOUT COMPLICATION, UNSPECIFIED WHETHER LONG TERM INSULIN USE: ICD-10-CM

## 2020-10-27 DIAGNOSIS — I49.3 PVC'S (PREMATURE VENTRICULAR CONTRACTIONS): ICD-10-CM

## 2020-10-27 DIAGNOSIS — E11.65 UNCONTROLLED TYPE 2 DIABETES MELLITUS WITH HYPERGLYCEMIA: ICD-10-CM

## 2020-10-27 RX ORDER — METOPROLOL SUCCINATE 25 MG/1
25 TABLET, EXTENDED RELEASE ORAL DAILY
Qty: 30 TABLET | Refills: 2 | Status: SHIPPED | OUTPATIENT
Start: 2020-10-27 | End: 2021-01-21 | Stop reason: SDUPTHER

## 2020-11-09 ENCOUNTER — CLINICAL SUPPORT (OUTPATIENT)
Dept: INTERNAL MEDICINE | Facility: CLINIC | Age: 60
End: 2020-11-09
Payer: COMMERCIAL

## 2020-11-09 DIAGNOSIS — R79.89 LOW TESTOSTERONE IN MALE: ICD-10-CM

## 2020-11-09 DIAGNOSIS — D68.61 ANTIPHOSPHOLIPID SYNDROME: ICD-10-CM

## 2020-11-09 DIAGNOSIS — E66.01 SEVERE OBESITY (BMI 35.0-39.9) WITH COMORBIDITY: ICD-10-CM

## 2020-11-09 DIAGNOSIS — E11.65 UNCONTROLLED TYPE 2 DIABETES MELLITUS WITH HYPERGLYCEMIA: ICD-10-CM

## 2020-11-09 DIAGNOSIS — E78.5 HYPERLIPIDEMIA LDL GOAL <70: ICD-10-CM

## 2020-11-09 DIAGNOSIS — Z72.0 TOBACCO ABUSE: ICD-10-CM

## 2020-11-09 LAB
ALBUMIN SERPL BCP-MCNC: 4 G/DL (ref 3.5–5.2)
ALBUMIN/CREAT UR: 51.3 UG/MG (ref 0–30)
ALP SERPL-CCNC: 51 U/L (ref 55–135)
ALT SERPL W/O P-5'-P-CCNC: 21 U/L (ref 10–44)
ANION GAP SERPL CALC-SCNC: 9 MMOL/L (ref 8–16)
AST SERPL-CCNC: 26 U/L (ref 10–40)
BASOPHILS # BLD AUTO: 0.05 K/UL (ref 0–0.2)
BASOPHILS NFR BLD: 0.4 % (ref 0–1.9)
BILIRUB SERPL-MCNC: 1.5 MG/DL (ref 0.1–1)
BUN SERPL-MCNC: 15 MG/DL (ref 6–20)
CALCIUM SERPL-MCNC: 9.1 MG/DL (ref 8.7–10.5)
CHLORIDE SERPL-SCNC: 106 MMOL/L (ref 95–110)
CHOLEST SERPL-MCNC: 106 MG/DL (ref 120–199)
CHOLEST/HDLC SERPL: 3.5 {RATIO} (ref 2–5)
CO2 SERPL-SCNC: 24 MMOL/L (ref 23–29)
CREAT SERPL-MCNC: 0.8 MG/DL (ref 0.5–1.4)
CREAT UR-MCNC: 156 MG/DL (ref 23–375)
DIFFERENTIAL METHOD: ABNORMAL
EOSINOPHIL # BLD AUTO: 0.2 K/UL (ref 0–0.5)
EOSINOPHIL NFR BLD: 1.6 % (ref 0–8)
ERYTHROCYTE [DISTWIDTH] IN BLOOD BY AUTOMATED COUNT: 13.9 % (ref 11.5–14.5)
EST. GFR  (AFRICAN AMERICAN): >60 ML/MIN/1.73 M^2
EST. GFR  (NON AFRICAN AMERICAN): >60 ML/MIN/1.73 M^2
ESTIMATED AVG GLUCOSE: 143 MG/DL (ref 68–131)
GLUCOSE SERPL-MCNC: 121 MG/DL (ref 70–110)
HBA1C MFR BLD HPLC: 6.6 % (ref 4–5.6)
HCT VFR BLD AUTO: 48.1 % (ref 40–54)
HDLC SERPL-MCNC: 30 MG/DL (ref 40–75)
HDLC SERPL: 28.3 % (ref 20–50)
HGB BLD-MCNC: 15.1 G/DL (ref 14–18)
IMM GRANULOCYTES # BLD AUTO: 0.07 K/UL (ref 0–0.04)
IMM GRANULOCYTES NFR BLD AUTO: 0.5 % (ref 0–0.5)
LDLC SERPL CALC-MCNC: 51.8 MG/DL (ref 63–159)
LYMPHOCYTES # BLD AUTO: 2.6 K/UL (ref 1–4.8)
LYMPHOCYTES NFR BLD: 19.8 % (ref 18–48)
MCH RBC QN AUTO: 32.8 PG (ref 27–31)
MCHC RBC AUTO-ENTMCNC: 31.4 G/DL (ref 32–36)
MCV RBC AUTO: 104 FL (ref 82–98)
MICROALBUMIN UR DL<=1MG/L-MCNC: 80 UG/ML
MONOCYTES # BLD AUTO: 1.2 K/UL (ref 0.3–1)
MONOCYTES NFR BLD: 8.8 % (ref 4–15)
NEUTROPHILS # BLD AUTO: 9 K/UL (ref 1.8–7.7)
NEUTROPHILS NFR BLD: 68.9 % (ref 38–73)
NONHDLC SERPL-MCNC: 76 MG/DL
NRBC BLD-RTO: 0 /100 WBC
PLATELET # BLD AUTO: 242 K/UL (ref 150–350)
PMV BLD AUTO: 10.1 FL (ref 9.2–12.9)
POTASSIUM SERPL-SCNC: 4.6 MMOL/L (ref 3.5–5.1)
PROT SERPL-MCNC: 7.1 G/DL (ref 6–8.4)
RBC # BLD AUTO: 4.61 M/UL (ref 4.6–6.2)
SODIUM SERPL-SCNC: 139 MMOL/L (ref 136–145)
TRIGL SERPL-MCNC: 121 MG/DL (ref 30–150)
TSH SERPL DL<=0.005 MIU/L-ACNC: 1.52 UIU/ML (ref 0.4–4)
WBC # BLD AUTO: 13.12 K/UL (ref 3.9–12.7)

## 2020-11-09 PROCEDURE — 85025 COMPLETE CBC W/AUTO DIFF WBC: CPT

## 2020-11-09 PROCEDURE — 82040 ASSAY OF SERUM ALBUMIN: CPT

## 2020-11-09 PROCEDURE — 83036 HEMOGLOBIN GLYCOSYLATED A1C: CPT

## 2020-11-09 PROCEDURE — 84443 ASSAY THYROID STIM HORMONE: CPT

## 2020-11-09 PROCEDURE — 80061 LIPID PANEL: CPT

## 2020-11-09 PROCEDURE — 80053 COMPREHEN METABOLIC PANEL: CPT

## 2020-11-09 PROCEDURE — 36415 COLL VENOUS BLD VENIPUNCTURE: CPT

## 2020-11-09 PROCEDURE — 82043 UR ALBUMIN QUANTITATIVE: CPT

## 2020-11-16 ENCOUNTER — OFFICE VISIT (OUTPATIENT)
Dept: INTERNAL MEDICINE | Facility: CLINIC | Age: 60
End: 2020-11-16
Payer: COMMERCIAL

## 2020-11-16 VITALS
DIASTOLIC BLOOD PRESSURE: 86 MMHG | OXYGEN SATURATION: 98 % | RESPIRATION RATE: 18 BRPM | WEIGHT: 291.25 LBS | TEMPERATURE: 98 F | SYSTOLIC BLOOD PRESSURE: 128 MMHG | HEIGHT: 74 IN | BODY MASS INDEX: 37.38 KG/M2 | HEART RATE: 60 BPM

## 2020-11-16 DIAGNOSIS — K64.9 HEMORRHOIDS, UNSPECIFIED HEMORRHOID TYPE: ICD-10-CM

## 2020-11-16 DIAGNOSIS — R79.89 LOW TESTOSTERONE IN MALE: ICD-10-CM

## 2020-11-16 DIAGNOSIS — E66.01 SEVERE OBESITY (BMI 35.0-39.9) WITH COMORBIDITY: ICD-10-CM

## 2020-11-16 DIAGNOSIS — D68.61 ANTIPHOSPHOLIPID SYNDROME: ICD-10-CM

## 2020-11-16 DIAGNOSIS — Z12.5 PROSTATE CANCER SCREENING: ICD-10-CM

## 2020-11-16 DIAGNOSIS — Z72.0 TOBACCO ABUSE: ICD-10-CM

## 2020-11-16 DIAGNOSIS — Z71.6 TOBACCO ABUSE COUNSELING: ICD-10-CM

## 2020-11-16 DIAGNOSIS — E78.5 HYPERLIPIDEMIA LDL GOAL <70: ICD-10-CM

## 2020-11-16 DIAGNOSIS — E11.65 UNCONTROLLED TYPE 2 DIABETES MELLITUS WITH HYPERGLYCEMIA: Primary | ICD-10-CM

## 2020-11-16 LAB
ALBUMIN SERPL-MCNC: 4.3 G/DL (ref 3.6–5.1)
SHBG SERPL-SCNC: 44 NMOL/L (ref 22–77)
TESTOST FREE SERPL-MCNC: 27.1 PG/ML (ref 46–224)
TESTOST SERPL-MCNC: 271 NG/DL (ref 250–1100)
TESTOSTERONE.FREE+WB SERPL-MCNC: 53.3 NG/DL (ref 110–575)

## 2020-11-16 PROCEDURE — 99999 PR PBB SHADOW E&M-EST. PATIENT-LVL III: ICD-10-PCS | Mod: PBBFAC,,, | Performed by: NURSE PRACTITIONER

## 2020-11-16 PROCEDURE — 3044F PR MOST RECENT HEMOGLOBIN A1C LEVEL <7.0%: ICD-10-PCS | Mod: CPTII,S$GLB,, | Performed by: NURSE PRACTITIONER

## 2020-11-16 PROCEDURE — 3008F PR BODY MASS INDEX (BMI) DOCUMENTED: ICD-10-PCS | Mod: CPTII,S$GLB,, | Performed by: NURSE PRACTITIONER

## 2020-11-16 PROCEDURE — 1126F AMNT PAIN NOTED NONE PRSNT: CPT | Mod: S$GLB,,, | Performed by: NURSE PRACTITIONER

## 2020-11-16 PROCEDURE — 3044F HG A1C LEVEL LT 7.0%: CPT | Mod: CPTII,S$GLB,, | Performed by: NURSE PRACTITIONER

## 2020-11-16 PROCEDURE — 99214 PR OFFICE/OUTPT VISIT, EST, LEVL IV, 30-39 MIN: ICD-10-PCS | Mod: S$GLB,,, | Performed by: NURSE PRACTITIONER

## 2020-11-16 PROCEDURE — 99999 PR PBB SHADOW E&M-EST. PATIENT-LVL III: CPT | Mod: PBBFAC,,, | Performed by: NURSE PRACTITIONER

## 2020-11-16 PROCEDURE — 1126F PR PAIN SEVERITY QUANTIFIED, NO PAIN PRESENT: ICD-10-PCS | Mod: S$GLB,,, | Performed by: NURSE PRACTITIONER

## 2020-11-16 PROCEDURE — 3008F BODY MASS INDEX DOCD: CPT | Mod: CPTII,S$GLB,, | Performed by: NURSE PRACTITIONER

## 2020-11-16 PROCEDURE — 99214 OFFICE O/P EST MOD 30 MIN: CPT | Mod: S$GLB,,, | Performed by: NURSE PRACTITIONER

## 2020-11-16 RX ORDER — HYDROCORTISONE ACETATE 25 MG/1
25 SUPPOSITORY RECTAL 2 TIMES DAILY
Qty: 20 SUPPOSITORY | Refills: 0 | Status: SHIPPED | OUTPATIENT
Start: 2020-11-16 | End: 2020-11-26

## 2020-11-16 RX ORDER — HYDROCORTISONE 25 MG/G
CREAM TOPICAL 2 TIMES DAILY
Qty: 28 G | Refills: 2 | Status: SHIPPED | OUTPATIENT
Start: 2020-11-16 | End: 2021-12-03

## 2020-11-16 NOTE — PATIENT INSTRUCTIONS
Diabetes: Activity Tips    Being more active can help you manage your diabetes. The tips on this sheet can help you get the most from your exercise. They can also help you stay safe.  Staying Active  Its important for adults to spend less time sitting and being inactive. This is especially true if you have type 2 diabetes. When you are sitting for long periods of time, get up for short sessions of light activity every 30 minutes.  You should aim for at least 150 minutes a week of exercise or physical activity. Dont let more than 2 days go by without being active.  Benefit from briskness  Brisk activity gets your heart beating faster. This can help you increase your fitness, lose extra weight, and manage your blood sugar level. Try brisk walking. Or, if you have foot or leg problems, you can try swimming or bike riding. You can break up your exercise into chunks throughout the day. Work up to at least 30 minutes of steady, brisk exercise on most days.  Warm up and cool down  Warming up and cooling down reduce your risk of injury. They also help limit muscle soreness. Do a mild version of your activity for 5 minutes before and after your routine. You can also learn stretches that will help keep your muscles loose. Your healthcare provider may show you good ways to warm up and stretch.  Do the talk-sing test  The talk-sing test is a simple way to tell how hard youre exercising. If you can talk while exercising, youre in a safe range. If youre out of breath, slow down. If you can carry a tune, its time to  the pace. Walk up a hill. Increase the resistance on your stationary bike. Or swim faster.  What about eating?  You may be told to plan your exercise for 1 to 2 hours after a meal. In most cases, you dont need to eat while being active. If you take insulin or medicine that can cause low blood sugar, test your blood sugar before exercising. And carry a fast-acting sugar that will raise your blood sugar  level quickly. This includes glucose tablets or hard candy. Use it if you feel low blood sugar symptoms.  Safety tips  These tips can help you stay safe as you become fit:  · Exercise with a friend or carry a cell phone if you have one.  · Carry or wear identification, such as a necklace or bracelet, that says you have diabetes.  · Use the proper footwear and safety equipment for your activity.  · Drink water before, during, and after exercise.  · Dress properly for the weather.  · Dont exercise in very hot or very cold weather.  · Dont exercise if you are sick.  · If you are instructed to do so, test your blood sugar before and after you exercise. Have a small carbohydrate snack if your blood sugar is low before you start exercising.   When to stop exercising and call your healthcare provider  Stop exercising and call your healthcare provider right away if you notice any of the following:  · Pain, pressure, tightness, or heaviness in the chest  · Pain or heaviness in the neck, shoulders, back, arms, legs, or feet  · Unusual shortness of breath  · Dizziness or lightheadedness  · Unusually rapid or slow pulse  · Increased joint or muscle pain  · Nausea or vomiting  Date Last Reviewed: 5/1/2016  © 7697-0831 [employer]. [address] [place] [address], [place] [address]. All rights reserved. This information is not intended as a substitute for professional medical care. Always follow your healthcare professional's instructions.

## 2020-11-16 NOTE — PROGRESS NOTES
Subjective:       Patient ID: North Najera is a 60 y.o. male.    Chief Complaint: Follow-up (6m followup)    Patient is known, to me and presents with   Chief Complaint   Patient presents with    Follow-up     6m followup   .  Denies chest pain and shortness of breath.  Patient presents with check up and labs. Doing well and is up to date with screenings. Needs to have hemorrhoid meds.    HPI  Review of Systems   Constitutional: Negative.  Negative for activity change, appetite change, chills, diaphoresis, fatigue, fever and unexpected weight change.   HENT: Negative.  Negative for congestion, ear discharge, ear pain, facial swelling, hearing loss, nosebleeds, postnasal drip, rhinorrhea, sinus pressure, sneezing, sore throat, tinnitus, trouble swallowing and voice change.    Eyes: Negative.  Negative for photophobia, pain, discharge, redness, itching and visual disturbance.   Respiratory: Negative.  Negative for apnea, cough, choking, chest tightness, shortness of breath, wheezing and stridor.    Cardiovascular: Negative.  Negative for chest pain, palpitations and leg swelling.   Gastrointestinal: Negative for abdominal distention, abdominal pain, anal bleeding, blood in stool, constipation, diarrhea, nausea and vomiting.   Genitourinary: Negative.  Negative for difficulty urinating, discharge, dysuria, enuresis, flank pain, frequency, hematuria, penile pain, penile swelling, scrotal swelling, testicular pain and urgency.   Musculoskeletal: Negative.  Negative for arthralgias, back pain, gait problem, joint swelling, myalgias, neck pain and neck stiffness.   Skin: Negative.  Negative for color change, pallor, rash and wound.   Neurological: Negative for dizziness, tremors, seizures, syncope, facial asymmetry, speech difficulty, weakness, light-headedness, numbness and headaches.   Hematological: Negative for adenopathy. Does not bruise/bleed easily.   Psychiatric/Behavioral: Negative.  Negative for agitation,  dysphoric mood, sleep disturbance and suicidal ideas. The patient is not nervous/anxious.        Objective:      Physical Exam  Vitals signs and nursing note reviewed.   Constitutional:       General: He is not in acute distress.     Appearance: He is well-developed. He is not diaphoretic.   HENT:      Head: Normocephalic and atraumatic.      Right Ear: External ear normal.      Left Ear: External ear normal.      Nose: Nose normal.      Mouth/Throat:      Pharynx: No oropharyngeal exudate.   Eyes:      General:         Right eye: No discharge.         Left eye: No discharge.      Conjunctiva/sclera: Conjunctivae normal.      Pupils: Pupils are equal, round, and reactive to light.   Neck:      Musculoskeletal: Normal range of motion and neck supple.      Thyroid: No thyromegaly.      Vascular: No JVD.      Trachea: No tracheal deviation.   Cardiovascular:      Rate and Rhythm: Normal rate and regular rhythm.      Heart sounds: Normal heart sounds. No murmur. No friction rub. No gallop.    Pulmonary:      Effort: Pulmonary effort is normal. No respiratory distress.      Breath sounds: Normal breath sounds. No stridor. No wheezing or rales.   Chest:      Chest wall: No tenderness.   Abdominal:      General: Bowel sounds are normal. There is no distension.      Palpations: Abdomen is soft.      Tenderness: There is no abdominal tenderness. There is no guarding or rebound.   Musculoskeletal: Normal range of motion.         General: No tenderness.   Lymphadenopathy:      Cervical: No cervical adenopathy.   Skin:     General: Skin is warm and dry.      Capillary Refill: Capillary refill takes less than 2 seconds.      Coloration: Skin is not pale.      Findings: No erythema or rash.   Neurological:      General: No focal deficit present.      Mental Status: He is alert and oriented to person, place, and time.      Cranial Nerves: No cranial nerve deficit.      Sensory: No sensory deficit.      Motor: No weakness or  abnormal muscle tone.      Coordination: Coordination normal.      Gait: Gait normal.      Deep Tendon Reflexes: Reflexes are normal and symmetric. Reflexes normal.   Psychiatric:         Behavior: Behavior normal.         Thought Content: Thought content normal.         Judgment: Judgment normal.         Assessment:       1. Uncontrolled type 2 diabetes mellitus with hyperglycemia    2. Hyperlipidemia LDL goal <70    3. Low testosterone in male    4. Antiphospholipid syndrome    5. Severe obesity (BMI 35.0-39.9) with comorbidity    6. Tobacco abuse    7. Tobacco abuse counseling    8. Prostate cancer screening    9. Hemorrhoids, unspecified hemorrhoid type        Plan:   North was seen today for follow-up.    Diagnoses and all orders for this visit:    Uncontrolled type 2 diabetes mellitus with hyperglycemia  -     CBC Auto Differential; Future  -     Comprehensive Metabolic Panel; Future  -     Microalbumin/Creatinine Ratio, Urine; Future  -     Hemoglobin A1C; Future    Hyperlipidemia LDL goal <70  -     CBC Auto Differential; Future  -     Comprehensive Metabolic Panel; Future  -     TSH; Future  -     Lipid Panel; Future    Low testosterone in male  -     CBC Auto Differential; Future  -     Comprehensive Metabolic Panel; Future  -     Testosterone Panel; Future    Antiphospholipid syndrome  -     CBC Auto Differential; Future  -     Comprehensive Metabolic Panel; Future    Severe obesity (BMI 35.0-39.9) with comorbidity  -     CBC Auto Differential; Future  -     Comprehensive Metabolic Panel; Future    Tobacco abuse  -     CBC Auto Differential; Future  -     Comprehensive Metabolic Panel; Future    Tobacco abuse counseling    Prostate cancer screening  -     PSA, Screening; Future    Hemorrhoids, unspecified hemorrhoid type  -     hydrocortisone 2.5 % cream; Apply topically 2 (two) times daily.  -     hydrocortisone (ANUSOL-HC) 25 mg suppository; Place 1 suppository (25 mg total) rectally 2 (two) times  "daily. for 10 days    "This note will not be shared with the patient."  Check CBC, CMP, TSH, Fasting lipid profile, HgA1c, Microalbuminuria in 6 months.  Medication compliance was discussed with the patient.  The patient was instructed to monitor glucoses closely using glucometer at home and to record the values in a log.  The patient was instructed to obtain an Optometry exam and microalbumin q year.  The patient was instructed to obtain a hemoglobin A1C q 3-4 months.  The patient was instructed to check the feet visually daily and to monitor for infection.  The patient was instructed to exercise at least 3 times a week.  The patient was instructed to follow a 1800 ADA diet.  The patient was instructed to monitor weight daily and try to keep it as close to ideal body weight as possible.  The patient was instructed on using exercise frequently to reduce BP.  The patient was instructed on using a low salt diet.  Monitor BP at home and to record the values in a log.  RTC in 6 months.  "

## 2020-11-23 DIAGNOSIS — E78.5 HYPERLIPIDEMIA LDL GOAL <70: ICD-10-CM

## 2020-11-23 DIAGNOSIS — D68.61 ANTIPHOSPHOLIPID SYNDROME: ICD-10-CM

## 2020-11-23 DIAGNOSIS — Z86.718 HISTORY OF DVT (DEEP VEIN THROMBOSIS): ICD-10-CM

## 2020-11-23 LAB
LEFT EYE DM RETINOPATHY: NEGATIVE
RIGHT EYE DM RETINOPATHY: NEGATIVE

## 2020-11-23 RX ORDER — ATORVASTATIN CALCIUM 20 MG/1
20 TABLET, FILM COATED ORAL NIGHTLY
Qty: 90 TABLET | Refills: 1 | Status: SHIPPED | OUTPATIENT
Start: 2020-11-23 | End: 2021-03-31 | Stop reason: SDUPTHER

## 2020-12-04 ENCOUNTER — PATIENT OUTREACH (OUTPATIENT)
Dept: ADMINISTRATIVE | Facility: HOSPITAL | Age: 60
End: 2020-12-04

## 2021-01-21 DIAGNOSIS — I49.3 PVC'S (PREMATURE VENTRICULAR CONTRACTIONS): ICD-10-CM

## 2021-01-21 RX ORDER — METOPROLOL SUCCINATE 25 MG/1
25 TABLET, EXTENDED RELEASE ORAL DAILY
Qty: 30 TABLET | Refills: 2 | Status: SHIPPED | OUTPATIENT
Start: 2021-01-21 | End: 2021-03-31 | Stop reason: SDUPTHER

## 2021-01-21 RX ORDER — LISINOPRIL 5 MG/1
TABLET ORAL
Qty: 30 TABLET | Refills: 5 | Status: SHIPPED | OUTPATIENT
Start: 2021-01-21 | End: 2021-06-30 | Stop reason: SDUPTHER

## 2021-01-28 ENCOUNTER — TELEPHONE (OUTPATIENT)
Dept: INTERNAL MEDICINE | Facility: CLINIC | Age: 61
End: 2021-01-28

## 2021-01-28 DIAGNOSIS — K04.7 TOOTH INFECTION: Primary | ICD-10-CM

## 2021-01-28 RX ORDER — AMOXICILLIN 500 MG/1
500 CAPSULE ORAL EVERY 12 HOURS
Qty: 14 CAPSULE | Refills: 0 | Status: SHIPPED | OUTPATIENT
Start: 2021-01-28 | End: 2021-02-04

## 2021-02-10 DIAGNOSIS — E11.65 UNCONTROLLED TYPE 2 DIABETES MELLITUS WITH HYPERGLYCEMIA: ICD-10-CM

## 2021-03-11 ENCOUNTER — CLINICAL SUPPORT (OUTPATIENT)
Dept: SMOKING CESSATION | Facility: CLINIC | Age: 61
End: 2021-03-11
Payer: COMMERCIAL

## 2021-03-11 DIAGNOSIS — F17.200 NICOTINE DEPENDENCE: Primary | ICD-10-CM

## 2021-03-11 PROCEDURE — 99407 PR TOBACCO USE CESSATION INTENSIVE >10 MINUTES: ICD-10-PCS | Mod: S$GLB,,, | Performed by: INTERNAL MEDICINE

## 2021-03-11 PROCEDURE — 99407 BEHAV CHNG SMOKING > 10 MIN: CPT | Mod: S$GLB,,, | Performed by: INTERNAL MEDICINE

## 2021-03-12 RX ORDER — PIOGLITAZONEHYDROCHLORIDE 30 MG/1
30 TABLET ORAL DAILY
Qty: 90 TABLET | Refills: 1 | Status: SHIPPED | OUTPATIENT
Start: 2021-03-12 | End: 2021-07-12 | Stop reason: SDUPTHER

## 2021-03-31 DIAGNOSIS — Z86.718 HISTORY OF DVT (DEEP VEIN THROMBOSIS): ICD-10-CM

## 2021-03-31 DIAGNOSIS — D68.61 ANTIPHOSPHOLIPID SYNDROME: ICD-10-CM

## 2021-03-31 DIAGNOSIS — I49.3 PVC'S (PREMATURE VENTRICULAR CONTRACTIONS): ICD-10-CM

## 2021-03-31 DIAGNOSIS — E78.5 HYPERLIPIDEMIA LDL GOAL <70: ICD-10-CM

## 2021-04-01 RX ORDER — RIVAROXABAN 20 MG/1
TABLET, FILM COATED ORAL
Qty: 90 TABLET | Refills: 1 | Status: SHIPPED | OUTPATIENT
Start: 2021-04-01 | End: 2021-09-06 | Stop reason: SDUPTHER

## 2021-04-01 RX ORDER — ATORVASTATIN CALCIUM 20 MG/1
20 TABLET, FILM COATED ORAL NIGHTLY
Qty: 90 TABLET | Refills: 1 | Status: SHIPPED | OUTPATIENT
Start: 2021-04-01 | End: 2021-09-06 | Stop reason: SDUPTHER

## 2021-04-01 RX ORDER — METOPROLOL SUCCINATE 25 MG/1
25 TABLET, EXTENDED RELEASE ORAL DAILY
Qty: 30 TABLET | Refills: 2 | Status: SHIPPED | OUTPATIENT
Start: 2021-04-01 | End: 2021-09-06 | Stop reason: SDUPTHER

## 2021-05-04 ENCOUNTER — PATIENT MESSAGE (OUTPATIENT)
Dept: RESEARCH | Facility: HOSPITAL | Age: 61
End: 2021-05-04

## 2021-05-17 ENCOUNTER — CLINICAL SUPPORT (OUTPATIENT)
Dept: INTERNAL MEDICINE | Facility: CLINIC | Age: 61
End: 2021-05-17
Payer: COMMERCIAL

## 2021-05-17 DIAGNOSIS — Z72.0 TOBACCO ABUSE: ICD-10-CM

## 2021-05-17 DIAGNOSIS — E78.5 HYPERLIPIDEMIA LDL GOAL <70: ICD-10-CM

## 2021-05-17 DIAGNOSIS — E66.01 SEVERE OBESITY (BMI 35.0-39.9) WITH COMORBIDITY: ICD-10-CM

## 2021-05-17 DIAGNOSIS — D68.61 ANTIPHOSPHOLIPID SYNDROME: ICD-10-CM

## 2021-05-17 DIAGNOSIS — Z12.5 PROSTATE CANCER SCREENING: ICD-10-CM

## 2021-05-17 DIAGNOSIS — E11.65 UNCONTROLLED TYPE 2 DIABETES MELLITUS WITH HYPERGLYCEMIA: ICD-10-CM

## 2021-05-17 DIAGNOSIS — R79.89 LOW TESTOSTERONE IN MALE: ICD-10-CM

## 2021-05-17 LAB
ALBUMIN/CREAT UR: 63.2 UG/MG (ref 0–30)
BASOPHILS # BLD AUTO: 0.03 K/UL (ref 0–0.2)
BASOPHILS NFR BLD: 0.4 % (ref 0–1.9)
COMPLEXED PSA SERPL-MCNC: 0.45 NG/ML (ref 0–4)
CREAT UR-MCNC: 136 MG/DL (ref 23–375)
DIFFERENTIAL METHOD: ABNORMAL
EOSINOPHIL # BLD AUTO: 0.2 K/UL (ref 0–0.5)
EOSINOPHIL NFR BLD: 1.9 % (ref 0–8)
ERYTHROCYTE [DISTWIDTH] IN BLOOD BY AUTOMATED COUNT: 13.8 % (ref 11.5–14.5)
ESTIMATED AVG GLUCOSE: 157 MG/DL (ref 68–131)
HBA1C MFR BLD: 7.1 % (ref 4–5.6)
HCT VFR BLD AUTO: 46.7 % (ref 40–54)
HGB BLD-MCNC: 15.3 G/DL (ref 14–18)
IMM GRANULOCYTES # BLD AUTO: 0.07 K/UL (ref 0–0.04)
IMM GRANULOCYTES NFR BLD AUTO: 0.8 % (ref 0–0.5)
LYMPHOCYTES # BLD AUTO: 2.6 K/UL (ref 1–4.8)
LYMPHOCYTES NFR BLD: 30 % (ref 18–48)
MCH RBC QN AUTO: 32.3 PG (ref 27–31)
MCHC RBC AUTO-ENTMCNC: 32.8 G/DL (ref 32–36)
MCV RBC AUTO: 99 FL (ref 82–98)
MICROALBUMIN UR DL<=1MG/L-MCNC: 86 UG/ML
MONOCYTES # BLD AUTO: 0.7 K/UL (ref 0.3–1)
MONOCYTES NFR BLD: 8.5 % (ref 4–15)
NEUTROPHILS # BLD AUTO: 5 K/UL (ref 1.8–7.7)
NEUTROPHILS NFR BLD: 58.4 % (ref 38–73)
NRBC BLD-RTO: 0 /100 WBC
PLATELET # BLD AUTO: 250 K/UL (ref 150–450)
PMV BLD AUTO: 10.3 FL (ref 9.2–12.9)
RBC # BLD AUTO: 4.73 M/UL (ref 4.6–6.2)
WBC # BLD AUTO: 8.57 K/UL (ref 3.9–12.7)

## 2021-05-17 PROCEDURE — 83036 HEMOGLOBIN GLYCOSYLATED A1C: CPT | Performed by: NURSE PRACTITIONER

## 2021-05-17 PROCEDURE — 36415 COLL VENOUS BLD VENIPUNCTURE: CPT | Performed by: NURSE PRACTITIONER

## 2021-05-17 PROCEDURE — 80053 COMPREHEN METABOLIC PANEL: CPT | Performed by: NURSE PRACTITIONER

## 2021-05-17 PROCEDURE — 85025 COMPLETE CBC W/AUTO DIFF WBC: CPT | Performed by: NURSE PRACTITIONER

## 2021-05-17 PROCEDURE — 84153 ASSAY OF PSA TOTAL: CPT | Performed by: NURSE PRACTITIONER

## 2021-05-17 PROCEDURE — 84403 ASSAY OF TOTAL TESTOSTERONE: CPT | Performed by: NURSE PRACTITIONER

## 2021-05-17 PROCEDURE — 82570 ASSAY OF URINE CREATININE: CPT | Performed by: NURSE PRACTITIONER

## 2021-05-17 PROCEDURE — 84443 ASSAY THYROID STIM HORMONE: CPT | Performed by: NURSE PRACTITIONER

## 2021-05-17 PROCEDURE — 82043 UR ALBUMIN QUANTITATIVE: CPT | Performed by: NURSE PRACTITIONER

## 2021-05-17 PROCEDURE — 80061 LIPID PANEL: CPT | Performed by: NURSE PRACTITIONER

## 2021-05-18 LAB
ALBUMIN SERPL BCP-MCNC: 4.1 G/DL (ref 3.5–5.2)
ALP SERPL-CCNC: 53 U/L (ref 55–135)
ALT SERPL W/O P-5'-P-CCNC: 28 U/L (ref 10–44)
ANION GAP SERPL CALC-SCNC: 11 MMOL/L (ref 8–16)
AST SERPL-CCNC: 30 U/L (ref 10–40)
BILIRUB SERPL-MCNC: 1.3 MG/DL (ref 0.1–1)
BUN SERPL-MCNC: 12 MG/DL (ref 6–20)
CALCIUM SERPL-MCNC: 9.6 MG/DL (ref 8.7–10.5)
CHLORIDE SERPL-SCNC: 105 MMOL/L (ref 95–110)
CHOLEST SERPL-MCNC: 125 MG/DL (ref 120–199)
CHOLEST/HDLC SERPL: 3.8 {RATIO} (ref 2–5)
CO2 SERPL-SCNC: 22 MMOL/L (ref 23–29)
CREAT SERPL-MCNC: 0.8 MG/DL (ref 0.5–1.4)
EST. GFR  (AFRICAN AMERICAN): >60 ML/MIN/1.73 M^2
EST. GFR  (NON AFRICAN AMERICAN): >60 ML/MIN/1.73 M^2
GLUCOSE SERPL-MCNC: 128 MG/DL (ref 70–110)
HDLC SERPL-MCNC: 33 MG/DL (ref 40–75)
HDLC SERPL: 26.4 % (ref 20–50)
LDLC SERPL CALC-MCNC: 69.2 MG/DL (ref 63–159)
NONHDLC SERPL-MCNC: 92 MG/DL
POTASSIUM SERPL-SCNC: 4.6 MMOL/L (ref 3.5–5.1)
PROT SERPL-MCNC: 7.3 G/DL (ref 6–8.4)
SODIUM SERPL-SCNC: 138 MMOL/L (ref 136–145)
TRIGL SERPL-MCNC: 114 MG/DL (ref 30–150)
TSH SERPL DL<=0.005 MIU/L-ACNC: 1.3 UIU/ML (ref 0.4–4)

## 2021-05-24 ENCOUNTER — TELEPHONE (OUTPATIENT)
Dept: INTERNAL MEDICINE | Facility: CLINIC | Age: 61
End: 2021-05-24

## 2021-05-24 ENCOUNTER — OFFICE VISIT (OUTPATIENT)
Dept: INTERNAL MEDICINE | Facility: CLINIC | Age: 61
End: 2021-05-24
Payer: COMMERCIAL

## 2021-05-24 VITALS
SYSTOLIC BLOOD PRESSURE: 126 MMHG | HEIGHT: 74 IN | BODY MASS INDEX: 37.97 KG/M2 | WEIGHT: 295.88 LBS | RESPIRATION RATE: 20 BRPM | OXYGEN SATURATION: 98 % | DIASTOLIC BLOOD PRESSURE: 84 MMHG | HEART RATE: 76 BPM

## 2021-05-24 DIAGNOSIS — Z72.0 TOBACCO ABUSE: ICD-10-CM

## 2021-05-24 DIAGNOSIS — R79.89 LOW TESTOSTERONE IN MALE: ICD-10-CM

## 2021-05-24 DIAGNOSIS — N40.1 BENIGN PROSTATIC HYPERPLASIA WITH URINARY HESITANCY: ICD-10-CM

## 2021-05-24 DIAGNOSIS — E78.5 HYPERLIPIDEMIA LDL GOAL <70: ICD-10-CM

## 2021-05-24 DIAGNOSIS — D68.61 ANTIPHOSPHOLIPID SYNDROME: ICD-10-CM

## 2021-05-24 DIAGNOSIS — L28.0 LICHEN SIMPLEX CHRONICUS: ICD-10-CM

## 2021-05-24 DIAGNOSIS — E11.65 UNCONTROLLED TYPE 2 DIABETES MELLITUS WITH HYPERGLYCEMIA: ICD-10-CM

## 2021-05-24 DIAGNOSIS — E66.01 SEVERE OBESITY (BMI 35.0-39.9) WITH COMORBIDITY: ICD-10-CM

## 2021-05-24 DIAGNOSIS — R39.11 BENIGN PROSTATIC HYPERPLASIA WITH URINARY HESITANCY: ICD-10-CM

## 2021-05-24 DIAGNOSIS — Z72.52 HIGH RISK HOMOSEXUAL BEHAVIOR: Primary | ICD-10-CM

## 2021-05-24 DIAGNOSIS — Z71.6 TOBACCO ABUSE COUNSELING: ICD-10-CM

## 2021-05-24 PROCEDURE — 3051F PR MOST RECENT HEMOGLOBIN A1C LEVEL 7.0 - < 8.0%: ICD-10-PCS | Mod: CPTII,S$GLB,, | Performed by: NURSE PRACTITIONER

## 2021-05-24 PROCEDURE — 1126F AMNT PAIN NOTED NONE PRSNT: CPT | Mod: S$GLB,,, | Performed by: NURSE PRACTITIONER

## 2021-05-24 PROCEDURE — 3008F PR BODY MASS INDEX (BMI) DOCUMENTED: ICD-10-PCS | Mod: CPTII,S$GLB,, | Performed by: NURSE PRACTITIONER

## 2021-05-24 PROCEDURE — 3008F BODY MASS INDEX DOCD: CPT | Mod: CPTII,S$GLB,, | Performed by: NURSE PRACTITIONER

## 2021-05-24 PROCEDURE — 99999 PR PBB SHADOW E&M-EST. PATIENT-LVL III: ICD-10-PCS | Mod: PBBFAC,,, | Performed by: NURSE PRACTITIONER

## 2021-05-24 PROCEDURE — 99999 PR PBB SHADOW E&M-EST. PATIENT-LVL III: CPT | Mod: PBBFAC,,, | Performed by: NURSE PRACTITIONER

## 2021-05-24 PROCEDURE — 3051F HG A1C>EQUAL 7.0%<8.0%: CPT | Mod: CPTII,S$GLB,, | Performed by: NURSE PRACTITIONER

## 2021-05-24 PROCEDURE — 99214 OFFICE O/P EST MOD 30 MIN: CPT | Mod: S$GLB,,, | Performed by: NURSE PRACTITIONER

## 2021-05-24 PROCEDURE — 1126F PR PAIN SEVERITY QUANTIFIED, NO PAIN PRESENT: ICD-10-PCS | Mod: S$GLB,,, | Performed by: NURSE PRACTITIONER

## 2021-05-24 PROCEDURE — 99214 PR OFFICE/OUTPT VISIT, EST, LEVL IV, 30-39 MIN: ICD-10-PCS | Mod: S$GLB,,, | Performed by: NURSE PRACTITIONER

## 2021-05-24 RX ORDER — TAMSULOSIN HYDROCHLORIDE 0.4 MG/1
0.4 CAPSULE ORAL DAILY
Qty: 30 CAPSULE | Refills: 5 | Status: SHIPPED | OUTPATIENT
Start: 2021-05-24 | End: 2021-09-06 | Stop reason: SDUPTHER

## 2021-05-24 RX ORDER — AMMONIUM LACTATE 12 G/100G
LOTION TOPICAL
Qty: 227 G | Refills: 2 | Status: SHIPPED | OUTPATIENT
Start: 2021-05-24 | End: 2021-12-03

## 2021-05-25 LAB
ALBUMIN SERPL-MCNC: 4.5 G/DL (ref 3.6–5.1)
SHBG SERPL-SCNC: 40 NMOL/L (ref 22–77)
TESTOST FREE SERPL-MCNC: 40.5 PG/ML (ref 46–224)
TESTOST SERPL-MCNC: 367 NG/DL (ref 250–1100)
TESTOSTERONE.FREE+WB SERPL-MCNC: 83.2 NG/DL (ref 110–575)

## 2021-06-17 ENCOUNTER — OFFICE VISIT (OUTPATIENT)
Dept: INTERNAL MEDICINE | Facility: CLINIC | Age: 61
End: 2021-06-17
Payer: COMMERCIAL

## 2021-06-17 VITALS
BODY MASS INDEX: 37.77 KG/M2 | WEIGHT: 294.31 LBS | HEIGHT: 74 IN | OXYGEN SATURATION: 98 % | HEART RATE: 70 BPM | DIASTOLIC BLOOD PRESSURE: 88 MMHG | RESPIRATION RATE: 18 BRPM | SYSTOLIC BLOOD PRESSURE: 136 MMHG

## 2021-06-17 DIAGNOSIS — B96.89 ACUTE BACTERIAL SINUSITIS: Primary | ICD-10-CM

## 2021-06-17 DIAGNOSIS — R05.9 COUGH: ICD-10-CM

## 2021-06-17 DIAGNOSIS — J01.90 ACUTE BACTERIAL SINUSITIS: Primary | ICD-10-CM

## 2021-06-17 PROCEDURE — 1126F AMNT PAIN NOTED NONE PRSNT: CPT | Mod: S$GLB,,, | Performed by: NURSE PRACTITIONER

## 2021-06-17 PROCEDURE — 1126F PR PAIN SEVERITY QUANTIFIED, NO PAIN PRESENT: ICD-10-PCS | Mod: S$GLB,,, | Performed by: NURSE PRACTITIONER

## 2021-06-17 PROCEDURE — 3008F PR BODY MASS INDEX (BMI) DOCUMENTED: ICD-10-PCS | Mod: CPTII,S$GLB,, | Performed by: NURSE PRACTITIONER

## 2021-06-17 PROCEDURE — 99999 PR PBB SHADOW E&M-EST. PATIENT-LVL V: ICD-10-PCS | Mod: PBBFAC,,, | Performed by: NURSE PRACTITIONER

## 2021-06-17 PROCEDURE — 99213 PR OFFICE/OUTPT VISIT, EST, LEVL III, 20-29 MIN: ICD-10-PCS | Mod: S$GLB,,, | Performed by: NURSE PRACTITIONER

## 2021-06-17 PROCEDURE — 99999 PR PBB SHADOW E&M-EST. PATIENT-LVL V: CPT | Mod: PBBFAC,,, | Performed by: NURSE PRACTITIONER

## 2021-06-17 PROCEDURE — 99213 OFFICE O/P EST LOW 20 MIN: CPT | Mod: S$GLB,,, | Performed by: NURSE PRACTITIONER

## 2021-06-17 PROCEDURE — 3008F BODY MASS INDEX DOCD: CPT | Mod: CPTII,S$GLB,, | Performed by: NURSE PRACTITIONER

## 2021-06-17 RX ORDER — PROMETHAZINE HYDROCHLORIDE AND DEXTROMETHORPHAN HYDROBROMIDE 6.25; 15 MG/5ML; MG/5ML
5 SYRUP ORAL EVERY 6 HOURS PRN
Qty: 120 ML | Refills: 0 | Status: SHIPPED | OUTPATIENT
Start: 2021-06-17 | End: 2021-06-24

## 2021-06-17 RX ORDER — AZITHROMYCIN 250 MG/1
TABLET, FILM COATED ORAL
Qty: 6 TABLET | Refills: 0 | Status: SHIPPED | OUTPATIENT
Start: 2021-06-17 | End: 2021-06-22

## 2021-06-30 ENCOUNTER — OFFICE VISIT (OUTPATIENT)
Dept: INTERNAL MEDICINE | Facility: CLINIC | Age: 61
End: 2021-06-30
Payer: COMMERCIAL

## 2021-06-30 VITALS
DIASTOLIC BLOOD PRESSURE: 70 MMHG | HEART RATE: 84 BPM | WEIGHT: 291.25 LBS | HEIGHT: 74 IN | BODY MASS INDEX: 37.38 KG/M2 | SYSTOLIC BLOOD PRESSURE: 118 MMHG | RESPIRATION RATE: 18 BRPM

## 2021-06-30 DIAGNOSIS — R31.0 GROSS HEMATURIA: ICD-10-CM

## 2021-06-30 DIAGNOSIS — N22 CALCULUS OF URINARY TRACT IN DISEASES CLASSIFIED ELSEWHERE: ICD-10-CM

## 2021-06-30 DIAGNOSIS — N30.01 ACUTE CYSTITIS WITH HEMATURIA: Primary | ICD-10-CM

## 2021-06-30 DIAGNOSIS — E11.65 UNCONTROLLED TYPE 2 DIABETES MELLITUS WITH HYPERGLYCEMIA: ICD-10-CM

## 2021-06-30 DIAGNOSIS — D68.61 ANTIPHOSPHOLIPID SYNDROME: ICD-10-CM

## 2021-06-30 DIAGNOSIS — R31.9 HEMATURIA OF UNKNOWN CAUSE: ICD-10-CM

## 2021-06-30 DIAGNOSIS — R30.0 DYSURIA: ICD-10-CM

## 2021-06-30 DIAGNOSIS — Z72.0 TOBACCO ABUSE: ICD-10-CM

## 2021-06-30 DIAGNOSIS — R31.9 HEMATURIA, UNSPECIFIED TYPE: ICD-10-CM

## 2021-06-30 DIAGNOSIS — R10.9 ABDOMINAL PAIN, UNSPECIFIED ABDOMINAL LOCATION: ICD-10-CM

## 2021-06-30 DIAGNOSIS — R50.9 FEVER, UNSPECIFIED FEVER CAUSE: ICD-10-CM

## 2021-06-30 DIAGNOSIS — E66.9 OBESITY (BMI 30.0-34.9): ICD-10-CM

## 2021-06-30 DIAGNOSIS — J01.01 ACUTE RECURRENT MAXILLARY SINUSITIS: ICD-10-CM

## 2021-06-30 LAB
BILIRUB SERPL-MCNC: NEGATIVE MG/DL
BLOOD URINE, POC: ABNORMAL
CLARITY, POC UA: CLEAR
COLOR, POC UA: ABNORMAL
GLUCOSE UR QL STRIP: NORMAL
KETONES UR QL STRIP: NEGATIVE
LEUKOCYTE ESTERASE URINE, POC: ABNORMAL
NITRITE, POC UA: NEGATIVE
PH, POC UA: 5
PROTEIN, POC: ABNORMAL
SPECIFIC GRAVITY, POC UA: 1.02
UROBILINOGEN, POC UA: NORMAL

## 2021-06-30 PROCEDURE — 99999 PR PBB SHADOW E&M-EST. PATIENT-LVL V: ICD-10-PCS | Mod: PBBFAC,,, | Performed by: NURSE PRACTITIONER

## 2021-06-30 PROCEDURE — 87077 CULTURE AEROBIC IDENTIFY: CPT | Performed by: NURSE PRACTITIONER

## 2021-06-30 PROCEDURE — 3051F PR MOST RECENT HEMOGLOBIN A1C LEVEL 7.0 - < 8.0%: ICD-10-PCS | Mod: CPTII,S$GLB,, | Performed by: NURSE PRACTITIONER

## 2021-06-30 PROCEDURE — 1126F PR PAIN SEVERITY QUANTIFIED, NO PAIN PRESENT: ICD-10-PCS | Mod: S$GLB,,, | Performed by: NURSE PRACTITIONER

## 2021-06-30 PROCEDURE — 87088 URINE BACTERIA CULTURE: CPT | Performed by: NURSE PRACTITIONER

## 2021-06-30 PROCEDURE — 99214 OFFICE O/P EST MOD 30 MIN: CPT | Mod: S$GLB,,, | Performed by: NURSE PRACTITIONER

## 2021-06-30 PROCEDURE — 3008F PR BODY MASS INDEX (BMI) DOCUMENTED: ICD-10-PCS | Mod: CPTII,S$GLB,, | Performed by: NURSE PRACTITIONER

## 2021-06-30 PROCEDURE — 87186 SC STD MICRODIL/AGAR DIL: CPT | Performed by: NURSE PRACTITIONER

## 2021-06-30 PROCEDURE — 99999 PR PBB SHADOW E&M-EST. PATIENT-LVL V: CPT | Mod: PBBFAC,,, | Performed by: NURSE PRACTITIONER

## 2021-06-30 PROCEDURE — 81002 POCT URINE DIPSTICK WITHOUT MICROSCOPE: ICD-10-PCS | Mod: S$GLB,,, | Performed by: NURSE PRACTITIONER

## 2021-06-30 PROCEDURE — 87086 URINE CULTURE/COLONY COUNT: CPT | Performed by: NURSE PRACTITIONER

## 2021-06-30 PROCEDURE — 81002 URINALYSIS NONAUTO W/O SCOPE: CPT | Mod: S$GLB,,, | Performed by: NURSE PRACTITIONER

## 2021-06-30 PROCEDURE — 3008F BODY MASS INDEX DOCD: CPT | Mod: CPTII,S$GLB,, | Performed by: NURSE PRACTITIONER

## 2021-06-30 PROCEDURE — 1126F AMNT PAIN NOTED NONE PRSNT: CPT | Mod: S$GLB,,, | Performed by: NURSE PRACTITIONER

## 2021-06-30 PROCEDURE — 3051F HG A1C>EQUAL 7.0%<8.0%: CPT | Mod: CPTII,S$GLB,, | Performed by: NURSE PRACTITIONER

## 2021-06-30 PROCEDURE — 99214 PR OFFICE/OUTPT VISIT, EST, LEVL IV, 30-39 MIN: ICD-10-PCS | Mod: S$GLB,,, | Performed by: NURSE PRACTITIONER

## 2021-06-30 RX ORDER — LEVOFLOXACIN 500 MG/1
500 TABLET, FILM COATED ORAL DAILY
Qty: 7 TABLET | Refills: 0 | Status: SHIPPED | OUTPATIENT
Start: 2021-06-30 | End: 2021-07-09

## 2021-06-30 RX ORDER — CIPROFLOXACIN 500 MG/1
500 TABLET ORAL EVERY 12 HOURS
Qty: 20 TABLET | Refills: 0 | Status: SHIPPED | OUTPATIENT
Start: 2021-06-30 | End: 2021-06-30

## 2021-07-01 ENCOUNTER — TELEPHONE (OUTPATIENT)
Dept: INTERNAL MEDICINE | Facility: CLINIC | Age: 61
End: 2021-07-01

## 2021-07-01 ENCOUNTER — HOSPITAL ENCOUNTER (OUTPATIENT)
Dept: RADIOLOGY | Facility: HOSPITAL | Age: 61
Discharge: HOME | End: 2021-07-01
Attending: NURSE PRACTITIONER
Payer: COMMERCIAL

## 2021-07-01 DIAGNOSIS — R50.9 FEVER, UNSPECIFIED FEVER CAUSE: ICD-10-CM

## 2021-07-01 DIAGNOSIS — R10.9 ABDOMINAL PAIN, UNSPECIFIED ABDOMINAL LOCATION: ICD-10-CM

## 2021-07-01 DIAGNOSIS — R31.9 HEMATURIA, UNSPECIFIED TYPE: ICD-10-CM

## 2021-07-01 DIAGNOSIS — N28.1 RENAL CYST: Primary | ICD-10-CM

## 2021-07-01 PROCEDURE — 74176 CT ABD & PELVIS W/O CONTRAST: CPT | Mod: TC

## 2021-07-01 PROCEDURE — 74176 CT RENAL STONE STUDY ABD PELVIS WO: ICD-10-PCS | Mod: 26,,, | Performed by: RADIOLOGY

## 2021-07-01 PROCEDURE — 74176 CT ABD & PELVIS W/O CONTRAST: CPT | Mod: 26,,, | Performed by: RADIOLOGY

## 2021-07-04 LAB — BACTERIA UR CULT: ABNORMAL

## 2021-07-07 ENCOUNTER — HOSPITAL ENCOUNTER (OUTPATIENT)
Dept: RADIOLOGY | Facility: HOSPITAL | Age: 61
Discharge: HOME | End: 2021-07-07
Attending: NURSE PRACTITIONER
Payer: COMMERCIAL

## 2021-07-07 DIAGNOSIS — N28.1 RENAL CYST: ICD-10-CM

## 2021-07-07 PROCEDURE — 76770 US EXAM ABDO BACK WALL COMP: CPT | Mod: TC

## 2021-07-07 PROCEDURE — 76770 US EXAM ABDO BACK WALL COMP: CPT | Mod: 26,,, | Performed by: RADIOLOGY

## 2021-07-07 PROCEDURE — 76770 US RETROPERITONEAL COMPLETE: ICD-10-PCS | Mod: 26,,, | Performed by: RADIOLOGY

## 2021-07-09 ENCOUNTER — OFFICE VISIT (OUTPATIENT)
Dept: INTERNAL MEDICINE | Facility: CLINIC | Age: 61
End: 2021-07-09
Payer: COMMERCIAL

## 2021-07-09 VITALS
HEART RATE: 69 BPM | BODY MASS INDEX: 37.68 KG/M2 | WEIGHT: 293.63 LBS | SYSTOLIC BLOOD PRESSURE: 122 MMHG | RESPIRATION RATE: 18 BRPM | OXYGEN SATURATION: 98 % | DIASTOLIC BLOOD PRESSURE: 84 MMHG | HEIGHT: 74 IN

## 2021-07-09 DIAGNOSIS — R31.0 GROSS HEMATURIA: Primary | ICD-10-CM

## 2021-07-09 DIAGNOSIS — A49.02 MRSA INFECTION: ICD-10-CM

## 2021-07-09 DIAGNOSIS — N30.01 ACUTE CYSTITIS WITH HEMATURIA: ICD-10-CM

## 2021-07-09 DIAGNOSIS — N28.1 BILATERAL RENAL CYSTS: ICD-10-CM

## 2021-07-09 LAB
BILIRUB SERPL-MCNC: ABNORMAL MG/DL
BLOOD URINE, POC: 250
CLARITY, POC UA: ABNORMAL
COLOR, POC UA: ABNORMAL
GLUCOSE UR QL STRIP: NORMAL
KETONES UR QL STRIP: ABNORMAL
LEUKOCYTE ESTERASE URINE, POC: ABNORMAL
NITRITE, POC UA: ABNORMAL
PH, POC UA: 5
PROTEIN, POC: ABNORMAL
SPECIFIC GRAVITY, POC UA: 1.02
UROBILINOGEN, POC UA: NORMAL

## 2021-07-09 PROCEDURE — 99213 PR OFFICE/OUTPT VISIT, EST, LEVL III, 20-29 MIN: ICD-10-PCS | Mod: S$GLB,,, | Performed by: NURSE PRACTITIONER

## 2021-07-09 PROCEDURE — 81002 URINALYSIS NONAUTO W/O SCOPE: CPT | Mod: S$GLB,,, | Performed by: NURSE PRACTITIONER

## 2021-07-09 PROCEDURE — 99213 OFFICE O/P EST LOW 20 MIN: CPT | Mod: S$GLB,,, | Performed by: NURSE PRACTITIONER

## 2021-07-09 PROCEDURE — 1126F PR PAIN SEVERITY QUANTIFIED, NO PAIN PRESENT: ICD-10-PCS | Mod: S$GLB,,, | Performed by: NURSE PRACTITIONER

## 2021-07-09 PROCEDURE — 3008F BODY MASS INDEX DOCD: CPT | Mod: CPTII,S$GLB,, | Performed by: NURSE PRACTITIONER

## 2021-07-09 PROCEDURE — 99999 PR PBB SHADOW E&M-EST. PATIENT-LVL V: CPT | Mod: PBBFAC,,, | Performed by: NURSE PRACTITIONER

## 2021-07-09 PROCEDURE — 3008F PR BODY MASS INDEX (BMI) DOCUMENTED: ICD-10-PCS | Mod: CPTII,S$GLB,, | Performed by: NURSE PRACTITIONER

## 2021-07-09 PROCEDURE — 99999 PR PBB SHADOW E&M-EST. PATIENT-LVL V: ICD-10-PCS | Mod: PBBFAC,,, | Performed by: NURSE PRACTITIONER

## 2021-07-09 PROCEDURE — 1126F AMNT PAIN NOTED NONE PRSNT: CPT | Mod: S$GLB,,, | Performed by: NURSE PRACTITIONER

## 2021-07-09 PROCEDURE — 81002 POCT URINE DIPSTICK WITHOUT MICROSCOPE: ICD-10-PCS | Mod: S$GLB,,, | Performed by: NURSE PRACTITIONER

## 2021-07-09 RX ORDER — SULFAMETHOXAZOLE AND TRIMETHOPRIM 800; 160 MG/1; MG/1
1 TABLET ORAL 2 TIMES DAILY
Qty: 14 TABLET | Refills: 0 | Status: SHIPPED | OUTPATIENT
Start: 2021-07-09 | End: 2021-07-16

## 2021-07-11 ENCOUNTER — PATIENT MESSAGE (OUTPATIENT)
Dept: INTERNAL MEDICINE | Facility: CLINIC | Age: 61
End: 2021-07-11

## 2021-07-12 ENCOUNTER — PATIENT MESSAGE (OUTPATIENT)
Dept: INTERNAL MEDICINE | Facility: CLINIC | Age: 61
End: 2021-07-12

## 2021-07-12 DIAGNOSIS — R31.0 GROSS HEMATURIA: Primary | ICD-10-CM

## 2021-07-12 DIAGNOSIS — I49.3 PVC'S (PREMATURE VENTRICULAR CONTRACTIONS): ICD-10-CM

## 2021-07-12 DIAGNOSIS — E11.65 UNCONTROLLED TYPE 2 DIABETES MELLITUS WITH HYPERGLYCEMIA: ICD-10-CM

## 2021-07-12 DIAGNOSIS — N30.01 ACUTE CYSTITIS WITH HEMATURIA: ICD-10-CM

## 2021-07-12 RX ORDER — PIOGLITAZONEHYDROCHLORIDE 30 MG/1
30 TABLET ORAL DAILY
Qty: 90 TABLET | Refills: 1 | Status: SHIPPED | OUTPATIENT
Start: 2021-07-12 | End: 2021-09-06 | Stop reason: SDUPTHER

## 2021-07-12 RX ORDER — LISINOPRIL 5 MG/1
TABLET ORAL
Qty: 30 TABLET | Refills: 5 | Status: SHIPPED | OUTPATIENT
Start: 2021-07-12 | End: 2021-09-06 | Stop reason: SDUPTHER

## 2021-07-13 ENCOUNTER — CLINICAL SUPPORT (OUTPATIENT)
Dept: INTERNAL MEDICINE | Facility: CLINIC | Age: 61
End: 2021-07-13
Payer: COMMERCIAL

## 2021-07-13 DIAGNOSIS — N30.01 ACUTE CYSTITIS WITH HEMATURIA: ICD-10-CM

## 2021-07-13 LAB
BACTERIA #/AREA URNS AUTO: NORMAL /HPF
BILIRUB UR QL STRIP: NEGATIVE
CLARITY UR REFRACT.AUTO: ABNORMAL
COLOR UR AUTO: YELLOW
GLUCOSE UR QL STRIP: NEGATIVE
HGB UR QL STRIP: NEGATIVE
KETONES UR QL STRIP: NEGATIVE
LEUKOCYTE ESTERASE UR QL STRIP: NEGATIVE
MICROSCOPIC COMMENT: NORMAL
NITRITE UR QL STRIP: NEGATIVE
PH UR STRIP: 5 [PH] (ref 5–8)
PROT UR QL STRIP: NEGATIVE
RBC #/AREA URNS AUTO: 2 /HPF (ref 0–4)
SP GR UR STRIP: 1.02 (ref 1–1.03)
SQUAMOUS #/AREA URNS AUTO: 0 /HPF
URN SPEC COLLECT METH UR: ABNORMAL
WBC #/AREA URNS AUTO: 2 /HPF (ref 0–5)

## 2021-07-13 PROCEDURE — 81001 URINALYSIS AUTO W/SCOPE: CPT | Performed by: NURSE PRACTITIONER

## 2021-08-16 ENCOUNTER — PATIENT MESSAGE (OUTPATIENT)
Dept: INTERNAL MEDICINE | Facility: CLINIC | Age: 61
End: 2021-08-16

## 2021-09-06 DIAGNOSIS — N40.1 BENIGN PROSTATIC HYPERPLASIA WITH URINARY HESITANCY: ICD-10-CM

## 2021-09-06 DIAGNOSIS — E78.5 HYPERLIPIDEMIA LDL GOAL <70: ICD-10-CM

## 2021-09-06 DIAGNOSIS — I10 ESSENTIAL HYPERTENSION: ICD-10-CM

## 2021-09-06 DIAGNOSIS — E11.65 UNCONTROLLED TYPE 2 DIABETES MELLITUS WITH HYPERGLYCEMIA: ICD-10-CM

## 2021-09-06 DIAGNOSIS — R39.11 BENIGN PROSTATIC HYPERPLASIA WITH URINARY HESITANCY: ICD-10-CM

## 2021-09-06 DIAGNOSIS — D68.61 ANTIPHOSPHOLIPID SYNDROME: ICD-10-CM

## 2021-09-06 DIAGNOSIS — Z86.718 HISTORY OF DVT (DEEP VEIN THROMBOSIS): ICD-10-CM

## 2021-09-06 DIAGNOSIS — I49.3 PVC'S (PREMATURE VENTRICULAR CONTRACTIONS): ICD-10-CM

## 2021-09-06 RX ORDER — ATORVASTATIN CALCIUM 20 MG/1
20 TABLET, FILM COATED ORAL NIGHTLY
Qty: 30 TABLET | Refills: 5 | Status: SHIPPED | OUTPATIENT
Start: 2021-09-06 | End: 2021-12-03

## 2021-09-06 RX ORDER — LISINOPRIL 5 MG/1
TABLET ORAL
Qty: 30 TABLET | Refills: 5 | Status: SHIPPED | OUTPATIENT
Start: 2021-09-06 | End: 2021-12-03

## 2021-09-06 RX ORDER — TAMSULOSIN HYDROCHLORIDE 0.4 MG/1
0.4 CAPSULE ORAL DAILY
Qty: 30 CAPSULE | Refills: 5 | Status: SHIPPED | OUTPATIENT
Start: 2021-09-06 | End: 2022-04-19

## 2021-09-06 RX ORDER — PIOGLITAZONEHYDROCHLORIDE 30 MG/1
30 TABLET ORAL DAILY
Qty: 90 TABLET | Refills: 1 | Status: SHIPPED | OUTPATIENT
Start: 2021-09-06 | End: 2021-12-10 | Stop reason: SDUPTHER

## 2021-09-06 RX ORDER — METOPROLOL SUCCINATE 25 MG/1
25 TABLET, EXTENDED RELEASE ORAL DAILY
Qty: 30 TABLET | Refills: 2 | Status: SHIPPED | OUTPATIENT
Start: 2021-09-06 | End: 2022-04-19

## 2021-10-07 RX ORDER — LISINOPRIL 5 MG/1
5 TABLET ORAL DAILY
Qty: 30 TABLET | Refills: 5 | Status: SHIPPED | OUTPATIENT
Start: 2021-10-07 | End: 2021-12-03

## 2021-10-07 RX ORDER — METOPROLOL SUCCINATE 25 MG/1
25 TABLET, EXTENDED RELEASE ORAL DAILY
Qty: 30 TABLET | Refills: 5 | Status: SHIPPED | OUTPATIENT
Start: 2021-10-07 | End: 2021-12-09 | Stop reason: SDUPTHER

## 2021-10-07 RX ORDER — TAMSULOSIN HYDROCHLORIDE 0.4 MG/1
0.4 CAPSULE ORAL DAILY
Qty: 30 CAPSULE | Refills: 5 | Status: SHIPPED | OUTPATIENT
Start: 2021-10-07 | End: 2021-12-09 | Stop reason: SDUPTHER

## 2021-10-07 RX ORDER — ATORVASTATIN CALCIUM 20 MG/1
20 TABLET, FILM COATED ORAL DAILY
Qty: 30 TABLET | Refills: 5 | Status: SHIPPED | OUTPATIENT
Start: 2021-10-07 | End: 2022-03-18 | Stop reason: SDUPTHER

## 2021-11-29 ENCOUNTER — LAB VISIT (OUTPATIENT)
Dept: INTERNAL MEDICINE | Facility: CLINIC | Age: 61
End: 2021-11-29
Payer: COMMERCIAL

## 2021-11-29 DIAGNOSIS — Z72.0 TOBACCO ABUSE: ICD-10-CM

## 2021-11-29 DIAGNOSIS — R39.11 BENIGN PROSTATIC HYPERPLASIA WITH URINARY HESITANCY: ICD-10-CM

## 2021-11-29 DIAGNOSIS — E66.01 SEVERE OBESITY (BMI 35.0-39.9) WITH COMORBIDITY: ICD-10-CM

## 2021-11-29 DIAGNOSIS — Z72.52 HIGH RISK HOMOSEXUAL BEHAVIOR: ICD-10-CM

## 2021-11-29 DIAGNOSIS — N40.1 BENIGN PROSTATIC HYPERPLASIA WITH URINARY HESITANCY: ICD-10-CM

## 2021-11-29 DIAGNOSIS — D68.61 ANTIPHOSPHOLIPID SYNDROME: ICD-10-CM

## 2021-11-29 DIAGNOSIS — E11.65 UNCONTROLLED TYPE 2 DIABETES MELLITUS WITH HYPERGLYCEMIA: ICD-10-CM

## 2021-11-29 DIAGNOSIS — E78.5 HYPERLIPIDEMIA LDL GOAL <70: ICD-10-CM

## 2021-11-29 DIAGNOSIS — R79.89 LOW TESTOSTERONE IN MALE: ICD-10-CM

## 2021-11-29 LAB
ALBUMIN SERPL BCP-MCNC: 4.1 G/DL (ref 3.5–5.2)
ALP SERPL-CCNC: 69 U/L (ref 55–135)
ALT SERPL W/O P-5'-P-CCNC: 38 U/L (ref 10–44)
ANION GAP SERPL CALC-SCNC: 9 MMOL/L (ref 8–16)
AST SERPL-CCNC: 24 U/L (ref 10–40)
BASOPHILS # BLD AUTO: 0.04 K/UL (ref 0–0.2)
BASOPHILS NFR BLD: 0.4 % (ref 0–1.9)
BILIRUB SERPL-MCNC: 1.6 MG/DL (ref 0.1–1)
BUN SERPL-MCNC: 13 MG/DL (ref 6–20)
CALCIUM SERPL-MCNC: 9.3 MG/DL (ref 8.7–10.5)
CHLORIDE SERPL-SCNC: 106 MMOL/L (ref 95–110)
CHOLEST SERPL-MCNC: 129 MG/DL (ref 120–199)
CHOLEST/HDLC SERPL: 4.3 {RATIO} (ref 2–5)
CO2 SERPL-SCNC: 26 MMOL/L (ref 23–29)
CREAT SERPL-MCNC: 0.9 MG/DL (ref 0.5–1.4)
DIFFERENTIAL METHOD: ABNORMAL
EOSINOPHIL # BLD AUTO: 0.2 K/UL (ref 0–0.5)
EOSINOPHIL NFR BLD: 2 % (ref 0–8)
ERYTHROCYTE [DISTWIDTH] IN BLOOD BY AUTOMATED COUNT: 13.1 % (ref 11.5–14.5)
EST. GFR  (AFRICAN AMERICAN): >60 ML/MIN/1.73 M^2
EST. GFR  (NON AFRICAN AMERICAN): >60 ML/MIN/1.73 M^2
GLUCOSE SERPL-MCNC: 191 MG/DL (ref 70–110)
HCT VFR BLD AUTO: 46 % (ref 40–54)
HDLC SERPL-MCNC: 30 MG/DL (ref 40–75)
HDLC SERPL: 23.3 % (ref 20–50)
HGB BLD-MCNC: 15 G/DL (ref 14–18)
IMM GRANULOCYTES # BLD AUTO: 0.06 K/UL (ref 0–0.04)
IMM GRANULOCYTES NFR BLD AUTO: 0.7 % (ref 0–0.5)
LDLC SERPL CALC-MCNC: 73.8 MG/DL (ref 63–159)
LYMPHOCYTES # BLD AUTO: 2.4 K/UL (ref 1–4.8)
LYMPHOCYTES NFR BLD: 27.2 % (ref 18–48)
MCH RBC QN AUTO: 31.9 PG (ref 27–31)
MCHC RBC AUTO-ENTMCNC: 32.6 G/DL (ref 32–36)
MCV RBC AUTO: 98 FL (ref 82–98)
MONOCYTES # BLD AUTO: 0.8 K/UL (ref 0.3–1)
MONOCYTES NFR BLD: 8.5 % (ref 4–15)
NEUTROPHILS # BLD AUTO: 5.5 K/UL (ref 1.8–7.7)
NEUTROPHILS NFR BLD: 61.2 % (ref 38–73)
NONHDLC SERPL-MCNC: 99 MG/DL
NRBC BLD-RTO: 0 /100 WBC
PLATELET # BLD AUTO: 241 K/UL (ref 150–450)
PMV BLD AUTO: 10 FL (ref 9.2–12.9)
POTASSIUM SERPL-SCNC: 4.8 MMOL/L (ref 3.5–5.1)
PROT SERPL-MCNC: 7 G/DL (ref 6–8.4)
RBC # BLD AUTO: 4.7 M/UL (ref 4.6–6.2)
SODIUM SERPL-SCNC: 141 MMOL/L (ref 136–145)
TRIGL SERPL-MCNC: 126 MG/DL (ref 30–150)
TSH SERPL DL<=0.005 MIU/L-ACNC: 1.36 UIU/ML (ref 0.4–4)
WBC # BLD AUTO: 8.95 K/UL (ref 3.9–12.7)

## 2021-11-29 PROCEDURE — 85025 COMPLETE CBC W/AUTO DIFF WBC: CPT | Performed by: NURSE PRACTITIONER

## 2021-11-29 PROCEDURE — 84443 ASSAY THYROID STIM HORMONE: CPT | Performed by: NURSE PRACTITIONER

## 2021-11-29 PROCEDURE — 87389 HIV-1 AG W/HIV-1&-2 AB AG IA: CPT | Performed by: NURSE PRACTITIONER

## 2021-11-29 PROCEDURE — 80053 COMPREHEN METABOLIC PANEL: CPT | Performed by: NURSE PRACTITIONER

## 2021-11-29 PROCEDURE — 83036 HEMOGLOBIN GLYCOSYLATED A1C: CPT | Performed by: NURSE PRACTITIONER

## 2021-11-29 PROCEDURE — 80061 LIPID PANEL: CPT | Performed by: NURSE PRACTITIONER

## 2021-11-30 LAB
ESTIMATED AVG GLUCOSE: 214 MG/DL (ref 68–131)
HBA1C MFR BLD: 9.1 % (ref 4–5.6)
HIV 1+2 AB+HIV1 P24 AG SERPL QL IA: NEGATIVE

## 2021-12-03 ENCOUNTER — OFFICE VISIT (OUTPATIENT)
Dept: INTERNAL MEDICINE | Facility: CLINIC | Age: 61
End: 2021-12-03
Payer: COMMERCIAL

## 2021-12-03 VITALS
WEIGHT: 285.94 LBS | RESPIRATION RATE: 18 BRPM | HEIGHT: 74 IN | OXYGEN SATURATION: 98 % | BODY MASS INDEX: 36.7 KG/M2 | HEART RATE: 84 BPM | DIASTOLIC BLOOD PRESSURE: 94 MMHG | SYSTOLIC BLOOD PRESSURE: 142 MMHG

## 2021-12-03 DIAGNOSIS — Z72.0 TOBACCO ABUSE: ICD-10-CM

## 2021-12-03 DIAGNOSIS — E11.65 UNCONTROLLED TYPE 2 DIABETES MELLITUS WITH HYPERGLYCEMIA: Primary | ICD-10-CM

## 2021-12-03 DIAGNOSIS — E66.01 SEVERE OBESITY (BMI 35.0-39.9) WITH COMORBIDITY: ICD-10-CM

## 2021-12-03 DIAGNOSIS — Z71.6 TOBACCO ABUSE COUNSELING: ICD-10-CM

## 2021-12-03 DIAGNOSIS — E78.5 HYPERLIPIDEMIA LDL GOAL <70: ICD-10-CM

## 2021-12-03 DIAGNOSIS — R79.89 LOW TESTOSTERONE IN MALE: ICD-10-CM

## 2021-12-03 DIAGNOSIS — Z12.5 PROSTATE CANCER SCREENING: ICD-10-CM

## 2021-12-03 PROCEDURE — 4010F ACE/ARB THERAPY RXD/TAKEN: CPT | Mod: CPTII,S$GLB,, | Performed by: NURSE PRACTITIONER

## 2021-12-03 PROCEDURE — 3066F PR DOCUMENTATION OF TREATMENT FOR NEPHROPATHY: ICD-10-PCS | Mod: CPTII,S$GLB,, | Performed by: NURSE PRACTITIONER

## 2021-12-03 PROCEDURE — 4010F PR ACE/ARB THEARPY RXD/TAKEN: ICD-10-PCS | Mod: CPTII,S$GLB,, | Performed by: NURSE PRACTITIONER

## 2021-12-03 PROCEDURE — 99999 PR PBB SHADOW E&M-EST. PATIENT-LVL IV: CPT | Mod: PBBFAC,,, | Performed by: NURSE PRACTITIONER

## 2021-12-03 PROCEDURE — 3066F NEPHROPATHY DOC TX: CPT | Mod: CPTII,S$GLB,, | Performed by: NURSE PRACTITIONER

## 2021-12-03 PROCEDURE — 3060F PR POS MICROALBUMINURIA RESULT DOCUMENTED/REVIEW: ICD-10-PCS | Mod: CPTII,S$GLB,, | Performed by: NURSE PRACTITIONER

## 2021-12-03 PROCEDURE — 99999 PR PBB SHADOW E&M-EST. PATIENT-LVL IV: ICD-10-PCS | Mod: PBBFAC,,, | Performed by: NURSE PRACTITIONER

## 2021-12-03 PROCEDURE — 99214 OFFICE O/P EST MOD 30 MIN: CPT | Mod: S$GLB,,, | Performed by: NURSE PRACTITIONER

## 2021-12-03 PROCEDURE — 3060F POS MICROALBUMINURIA REV: CPT | Mod: CPTII,S$GLB,, | Performed by: NURSE PRACTITIONER

## 2021-12-03 PROCEDURE — 99214 PR OFFICE/OUTPT VISIT, EST, LEVL IV, 30-39 MIN: ICD-10-PCS | Mod: S$GLB,,, | Performed by: NURSE PRACTITIONER

## 2021-12-09 ENCOUNTER — TELEPHONE (OUTPATIENT)
Dept: INTERNAL MEDICINE | Facility: CLINIC | Age: 61
End: 2021-12-09
Payer: COMMERCIAL

## 2021-12-09 RX ORDER — TAMSULOSIN HYDROCHLORIDE 0.4 MG/1
0.4 CAPSULE ORAL DAILY
Qty: 30 CAPSULE | Refills: 0 | OUTPATIENT
Start: 2021-12-09 | End: 2022-01-10 | Stop reason: SDUPTHER

## 2021-12-09 RX ORDER — METOPROLOL SUCCINATE 25 MG/1
25 TABLET, EXTENDED RELEASE ORAL DAILY
Qty: 30 TABLET | Refills: 0 | OUTPATIENT
Start: 2021-12-09 | End: 2022-01-10 | Stop reason: SDUPTHER

## 2021-12-09 RX ORDER — SITAGLIPTIN 50 MG/1
50 TABLET, FILM COATED ORAL DAILY
Qty: 30 TABLET | Refills: 0 | OUTPATIENT
Start: 2021-12-09 | End: 2022-01-10 | Stop reason: SDUPTHER

## 2021-12-10 DIAGNOSIS — I10 ESSENTIAL HYPERTENSION: ICD-10-CM

## 2021-12-10 RX ORDER — LISINOPRIL 5 MG/1
5 TABLET ORAL DAILY
Qty: 30 TABLET | Refills: 5 | Status: SHIPPED | OUTPATIENT
Start: 2021-12-10 | End: 2022-05-31 | Stop reason: SDUPTHER

## 2021-12-13 RX ORDER — PIOGLITAZONEHYDROCHLORIDE 30 MG/1
30 TABLET ORAL DAILY
Qty: 90 TABLET | Refills: 1 | Status: SHIPPED | OUTPATIENT
Start: 2021-12-13 | End: 2022-05-31 | Stop reason: SDUPTHER

## 2022-01-11 RX ORDER — TAMSULOSIN HYDROCHLORIDE 0.4 MG/1
0.4 CAPSULE ORAL DAILY
Qty: 30 CAPSULE | Refills: 5 | Status: SHIPPED | OUTPATIENT
Start: 2022-01-11 | End: 2022-07-06 | Stop reason: SDUPTHER

## 2022-01-11 RX ORDER — METOPROLOL SUCCINATE 25 MG/1
25 TABLET, EXTENDED RELEASE ORAL DAILY
Qty: 30 TABLET | Refills: 5 | Status: SHIPPED | OUTPATIENT
Start: 2022-01-11 | End: 2022-07-06 | Stop reason: SDUPTHER

## 2022-02-07 ENCOUNTER — TELEPHONE (OUTPATIENT)
Dept: INTERNAL MEDICINE | Facility: CLINIC | Age: 62
End: 2022-02-07
Payer: COMMERCIAL
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
## 2022-02-07 NOTE — TELEPHONE ENCOUNTER
Any of the retail pharmacies have knee braces that he can purchase. I do recommend that he purchase one to help with the discomfort. He can also use products such as biofreeze patches to affected area.

## 2022-02-14 ENCOUNTER — OFFICE VISIT (OUTPATIENT)
Dept: INTERNAL MEDICINE | Facility: CLINIC | Age: 62
End: 2022-02-14
Payer: COMMERCIAL

## 2022-02-14 VITALS
HEIGHT: 74 IN | OXYGEN SATURATION: 97 % | WEIGHT: 275.13 LBS | RESPIRATION RATE: 18 BRPM | BODY MASS INDEX: 35.31 KG/M2 | HEART RATE: 88 BPM

## 2022-02-14 DIAGNOSIS — M25.562 ACUTE PAIN OF LEFT KNEE: Primary | ICD-10-CM

## 2022-02-14 DIAGNOSIS — D68.61 ANTIPHOSPHOLIPID SYNDROME: ICD-10-CM

## 2022-02-14 DIAGNOSIS — E66.01 SEVERE OBESITY (BMI 35.0-39.9) WITH COMORBIDITY: ICD-10-CM

## 2022-02-14 DIAGNOSIS — E11.65 UNCONTROLLED TYPE 2 DIABETES MELLITUS WITH HYPERGLYCEMIA: ICD-10-CM

## 2022-02-14 DIAGNOSIS — M54.2 NECK PAIN: ICD-10-CM

## 2022-02-14 PROCEDURE — 1159F MED LIST DOCD IN RCRD: CPT | Mod: CPTII,S$GLB,, | Performed by: NURSE PRACTITIONER

## 2022-02-14 PROCEDURE — 3008F PR BODY MASS INDEX (BMI) DOCUMENTED: ICD-10-PCS | Mod: CPTII,S$GLB,, | Performed by: NURSE PRACTITIONER

## 2022-02-14 PROCEDURE — 99999 PR PBB SHADOW E&M-EST. PATIENT-LVL IV: CPT | Mod: PBBFAC,,, | Performed by: NURSE PRACTITIONER

## 2022-02-14 PROCEDURE — 4010F ACE/ARB THERAPY RXD/TAKEN: CPT | Mod: CPTII,S$GLB,, | Performed by: NURSE PRACTITIONER

## 2022-02-14 PROCEDURE — 3008F BODY MASS INDEX DOCD: CPT | Mod: CPTII,S$GLB,, | Performed by: NURSE PRACTITIONER

## 2022-02-14 PROCEDURE — 99213 PR OFFICE/OUTPT VISIT, EST, LEVL III, 20-29 MIN: ICD-10-PCS | Mod: S$GLB,,, | Performed by: NURSE PRACTITIONER

## 2022-02-14 PROCEDURE — 99213 OFFICE O/P EST LOW 20 MIN: CPT | Mod: S$GLB,,, | Performed by: NURSE PRACTITIONER

## 2022-02-14 PROCEDURE — 4010F PR ACE/ARB THEARPY RXD/TAKEN: ICD-10-PCS | Mod: CPTII,S$GLB,, | Performed by: NURSE PRACTITIONER

## 2022-02-14 PROCEDURE — 99999 PR PBB SHADOW E&M-EST. PATIENT-LVL IV: ICD-10-PCS | Mod: PBBFAC,,, | Performed by: NURSE PRACTITIONER

## 2022-02-14 PROCEDURE — 1159F PR MEDICATION LIST DOCUMENTED IN MEDICAL RECORD: ICD-10-PCS | Mod: CPTII,S$GLB,, | Performed by: NURSE PRACTITIONER

## 2022-02-14 RX ORDER — CYCLOBENZAPRINE HCL 5 MG
5 TABLET ORAL 3 TIMES DAILY PRN
Qty: 30 TABLET | Refills: 1 | Status: SHIPPED | OUTPATIENT
Start: 2022-02-14 | End: 2022-02-24
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
## 2022-03-01 ENCOUNTER — PATIENT MESSAGE (OUTPATIENT)
Dept: ADMINISTRATIVE | Facility: HOSPITAL | Age: 62
End: 2022-03-01
Payer: COMMERCIAL

## 2022-03-01 ENCOUNTER — PATIENT OUTREACH (OUTPATIENT)
Dept: ADMINISTRATIVE | Facility: HOSPITAL | Age: 62
End: 2022-03-01
Payer: COMMERCIAL

## 2022-03-01 NOTE — PROGRESS NOTES
The patient is on non-compliant report for HgbA1c.   Immunizations reviewed. [facility] reviewed. Care Everywhere reviewed. Media tab reviewed.   [facility] and [facility] reviewed w/ no applicable results yielded.   Portal message sent to patient to schedule labs.         BAR

## 2022-03-18 RX ORDER — ATORVASTATIN CALCIUM 20 MG/1
20 TABLET, FILM COATED ORAL DAILY
Qty: 30 TABLET | Refills: 5 | Status: SHIPPED | OUTPATIENT
Start: 2022-03-18 | End: 2022-07-11 | Stop reason: SDUPTHER

## 2022-04-04 ENCOUNTER — PATIENT MESSAGE (OUTPATIENT)
Dept: ADMINISTRATIVE | Facility: HOSPITAL | Age: 62
End: 2022-04-04
Payer: COMMERCIAL

## 2022-04-12 ENCOUNTER — LAB VISIT (OUTPATIENT)
Dept: LAB | Facility: HOSPITAL | Age: 62
End: 2022-04-12
Attending: NURSE PRACTITIONER
Payer: COMMERCIAL

## 2022-04-12 DIAGNOSIS — E78.5 HYPERLIPIDEMIA LDL GOAL <70: ICD-10-CM

## 2022-04-12 DIAGNOSIS — R79.89 LOW TESTOSTERONE IN MALE: ICD-10-CM

## 2022-04-12 DIAGNOSIS — Z72.0 TOBACCO ABUSE: ICD-10-CM

## 2022-04-12 DIAGNOSIS — E11.65 UNCONTROLLED TYPE 2 DIABETES MELLITUS WITH HYPERGLYCEMIA: ICD-10-CM

## 2022-04-12 DIAGNOSIS — E66.01 SEVERE OBESITY (BMI 35.0-39.9) WITH COMORBIDITY: ICD-10-CM

## 2022-04-12 LAB
ALBUMIN SERPL BCP-MCNC: 3.9 G/DL (ref 3.5–5.2)
ALP SERPL-CCNC: 69 U/L (ref 55–135)
ALT SERPL W/O P-5'-P-CCNC: 22 U/L (ref 10–44)
ANION GAP SERPL CALC-SCNC: 10 MMOL/L (ref 8–16)
AST SERPL-CCNC: 13 U/L (ref 10–40)
BILIRUB SERPL-MCNC: 1.5 MG/DL (ref 0.1–1)
BUN SERPL-MCNC: 11 MG/DL (ref 6–20)
CALCIUM SERPL-MCNC: 9.3 MG/DL (ref 8.7–10.5)
CHLORIDE SERPL-SCNC: 104 MMOL/L (ref 95–110)
CO2 SERPL-SCNC: 24 MMOL/L (ref 23–29)
CREAT SERPL-MCNC: 0.8 MG/DL (ref 0.5–1.4)
EST. GFR  (AFRICAN AMERICAN): >60 ML/MIN/1.73 M^2
EST. GFR  (NON AFRICAN AMERICAN): >60 ML/MIN/1.73 M^2
ESTIMATED AVG GLUCOSE: 151 MG/DL (ref 68–131)
GLUCOSE SERPL-MCNC: 149 MG/DL (ref 70–110)
HBA1C MFR BLD: 6.9 % (ref 4–5.6)
POTASSIUM SERPL-SCNC: 4.3 MMOL/L (ref 3.5–5.1)
PROT SERPL-MCNC: 7.1 G/DL (ref 6–8.4)
SODIUM SERPL-SCNC: 138 MMOL/L (ref 136–145)

## 2022-04-12 PROCEDURE — 80053 COMPREHEN METABOLIC PANEL: CPT | Performed by: NURSE PRACTITIONER

## 2022-04-12 PROCEDURE — 83036 HEMOGLOBIN GLYCOSYLATED A1C: CPT | Performed by: NURSE PRACTITIONER

## 2022-04-12 PROCEDURE — 36415 COLL VENOUS BLD VENIPUNCTURE: CPT | Performed by: NURSE PRACTITIONER

## 2022-04-19 ENCOUNTER — OFFICE VISIT (OUTPATIENT)
Dept: INTERNAL MEDICINE | Facility: CLINIC | Age: 62
End: 2022-04-19
Payer: COMMERCIAL

## 2022-04-19 VITALS
WEIGHT: 268 LBS | RESPIRATION RATE: 18 BRPM | BODY MASS INDEX: 34.39 KG/M2 | DIASTOLIC BLOOD PRESSURE: 88 MMHG | SYSTOLIC BLOOD PRESSURE: 128 MMHG | HEIGHT: 74 IN | HEART RATE: 80 BPM | OXYGEN SATURATION: 99 %

## 2022-04-19 DIAGNOSIS — E78.5 HYPERLIPIDEMIA LDL GOAL <70: ICD-10-CM

## 2022-04-19 DIAGNOSIS — Z72.0 TOBACCO ABUSE: ICD-10-CM

## 2022-04-19 DIAGNOSIS — M25.562 CHRONIC PAIN OF LEFT KNEE: ICD-10-CM

## 2022-04-19 DIAGNOSIS — E66.9 OBESITY (BMI 30-39.9): ICD-10-CM

## 2022-04-19 DIAGNOSIS — E11.65 UNCONTROLLED TYPE 2 DIABETES MELLITUS WITH HYPERGLYCEMIA: Primary | ICD-10-CM

## 2022-04-19 DIAGNOSIS — G89.29 CHRONIC PAIN OF LEFT KNEE: ICD-10-CM

## 2022-04-19 DIAGNOSIS — Z12.5 PROSTATE CANCER SCREENING: ICD-10-CM

## 2022-04-19 PROCEDURE — 99999 PR PBB SHADOW E&M-EST. PATIENT-LVL IV: ICD-10-PCS | Mod: PBBFAC,,, | Performed by: NURSE PRACTITIONER

## 2022-04-19 PROCEDURE — 3008F BODY MASS INDEX DOCD: CPT | Mod: CPTII,S$GLB,, | Performed by: NURSE PRACTITIONER

## 2022-04-19 PROCEDURE — 1159F PR MEDICATION LIST DOCUMENTED IN MEDICAL RECORD: ICD-10-PCS | Mod: CPTII,S$GLB,, | Performed by: NURSE PRACTITIONER

## 2022-04-19 PROCEDURE — 3079F PR MOST RECENT DIASTOLIC BLOOD PRESSURE 80-89 MM HG: ICD-10-PCS | Mod: CPTII,S$GLB,, | Performed by: NURSE PRACTITIONER

## 2022-04-19 PROCEDURE — 3044F HG A1C LEVEL LT 7.0%: CPT | Mod: CPTII,S$GLB,, | Performed by: NURSE PRACTITIONER

## 2022-04-19 PROCEDURE — 1159F MED LIST DOCD IN RCRD: CPT | Mod: CPTII,S$GLB,, | Performed by: NURSE PRACTITIONER

## 2022-04-19 PROCEDURE — 3079F DIAST BP 80-89 MM HG: CPT | Mod: CPTII,S$GLB,, | Performed by: NURSE PRACTITIONER

## 2022-04-19 PROCEDURE — 3074F SYST BP LT 130 MM HG: CPT | Mod: CPTII,S$GLB,, | Performed by: NURSE PRACTITIONER

## 2022-04-19 PROCEDURE — 3074F PR MOST RECENT SYSTOLIC BLOOD PRESSURE < 130 MM HG: ICD-10-PCS | Mod: CPTII,S$GLB,, | Performed by: NURSE PRACTITIONER

## 2022-04-19 PROCEDURE — 4010F ACE/ARB THERAPY RXD/TAKEN: CPT | Mod: CPTII,S$GLB,, | Performed by: NURSE PRACTITIONER

## 2022-04-19 PROCEDURE — 3008F PR BODY MASS INDEX (BMI) DOCUMENTED: ICD-10-PCS | Mod: CPTII,S$GLB,, | Performed by: NURSE PRACTITIONER

## 2022-04-19 PROCEDURE — 99999 PR PBB SHADOW E&M-EST. PATIENT-LVL IV: CPT | Mod: PBBFAC,,, | Performed by: NURSE PRACTITIONER

## 2022-04-19 PROCEDURE — 99214 OFFICE O/P EST MOD 30 MIN: CPT | Mod: S$GLB,,, | Performed by: NURSE PRACTITIONER

## 2022-04-19 PROCEDURE — 4010F PR ACE/ARB THEARPY RXD/TAKEN: ICD-10-PCS | Mod: CPTII,S$GLB,, | Performed by: NURSE PRACTITIONER

## 2022-04-19 PROCEDURE — 3044F PR MOST RECENT HEMOGLOBIN A1C LEVEL <7.0%: ICD-10-PCS | Mod: CPTII,S$GLB,, | Performed by: NURSE PRACTITIONER

## 2022-04-19 PROCEDURE — 99214 PR OFFICE/OUTPT VISIT, EST, LEVL IV, 30-39 MIN: ICD-10-PCS | Mod: S$GLB,,, | Performed by: NURSE PRACTITIONER

## 2022-04-19 NOTE — PROGRESS NOTES
Subjective:       Patient ID: North Najera is a 61 y.o. male.    Chief Complaint: Follow-up (Check up) and Knee Pain (L. Knee- PS:3)    Patient is known, to me and presents with   Chief Complaint   Patient presents with    Follow-up     Check up    Knee Pain     L. Knee- PS:3   .  Denies chest pain and shortness of breath.  Patient presents with check up and lab review. Doing much better with his blood sugars. Having left knee pain and swelling. He is ready to see ortho.    HPI  Review of Systems   Constitutional: Negative.  Negative for activity change, appetite change, chills, diaphoresis, fatigue, fever and unexpected weight change.   HENT: Negative.  Negative for congestion, ear discharge, ear pain, facial swelling, hearing loss, nosebleeds, postnasal drip, rhinorrhea, sinus pressure, sneezing, sore throat, tinnitus, trouble swallowing and voice change.    Eyes: Negative.  Negative for photophobia, pain, discharge, redness, itching and visual disturbance.   Respiratory: Negative.  Negative for apnea, cough, choking, chest tightness, shortness of breath, wheezing and stridor.    Cardiovascular: Negative.  Negative for chest pain, palpitations and leg swelling.   Gastrointestinal: Negative for abdominal distention, abdominal pain, anal bleeding, blood in stool, constipation, diarrhea, nausea and vomiting.   Genitourinary: Negative.  Negative for difficulty urinating, dysuria, enuresis, flank pain, frequency, hematuria, penile discharge, penile pain, penile swelling, scrotal swelling, testicular pain and urgency.   Musculoskeletal: Positive for arthralgias and joint swelling. Negative for back pain, gait problem, myalgias, neck pain and neck stiffness.   Skin: Negative.  Negative for color change, pallor, rash and wound.   Neurological: Negative for dizziness, tremors, seizures, syncope, facial asymmetry, speech difficulty, weakness, light-headedness, numbness and headaches.   Hematological: Negative for  adenopathy. Does not bruise/bleed easily.   Psychiatric/Behavioral: Negative.  Negative for agitation, dysphoric mood, sleep disturbance and suicidal ideas. The patient is not nervous/anxious.        Objective:      Physical Exam  Vitals and nursing note reviewed.   Constitutional:       General: He is not in acute distress.     Appearance: He is well-developed. He is not diaphoretic.   HENT:      Head: Normocephalic and atraumatic.      Right Ear: External ear normal.      Left Ear: External ear normal.      Nose: Nose normal.      Mouth/Throat:      Pharynx: No oropharyngeal exudate.   Eyes:      General:         Right eye: No discharge.         Left eye: No discharge.      Conjunctiva/sclera: Conjunctivae normal.      Pupils: Pupils are equal, round, and reactive to light.   Neck:      Thyroid: No thyromegaly.      Vascular: No JVD.      Trachea: No tracheal deviation.   Cardiovascular:      Rate and Rhythm: Normal rate and regular rhythm.      Heart sounds: Normal heart sounds. No murmur heard.    No friction rub. No gallop.   Pulmonary:      Effort: Pulmonary effort is normal. No respiratory distress.      Breath sounds: Normal breath sounds. No stridor. No wheezing or rales.   Chest:      Chest wall: No tenderness.   Abdominal:      General: Bowel sounds are normal. There is no distension.      Palpations: Abdomen is soft.      Tenderness: There is no abdominal tenderness. There is no guarding or rebound.   Musculoskeletal:         General: Swelling and tenderness present. No deformity or signs of injury.      Cervical back: Normal range of motion and neck supple.      Left knee: Swelling present. Decreased range of motion. Tenderness present over the medial joint line.      Right lower leg: No edema.      Left lower leg: No edema.        Legs:    Lymphadenopathy:      Cervical: No cervical adenopathy.   Skin:     General: Skin is warm and dry.      Coloration: Skin is not pale.      Findings: No erythema or  "rash.   Neurological:      Mental Status: He is alert and oriented to person, place, and time.      Cranial Nerves: No cranial nerve deficit.      Motor: No abnormal muscle tone.      Coordination: Coordination normal.      Deep Tendon Reflexes: Reflexes are normal and symmetric. Reflexes normal.   Psychiatric:         Behavior: Behavior normal.         Thought Content: Thought content normal.         Judgment: Judgment normal.         Assessment:       1. Uncontrolled type 2 diabetes mellitus with hyperglycemia    2. Hyperlipidemia LDL goal <70    3. Obesity (BMI 30-39.9)    4. Tobacco abuse    5. Prostate cancer screening    6. Chronic pain of left knee        Plan:   North was seen today for follow-up and knee pain.    Diagnoses and all orders for this visit:    Uncontrolled type 2 diabetes mellitus with hyperglycemia  -     CBC Auto Differential; Future  -     Comprehensive Metabolic Panel; Future  -     Microalbumin/Creatinine Ratio, Urine; Future  -     Hemoglobin A1C; Future    Hyperlipidemia LDL goal <70  -     CBC Auto Differential; Future  -     Comprehensive Metabolic Panel; Future  -     TSH; Future  -     Lipid Panel; Future    Obesity (BMI 30-39.9)  -     CBC Auto Differential; Future  -     Comprehensive Metabolic Panel; Future    Tobacco abuse  -     CBC Auto Differential; Future  -     Comprehensive Metabolic Panel; Future    Prostate cancer screening  -     PSA, Screening; Future    Chronic pain of left knee  -     Ambulatory referral/consult to Orthopedics; Future    "This note will not be shared with the patient."  Check CBC, CMP, TSH, Fasting lipid profile, HgA1c, Microalbuminuria in 6 months.  Medication compliance was discussed with the patient.  The patient was instructed to monitor glucoses closely using glucometer at home and to record the values in a log.  The patient was instructed to obtain an Optometry exam and microalbumin q year.  The patient was instructed to obtain a hemoglobin A1C " q 3-4 months.  The patient was instructed to check the feet visually daily and to monitor for infection.  The patient was instructed to exercise at least 3 times a week.  The patient was instructed to follow a 1800 ADA diet.  The patient was instructed to monitor weight daily and try to keep it as close to ideal body weight as possible.  The patient was instructed on using exercise frequently to reduce BP.  The patient was instructed on using a low salt diet.  Monitor BP at home and to record the values in a log.  RTC in 6 months.

## 2022-04-20 ENCOUNTER — PATIENT OUTREACH (OUTPATIENT)
Dept: ADMINISTRATIVE | Facility: OTHER | Age: 62
End: 2022-04-20
Payer: COMMERCIAL

## 2022-04-20 ENCOUNTER — TELEPHONE (OUTPATIENT)
Dept: ORTHOPEDICS | Facility: CLINIC | Age: 62
End: 2022-04-20
Payer: COMMERCIAL

## 2022-04-20 DIAGNOSIS — M25.562 LEFT KNEE PAIN, UNSPECIFIED CHRONICITY: Primary | ICD-10-CM

## 2022-04-20 NOTE — PROGRESS NOTES
Health Maintenance Due   Topic Date Due    Hepatitis C Screening  Never done    COVID-19 Vaccine (1) Never done    Foot Exam  Never done    LDCT Lung Screen  Never done    TETANUS VACCINE  01/28/2018    Influenza Vaccine (1) 09/01/2021     Updates were requested from care everywhere.  Chart was reviewed for overdue Proactive [facility] Encounters ([facility]) topics (CRS, Breast Cancer Screening, Eye exam)  Health Maintenance has been updated.  [facility] immunization registry triggered.  Immunizations were reconciled.

## 2022-04-22 ENCOUNTER — TELEPHONE (OUTPATIENT)
Dept: INTERNAL MEDICINE | Facility: CLINIC | Age: 62
End: 2022-04-22
Payer: COMMERCIAL

## 2022-04-22 ENCOUNTER — HOSPITAL ENCOUNTER (OUTPATIENT)
Dept: RADIOLOGY | Facility: HOSPITAL | Age: 62
Discharge: HOME | End: 2022-04-22
Attending: PHYSICIAN ASSISTANT
Payer: COMMERCIAL

## 2022-04-22 DIAGNOSIS — M25.562 LEFT KNEE PAIN, UNSPECIFIED CHRONICITY: ICD-10-CM

## 2022-04-22 PROCEDURE — 73564 X-RAY EXAM KNEE 4 OR MORE: CPT | Mod: 26,LT,, | Performed by: RADIOLOGY

## 2022-04-22 PROCEDURE — 73564 XR KNEE ORTHO LEFT WITH FLEXION: ICD-10-PCS | Mod: 26,LT,, | Performed by: RADIOLOGY

## 2022-04-22 PROCEDURE — 73562 X-RAY EXAM OF KNEE 3: CPT | Mod: 26,RT,, | Performed by: RADIOLOGY

## 2022-04-22 PROCEDURE — 73562 XR KNEE ORTHO LEFT WITH FLEXION: ICD-10-PCS | Mod: 26,RT,, | Performed by: RADIOLOGY

## 2022-04-22 PROCEDURE — 73562 X-RAY EXAM OF KNEE 3: CPT | Mod: TC,RT
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
## 2022-05-02 ENCOUNTER — PATIENT MESSAGE (OUTPATIENT)
Dept: SMOKING CESSATION | Facility: CLINIC | Age: 62
End: 2022-05-02
Payer: COMMERCIAL

## 2022-05-12 ENCOUNTER — TELEPHONE (OUTPATIENT)
Dept: INTERNAL MEDICINE | Facility: CLINIC | Age: 62
End: 2022-05-12
Payer: COMMERCIAL

## 2022-05-12 DIAGNOSIS — G89.29 CHRONIC PAIN OF LEFT KNEE: Primary | ICD-10-CM

## 2022-05-12 DIAGNOSIS — M25.562 CHRONIC PAIN OF LEFT KNEE: Primary | ICD-10-CM

## 2022-05-20 LAB
LEFT EYE DM RETINOPATHY: NEGATIVE
RIGHT EYE DM RETINOPATHY: NEGATIVE

## 2022-05-31 DIAGNOSIS — I10 ESSENTIAL HYPERTENSION: ICD-10-CM

## 2022-05-31 RX ORDER — PIOGLITAZONEHYDROCHLORIDE 30 MG/1
30 TABLET ORAL DAILY
Qty: 90 TABLET | Refills: 1 | Status: SHIPPED | OUTPATIENT
Start: 2022-05-31 | End: 2022-10-21 | Stop reason: SDUPTHER

## 2022-05-31 RX ORDER — LISINOPRIL 5 MG/1
5 TABLET ORAL DAILY
Qty: 30 TABLET | Refills: 5 | Status: SHIPPED | OUTPATIENT
Start: 2022-05-31 | End: 2022-10-21 | Stop reason: SDUPTHER

## 2022-07-07 RX ORDER — TAMSULOSIN HYDROCHLORIDE 0.4 MG/1
0.4 CAPSULE ORAL DAILY
Qty: 30 CAPSULE | Refills: 5 | Status: SHIPPED | OUTPATIENT
Start: 2022-07-07 | End: 2022-10-21 | Stop reason: SDUPTHER

## 2022-07-07 RX ORDER — METOPROLOL SUCCINATE 25 MG/1
25 TABLET, EXTENDED RELEASE ORAL DAILY
Qty: 30 TABLET | Refills: 5 | Status: SHIPPED | OUTPATIENT
Start: 2022-07-07 | End: 2022-10-21 | Stop reason: SDUPTHER

## 2022-07-11 RX ORDER — ATORVASTATIN CALCIUM 20 MG/1
20 TABLET, FILM COATED ORAL DAILY
Qty: 30 TABLET | Refills: 5 | Status: SHIPPED | OUTPATIENT
Start: 2022-07-11 | End: 2022-10-21 | Stop reason: SDUPTHER

## 2022-10-18 ENCOUNTER — CLINICAL SUPPORT (OUTPATIENT)
Dept: INTERNAL MEDICINE | Facility: CLINIC | Age: 62
End: 2022-10-18
Payer: COMMERCIAL

## 2022-10-18 DIAGNOSIS — Z72.0 TOBACCO ABUSE: ICD-10-CM

## 2022-10-18 DIAGNOSIS — E66.9 OBESITY (BMI 30-39.9): ICD-10-CM

## 2022-10-18 DIAGNOSIS — E11.65 UNCONTROLLED TYPE 2 DIABETES MELLITUS WITH HYPERGLYCEMIA: ICD-10-CM

## 2022-10-18 DIAGNOSIS — E78.5 HYPERLIPIDEMIA LDL GOAL <70: ICD-10-CM

## 2022-10-18 DIAGNOSIS — Z12.5 PROSTATE CANCER SCREENING: ICD-10-CM

## 2022-10-18 LAB
ALBUMIN SERPL BCP-MCNC: 4.3 G/DL (ref 3.5–5.2)
ALBUMIN/CREAT UR: 36.1 UG/MG (ref 0–30)
ALP SERPL-CCNC: 55 U/L (ref 55–135)
ALT SERPL W/O P-5'-P-CCNC: 20 U/L (ref 10–44)
ANION GAP SERPL CALC-SCNC: 10 MMOL/L (ref 8–16)
AST SERPL-CCNC: 21 U/L (ref 10–40)
BASOPHILS # BLD AUTO: 0.03 K/UL (ref 0–0.2)
BASOPHILS NFR BLD: 0.4 % (ref 0–1.9)
BILIRUB SERPL-MCNC: 2.2 MG/DL (ref 0.1–1)
BUN SERPL-MCNC: 11 MG/DL (ref 6–20)
CALCIUM SERPL-MCNC: 9.5 MG/DL (ref 8.7–10.5)
CHLORIDE SERPL-SCNC: 104 MMOL/L (ref 95–110)
CHOLEST SERPL-MCNC: 116 MG/DL (ref 120–199)
CHOLEST/HDLC SERPL: 3.4 {RATIO} (ref 2–5)
CO2 SERPL-SCNC: 25 MMOL/L (ref 23–29)
COMPLEXED PSA SERPL-MCNC: 0.51 NG/ML (ref 0–4)
CREAT SERPL-MCNC: 0.8 MG/DL (ref 0.5–1.4)
CREAT UR-MCNC: 169 MG/DL (ref 23–375)
DIFFERENTIAL METHOD: ABNORMAL
EOSINOPHIL # BLD AUTO: 0.1 K/UL (ref 0–0.5)
EOSINOPHIL NFR BLD: 1.7 % (ref 0–8)
ERYTHROCYTE [DISTWIDTH] IN BLOOD BY AUTOMATED COUNT: 13.7 % (ref 11.5–14.5)
EST. GFR  (NO RACE VARIABLE): >60 ML/MIN/1.73 M^2
ESTIMATED AVG GLUCOSE: 128 MG/DL (ref 68–131)
GLUCOSE SERPL-MCNC: 98 MG/DL (ref 70–110)
HBA1C MFR BLD: 6.1 % (ref 4–5.6)
HCT VFR BLD AUTO: 47.4 % (ref 40–54)
HDLC SERPL-MCNC: 34 MG/DL (ref 40–75)
HDLC SERPL: 29.3 % (ref 20–50)
HGB BLD-MCNC: 15.4 G/DL (ref 14–18)
IMM GRANULOCYTES # BLD AUTO: 0.04 K/UL (ref 0–0.04)
IMM GRANULOCYTES NFR BLD AUTO: 0.5 % (ref 0–0.5)
LDLC SERPL CALC-MCNC: 57.8 MG/DL (ref 63–159)
LYMPHOCYTES # BLD AUTO: 2.3 K/UL (ref 1–4.8)
LYMPHOCYTES NFR BLD: 28.7 % (ref 18–48)
MCH RBC QN AUTO: 32.9 PG (ref 27–31)
MCHC RBC AUTO-ENTMCNC: 32.5 G/DL (ref 32–36)
MCV RBC AUTO: 101 FL (ref 82–98)
MICROALBUMIN UR DL<=1MG/L-MCNC: 61 UG/ML
MONOCYTES # BLD AUTO: 0.7 K/UL (ref 0.3–1)
MONOCYTES NFR BLD: 8.2 % (ref 4–15)
NEUTROPHILS # BLD AUTO: 4.9 K/UL (ref 1.8–7.7)
NEUTROPHILS NFR BLD: 60.5 % (ref 38–73)
NONHDLC SERPL-MCNC: 82 MG/DL
NRBC BLD-RTO: 0 /100 WBC
PLATELET # BLD AUTO: 210 K/UL (ref 150–450)
PMV BLD AUTO: 10.3 FL (ref 9.2–12.9)
POTASSIUM SERPL-SCNC: 4.5 MMOL/L (ref 3.5–5.1)
PROT SERPL-MCNC: 7.4 G/DL (ref 6–8.4)
RBC # BLD AUTO: 4.68 M/UL (ref 4.6–6.2)
SODIUM SERPL-SCNC: 139 MMOL/L (ref 136–145)
TRIGL SERPL-MCNC: 121 MG/DL (ref 30–150)
TSH SERPL DL<=0.005 MIU/L-ACNC: 1.37 UIU/ML (ref 0.4–4)
WBC # BLD AUTO: 8.08 K/UL (ref 3.9–12.7)

## 2022-10-18 PROCEDURE — 82570 ASSAY OF URINE CREATININE: CPT | Performed by: NURSE PRACTITIONER

## 2022-10-18 PROCEDURE — 85025 COMPLETE CBC W/AUTO DIFF WBC: CPT | Performed by: NURSE PRACTITIONER

## 2022-10-18 PROCEDURE — 84153 ASSAY OF PSA TOTAL: CPT | Performed by: NURSE PRACTITIONER

## 2022-10-18 PROCEDURE — 83036 HEMOGLOBIN GLYCOSYLATED A1C: CPT | Performed by: NURSE PRACTITIONER

## 2022-10-18 PROCEDURE — 80061 LIPID PANEL: CPT | Performed by: NURSE PRACTITIONER

## 2022-10-18 PROCEDURE — 84443 ASSAY THYROID STIM HORMONE: CPT | Performed by: NURSE PRACTITIONER

## 2022-10-18 PROCEDURE — 82043 UR ALBUMIN QUANTITATIVE: CPT | Performed by: NURSE PRACTITIONER

## 2022-10-18 PROCEDURE — 36415 COLL VENOUS BLD VENIPUNCTURE: CPT | Performed by: NURSE PRACTITIONER

## 2022-10-18 PROCEDURE — 80053 COMPREHEN METABOLIC PANEL: CPT | Performed by: NURSE PRACTITIONER

## 2022-10-21 DIAGNOSIS — I10 ESSENTIAL HYPERTENSION: ICD-10-CM

## 2022-10-22 RX ORDER — LISINOPRIL 5 MG/1
5 TABLET ORAL DAILY
Qty: 30 TABLET | Refills: 5 | Status: SHIPPED | OUTPATIENT
Start: 2022-10-22 | End: 2023-05-04 | Stop reason: SDUPTHER

## 2022-10-22 RX ORDER — TAMSULOSIN HYDROCHLORIDE 0.4 MG/1
0.4 CAPSULE ORAL DAILY
Qty: 30 CAPSULE | Refills: 5 | Status: SHIPPED | OUTPATIENT
Start: 2022-10-22 | End: 2023-05-04 | Stop reason: SDUPTHER

## 2022-10-22 RX ORDER — METOPROLOL SUCCINATE 25 MG/1
25 TABLET, EXTENDED RELEASE ORAL DAILY
Qty: 30 TABLET | Refills: 5 | Status: SHIPPED | OUTPATIENT
Start: 2022-10-22 | End: 2023-05-04 | Stop reason: SDUPTHER

## 2022-10-22 RX ORDER — PIOGLITAZONEHYDROCHLORIDE 30 MG/1
30 TABLET ORAL DAILY
Qty: 90 TABLET | Refills: 1 | Status: SHIPPED | OUTPATIENT
Start: 2022-10-22 | End: 2023-05-04 | Stop reason: SDUPTHER

## 2022-10-22 RX ORDER — ATORVASTATIN CALCIUM 20 MG/1
20 TABLET, FILM COATED ORAL DAILY
Qty: 30 TABLET | Refills: 5 | Status: SHIPPED | OUTPATIENT
Start: 2022-10-22 | End: 2023-05-04 | Stop reason: SDUPTHER

## 2022-10-25 ENCOUNTER — OFFICE VISIT (OUTPATIENT)
Dept: INTERNAL MEDICINE | Facility: CLINIC | Age: 62
End: 2022-10-25
Payer: COMMERCIAL

## 2022-10-25 VITALS
SYSTOLIC BLOOD PRESSURE: 130 MMHG | HEIGHT: 74 IN | DIASTOLIC BLOOD PRESSURE: 68 MMHG | HEART RATE: 58 BPM | BODY MASS INDEX: 34.65 KG/M2 | RESPIRATION RATE: 16 BRPM | WEIGHT: 270 LBS

## 2022-10-25 DIAGNOSIS — Z11.59 NEED FOR HEPATITIS C SCREENING TEST: ICD-10-CM

## 2022-10-25 DIAGNOSIS — E11.65 UNCONTROLLED TYPE 2 DIABETES MELLITUS WITH HYPERGLYCEMIA: Primary | ICD-10-CM

## 2022-10-25 DIAGNOSIS — Z23 NEED FOR VACCINATION: ICD-10-CM

## 2022-10-25 DIAGNOSIS — R79.89 LOW TESTOSTERONE IN MALE: ICD-10-CM

## 2022-10-25 DIAGNOSIS — D68.61 ANTIPHOSPHOLIPID SYNDROME: ICD-10-CM

## 2022-10-25 DIAGNOSIS — E66.9 OBESITY (BMI 30.0-34.9): ICD-10-CM

## 2022-10-25 DIAGNOSIS — E78.5 HYPERLIPIDEMIA LDL GOAL <70: ICD-10-CM

## 2022-10-25 PROBLEM — E66.811 OBESITY (BMI 30.0-34.9): Status: ACTIVE | Noted: 2022-10-25

## 2022-10-25 PROCEDURE — 3066F PR DOCUMENTATION OF TREATMENT FOR NEPHROPATHY: ICD-10-PCS | Mod: CPTII,S$GLB,, | Performed by: NURSE PRACTITIONER

## 2022-10-25 PROCEDURE — 3078F DIAST BP <80 MM HG: CPT | Mod: CPTII,S$GLB,, | Performed by: NURSE PRACTITIONER

## 2022-10-25 PROCEDURE — 99999 PR PBB SHADOW E&M-EST. PATIENT-LVL IV: CPT | Mod: PBBFAC,,, | Performed by: NURSE PRACTITIONER

## 2022-10-25 PROCEDURE — 90471 IMMUNIZATION ADMIN: CPT | Mod: S$GLB,,, | Performed by: NURSE PRACTITIONER

## 2022-10-25 PROCEDURE — 99999 PR PBB SHADOW E&M-EST. PATIENT-LVL IV: ICD-10-PCS | Mod: PBBFAC,,, | Performed by: NURSE PRACTITIONER

## 2022-10-25 PROCEDURE — 90714 TD VACC NO PRESV 7 YRS+ IM: CPT | Mod: S$GLB,,, | Performed by: NURSE PRACTITIONER

## 2022-10-25 PROCEDURE — 90471 FLU VACCINE (QUAD) GREATER THAN OR EQUAL TO 3YO PRESERVATIVE FREE IM: ICD-10-PCS | Mod: S$GLB,,, | Performed by: NURSE PRACTITIONER

## 2022-10-25 PROCEDURE — 3060F PR POS MICROALBUMINURIA RESULT DOCUMENTED/REVIEW: ICD-10-PCS | Mod: CPTII,S$GLB,, | Performed by: NURSE PRACTITIONER

## 2022-10-25 PROCEDURE — 90472 TD VACCINE GREATER THAN OR EQUAL TO 7YO PRESERVATIVE FREE IM: ICD-10-PCS | Mod: S$GLB,,, | Performed by: NURSE PRACTITIONER

## 2022-10-25 PROCEDURE — 90714 TD VACCINE GREATER THAN OR EQUAL TO 7YO PRESERVATIVE FREE IM: ICD-10-PCS | Mod: S$GLB,,, | Performed by: NURSE PRACTITIONER

## 2022-10-25 PROCEDURE — 3075F PR MOST RECENT SYSTOLIC BLOOD PRESS GE 130-139MM HG: ICD-10-PCS | Mod: CPTII,S$GLB,, | Performed by: NURSE PRACTITIONER

## 2022-10-25 PROCEDURE — 90472 IMMUNIZATION ADMIN EACH ADD: CPT | Mod: S$GLB,,, | Performed by: NURSE PRACTITIONER

## 2022-10-25 PROCEDURE — 3060F POS MICROALBUMINURIA REV: CPT | Mod: CPTII,S$GLB,, | Performed by: NURSE PRACTITIONER

## 2022-10-25 PROCEDURE — 4010F ACE/ARB THERAPY RXD/TAKEN: CPT | Mod: CPTII,S$GLB,, | Performed by: NURSE PRACTITIONER

## 2022-10-25 PROCEDURE — 3066F NEPHROPATHY DOC TX: CPT | Mod: CPTII,S$GLB,, | Performed by: NURSE PRACTITIONER

## 2022-10-25 PROCEDURE — 90686 IIV4 VACC NO PRSV 0.5 ML IM: CPT | Mod: S$GLB,,, | Performed by: NURSE PRACTITIONER

## 2022-10-25 PROCEDURE — 3044F HG A1C LEVEL LT 7.0%: CPT | Mod: CPTII,S$GLB,, | Performed by: NURSE PRACTITIONER

## 2022-10-25 PROCEDURE — 1159F MED LIST DOCD IN RCRD: CPT | Mod: CPTII,S$GLB,, | Performed by: NURSE PRACTITIONER

## 2022-10-25 PROCEDURE — 4010F PR ACE/ARB THEARPY RXD/TAKEN: ICD-10-PCS | Mod: CPTII,S$GLB,, | Performed by: NURSE PRACTITIONER

## 2022-10-25 PROCEDURE — 90686 FLU VACCINE (QUAD) GREATER THAN OR EQUAL TO 3YO PRESERVATIVE FREE IM: ICD-10-PCS | Mod: S$GLB,,, | Performed by: NURSE PRACTITIONER

## 2022-10-25 PROCEDURE — 3075F SYST BP GE 130 - 139MM HG: CPT | Mod: CPTII,S$GLB,, | Performed by: NURSE PRACTITIONER

## 2022-10-25 PROCEDURE — 1159F PR MEDICATION LIST DOCUMENTED IN MEDICAL RECORD: ICD-10-PCS | Mod: CPTII,S$GLB,, | Performed by: NURSE PRACTITIONER

## 2022-10-25 PROCEDURE — 99214 OFFICE O/P EST MOD 30 MIN: CPT | Mod: 25,S$GLB,, | Performed by: NURSE PRACTITIONER

## 2022-10-25 PROCEDURE — 3078F PR MOST RECENT DIASTOLIC BLOOD PRESSURE < 80 MM HG: ICD-10-PCS | Mod: CPTII,S$GLB,, | Performed by: NURSE PRACTITIONER

## 2022-10-25 PROCEDURE — 99214 PR OFFICE/OUTPT VISIT, EST, LEVL IV, 30-39 MIN: ICD-10-PCS | Mod: 25,S$GLB,, | Performed by: NURSE PRACTITIONER

## 2022-10-25 PROCEDURE — 3044F PR MOST RECENT HEMOGLOBIN A1C LEVEL <7.0%: ICD-10-PCS | Mod: CPTII,S$GLB,, | Performed by: NURSE PRACTITIONER

## 2022-10-25 NOTE — PROGRESS NOTES
Subjective:       Patient ID: North Najera is a 62 y.o. male.    Chief Complaint: Follow-up (6 mo )    Patient is known, to me and presents with   Chief Complaint   Patient presents with    Follow-up     6 mo    .  Denies chest pain and shortness of breath.  Patient presents today for check up and labs. He is doing well and no complaints. Reports no abnormal bleeding or blood in stool.   Follow-up  Pertinent negatives include no abdominal pain, arthralgias, chest pain, chills, congestion, coughing, diaphoresis, fatigue, fever, headaches, joint swelling, myalgias, nausea, neck pain, numbness, rash, sore throat, vomiting or weakness.   Review of Systems   Constitutional: Negative.  Negative for activity change, appetite change, chills, diaphoresis, fatigue, fever and unexpected weight change.   HENT: Negative.  Negative for congestion, ear discharge, ear pain, facial swelling, hearing loss, nosebleeds, postnasal drip, rhinorrhea, sinus pressure, sneezing, sore throat, tinnitus, trouble swallowing and voice change.    Eyes: Negative.  Negative for photophobia, pain, discharge, redness, itching and visual disturbance.   Respiratory: Negative.  Negative for apnea, cough, choking, chest tightness, shortness of breath, wheezing and stridor.    Cardiovascular: Negative.  Negative for chest pain, palpitations and leg swelling.   Gastrointestinal:  Negative for abdominal distention, abdominal pain, anal bleeding, blood in stool, constipation, diarrhea, nausea and vomiting.   Genitourinary: Negative.  Negative for difficulty urinating, dysuria, enuresis, flank pain, frequency, hematuria, penile discharge, penile pain, penile swelling, scrotal swelling, testicular pain and urgency.   Musculoskeletal: Negative.  Negative for arthralgias, back pain, gait problem, joint swelling, myalgias, neck pain and neck stiffness.   Skin: Negative.  Negative for color change, pallor, rash and wound.   Neurological:  Negative for  dizziness, tremors, seizures, syncope, facial asymmetry, speech difficulty, weakness, light-headedness, numbness and headaches.   Hematological:  Negative for adenopathy. Does not bruise/bleed easily.   Psychiatric/Behavioral: Negative.  Negative for agitation, dysphoric mood, sleep disturbance and suicidal ideas. The patient is not nervous/anxious.      Objective:      Physical Exam  Vitals and nursing note reviewed.   Constitutional:       General: He is not in acute distress.     Appearance: He is well-developed. He is not diaphoretic.   HENT:      Head: Normocephalic and atraumatic.      Right Ear: External ear normal.      Left Ear: External ear normal.      Nose: Nose normal.      Mouth/Throat:      Pharynx: No oropharyngeal exudate.   Eyes:      General:         Right eye: No discharge.         Left eye: No discharge.      Conjunctiva/sclera: Conjunctivae normal.      Pupils: Pupils are equal, round, and reactive to light.   Neck:      Thyroid: No thyromegaly.      Vascular: No JVD.      Trachea: No tracheal deviation.   Cardiovascular:      Rate and Rhythm: Normal rate and regular rhythm.      Pulses:           Dorsalis pedis pulses are 2+ on the right side and 2+ on the left side.        Posterior tibial pulses are 2+ on the right side and 2+ on the left side.      Heart sounds: Normal heart sounds. No murmur heard.    No friction rub. No gallop.   Pulmonary:      Effort: Pulmonary effort is normal. No respiratory distress.      Breath sounds: Normal breath sounds. No stridor. No wheezing or rales.   Chest:      Chest wall: No tenderness.   Abdominal:      General: Bowel sounds are normal. There is no distension.      Palpations: Abdomen is soft.      Tenderness: There is no abdominal tenderness. There is no guarding or rebound.   Musculoskeletal:         General: No tenderness. Normal range of motion.      Cervical back: Normal range of motion and neck supple.      Right foot: Normal range of motion. No  deformity, bunion, Charcot foot, foot drop or prominent metatarsal heads.      Left foot: Normal range of motion. No deformity, bunion, Charcot foot, foot drop or prominent metatarsal heads.   Feet:      Right foot:      Protective Sensation: 10 sites tested.  10 sites sensed.      Skin integrity: Skin integrity normal.      Toenail Condition: Fungal disease present.     Left foot:      Protective Sensation: 10 sites tested.  10 sites sensed.      Skin integrity: Skin integrity normal.      Toenail Condition: Fungal disease present.  Lymphadenopathy:      Cervical: No cervical adenopathy.   Skin:     General: Skin is warm and dry.      Capillary Refill: Capillary refill takes less than 2 seconds.      Coloration: Skin is not jaundiced or pale.      Findings: No bruising, erythema, lesion or rash.   Neurological:      General: No focal deficit present.      Mental Status: He is alert and oriented to person, place, and time.      Cranial Nerves: No cranial nerve deficit.      Sensory: No sensory deficit.      Motor: No weakness or abnormal muscle tone.      Coordination: Coordination normal.      Gait: Gait normal.      Deep Tendon Reflexes: Reflexes are normal and symmetric. Reflexes normal.   Psychiatric:         Mood and Affect: Mood normal.         Behavior: Behavior normal.         Thought Content: Thought content normal.         Judgment: Judgment normal.       Assessment:       1. Uncontrolled type 2 diabetes mellitus with hyperglycemia    2. Hyperlipidemia LDL goal <70    3. Low testosterone in male    4. Antiphospholipid syndrome    5. Obesity (BMI 30.0-34.9)    6. Need for hepatitis C screening test    7. Need for vaccination        Plan:   North was seen today for follow-up.    Diagnoses and all orders for this visit:    Uncontrolled type 2 diabetes mellitus with hyperglycemia  -     CBC Auto Differential; Future  -     Comprehensive Metabolic Panel; Future  -     Microalbumin/Creatinine Ratio, Urine;  "Future  -     Hemoglobin A1C; Future    Hyperlipidemia LDL goal <70  -     CBC Auto Differential; Future  -     Comprehensive Metabolic Panel; Future  -     TSH; Future  -     Lipid Panel; Future    Low testosterone in male  -     CBC Auto Differential; Future  -     Comprehensive Metabolic Panel; Future  -     Testosterone Panel; Future    Antiphospholipid syndrome  -     CBC Auto Differential; Future  -     Comprehensive Metabolic Panel; Future    Obesity (BMI 30.0-34.9)  -     CBC Auto Differential; Future  -     Comprehensive Metabolic Panel; Future    Need for hepatitis C screening test  -     HEPATITIS C ANTIBODY; Future    Need for vaccination  -     Influenza - Quadrivalent *Preferred* (6 months+) (PF)  -     (In Office Administered) Td Vaccine  "This note will not be shared with the patient."  Continue with plan of care for now  Lab drawn today CBC, CMP, TSH, FLP  Limit the cholesterol in your diet to less than 300 mg per day.  Fats should contribute no more than 20 to 35% of your daily calories.  Less than 7 to 10% of your calories should come from saturated fat.  Avoid saturated fat products e.g., butter, some oils, meat, and poultry fat contain a lot of saturated fat.  Check food labels for fat and cholesterol content. Choose the foods with less fat per serving.  Limit the amount of butter and margarine you eat.  Use salad dressings and margarine made with polyunsaturated and monunsaturated fats.  Use egg whites or egg substitutes rather than whole eggs.  Replace whole-milk dairy products with nonfat or low-fat mild, cheese, spreads, and yogurt.  Eat skinless chicken, turkey, fish, and meatless entrees more often than red meat.  Choose lean cuts of meat and trim off all visible fat. Keep portion sizes moderate.  Avoid fatty desserts such as ice cream, cream-filled cakes, and cheesecakes. Choose fresh fruits, nonfat frozen yogurt, Popsicles, etc.  Reduce the amount of fried foods, vending machine food, " and fast food you eat.  Eat fruits and vegetables (especially fresh fruits and leafy vegetables), beans, and whole grains daily. The fiber in these foods helps lower cholesterol.  Look for low-fat or nonfat varieties of the foods you like to eat or look for substitutes.  You may need to exercise 60 minutes a day to prevent weight gain and 90 minutes a day to lose weight.  RTC in six months.

## 2023-05-01 ENCOUNTER — CLINICAL SUPPORT (OUTPATIENT)
Dept: INTERNAL MEDICINE | Facility: CLINIC | Age: 63
End: 2023-05-01
Payer: COMMERCIAL

## 2023-05-01 DIAGNOSIS — E78.5 HYPERLIPIDEMIA LDL GOAL <70: ICD-10-CM

## 2023-05-01 DIAGNOSIS — R79.89 LOW TESTOSTERONE IN MALE: ICD-10-CM

## 2023-05-01 DIAGNOSIS — E11.65 UNCONTROLLED TYPE 2 DIABETES MELLITUS WITH HYPERGLYCEMIA: ICD-10-CM

## 2023-05-01 DIAGNOSIS — E66.9 OBESITY (BMI 30.0-34.9): ICD-10-CM

## 2023-05-01 DIAGNOSIS — D68.61 ANTIPHOSPHOLIPID SYNDROME: ICD-10-CM

## 2023-05-01 LAB
ALBUMIN SERPL BCP-MCNC: 3.9 G/DL (ref 3.5–5.2)
ALBUMIN/CREAT UR: 27 UG/MG (ref 0–30)
ALP SERPL-CCNC: 55 U/L (ref 55–135)
ALT SERPL W/O P-5'-P-CCNC: 21 U/L (ref 10–44)
ANION GAP SERPL CALC-SCNC: 6 MMOL/L (ref 8–16)
AST SERPL-CCNC: 23 U/L (ref 10–40)
BASOPHILS # BLD AUTO: 0.04 K/UL (ref 0–0.2)
BASOPHILS NFR BLD: 0.5 % (ref 0–1.9)
BILIRUB SERPL-MCNC: 1.6 MG/DL (ref 0.1–1)
BUN SERPL-MCNC: 11 MG/DL (ref 8–23)
CALCIUM SERPL-MCNC: 9.5 MG/DL (ref 8.7–10.5)
CHLORIDE SERPL-SCNC: 105 MMOL/L (ref 95–110)
CHOLEST SERPL-MCNC: 109 MG/DL (ref 120–199)
CHOLEST/HDLC SERPL: 3.4 {RATIO} (ref 2–5)
CO2 SERPL-SCNC: 28 MMOL/L (ref 23–29)
CREAT SERPL-MCNC: 0.8 MG/DL (ref 0.5–1.4)
CREAT UR-MCNC: 178 MG/DL (ref 23–375)
DIFFERENTIAL METHOD: ABNORMAL
EOSINOPHIL # BLD AUTO: 0.2 K/UL (ref 0–0.5)
EOSINOPHIL NFR BLD: 1.9 % (ref 0–8)
ERYTHROCYTE [DISTWIDTH] IN BLOOD BY AUTOMATED COUNT: 13.2 % (ref 11.5–14.5)
EST. GFR  (NO RACE VARIABLE): >60 ML/MIN/1.73 M^2
ESTIMATED AVG GLUCOSE: 137 MG/DL (ref 68–131)
GLUCOSE SERPL-MCNC: 118 MG/DL (ref 70–110)
HBA1C MFR BLD: 6.4 % (ref 4–5.6)
HCT VFR BLD AUTO: 47.4 % (ref 40–54)
HDLC SERPL-MCNC: 32 MG/DL (ref 40–75)
HDLC SERPL: 29.4 % (ref 20–50)
HGB BLD-MCNC: 15.4 G/DL (ref 14–18)
IMM GRANULOCYTES # BLD AUTO: 0.05 K/UL (ref 0–0.04)
IMM GRANULOCYTES NFR BLD AUTO: 0.6 % (ref 0–0.5)
LDLC SERPL CALC-MCNC: 54.6 MG/DL (ref 63–159)
LYMPHOCYTES # BLD AUTO: 2.4 K/UL (ref 1–4.8)
LYMPHOCYTES NFR BLD: 27.5 % (ref 18–48)
MCH RBC QN AUTO: 32.2 PG (ref 27–31)
MCHC RBC AUTO-ENTMCNC: 32.5 G/DL (ref 32–36)
MCV RBC AUTO: 99 FL (ref 82–98)
MICROALBUMIN UR DL<=1MG/L-MCNC: 48 UG/ML
MONOCYTES # BLD AUTO: 0.8 K/UL (ref 0.3–1)
MONOCYTES NFR BLD: 9.1 % (ref 4–15)
NEUTROPHILS # BLD AUTO: 5.4 K/UL (ref 1.8–7.7)
NEUTROPHILS NFR BLD: 60.4 % (ref 38–73)
NONHDLC SERPL-MCNC: 77 MG/DL
NRBC BLD-RTO: 0 /100 WBC
PLATELET # BLD AUTO: 206 K/UL (ref 150–450)
PMV BLD AUTO: 10.1 FL (ref 9.2–12.9)
POTASSIUM SERPL-SCNC: 5.2 MMOL/L (ref 3.5–5.1)
PROT SERPL-MCNC: 6.9 G/DL (ref 6–8.4)
RBC # BLD AUTO: 4.79 M/UL (ref 4.6–6.2)
SODIUM SERPL-SCNC: 139 MMOL/L (ref 136–145)
TRIGL SERPL-MCNC: 112 MG/DL (ref 30–150)
TSH SERPL DL<=0.005 MIU/L-ACNC: 1.13 UIU/ML (ref 0.4–4)
WBC # BLD AUTO: 8.86 K/UL (ref 3.9–12.7)

## 2023-05-01 PROCEDURE — 80053 COMPREHEN METABOLIC PANEL: CPT | Performed by: NURSE PRACTITIONER

## 2023-05-01 PROCEDURE — 36415 PR COLLECTION VENOUS BLOOD,VENIPUNCTURE: ICD-10-PCS | Mod: S$GLB,,, | Performed by: NURSE PRACTITIONER

## 2023-05-01 PROCEDURE — 85025 COMPLETE CBC W/AUTO DIFF WBC: CPT | Performed by: NURSE PRACTITIONER

## 2023-05-01 PROCEDURE — 84270 ASSAY OF SEX HORMONE GLOBUL: CPT | Performed by: NURSE PRACTITIONER

## 2023-05-01 PROCEDURE — 83036 HEMOGLOBIN GLYCOSYLATED A1C: CPT | Performed by: NURSE PRACTITIONER

## 2023-05-01 PROCEDURE — 36415 COLL VENOUS BLD VENIPUNCTURE: CPT | Mod: S$GLB,,, | Performed by: NURSE PRACTITIONER

## 2023-05-01 PROCEDURE — 84443 ASSAY THYROID STIM HORMONE: CPT | Performed by: NURSE PRACTITIONER

## 2023-05-01 PROCEDURE — 80061 LIPID PANEL: CPT | Performed by: NURSE PRACTITIONER

## 2023-05-01 PROCEDURE — 36415 COLL VENOUS BLD VENIPUNCTURE: CPT | Performed by: NURSE PRACTITIONER

## 2023-05-01 PROCEDURE — 82570 ASSAY OF URINE CREATININE: CPT | Performed by: NURSE PRACTITIONER

## 2023-05-01 PROCEDURE — 84403 ASSAY OF TOTAL TESTOSTERONE: CPT | Performed by: NURSE PRACTITIONER

## 2023-05-04 ENCOUNTER — OFFICE VISIT (OUTPATIENT)
Dept: INTERNAL MEDICINE | Facility: CLINIC | Age: 63
End: 2023-05-04
Payer: COMMERCIAL

## 2023-05-04 VITALS
HEART RATE: 60 BPM | DIASTOLIC BLOOD PRESSURE: 82 MMHG | OXYGEN SATURATION: 97 % | SYSTOLIC BLOOD PRESSURE: 128 MMHG | WEIGHT: 281.5 LBS | HEIGHT: 74 IN | RESPIRATION RATE: 18 BRPM | BODY MASS INDEX: 36.13 KG/M2

## 2023-05-04 DIAGNOSIS — R79.89 LOW TESTOSTERONE IN MALE: ICD-10-CM

## 2023-05-04 DIAGNOSIS — Z12.5 PROSTATE CANCER SCREENING: ICD-10-CM

## 2023-05-04 DIAGNOSIS — E11.65 UNCONTROLLED TYPE 2 DIABETES MELLITUS WITH HYPERGLYCEMIA: Primary | ICD-10-CM

## 2023-05-04 DIAGNOSIS — E66.01 SEVERE OBESITY (BMI 35.0-39.9) WITH COMORBIDITY: ICD-10-CM

## 2023-05-04 DIAGNOSIS — Z11.59 NEED FOR HEPATITIS C SCREENING TEST: ICD-10-CM

## 2023-05-04 DIAGNOSIS — D68.61 ANTIPHOSPHOLIPID SYNDROME: ICD-10-CM

## 2023-05-04 DIAGNOSIS — I10 ESSENTIAL HYPERTENSION: ICD-10-CM

## 2023-05-04 DIAGNOSIS — E78.5 HYPERLIPIDEMIA LDL GOAL <70: ICD-10-CM

## 2023-05-04 PROCEDURE — 3074F SYST BP LT 130 MM HG: CPT | Mod: CPTII,S$GLB,, | Performed by: NURSE PRACTITIONER

## 2023-05-04 PROCEDURE — 1159F PR MEDICATION LIST DOCUMENTED IN MEDICAL RECORD: ICD-10-PCS | Mod: CPTII,S$GLB,, | Performed by: NURSE PRACTITIONER

## 2023-05-04 PROCEDURE — 99999 PR PBB SHADOW E&M-EST. PATIENT-LVL IV: CPT | Mod: PBBFAC,,, | Performed by: NURSE PRACTITIONER

## 2023-05-04 PROCEDURE — 99214 PR OFFICE/OUTPT VISIT, EST, LEVL IV, 30-39 MIN: ICD-10-PCS | Mod: S$GLB,,, | Performed by: NURSE PRACTITIONER

## 2023-05-04 PROCEDURE — 3066F PR DOCUMENTATION OF TREATMENT FOR NEPHROPATHY: ICD-10-PCS | Mod: CPTII,S$GLB,, | Performed by: NURSE PRACTITIONER

## 2023-05-04 PROCEDURE — 99214 OFFICE O/P EST MOD 30 MIN: CPT | Mod: S$GLB,,, | Performed by: NURSE PRACTITIONER

## 2023-05-04 PROCEDURE — 99999 PR PBB SHADOW E&M-EST. PATIENT-LVL IV: ICD-10-PCS | Mod: PBBFAC,,, | Performed by: NURSE PRACTITIONER

## 2023-05-04 PROCEDURE — 3061F PR NEG MICROALBUMINURIA RESULT DOCUMENTED/REVIEW: ICD-10-PCS | Mod: CPTII,S$GLB,, | Performed by: NURSE PRACTITIONER

## 2023-05-04 PROCEDURE — 3044F HG A1C LEVEL LT 7.0%: CPT | Mod: CPTII,S$GLB,, | Performed by: NURSE PRACTITIONER

## 2023-05-04 PROCEDURE — 3044F PR MOST RECENT HEMOGLOBIN A1C LEVEL <7.0%: ICD-10-PCS | Mod: CPTII,S$GLB,, | Performed by: NURSE PRACTITIONER

## 2023-05-04 PROCEDURE — 3008F PR BODY MASS INDEX (BMI) DOCUMENTED: ICD-10-PCS | Mod: CPTII,S$GLB,, | Performed by: NURSE PRACTITIONER

## 2023-05-04 PROCEDURE — 3066F NEPHROPATHY DOC TX: CPT | Mod: CPTII,S$GLB,, | Performed by: NURSE PRACTITIONER

## 2023-05-04 PROCEDURE — 3074F PR MOST RECENT SYSTOLIC BLOOD PRESSURE < 130 MM HG: ICD-10-PCS | Mod: CPTII,S$GLB,, | Performed by: NURSE PRACTITIONER

## 2023-05-04 PROCEDURE — 3079F PR MOST RECENT DIASTOLIC BLOOD PRESSURE 80-89 MM HG: ICD-10-PCS | Mod: CPTII,S$GLB,, | Performed by: NURSE PRACTITIONER

## 2023-05-04 PROCEDURE — 4010F PR ACE/ARB THEARPY RXD/TAKEN: ICD-10-PCS | Mod: CPTII,S$GLB,, | Performed by: NURSE PRACTITIONER

## 2023-05-04 PROCEDURE — 3061F NEG MICROALBUMINURIA REV: CPT | Mod: CPTII,S$GLB,, | Performed by: NURSE PRACTITIONER

## 2023-05-04 PROCEDURE — 4010F ACE/ARB THERAPY RXD/TAKEN: CPT | Mod: CPTII,S$GLB,, | Performed by: NURSE PRACTITIONER

## 2023-05-04 PROCEDURE — 1159F MED LIST DOCD IN RCRD: CPT | Mod: CPTII,S$GLB,, | Performed by: NURSE PRACTITIONER

## 2023-05-04 PROCEDURE — 3079F DIAST BP 80-89 MM HG: CPT | Mod: CPTII,S$GLB,, | Performed by: NURSE PRACTITIONER

## 2023-05-04 PROCEDURE — 3008F BODY MASS INDEX DOCD: CPT | Mod: CPTII,S$GLB,, | Performed by: NURSE PRACTITIONER

## 2023-05-04 RX ORDER — ATORVASTATIN CALCIUM 20 MG/1
20 TABLET, FILM COATED ORAL DAILY
Qty: 30 TABLET | Refills: 5 | Status: SHIPPED | OUTPATIENT
Start: 2023-05-04 | End: 2023-07-25 | Stop reason: SDUPTHER

## 2023-05-04 RX ORDER — TAMSULOSIN HYDROCHLORIDE 0.4 MG/1
0.4 CAPSULE ORAL DAILY
Qty: 30 CAPSULE | Refills: 5 | Status: SHIPPED | OUTPATIENT
Start: 2023-05-04 | End: 2023-06-09

## 2023-05-04 RX ORDER — LISINOPRIL 5 MG/1
5 TABLET ORAL DAILY
Qty: 30 TABLET | Refills: 5 | Status: SHIPPED | OUTPATIENT
Start: 2023-05-04 | End: 2023-06-16 | Stop reason: SDUPTHER

## 2023-05-04 RX ORDER — METOPROLOL SUCCINATE 25 MG/1
25 TABLET, EXTENDED RELEASE ORAL DAILY
Qty: 30 TABLET | Refills: 5 | Status: SHIPPED | OUTPATIENT
Start: 2023-05-04 | End: 2023-06-09

## 2023-05-04 RX ORDER — PIOGLITAZONEHYDROCHLORIDE 30 MG/1
30 TABLET ORAL DAILY
Qty: 90 TABLET | Refills: 1 | Status: SHIPPED | OUTPATIENT
Start: 2023-05-04 | End: 2023-06-09

## 2023-05-04 NOTE — PROGRESS NOTES
Subjective:       Patient ID: North Najera is a 63 y.o. male.    Chief Complaint: Follow-up (X 6 months-results)    Patient is known, to me and presents with   Chief Complaint   Patient presents with    Follow-up     X 6 months-results   .  Denies chest pain and shortness of breath.  Patient presents with check up and labs. Doing well at this time no complaints.   Follow-up  Pertinent negatives include no abdominal pain, arthralgias, chest pain, chills, congestion, coughing, diaphoresis, fatigue, fever, headaches, joint swelling, myalgias, nausea, neck pain, numbness, rash, sore throat, vomiting or weakness.   Review of Systems   Constitutional: Negative.  Negative for activity change, appetite change, chills, diaphoresis, fatigue, fever and unexpected weight change.   HENT: Negative.  Negative for congestion, ear discharge, ear pain, facial swelling, hearing loss, nosebleeds, postnasal drip, rhinorrhea, sinus pressure, sneezing, sore throat, tinnitus, trouble swallowing and voice change.    Eyes: Negative.  Negative for photophobia, pain, discharge, redness, itching and visual disturbance.   Respiratory: Negative.  Negative for apnea, cough, choking, chest tightness, shortness of breath, wheezing and stridor.    Cardiovascular: Negative.  Negative for chest pain, palpitations and leg swelling.   Gastrointestinal:  Negative for abdominal distention, abdominal pain, anal bleeding, blood in stool, constipation, diarrhea, nausea and vomiting.   Genitourinary: Negative.  Negative for difficulty urinating, dysuria, enuresis, flank pain, frequency, hematuria, penile discharge, penile pain, penile swelling, scrotal swelling, testicular pain and urgency.   Musculoskeletal: Negative.  Negative for arthralgias, back pain, gait problem, joint swelling, myalgias, neck pain and neck stiffness.   Skin: Negative.  Negative for color change, pallor, rash and wound.   Neurological:  Negative for dizziness, tremors, seizures,  syncope, facial asymmetry, speech difficulty, weakness, light-headedness, numbness and headaches.   Hematological:  Negative for adenopathy. Does not bruise/bleed easily.   Psychiatric/Behavioral: Negative.  Negative for agitation, dysphoric mood, sleep disturbance and suicidal ideas. The patient is not nervous/anxious.      Objective:      Physical Exam  Vitals and nursing note reviewed.   Constitutional:       General: He is not in acute distress.     Appearance: He is well-developed. He is not diaphoretic.   HENT:      Head: Normocephalic and atraumatic.      Right Ear: External ear normal.      Left Ear: External ear normal.      Nose: Nose normal.      Mouth/Throat:      Pharynx: No oropharyngeal exudate.   Eyes:      General:         Right eye: No discharge.         Left eye: No discharge.      Conjunctiva/sclera: Conjunctivae normal.      Pupils: Pupils are equal, round, and reactive to light.   Neck:      Thyroid: No thyromegaly.      Vascular: No JVD.      Trachea: No tracheal deviation.   Cardiovascular:      Rate and Rhythm: Normal rate and regular rhythm.      Heart sounds: Normal heart sounds. No murmur heard.    No friction rub. No gallop.   Pulmonary:      Effort: Pulmonary effort is normal. No respiratory distress.      Breath sounds: Normal breath sounds. No stridor. No wheezing or rales.   Chest:      Chest wall: No tenderness.   Abdominal:      General: Bowel sounds are normal. There is no distension.      Palpations: Abdomen is soft.      Tenderness: There is no abdominal tenderness. There is no guarding or rebound.   Musculoskeletal:         General: No tenderness. Normal range of motion.      Cervical back: Normal range of motion and neck supple.   Lymphadenopathy:      Cervical: No cervical adenopathy.   Skin:     General: Skin is warm and dry.      Capillary Refill: Capillary refill takes less than 2 seconds.      Coloration: Skin is not pale.      Findings: No erythema or rash.    Neurological:      General: No focal deficit present.      Mental Status: He is alert and oriented to person, place, and time.      Cranial Nerves: No cranial nerve deficit.      Motor: No abnormal muscle tone.      Coordination: Coordination normal.      Deep Tendon Reflexes: Reflexes are normal and symmetric. Reflexes normal.   Psychiatric:         Mood and Affect: Mood normal.         Behavior: Behavior normal.         Thought Content: Thought content normal. Thought content does not include homicidal or suicidal ideation. Thought content does not include homicidal or suicidal plan.         Judgment: Judgment normal.       Assessment:       1. Uncontrolled type 2 diabetes mellitus with hyperglycemia    2. Hyperlipidemia LDL goal <70    3. Essential hypertension    4. Low testosterone in male    5. Antiphospholipid syndrome    6. Severe obesity (BMI 35.0-39.9) with comorbidity    7. Need for hepatitis C screening test    8. Prostate cancer screening        Plan:   1. Uncontrolled type 2 diabetes mellitus with hyperglycemia  -     CBC Auto Differential; Future; Expected date: 10/31/2023  -     Comprehensive Metabolic Panel; Future; Expected date: 10/31/2023  -     Microalbumin/Creatinine Ratio, Urine; Future; Expected date: 10/31/2023  -     Hemoglobin A1C; Future; Expected date: 10/31/2023  -     lisinopriL (PRINIVIL,ZESTRIL) 5 MG tablet; Take 1 tablet (5 mg total) by mouth once daily.  Dispense: 30 tablet; Refill: 5  -     pioglitazone (ACTOS) 30 MG tablet; Take 1 tablet (30 mg total) by mouth once daily.  Dispense: 90 tablet; Refill: 1  -     SITagliptin phosphate (JANUVIA) 50 MG Tab; Take 1 tablet (50 mg total) by mouth once daily.  Dispense: 30 tablet; Refill: 5    2. Hyperlipidemia LDL goal <70  -     CBC Auto Differential; Future; Expected date: 10/31/2023  -     Comprehensive Metabolic Panel; Future; Expected date: 10/31/2023  -     TSH; Future; Expected date: 10/31/2023  -     Lipid Panel; Future;  "Expected date: 10/31/2023  -     atorvastatin (LIPITOR) 20 MG tablet; Take 1 tablet (20 mg total) by mouth once daily IN THE MORNING  Dispense: 30 tablet; Refill: 5    3. Essential hypertension  -     metoprolol succinate (TOPROL-XL) 25 MG 24 hr tablet; Take 1 tablet (25 mg total) by mouth once daily.  Dispense: 30 tablet; Refill: 5    4. Low testosterone in male  -     CBC Auto Differential; Future; Expected date: 10/31/2023  -     Comprehensive Metabolic Panel; Future; Expected date: 10/31/2023  -     Testosterone Panel; Future; Expected date: 11/04/2023    5. Antiphospholipid syndrome  -     rivaroxaban (XARELTO) 20 mg Tab; Take 1 tablet (20 mg total) by mouth once daily.  Dispense: 30 tablet; Refill: 5    6. Severe obesity (BMI 35.0-39.9) with comorbidity  -     CBC Auto Differential; Future; Expected date: 10/31/2023  -     Comprehensive Metabolic Panel; Future; Expected date: 10/31/2023    7. Need for hepatitis C screening test  -     HEPATITIS C ANTIBODY; Future; Expected date: 11/04/2023    8. Prostate cancer screening  -     PSA, Screening; Future; Expected date: 11/04/2023    Other orders  -     tamsulosin (FLOMAX) 0.4 mg Cap; Take 1 capsule (0.4 mg total) by mouth once daily.  Dispense: 30 capsule; Refill: 5     "This note will not be shared with the patient."  Check CBC, CMP, TSH, Fasting lipid profile, HgA1c, Microalbuminuria in 6  months.  Medication compliance was discussed with the patient.  The patient was instructed to monitor glucoses closely using glucometer at home and to record the values in a log.  The patient was instructed to obtain an Optometry exam and microalbumin q year.  The patient was instructed to obtain a hemoglobin A1C q 3-4 months.  The patient was instructed to check the feet visually daily and to monitor for infection.  The patient was instructed to exercise at least 3 times a week.  The patient was instructed to follow a 1800 ADA diet.  The patient was instructed to monitor " weight daily and try to keep it as close to ideal body weight as possible.  The patient was instructed on using exercise frequently to reduce BP.  The patient was instructed on using a low salt diet.  Monitor BP at home and to record the values in a log.  RTC in 6  months.

## 2023-05-09 LAB
ALBUMIN SERPL-MCNC: 4.3 G/DL (ref 3.6–5.1)
SHBG SERPL-SCNC: 48 NMOL/L (ref 22–77)
TESTOST FREE SERPL-MCNC: 39.4 PG/ML (ref 46–224)
TESTOST SERPL-MCNC: 410 NG/DL (ref 250–1100)
TESTOSTERONE.FREE+WB SERPL-MCNC: 77.5 NG/DL (ref 110–575)

## 2023-05-26 DIAGNOSIS — I10 ESSENTIAL HYPERTENSION: ICD-10-CM

## 2023-05-26 RX ORDER — METOPROLOL SUCCINATE 25 MG/1
25 TABLET, EXTENDED RELEASE ORAL DAILY
Qty: 30 TABLET | Refills: 5 | Status: ON HOLD | OUTPATIENT
Start: 2023-05-26 | End: 2023-06-16 | Stop reason: HOSPADM

## 2023-05-26 RX ORDER — LISINOPRIL 5 MG/1
5 TABLET ORAL DAILY
Qty: 30 TABLET | Refills: 5 | Status: SHIPPED | OUTPATIENT
Start: 2023-05-26 | End: 2023-06-09

## 2023-05-26 RX ORDER — ATORVASTATIN CALCIUM 20 MG/1
20 TABLET, FILM COATED ORAL DAILY
Qty: 30 TABLET | Refills: 5 | Status: SHIPPED | OUTPATIENT
Start: 2023-05-26 | End: 2023-06-09

## 2023-05-26 RX ORDER — TAMSULOSIN HYDROCHLORIDE 0.4 MG/1
0.4 CAPSULE ORAL DAILY
Qty: 30 CAPSULE | Refills: 5 | Status: SHIPPED | OUTPATIENT
Start: 2023-05-26 | End: 2024-02-07 | Stop reason: SDUPTHER

## 2023-05-26 RX ORDER — PIOGLITAZONEHYDROCHLORIDE 30 MG/1
30 TABLET ORAL DAILY
Qty: 90 TABLET | Refills: 1 | Status: ON HOLD | OUTPATIENT
Start: 2023-05-26 | End: 2023-06-16 | Stop reason: HOSPADM

## 2023-06-05 ENCOUNTER — PATIENT OUTREACH (OUTPATIENT)
Dept: ADMINISTRATIVE | Facility: HOSPITAL | Age: 63
End: 2023-06-05
Payer: COMMERCIAL

## 2023-06-05 LAB
LEFT EYE DM RETINOPATHY: NEGATIVE
RIGHT EYE DM RETINOPATHY: NEGATIVE

## 2023-06-05 NOTE — PROGRESS NOTES
Chart reviewed, immunization record updated.  No new results noted on [facility] or [facility] web site.  Care Everywhere updated.   Patient care coordination note updated.   Upcoming PCP visit updated.  Next PCP visit 11/10/2023.  LOV with PCP 05/04/2023.  Received external Diabetic Eye exam collected/ completed on 06/05/2023, updated to .       Problem: Risk for Impaired Skin Integrity  Goal: Tissue integrity - skin and mucous membranes  Description  Structural intactness and normal physiological function of skin and  mucous membranes. Outcome: Ongoing  Note:   Skin assessment completed this shift. Nutrition and Hydration status assessed with adequate intake. Roger Score as charted. Bilateral heels remain elevated on pillows throughout the shift. Patient able to reposition self for comfort and to prevent breakdown. Patient verbalizes understanding of pressure ulcer prevention measures. Skin integrity maintained. No new skin breakdown noted. Skin to high risk pressure areas including coccyx and heels are clear. Dressing changed this shift per pt request. Dwayne Cork are intact with no S/S of infection noted. Medi pore placed onto back incision. Problem: Falls - Risk of:  Goal: Will remain free from falls  Description  Will remain free from falls  Outcome: Ongoing  Note:   No falls or injuries sustained at this time. No attempts to get out of bed without nursing assistance. Call light within reach and pt. uses appropriately for assistance. Siderails up x 2. Nonskid footwear remains on. Bed in low and locked position. Hourly nursing rounds made. Pt. Alert and oriented, aware of limitations, and exhibits good safety judgement. Pt. uses assistive devices appropriately. Pt. understands individual fall risk factors. Pt. reoriented to surroundings and reminded to use call light with each nurse/patient interaction. Bed alarm remains engaged throughout the shift as a precaution.         Problem: Pain:  Goal: Control of acute pain  Description  Control of acute pain  Outcome: Ongoing  Note:   Pain assessment and reassessment completed so ar this shift. Pt. able to rest after the use of pain medications. Patient medicated with Tamera 5-10mg Q4hrs for c/o pain lower back. Patient relates a tolerable level of discomfort with the current medication.    Pt. Repositions per self for comfort. Nonverbal cues indicate pain relief. Pt. Rests quietly with eyes closed after pain medication administration. Respirations easy and unlabored.  Appears free from distress.

## 2023-06-08 ENCOUNTER — HOSPITAL ENCOUNTER (EMERGENCY)
Facility: HOSPITAL | Age: 63
Discharge: HOME | End: 2023-06-08
Attending: STUDENT IN AN ORGANIZED HEALTH CARE EDUCATION/TRAINING PROGRAM
Payer: COMMERCIAL

## 2023-06-08 VITALS
SYSTOLIC BLOOD PRESSURE: 133 MMHG | HEIGHT: 74 IN | HEART RATE: 89 BPM | BODY MASS INDEX: 36.25 KG/M2 | OXYGEN SATURATION: 99 % | TEMPERATURE: 98 F | DIASTOLIC BLOOD PRESSURE: 96 MMHG | WEIGHT: 282.44 LBS | RESPIRATION RATE: 18 BRPM

## 2023-06-08 DIAGNOSIS — R42 LIGHTHEADED: Primary | ICD-10-CM

## 2023-06-08 LAB
ALBUMIN SERPL BCP-MCNC: 4.3 G/DL (ref 3.5–5.2)
ALP SERPL-CCNC: 51 U/L (ref 55–135)
ALT SERPL W/O P-5'-P-CCNC: 24 U/L (ref 10–44)
AMPHET+METHAMPHET UR QL: NEGATIVE
ANION GAP SERPL CALC-SCNC: 10 MMOL/L (ref 8–16)
AST SERPL-CCNC: 15 U/L (ref 10–40)
BARBITURATES UR QL SCN>200 NG/ML: NEGATIVE
BASOPHILS # BLD AUTO: 0.05 K/UL (ref 0–0.2)
BASOPHILS NFR BLD: 0.4 % (ref 0–1.9)
BENZODIAZ UR QL SCN>200 NG/ML: NEGATIVE
BILIRUB SERPL-MCNC: 1.7 MG/DL (ref 0.1–1)
BUN SERPL-MCNC: 11 MG/DL (ref 8–23)
BZE UR QL SCN: NEGATIVE
CALCIUM SERPL-MCNC: 9.2 MG/DL (ref 8.7–10.5)
CANNABINOIDS UR QL SCN: NEGATIVE
CHLORIDE SERPL-SCNC: 105 MMOL/L (ref 95–110)
CO2 SERPL-SCNC: 22 MMOL/L (ref 23–29)
CREAT SERPL-MCNC: 0.8 MG/DL (ref 0.5–1.4)
CREAT UR-MCNC: 132 MG/DL (ref 23–375)
D DIMER PPP IA.FEU-MCNC: 0.2 MG/L FEU
DIFFERENTIAL METHOD: ABNORMAL
EOSINOPHIL # BLD AUTO: 0.1 K/UL (ref 0–0.5)
EOSINOPHIL NFR BLD: 0.5 % (ref 0–8)
ERYTHROCYTE [DISTWIDTH] IN BLOOD BY AUTOMATED COUNT: 13.6 % (ref 11.5–14.5)
EST. GFR  (NO RACE VARIABLE): >60 ML/MIN/1.73 M^2
GLUCOSE SERPL-MCNC: 119 MG/DL (ref 70–110)
HCT VFR BLD AUTO: 45.6 % (ref 40–54)
HGB BLD-MCNC: 14.8 G/DL (ref 14–18)
IMM GRANULOCYTES # BLD AUTO: 0.07 K/UL (ref 0–0.04)
IMM GRANULOCYTES NFR BLD AUTO: 0.6 % (ref 0–0.5)
LYMPHOCYTES # BLD AUTO: 2.7 K/UL (ref 1–4.8)
LYMPHOCYTES NFR BLD: 23.8 % (ref 18–48)
MAGNESIUM SERPL-MCNC: 1.8 MG/DL (ref 1.6–2.6)
MCH RBC QN AUTO: 31.8 PG (ref 27–31)
MCHC RBC AUTO-ENTMCNC: 32.5 G/DL (ref 32–36)
MCV RBC AUTO: 98 FL (ref 82–98)
METHADONE UR QL SCN>300 NG/ML: NEGATIVE
MONOCYTES # BLD AUTO: 0.7 K/UL (ref 0.3–1)
MONOCYTES NFR BLD: 6 % (ref 4–15)
NEUTROPHILS # BLD AUTO: 7.7 K/UL (ref 1.8–7.7)
NEUTROPHILS NFR BLD: 68.7 % (ref 38–73)
NRBC BLD-RTO: 0 /100 WBC
OPIATES UR QL SCN: NEGATIVE
PCP UR QL SCN>25 NG/ML: NEGATIVE
PLATELET # BLD AUTO: 193 K/UL (ref 150–450)
PMV BLD AUTO: 9 FL (ref 9.2–12.9)
POCT GLUCOSE: 106 MG/DL (ref 70–110)
POTASSIUM SERPL-SCNC: 4.4 MMOL/L (ref 3.5–5.1)
PROT SERPL-MCNC: 7.2 G/DL (ref 6–8.4)
RBC # BLD AUTO: 4.66 M/UL (ref 4.6–6.2)
SODIUM SERPL-SCNC: 137 MMOL/L (ref 136–145)
TOXICOLOGY INFORMATION: NORMAL
TROPONIN I SERPL DL<=0.01 NG/ML-MCNC: 0.03 NG/ML (ref 0–0.03)
WBC # BLD AUTO: 11.19 K/UL (ref 3.9–12.7)

## 2023-06-08 PROCEDURE — 82962 GLUCOSE BLOOD TEST: CPT

## 2023-06-08 PROCEDURE — 93010 EKG 12-LEAD: ICD-10-PCS | Mod: ,,, | Performed by: INTERNAL MEDICINE

## 2023-06-08 PROCEDURE — 93010 ELECTROCARDIOGRAM REPORT: CPT | Mod: ,,, | Performed by: INTERNAL MEDICINE

## 2023-06-08 PROCEDURE — 85025 COMPLETE CBC W/AUTO DIFF WBC: CPT | Performed by: STUDENT IN AN ORGANIZED HEALTH CARE EDUCATION/TRAINING PROGRAM

## 2023-06-08 PROCEDURE — 99285 EMERGENCY DEPT VISIT HI MDM: CPT | Mod: 25

## 2023-06-08 PROCEDURE — 80307 DRUG TEST PRSMV CHEM ANLYZR: CPT | Performed by: STUDENT IN AN ORGANIZED HEALTH CARE EDUCATION/TRAINING PROGRAM

## 2023-06-08 PROCEDURE — 80053 COMPREHEN METABOLIC PANEL: CPT | Performed by: STUDENT IN AN ORGANIZED HEALTH CARE EDUCATION/TRAINING PROGRAM

## 2023-06-08 PROCEDURE — 84484 ASSAY OF TROPONIN QUANT: CPT | Performed by: STUDENT IN AN ORGANIZED HEALTH CARE EDUCATION/TRAINING PROGRAM

## 2023-06-08 PROCEDURE — 85379 FIBRIN DEGRADATION QUANT: CPT | Performed by: STUDENT IN AN ORGANIZED HEALTH CARE EDUCATION/TRAINING PROGRAM

## 2023-06-08 PROCEDURE — 83735 ASSAY OF MAGNESIUM: CPT | Performed by: STUDENT IN AN ORGANIZED HEALTH CARE EDUCATION/TRAINING PROGRAM

## 2023-06-08 PROCEDURE — 36415 COLL VENOUS BLD VENIPUNCTURE: CPT | Performed by: STUDENT IN AN ORGANIZED HEALTH CARE EDUCATION/TRAINING PROGRAM

## 2023-06-08 PROCEDURE — 93005 ELECTROCARDIOGRAM TRACING: CPT

## 2023-06-08 NOTE — ED PROVIDER NOTES
Encounter Date: 6/8/2023       History     Chief Complaint   Patient presents with    Dizziness     63-year-old male with history of diabetes, prior DVT, presenting with multiple episodes of lightheadedness and near syncope over the last few months.  Patient had an episode like this earlier today.  No loss of consciousness.  Denies chest pain or shortness of breath.  No recent nausea, vomiting, diarrhea.  Denies any dysuria or back pain.  No frequent urination.  No fever.  Patient does report mild headache that occurred prior to lightheadedness.  No other complaints.    Review of patient's allergies indicates:   Allergen Reactions    Codeine Diarrhea     Patient can not tolerate Tylenol #3, states he does okay with Codeine with cough medications     Nickel sutures [surgical stainless steel] Rash     Past Medical History:   Diagnosis Date    Anticoagulant long-term use     APS (antiphospholipid syndrome)     Cancer     skin cancer- R. hand    Deep vein thrombosis     Diabetes mellitus     Diabetes mellitus, type 2     Elevated homocysteine     History of DVT (deep vein thrombosis) 2002    Obesity     Screening for colon cancer 5/4/2017     Past Surgical History:   Procedure Laterality Date    APPENDECTOMY  1976    CHOLECYSTECTOMY  1987    COLONOSCOPY N/A 5/4/2017    Procedure: COLONOSCOPY;  Surgeon: Gabriel Saini MD;  Location: [facility];  Service: Endoscopy;  Laterality: N/A;    COLONOSCOPY W/ BIOPSIES AND POLYPECTOMY  2011    SKIN SURGERY      piece of skin removed on R.hand- skin cancer     TONSILLECTOMY       Family History   Problem Relation Age of Onset    No Known Problems Mother     No Known Problems Father     Diabetes Maternal Grandmother      Social History     Tobacco Use    Smoking status: Every Day     Packs/day: 1.50     Years: 30.00     Pack years: 45.00     Types: Cigarettes     Start date: 3/28/1987     Last attempt to quit: 8/3/2019     Years since quitting: 3.8    Smokeless tobacco: Never    Substance Use Topics    Alcohol use: Yes     Comment: Occasionally 3-4 drinks per month at the most    Drug use: No     Review of Systems   Constitutional:  Negative for fever.   HENT:  Negative for sore throat.    Respiratory:  Negative for shortness of breath.    Cardiovascular:  Negative for chest pain.   Gastrointestinal:  Negative for abdominal pain, nausea and vomiting.   Genitourinary:  Negative for dysuria.   Musculoskeletal:  Negative for back pain.   Skin:  Negative for rash.   Neurological:  Positive for light-headedness and headaches. Negative for weakness.   Hematological:  Does not bruise/bleed easily.     Physical Exam     Initial Vitals [06/08/23 1317]   BP Pulse Resp Temp SpO2   125/76 75 20 97.6 °F (36.4 °C) 97 %      MAP       --         Physical Exam    Nursing note and vitals reviewed.  Constitutional: He appears well-developed. He is not diaphoretic. No distress.   Eyes: EOM are normal.   Neck:   Normal range of motion.  Cardiovascular:            No murmur heard.  Pulmonary/Chest: No respiratory distress.   Clear lungs bilaterally.  No respiratory distress.  No wheezing or rales.  Good air movement.     Abdominal: He exhibits no distension.   No TTP diffusely. No guarding, rebound, or masses.    Musculoskeletal:         General: Normal range of motion.      Cervical back: Normal range of motion.     Neurological: He is alert and oriented to person, place, and time.   Skin: Skin is warm.   Psychiatric: He has a normal mood and affect.       ED Course   Procedures  Labs Reviewed   CBC W/ AUTO DIFFERENTIAL - Abnormal; Notable for the following components:       Result Value    MCH 31.8 (*)     MPV 9.0 (*)     Immature Granulocytes 0.6 (*)     Immature Grans (Abs) 0.07 (*)     All other components within normal limits   COMPREHENSIVE METABOLIC PANEL - Abnormal; Notable for the following components:    CO2 22 (*)     Glucose 119 (*)     Total Bilirubin 1.7 (*)     Alkaline Phosphatase 51 (*)      All other components within normal limits   TROPONIN I   MAGNESIUM   DRUG SCREEN PANEL, URINE EMERGENCY    Narrative:     Specimen Source->Urine   D DIMER, QUANTITATIVE   POCT GLUCOSE   POCT GLUCOSE MONITORING CONTINUOUS     EKG Readings: (Independently Interpreted)   Initial Reading: No STEMI. Rhythm: Normal Sinus Rhythm. Heart Rate: 66. Ectopy: No Ectopy. Conduction: Normal.     Imaging Results              X-Ray Chest AP Portable (Final result)  Result time 06/08/23 14:13:48      Final result by Ezequiel Lee MD (06/08/23 14:13:48)                   Impression:      No acute finding detected.      Electronically signed by: Ezequiel Lee MD  Date:    06/08/2023  Time:    14:13               Narrative:    EXAMINATION:  XR CHEST AP PORTABLE    CLINICAL HISTORY:  lightheaded;    TECHNIQUE:  Frontal view obtained of the chest    COMPARISON:  No priors available    FINDINGS:  No cardiac silhouette enlargement. Pulmonary vasculature unremarkable. No consolidation or evidence of pneumothorax. No acute osseous change.                                       CT Head Without Contrast (Final result)  Result time 06/08/23 14:36:45      Final result by Ezequiel Lee MD (06/08/23 14:36:45)                   Impression:      Streak artifact at the skull base.  No acute intracranial process is detected.      Electronically signed by: Ezequiel Lee MD  Date:    06/08/2023  Time:    14:36               Narrative:    EXAMINATION:  CT HEAD WITHOUT CONTRAST    CLINICAL HISTORY:  Headache, chronic, new features or increased frequency;    TECHNIQUE:  Axial CT images were obtained from the skull base to the vertex without contrast. Iterative reconstruction technique was used.  CT/Cardiac nuclear examinations in the past 12 months: 0    COMPARISON:  No priors available    FINDINGS:  Streak artifact at the skull base resulting in a component of decreased resolution within the middle and posterior cranial fossa regions.  No ventricular or basal  cistern effacement.  No evidence of acute intracranial hemorrhage, midline shift, mass, or mass effect.  No detected extra-axial fluid collections.  Imaged paranasal sinuses and mastoid air cells are clear.  Calvarium is intact.                                       Medications - No data to display  Medical Decision Making:   Differential Diagnosis:   DDX:  Lightheadedness - given risk factors and history will rule out ACS as cause for lightheadedness. Less likely neurological given nonfocal neuro exam, history, no urinary incontinence, no tongue biting, no seizure like activity. R/o hyper/hypoglycemia, occult infection.  Patient has no concerning morphologies on their EKG for any concerning underlying cardiac pathology (including ACS, Brugada, Wellens, Prolonged QT, HOCM, WPW, ARVD)  DX: BMP, CBC, trop, EKG. UA. CXR. CTH.  TX: Analgesia PRN. IVF if poor PO intake.   DISPO: Pending workup               ED Course as of 06/08/23 1455   Thu Jun 08, 2023   1452 I have evaluated this EKG (Date: 6/8/23 Time: 14:49 ) and found the following:    Rate: 65  Rhythm: NSR  Axis: LAD  Signs of ischemia: None    Conclusion: NSR    Patient has no concerning morphologies on their EKG for any concerning underlying cardiac pathology (including ACS, Brugada, Wellens, Prolonged QT, HOCM, WPW, ARVD)   [NB]      ED Course User Index  [NB] Ryan Mcdonnell MD                 Clinical Impression:   Final diagnoses:  [R42] Lightheaded (Primary)        ED Disposition Condition    Discharge Stable          ED Prescriptions    None       Follow-up Information       Follow up With Specialties Details Why Contact Info    Irma Biswas NP Family Medicine Schedule an appointment as soon as possible for a visit in 2 days  [address] [facility] [address]  [phone]      [facility] - Emergency Dept Emergency Medicine  If symptoms worsen [facility] [facility] [address] [phone]    Neno Constantino MD Cardiology,  Interventional Cardiology Schedule an appointment as soon as possible for a visit in 2 days  [address] [facility] [address]  [phone]               Ryan Mcdonnell MD  06/08/23 1342       Ryan Mcdonnell MD  06/08/23 1350       Ryan Mcdonnell MD  06/08/23 0760

## 2023-06-09 ENCOUNTER — HOSPITAL ENCOUNTER (OUTPATIENT)
Dept: PULMONOLOGY | Facility: HOSPITAL | Age: 63
Discharge: HOME | End: 2023-06-09
Attending: INTERNAL MEDICINE
Payer: COMMERCIAL

## 2023-06-09 ENCOUNTER — OFFICE VISIT (OUTPATIENT)
Dept: CARDIOLOGY | Facility: CLINIC | Age: 63
End: 2023-06-09
Payer: COMMERCIAL

## 2023-06-09 VITALS
RESPIRATION RATE: 18 BRPM | HEART RATE: 56 BPM | WEIGHT: 282.19 LBS | SYSTOLIC BLOOD PRESSURE: 128 MMHG | BODY MASS INDEX: 36.22 KG/M2 | HEIGHT: 74 IN | OXYGEN SATURATION: 98 % | DIASTOLIC BLOOD PRESSURE: 90 MMHG

## 2023-06-09 DIAGNOSIS — R42 LIGHTHEADED: ICD-10-CM

## 2023-06-09 DIAGNOSIS — R55 VASOVAGAL NEAR-SYNCOPE: ICD-10-CM

## 2023-06-09 DIAGNOSIS — E11.9 DIABETES MELLITUS WITHOUT COMPLICATION: ICD-10-CM

## 2023-06-09 DIAGNOSIS — E66.01 SEVERE OBESITY (BMI 35.0-39.9) WITH COMORBIDITY: ICD-10-CM

## 2023-06-09 DIAGNOSIS — R55 SYNCOPE AND COLLAPSE: ICD-10-CM

## 2023-06-09 DIAGNOSIS — D68.61 ANTIPHOSPHOLIPID SYNDROME: ICD-10-CM

## 2023-06-09 DIAGNOSIS — I25.10 CORONARY ARTERY DISEASE INVOLVING NATIVE CORONARY ARTERY OF NATIVE HEART WITHOUT ANGINA PECTORIS: ICD-10-CM

## 2023-06-09 DIAGNOSIS — R94.31 ABNORMAL EKG: ICD-10-CM

## 2023-06-09 DIAGNOSIS — I70.0 AORTIC ATHEROSCLEROSIS: ICD-10-CM

## 2023-06-09 DIAGNOSIS — Z72.0 TOBACCO ABUSE: ICD-10-CM

## 2023-06-09 DIAGNOSIS — R55 SYNCOPE AND COLLAPSE: Primary | ICD-10-CM

## 2023-06-09 DIAGNOSIS — I49.3 PVC'S (PREMATURE VENTRICULAR CONTRACTIONS): ICD-10-CM

## 2023-06-09 DIAGNOSIS — E78.5 HYPERLIPIDEMIA LDL GOAL <70: ICD-10-CM

## 2023-06-09 DIAGNOSIS — Z86.718 HISTORY OF DVT (DEEP VEIN THROMBOSIS): ICD-10-CM

## 2023-06-09 PROCEDURE — 3061F PR NEG MICROALBUMINURIA RESULT DOCUMENTED/REVIEW: ICD-10-PCS | Mod: CPTII,S$GLB,, | Performed by: INTERNAL MEDICINE

## 2023-06-09 PROCEDURE — 3061F NEG MICROALBUMINURIA REV: CPT | Mod: CPTII,S$GLB,, | Performed by: INTERNAL MEDICINE

## 2023-06-09 PROCEDURE — 1159F MED LIST DOCD IN RCRD: CPT | Mod: CPTII,S$GLB,, | Performed by: INTERNAL MEDICINE

## 2023-06-09 PROCEDURE — 1160F PR REVIEW ALL MEDS BY PRESCRIBER/CLIN PHARMACIST DOCUMENTED: ICD-10-PCS | Mod: CPTII,S$GLB,, | Performed by: INTERNAL MEDICINE

## 2023-06-09 PROCEDURE — 3074F PR MOST RECENT SYSTOLIC BLOOD PRESSURE < 130 MM HG: ICD-10-PCS | Mod: CPTII,S$GLB,, | Performed by: INTERNAL MEDICINE

## 2023-06-09 PROCEDURE — 3080F DIAST BP >= 90 MM HG: CPT | Mod: CPTII,S$GLB,, | Performed by: INTERNAL MEDICINE

## 2023-06-09 PROCEDURE — 93227 XTRNL ECG REC<48 HR R&I: CPT | Mod: ,,, | Performed by: INTERNAL MEDICINE

## 2023-06-09 PROCEDURE — 3074F SYST BP LT 130 MM HG: CPT | Mod: CPTII,S$GLB,, | Performed by: INTERNAL MEDICINE

## 2023-06-09 PROCEDURE — 99999 PR PBB SHADOW E&M-EST. PATIENT-LVL V: CPT | Mod: PBBFAC,,, | Performed by: INTERNAL MEDICINE

## 2023-06-09 PROCEDURE — 3066F NEPHROPATHY DOC TX: CPT | Mod: CPTII,S$GLB,, | Performed by: INTERNAL MEDICINE

## 2023-06-09 PROCEDURE — 3008F BODY MASS INDEX DOCD: CPT | Mod: CPTII,S$GLB,, | Performed by: INTERNAL MEDICINE

## 2023-06-09 PROCEDURE — 3066F PR DOCUMENTATION OF TREATMENT FOR NEPHROPATHY: ICD-10-PCS | Mod: CPTII,S$GLB,, | Performed by: INTERNAL MEDICINE

## 2023-06-09 PROCEDURE — 4010F ACE/ARB THERAPY RXD/TAKEN: CPT | Mod: CPTII,S$GLB,, | Performed by: INTERNAL MEDICINE

## 2023-06-09 PROCEDURE — 4010F PR ACE/ARB THEARPY RXD/TAKEN: ICD-10-PCS | Mod: CPTII,S$GLB,, | Performed by: INTERNAL MEDICINE

## 2023-06-09 PROCEDURE — 99204 OFFICE O/P NEW MOD 45 MIN: CPT | Mod: S$GLB,,, | Performed by: INTERNAL MEDICINE

## 2023-06-09 PROCEDURE — 1160F RVW MEDS BY RX/DR IN RCRD: CPT | Mod: CPTII,S$GLB,, | Performed by: INTERNAL MEDICINE

## 2023-06-09 PROCEDURE — 99999 PR PBB SHADOW E&M-EST. PATIENT-LVL V: ICD-10-PCS | Mod: PBBFAC,,, | Performed by: INTERNAL MEDICINE

## 2023-06-09 PROCEDURE — 3008F PR BODY MASS INDEX (BMI) DOCUMENTED: ICD-10-PCS | Mod: CPTII,S$GLB,, | Performed by: INTERNAL MEDICINE

## 2023-06-09 PROCEDURE — 1159F PR MEDICATION LIST DOCUMENTED IN MEDICAL RECORD: ICD-10-PCS | Mod: CPTII,S$GLB,, | Performed by: INTERNAL MEDICINE

## 2023-06-09 PROCEDURE — 93225 XTRNL ECG REC<48 HRS REC: CPT

## 2023-06-09 PROCEDURE — 99204 PR OFFICE/OUTPT VISIT, NEW, LEVL IV, 45-59 MIN: ICD-10-PCS | Mod: S$GLB,,, | Performed by: INTERNAL MEDICINE

## 2023-06-09 PROCEDURE — 3044F HG A1C LEVEL LT 7.0%: CPT | Mod: CPTII,S$GLB,, | Performed by: INTERNAL MEDICINE

## 2023-06-09 PROCEDURE — 3080F PR MOST RECENT DIASTOLIC BLOOD PRESSURE >= 90 MM HG: ICD-10-PCS | Mod: CPTII,S$GLB,, | Performed by: INTERNAL MEDICINE

## 2023-06-09 PROCEDURE — 3044F PR MOST RECENT HEMOGLOBIN A1C LEVEL <7.0%: ICD-10-PCS | Mod: CPTII,S$GLB,, | Performed by: INTERNAL MEDICINE

## 2023-06-09 PROCEDURE — 93227 HOLTER MONITOR - 24 HOUR (CUPID ONLY): ICD-10-PCS | Mod: ,,, | Performed by: INTERNAL MEDICINE

## 2023-06-09 NOTE — ASSESSMENT & PLAN NOTE
Events of near-syncope-recurrent reported developing over the past few months.  There has been documented hypotension but no bradycardia.    Education provided regarding management.  Improved focus on hydration and avoiding long periods of time without eating are advised.    He has had echo confirming normal ejection fraction in the past.

## 2023-06-09 NOTE — PROGRESS NOTES
[facility] Cardiology     Subjective:    Patient ID:  [place] Najera is a 63 y.o. male who presents for evaluation of Dizziness, Hypertension, Hyperlipidemia, Diabetes Mellitus, and Coronary Artery Disease    Review of patient's allergies indicates:   Allergen Reactions    Codeine Diarrhea     Patient can not tolerate Tylenol #3, states he does okay with Codeine with cough medications     Nickel sutures [surgical stainless steel] Rash      He was driven to the ED yesterday after developing dizziness nausea headache and weakness at work.  He went to the ED for hours after symptoms initiated.  His workup was negative and vitals were normal.  His Electrocardiogram showed PACs and PVCs with heart rate 65.  He has had between 5 and 10 similar episodes at home and there have been blood pressure readings in the 70 systolic range during the spells.  He always has dizziness.  He was markedly diaphoretic yesterday.  He was able to continue at work for 4 hours before he went to the ED. he has never had complete syncope.  He admits to not drinking enough water.  He had not had anything to eat yesterday when he had the event at work.  He has a desk job.]    He is a cigarette smoker.  He has coronary calcifications noted on imaging test.  He has had a negative stress test in 2017 with [facility].  He also had an echo which was normal.  There are records documented in the echo findings.  In 2002 he had a left leg DVT.  He remains on Xarelto 20 mg daily.  Hypertension is treated with lisinopril.  Metoprolol SR 25 mg ordered for irregular heartbeats without complaints of palpitations in the past.  Most recent A1c 6.4.  He has not been taking lisinopril for concerns regarding his low blood pressures.  Blood pressure today 130/90 mm Hg.      Review of Systems   Constitutional: Positive for diaphoresis. Negative for chills, decreased appetite, fever, malaise/fatigue,  night sweats, weight gain and weight loss.   HENT:  Negative for congestion, ear discharge, ear pain, hearing loss, hoarse voice, nosebleeds, odynophagia, sore throat, stridor and tinnitus.    Eyes:  Negative for blurred vision, discharge, double vision, pain, photophobia, redness, vision loss in left eye, vision loss in right eye, visual disturbance and visual halos.   Cardiovascular:  Negative for chest pain, claudication, cyanosis, dyspnea on exertion, irregular heartbeat, leg swelling, near-syncope, orthopnea, palpitations, paroxysmal nocturnal dyspnea and syncope.   Respiratory:  Negative for cough, hemoptysis, shortness of breath, sleep disturbances due to breathing, snoring, sputum production and wheezing.    Endocrine: Negative for cold intolerance, heat intolerance, polydipsia, polyphagia and polyuria.   Hematologic/Lymphatic: Negative for adenopathy and bleeding problem. Does not bruise/bleed easily.   Skin:  Negative for color change, dry skin, flushing, itching, nail changes, poor wound healing, rash, skin cancer, suspicious lesions and unusual hair distribution.   Musculoskeletal:  Negative for arthritis, back pain, falls, gout, joint pain, joint swelling, muscle cramps, muscle weakness, myalgias, neck pain and stiffness.   Gastrointestinal:  Positive for nausea. Negative for bloating, abdominal pain, anorexia, change in bowel habit, bowel incontinence, constipation, diarrhea, dysphagia, excessive appetite, flatus, heartburn, hematemesis, hematochezia, hemorrhoids, jaundice, melena and vomiting.   Genitourinary:  Negative for bladder incontinence, decreased libido, dysuria, flank pain, frequency, genital sores, hematuria, hesitancy, incomplete emptying, nocturia and urgency.   Neurological:  Positive for dizziness and headaches. Negative for aphonia, brief paralysis, difficulty with concentration, disturbances in coordination, excessive daytime sleepiness, focal weakness, light-headedness, loss of  "balance, numbness, paresthesias, seizures, sensory change, tremors, vertigo and weakness.   Psychiatric/Behavioral:  Negative for altered mental status, depression, hallucinations, memory loss, substance abuse, suicidal ideas and thoughts of violence. The patient does not have insomnia and is not nervous/anxious.    Allergic/Immunologic: Negative for hives and persistent infections.      Objective:       Vitals:    06/09/23 0838   BP: (!) 128/90   Pulse: (!) 56   Resp: 18   SpO2: 98%   Weight: 128 kg (282 lb 3 oz)   Height: 6' 2" (1.88 m)    Physical Exam  Constitutional:       General: He is not in acute distress.     Appearance: He is well-developed. He is not diaphoretic.   HENT:      Head: Normocephalic and atraumatic.      Nose: Nose normal.   Eyes:      General: No scleral icterus.        Right eye: No discharge.      Conjunctiva/sclera: Conjunctivae normal.      Pupils: Pupils are equal, round, and reactive to light.   Neck:      Thyroid: No thyromegaly.      Vascular: No JVD.      Trachea: No tracheal deviation.   Cardiovascular:      Rate and Rhythm: Regular rhythm. Bradycardia present.      Pulses:           Carotid pulses are 2+ on the right side and 2+ on the left side.       Radial pulses are 2+ on the right side and 2+ on the left side.        Dorsalis pedis pulses are 2+ on the right side and 2+ on the left side.        Posterior tibial pulses are 2+ on the right side and 2+ on the left side.      Heart sounds: Normal heart sounds. No murmur heard.    No friction rub. No gallop.   Pulmonary:      Effort: Pulmonary effort is normal. No respiratory distress.      Breath sounds: Normal breath sounds. No stridor. No wheezing or rales.   Chest:      Chest wall: No tenderness.   Abdominal:      General: Bowel sounds are normal. There is no distension.      Palpations: Abdomen is soft. There is no mass.      Tenderness: There is no abdominal tenderness. There is no guarding or rebound.   Musculoskeletal:    "      General: No tenderness. Normal range of motion.      Cervical back: Normal range of motion and neck supple.   Lymphadenopathy:      Cervical: No cervical adenopathy.   Skin:     General: Skin is warm and dry.      Coloration: Skin is not pale.      Findings: No erythema or rash.   Neurological:      Mental Status: He is alert and oriented to person, place, and time.      Cranial Nerves: No cranial nerve deficit.      Coordination: Coordination normal.   Psychiatric:         Behavior: Behavior normal.         Thought Content: Thought content normal.         Judgment: Judgment normal.         Assessment:       1. Syncope and collapse    2. Lightheaded    3. Antiphospholipid syndrome    4. Tobacco abuse    5. History of DVT (deep vein thrombosis)    6. PVC's (premature ventricular contractions)    7. Abnormal EKG    8. Hyperlipidemia LDL goal <70    9. Severe obesity (BMI 35.0-39.9) with comorbidity    10. Diabetes mellitus without complication    11. Aortic atherosclerosis    12. Coronary artery disease involving native coronary artery of native heart without angina pectoris    13. Vasovagal near-syncope      Results for orders placed or performed during the hospital encounter of 06/08/23   Complete Blood Count (CBC)   Result Value Ref Range    WBC 11.19 3.90 - 12.70 K/uL    RBC 4.66 4.60 - 6.20 M/uL    Hemoglobin 14.8 14.0 - 18.0 g/dL    Hematocrit 45.6 40.0 - 54.0 %    MCV 98 82 - 98 fL    MCH 31.8 (H) 27.0 - 31.0 pg    MCHC 32.5 32.0 - 36.0 g/dL    RDW 13.6 11.5 - 14.5 %    Platelets 193 150 - 450 K/uL    MPV 9.0 (L) 9.2 - 12.9 fL    Immature Granulocytes 0.6 (H) 0.0 - 0.5 %    Gran # (ANC) 7.7 1.8 - 7.7 K/uL    Immature Grans (Abs) 0.07 (H) 0.00 - 0.04 K/uL    Lymph # 2.7 1.0 - 4.8 K/uL    Mono # 0.7 0.3 - 1.0 K/uL    Eos # 0.1 0.0 - 0.5 K/uL    Baso # 0.05 0.00 - 0.20 K/uL    nRBC 0 0 /100 WBC    Gran % 68.7 38.0 - 73.0 %    Lymph % 23.8 18.0 - 48.0 %    Mono % 6.0 4.0 - 15.0 %    Eosinophil % 0.5 0.0 - 8.0  %    Basophil % 0.4 0.0 - 1.9 %    Differential Method Automated    Comprehensive Metabolic Panel (CMP)   Result Value Ref Range    Sodium 137 136 - 145 mmol/L    Potassium 4.4 3.5 - 5.1 mmol/L    Chloride 105 95 - 110 mmol/L    CO2 22 (L) 23 - 29 mmol/L    Glucose 119 (H) 70 - 110 mg/dL    BUN 11 8 - 23 mg/dL    Creatinine 0.8 0.5 - 1.4 mg/dL    Calcium 9.2 8.7 - 10.5 mg/dL    Total Protein 7.2 6.0 - 8.4 g/dL    Albumin 4.3 3.5 - 5.2 g/dL    Total Bilirubin 1.7 (H) 0.1 - 1.0 mg/dL    Alkaline Phosphatase 51 (L) 55 - 135 U/L    AST 15 10 - 40 U/L    ALT 24 10 - 44 U/L    Anion Gap 10 8 - 16 mmol/L    eGFR >60 >60 mL/min/1.73 m^2   Troponin I   Result Value Ref Range    Troponin I 0.025 0.000 - 0.026 ng/mL   Magnesium   Result Value Ref Range    Magnesium 1.8 1.6 - 2.6 mg/dL   Drug screen panel, emergency   Result Value Ref Range    Benzodiazepines Negative Negative    Methadone metabolites Negative Negative    Cocaine (Metab.) Negative Negative    Opiate Scrn, Ur Negative Negative    Barbiturate Screen, Ur Negative Negative    Amphetamine Screen, Ur Negative Negative    THC Negative Negative    Phencyclidine Negative Negative    Creatinine, Urine 132.0 23.0 - 375.0 mg/dL    Toxicology Information SEE COMMENT    D dimer, quantitative   Result Value Ref Range    D-Dimer 0.20 <0.50 mg/L FEU   POCT glucose   Result Value Ref Range    POCT Glucose 106 70 - 110 mg/dL         Current Outpatient Medications:     cholecalciferol, vitamin D3, (VITAMIN D3) 50 mcg (2,000 unit) Cap, Take 1 capsule by mouth once daily., Disp: , Rfl:     fish oil-omega-3 fatty acids 300-1,000 mg capsule, Take 2 g by mouth once daily., Disp: , Rfl:     metoprolol succinate (TOPROL-XL) 25 MG 24 hr tablet, Take 1 tablet (25 mg total) by mouth once daily., Disp: 30 tablet, Rfl: 5    MULTIVIT-MIN/FA/LYCOPEN/LUTEIN (CENTRUM SILVER MEN ORAL), Take by mouth., Disp: , Rfl:     pioglitazone (ACTOS) 30 MG tablet, Take 1 tablet (30 mg total) by mouth once  daily., Disp: 90 tablet, Rfl: 1    SITagliptin phosphate (JANUVIA) 50 MG Tab, Take 1 tablet (50 mg total) by mouth once daily., Disp: 30 tablet, Rfl: 5    tamsulosin (FLOMAX) 0.4 mg Cap, Take 1 capsule (0.4 mg total) by mouth once daily., Disp: 30 capsule, Rfl: 5    aspirin (ECOTRIN) 81 MG EC tablet, Take 81 mg by mouth once daily., Disp: , Rfl:     atorvastatin (LIPITOR) 20 MG tablet, Take 1 tablet (20 mg total) by mouth once daily IN THE MORNING, Disp: 30 tablet, Rfl: 5    lisinopriL (PRINIVIL,ZESTRIL) 5 MG tablet, Take 1 tablet (5 mg total) by mouth once daily., Disp: 30 tablet, Rfl: 5    rivaroxaban (XARELTO) 10 mg Tab, Take 1 tablet (10 mg total) by mouth once daily., Disp: 90 tablet, Rfl: 3     Lab Results   Component Value Date    WBC 11.19 06/08/2023    RBC 4.66 06/08/2023    HGB 14.8 06/08/2023    HCT 45.6 06/08/2023    MCV 98 06/08/2023    MCH 31.8 (H) 06/08/2023    MCHC 32.5 06/08/2023    RDW 13.6 06/08/2023     06/08/2023    MPV 9.0 (L) 06/08/2023    GRAN 7.7 06/08/2023    GRAN 68.7 06/08/2023    LYMPH 2.7 06/08/2023    LYMPH 23.8 06/08/2023    MONO 0.7 06/08/2023    MONO 6.0 06/08/2023    EOS 0.1 06/08/2023    BASO 0.05 06/08/2023    EOSINOPHIL 0.5 06/08/2023    BASOPHIL 0.4 06/08/2023    MG 1.8 06/08/2023        CMP  Lab Results   Component Value Date     06/08/2023    K 4.4 06/08/2023     06/08/2023    CO2 22 (L) 06/08/2023     (H) 06/08/2023    BUN 11 06/08/2023    CREATININE 0.8 06/08/2023    CALCIUM 9.2 06/08/2023    PROT 7.2 06/08/2023    ALBUMIN 4.3 06/08/2023    BILITOT 1.7 (H) 06/08/2023    ALKPHOS 51 (L) 06/08/2023    AST 15 06/08/2023    ALT 24 06/08/2023    ANIONGAP 10 06/08/2023    ESTGFRAFRICA >60 04/12/2022    EGFRNONAA >60 04/12/2022        Lab Results   Component Value Date    LABURIN (A) 06/30/2021     METHICILLIN RESISTANT STAPHYLOCOCCUS AUREUS  >100,000cfu/ml              Results for orders placed or performed during the hospital encounter of 04/28/17   EKG  12-lead    Collection Time: 04/28/17 12:06 PM    Narrative    Test Reason : Z01.81  Vent. Rate : 060 BPM     Atrial Rate : 060 BPM     P-R Int : 194 ms          QRS Dur : 104 ms      QT Int : 406 ms       P-R-T Axes : 048 006 009 degrees     QTc Int : 406 ms    Normal sinus rhythm  Incomplete right bundle branch block  Borderline Abnormal ECG  When compared with ECG of 23-JAN-2006 12:04,  Incomplete right bundle branch block is now Present  Confirmed by Elvi Caballero MD ([id number]) on 5/1/2017 2:39:21 PM    Referred By: ADELA RENNER           Confirmed By:Elvi Caballero MD        X-Ray Chest AP Portable 06/08/2023 [id number] Final   CT Head Without Contrast 06/08/2023 [id number] Final   X-ray Knee Ortho Left with Flexion 04/22/2022 [id number] Final            Plan:       Problem List Items Addressed This Visit          Cardiac/Vascular    Hyperlipidemia LDL goal <70     He is on atorvastatin 20 mg per day.  Most recent LDL 54, HDL 32, triglycerides 112 mg %.  Condition stable.  Mixed hyperlipidemia picture.         PVC's (premature ventricular contractions)     Noted on Electrocardiogram.  Toprol withdrawn today.  He has never felt palpitations.         Abnormal EKG     Both PACs and PVCs noted.  Holter monitor advised.  He has never worn 1.  He does have mild sinus bradycardia today in my office.         Aortic atherosclerosis     Condition stable.         Coronary artery disease involving native coronary artery of native heart without angina pectoris     Coronary calcifications noted on CT chest.  He has had a negative Lexiscan in the past.  No active symptoms of chest pain.  Condition stable.            Hematology    Antiphospholipid syndrome    Relevant Medications    rivaroxaban (XARELTO) 10 mg Tab    History of DVT (deep vein thrombosis)     A remote event left leg 2002.  I advised reducing Xarelto dosing to 10 mg per day long-term.  He was advised for prophylaxis against additional DVTs based on hypercoagulability.             Endocrine    Severe obesity (BMI 35.0-39.9) with comorbidity     Weight loss encouraged.         Diabetes mellitus without complication     Most recent A1c 6.4.  He takes Januvia and Actos.  He has mild proteinuria.  Recent UA negative.  GFR normal.            Other    Tobacco abuse     Cessation advised.         Vasovagal near-syncope     Events of near-syncope-recurrent reported developing over the past few months.  There has been documented hypotension but no bradycardia.    Education provided regarding management.  Improved focus on hydration and avoiding long periods of time without eating are advised.    He has had echo confirming normal ejection fraction in the past.          Other Visit Diagnoses       Syncope and collapse    -  Primary    Relevant Orders    Holter monitor - 24 hour    Lightheaded                 Vasovagal syncope/presyncope seems to be his problem from a clinical standpoint.  I am placing a Holter monitor.  I have stopped metoprolol and recommended lisinopril 5 mg.  His LDL is well controlled.    I advised prophylactic dosing of Xarelto for the long-term.  He has hypercoagulability as the recommendation for lifelong anticoagulation after single DVT.    Thank you for allowing me to participate in your patient's care.                 Neno Constantino MD  06/09/2023   9:39 AM

## 2023-06-09 NOTE — ASSESSMENT & PLAN NOTE
Most recent A1c 6.4.  He takes Januvia and Actos.  He has mild proteinuria.  Recent UA negative.  GFR normal.

## 2023-06-09 NOTE — ASSESSMENT & PLAN NOTE
Coronary calcifications noted on CT chest.  He has had a negative Lexiscan in the past.  No active symptoms of chest pain.  Condition stable.

## 2023-06-09 NOTE — ASSESSMENT & PLAN NOTE
A remote event left leg 2002.  I advised reducing Xarelto dosing to 10 mg per day long-term.  He was advised for prophylaxis against additional DVTs based on hypercoagulability.

## 2023-06-09 NOTE — ASSESSMENT & PLAN NOTE
He is on atorvastatin 20 mg per day.  Most recent LDL 54, HDL 32, triglycerides 112 mg %.  Condition stable.  Mixed hyperlipidemia picture.

## 2023-06-09 NOTE — ASSESSMENT & PLAN NOTE
Both PACs and PVCs noted.  Holter monitor advised.  He has never worn 1.  He does have mild sinus bradycardia today in my office.

## 2023-06-14 ENCOUNTER — TELEPHONE (OUTPATIENT)
Dept: CARDIOLOGY | Facility: CLINIC | Age: 63
End: 2023-06-14
Payer: COMMERCIAL

## 2023-06-14 ENCOUNTER — HOSPITAL ENCOUNTER (OUTPATIENT)
Dept: PULMONOLOGY | Facility: HOSPITAL | Age: 63
Discharge: HOME | End: 2023-06-14
Attending: INTERNAL MEDICINE
Payer: COMMERCIAL

## 2023-06-14 ENCOUNTER — HOSPITAL ENCOUNTER (OUTPATIENT)
Facility: HOSPITAL | Age: 63
Discharge: HOME | End: 2023-06-16
Attending: EMERGENCY MEDICINE | Admitting: EMERGENCY MEDICINE
Payer: COMMERCIAL

## 2023-06-14 DIAGNOSIS — R00.1 BRADYCARDIA: ICD-10-CM

## 2023-06-14 DIAGNOSIS — R17 ELEVATED BILIRUBIN: ICD-10-CM

## 2023-06-14 DIAGNOSIS — I49.9 CARDIAC ARRHYTHMIA, UNSPECIFIED CARDIAC ARRHYTHMIA TYPE: Primary | ICD-10-CM

## 2023-06-14 DIAGNOSIS — I49.9 CARDIAC ARRHYTHMIA, UNSPECIFIED CARDIAC ARRHYTHMIA TYPE: ICD-10-CM

## 2023-06-14 DIAGNOSIS — R07.9 CHEST PAIN: ICD-10-CM

## 2023-06-14 DIAGNOSIS — E11.9 TYPE 2 DIABETES MELLITUS TREATED WITHOUT INSULIN: ICD-10-CM

## 2023-06-14 DIAGNOSIS — I25.10 CORONARY ARTERY DISEASE INVOLVING NATIVE CORONARY ARTERY OF NATIVE HEART WITHOUT ANGINA PECTORIS: ICD-10-CM

## 2023-06-14 DIAGNOSIS — I47.20 VT (VENTRICULAR TACHYCARDIA): ICD-10-CM

## 2023-06-14 DIAGNOSIS — I50.41 ACUTE COMBINED SYSTOLIC AND DIASTOLIC CONGESTIVE HEART FAILURE: ICD-10-CM

## 2023-06-14 DIAGNOSIS — I49.9 ABNORMAL HEART RHYTHM: ICD-10-CM

## 2023-06-14 DIAGNOSIS — I47.29 NON-SUSTAINED VENTRICULAR TACHYCARDIA: ICD-10-CM

## 2023-06-14 DIAGNOSIS — Z86.718 HISTORY OF DVT (DEEP VEIN THROMBOSIS): ICD-10-CM

## 2023-06-14 DIAGNOSIS — I49.9 CARDIAC RHYTHM DISORDER OR DISTURBANCE OR CHANGE: ICD-10-CM

## 2023-06-14 PROBLEM — I10 HTN (HYPERTENSION): Status: ACTIVE | Noted: 2023-06-14

## 2023-06-14 LAB
ALBUMIN SERPL BCP-MCNC: 4.2 G/DL (ref 3.5–5.2)
ALP SERPL-CCNC: 58 U/L (ref 55–135)
ALT SERPL W/O P-5'-P-CCNC: 19 U/L (ref 10–44)
ANION GAP SERPL CALC-SCNC: 8 MMOL/L (ref 8–16)
ASCENDING AORTA: 3.03 CM
AST SERPL-CCNC: 22 U/L (ref 10–40)
AV INDEX (PROSTH): 0.87
AV MEAN GRADIENT: 4 MMHG
AV PEAK GRADIENT: 6 MMHG
AV VALVE AREA: 3.84 CM2
AV VELOCITY RATIO: 0.9
BASOPHILS # BLD AUTO: 0.04 K/UL (ref 0–0.2)
BASOPHILS NFR BLD: 0.4 % (ref 0–1.9)
BILIRUB SERPL-MCNC: 1.9 MG/DL (ref 0.1–1)
BNP SERPL-MCNC: 58 PG/ML (ref 0–99)
BUN SERPL-MCNC: 12 MG/DL (ref 8–23)
CALCIUM SERPL-MCNC: 9.3 MG/DL (ref 8.7–10.5)
CHLORIDE SERPL-SCNC: 106 MMOL/L (ref 95–110)
CHOLEST SERPL-MCNC: 119 MG/DL (ref 120–199)
CHOLEST/HDLC SERPL: 3.8 {RATIO} (ref 2–5)
CO2 SERPL-SCNC: 23 MMOL/L (ref 23–29)
CREAT SERPL-MCNC: 0.7 MG/DL (ref 0.5–1.4)
CV ECHO LV RWT: 0.27 CM
DIFFERENTIAL METHOD: ABNORMAL
DOP CALC AO PEAK VEL: 1.19 M/S
DOP CALC AO VTI: 26.2 CM
DOP CALC LVOT AREA: 4.4 CM2
DOP CALC LVOT DIAMETER: 2.37 CM
DOP CALC LVOT PEAK VEL: 1.07 M/S
DOP CALC LVOT STROKE VOLUME: 100.53 CM3
DOP CALC MV VTI: 28.4 CM
DOP CALC RVOT PEAK VEL: 0.77 M/S
DOP CALC RVOT VTI: 18.3 CM
DOP CALCLVOT PEAK VEL VTI: 22.8 CM
E WAVE DECELERATION TIME: 312.54 MSEC
E/A RATIO: 1.16
E/E' RATIO: 6.17 M/S
ECHO LV POSTERIOR WALL: 0.99 CM (ref 0.6–1.1)
EJECTION FRACTION: 40 %
EOSINOPHIL # BLD AUTO: 0.2 K/UL (ref 0–0.5)
EOSINOPHIL NFR BLD: 2.1 % (ref 0–8)
ERYTHROCYTE [DISTWIDTH] IN BLOOD BY AUTOMATED COUNT: 13.6 % (ref 11.5–14.5)
EST. GFR  (NO RACE VARIABLE): >60 ML/MIN/1.73 M^2
FRACTIONAL SHORTENING: 23 % (ref 28–44)
GLUCOSE SERPL-MCNC: 98 MG/DL (ref 70–110)
HCT VFR BLD AUTO: 45.1 % (ref 40–54)
HCV AB SERPL QL IA: NORMAL
HDLC SERPL-MCNC: 31 MG/DL (ref 40–75)
HDLC SERPL: 26.1 % (ref 20–50)
HGB BLD-MCNC: 15.4 G/DL (ref 14–18)
HIV 1+2 AB+HIV1 P24 AG SERPL QL IA: NORMAL
IMM GRANULOCYTES # BLD AUTO: 0.05 K/UL (ref 0–0.04)
IMM GRANULOCYTES NFR BLD AUTO: 0.5 % (ref 0–0.5)
INTERVENTRICULAR SEPTUM: 1.22 CM (ref 0.6–1.1)
IVC DIAMETER: 2.09 CM
IVRT: 140.82 MSEC
LA MAJOR: 5.61 CM
LA MINOR: 6.38 CM
LA WIDTH: 4 CM
LDLC SERPL CALC-MCNC: 49.4 MG/DL (ref 63–159)
LEFT ATRIUM SIZE: 5.24 CM
LEFT ATRIUM VOLUME MOD: 103.18 CM3
LEFT ATRIUM VOLUME: 106.37 CM3
LEFT INTERNAL DIMENSION IN SYSTOLE: 5.62 CM (ref 2.1–4)
LEFT VENTRICLE DIASTOLIC VOLUME: 281.41 ML
LEFT VENTRICLE SYSTOLIC VOLUME: 154.93 ML
LEFT VENTRICULAR INTERNAL DIMENSION IN DIASTOLE: 7.31 CM (ref 3.5–6)
LEFT VENTRICULAR MASS: 393.46 G
LV LATERAL E/E' RATIO: 6.45 M/S
LV SEPTAL E/E' RATIO: 5.92 M/S
LVOT MG: 3.01 MMHG
LVOT MV: 0.83 CM/S
LYMPHOCYTES # BLD AUTO: 2.6 K/UL (ref 1–4.8)
LYMPHOCYTES NFR BLD: 28.1 % (ref 18–48)
MCH RBC QN AUTO: 33.7 PG (ref 27–31)
MCHC RBC AUTO-ENTMCNC: 34.1 G/DL (ref 32–36)
MCV RBC AUTO: 99 FL (ref 82–98)
MONOCYTES # BLD AUTO: 0.8 K/UL (ref 0.3–1)
MONOCYTES NFR BLD: 8.6 % (ref 4–15)
MV MEAN GRADIENT: 1 MMHG
MV PEAK A VEL: 0.61 M/S
MV PEAK E VEL: 0.71 M/S
MV PEAK GRADIENT: 2 MMHG
MV STENOSIS PRESSURE HALF TIME: 90.64 MS
MV VALVE AREA BY CONTINUITY EQUATION: 3.54 CM2
MV VALVE AREA P 1/2 METHOD: 2.43 CM2
NEUTROPHILS # BLD AUTO: 5.5 K/UL (ref 1.8–7.7)
NEUTROPHILS NFR BLD: 60.3 % (ref 38–73)
NONHDLC SERPL-MCNC: 88 MG/DL
NRBC BLD-RTO: 0 /100 WBC
OHS CV EVENT MONITOR DAY: 0
OHS CV HOLTER LENGTH DECIMAL HOURS: 24
OHS CV HOLTER LENGTH HOURS: 24
OHS CV HOLTER LENGTH MINUTES: 0
OHS CV HOLTER SINUS AVERAGE HR: 92
OHS CV HOLTER SINUS MAX HR: 200
OHS CV HOLTER SINUS MIN HR: 43
PISA TR MAX VEL: 2.53 M/S
PLATELET # BLD AUTO: 188 K/UL (ref 150–450)
PMV BLD AUTO: 9.2 FL (ref 9.2–12.9)
POC CARDIAC TROPONIN I: 0.01 NG/ML (ref 0–0.08)
POCT GLUCOSE: 157 MG/DL (ref 70–110)
POTASSIUM SERPL-SCNC: 4 MMOL/L (ref 3.5–5.1)
PROT SERPL-MCNC: 7.3 G/DL (ref 6–8.4)
PULM VEIN S/D RATIO: 0.89
PV MEAN GRADIENT: 1.61 MMHG
PV MV: 0.78 M/S
PV PEAK D VEL: 0.55 M/S
PV PEAK S VEL: 0.49 M/S
PV PEAK VELOCITY: 0.94 CM/S
RA MAJOR: 4.82 CM
RA PRESSURE: 8 MMHG
RBC # BLD AUTO: 4.57 M/UL (ref 4.6–6.2)
RIGHT VENTRICULAR END-DIASTOLIC DIMENSION: 3.79 CM
SAMPLE: NORMAL
SINUS: 3.67 CM
SODIUM SERPL-SCNC: 137 MMOL/L (ref 136–145)
STJ: 3.42 CM
TDI LATERAL: 0.11 M/S
TDI SEPTAL: 0.12 M/S
TDI: 0.12 M/S
TR MAX PG: 26 MMHG
TRIGL SERPL-MCNC: 193 MG/DL (ref 30–150)
TROPONIN I SERPL DL<=0.01 NG/ML-MCNC: 0.01 NG/ML (ref 0–0.03)
TROPONIN I SERPL DL<=0.01 NG/ML-MCNC: 0.02 NG/ML (ref 0–0.03)
TSH SERPL DL<=0.005 MIU/L-ACNC: 1.73 UIU/ML (ref 0.4–4)
TV REST PULMONARY ARTERY PRESSURE: 34 MMHG
WBC # BLD AUTO: 9.11 K/UL (ref 3.9–12.7)

## 2023-06-14 PROCEDURE — 99222 1ST HOSP IP/OBS MODERATE 55: CPT | Mod: ,,, | Performed by: HOSPITALIST

## 2023-06-14 PROCEDURE — 93306 TTE W/DOPPLER COMPLETE: CPT | Mod: 26,,, | Performed by: INTERNAL MEDICINE

## 2023-06-14 PROCEDURE — 84443 ASSAY THYROID STIM HORMONE: CPT

## 2023-06-14 PROCEDURE — 93306 TTE W/DOPPLER COMPLETE: CPT

## 2023-06-14 PROCEDURE — 84484 ASSAY OF TROPONIN QUANT: CPT

## 2023-06-14 PROCEDURE — G0378 HOSPITAL OBSERVATION PER HR: HCPCS

## 2023-06-14 PROCEDURE — 80061 LIPID PANEL: CPT

## 2023-06-14 PROCEDURE — 87389 HIV-1 AG W/HIV-1&-2 AB AG IA: CPT | Performed by: EMERGENCY MEDICINE

## 2023-06-14 PROCEDURE — 84484 ASSAY OF TROPONIN QUANT: CPT | Mod: 91

## 2023-06-14 PROCEDURE — 93010 EKG 12-LEAD: ICD-10-PCS | Mod: ,,, | Performed by: INTERNAL MEDICINE

## 2023-06-14 PROCEDURE — 93306 ECHO (CUPID ONLY): ICD-10-PCS | Mod: 26,,, | Performed by: INTERNAL MEDICINE

## 2023-06-14 PROCEDURE — 99285 PR EMERGENCY DEPT VISIT,LEVEL V: ICD-10-PCS | Mod: GC,,, | Performed by: EMERGENCY MEDICINE

## 2023-06-14 PROCEDURE — 25000003 PHARM REV CODE 250: Performed by: HOSPITALIST

## 2023-06-14 PROCEDURE — 83735 ASSAY OF MAGNESIUM: CPT

## 2023-06-14 PROCEDURE — 99222 PR INITIAL HOSPITAL CARE,LEVL II: ICD-10-PCS | Mod: ,,, | Performed by: HOSPITALIST

## 2023-06-14 PROCEDURE — 93010 ELECTROCARDIOGRAM REPORT: CPT | Mod: 76,,, | Performed by: INTERNAL MEDICINE

## 2023-06-14 PROCEDURE — 80053 COMPREHEN METABOLIC PANEL: CPT

## 2023-06-14 PROCEDURE — 93005 ELECTROCARDIOGRAM TRACING: CPT

## 2023-06-14 PROCEDURE — 99285 EMERGENCY DEPT VISIT HI MDM: CPT | Mod: 25

## 2023-06-14 PROCEDURE — 93010 ELECTROCARDIOGRAM REPORT: CPT | Mod: ,,, | Performed by: INTERNAL MEDICINE

## 2023-06-14 PROCEDURE — 85025 COMPLETE CBC W/AUTO DIFF WBC: CPT

## 2023-06-14 PROCEDURE — 99285 EMERGENCY DEPT VISIT HI MDM: CPT | Mod: GC,,, | Performed by: EMERGENCY MEDICINE

## 2023-06-14 PROCEDURE — 83880 ASSAY OF NATRIURETIC PEPTIDE: CPT

## 2023-06-14 PROCEDURE — 86803 HEPATITIS C AB TEST: CPT | Performed by: EMERGENCY MEDICINE

## 2023-06-14 RX ORDER — METOPROLOL SUCCINATE 25 MG/1
25 TABLET, EXTENDED RELEASE ORAL NIGHTLY
Status: DISCONTINUED | OUTPATIENT
Start: 2023-06-14 | End: 2023-06-16

## 2023-06-14 RX ORDER — DEXTROSE 40 %
15 GEL (GRAM) ORAL
Status: DISCONTINUED | OUTPATIENT
Start: 2023-06-14 | End: 2023-06-16 | Stop reason: HOSPADM

## 2023-06-14 RX ORDER — ATORVASTATIN CALCIUM 20 MG/1
20 TABLET, FILM COATED ORAL NIGHTLY
Status: DISCONTINUED | OUTPATIENT
Start: 2023-06-14 | End: 2023-06-16 | Stop reason: HOSPADM

## 2023-06-14 RX ORDER — ASPIRIN 81 MG/1
81 TABLET ORAL DAILY
Status: DISCONTINUED | OUTPATIENT
Start: 2023-06-15 | End: 2023-06-14

## 2023-06-14 RX ORDER — DEXTROSE 40 %
30 GEL (GRAM) ORAL
Status: DISCONTINUED | OUTPATIENT
Start: 2023-06-14 | End: 2023-06-16 | Stop reason: HOSPADM

## 2023-06-14 RX ORDER — ASPIRIN 81 MG/1
81 TABLET ORAL NIGHTLY
Status: DISCONTINUED | OUTPATIENT
Start: 2023-06-14 | End: 2023-06-16 | Stop reason: HOSPADM

## 2023-06-14 RX ORDER — GLUCAGON 1 MG
1 KIT INJECTION
Status: DISCONTINUED | OUTPATIENT
Start: 2023-06-14 | End: 2023-06-16 | Stop reason: HOSPADM

## 2023-06-14 RX ORDER — INSULIN ASPART 100 [IU]/ML
0-5 INJECTION, SOLUTION INTRAVENOUS; SUBCUTANEOUS
Status: DISCONTINUED | OUTPATIENT
Start: 2023-06-14 | End: 2023-06-16 | Stop reason: HOSPADM

## 2023-06-14 RX ORDER — TAMSULOSIN HYDROCHLORIDE 0.4 MG/1
0.4 CAPSULE ORAL DAILY
Status: DISCONTINUED | OUTPATIENT
Start: 2023-06-14 | End: 2023-06-16 | Stop reason: HOSPADM

## 2023-06-14 RX ORDER — LISINOPRIL 5 MG/1
5 TABLET ORAL DAILY
Status: DISCONTINUED | OUTPATIENT
Start: 2023-06-15 | End: 2023-06-14

## 2023-06-14 RX ORDER — LISINOPRIL 5 MG/1
5 TABLET ORAL NIGHTLY
Status: DISCONTINUED | OUTPATIENT
Start: 2023-06-14 | End: 2023-06-16 | Stop reason: HOSPADM

## 2023-06-14 RX ORDER — TAMSULOSIN HYDROCHLORIDE 0.4 MG/1
0.4 CAPSULE ORAL DAILY
Status: DISCONTINUED | OUTPATIENT
Start: 2023-06-15 | End: 2023-06-14

## 2023-06-14 RX ORDER — ATORVASTATIN CALCIUM 20 MG/1
20 TABLET, FILM COATED ORAL DAILY
Status: DISCONTINUED | OUTPATIENT
Start: 2023-06-15 | End: 2023-06-14

## 2023-06-14 RX ORDER — METOPROLOL SUCCINATE 25 MG/1
25 TABLET, EXTENDED RELEASE ORAL DAILY
Status: DISCONTINUED | OUTPATIENT
Start: 2023-06-15 | End: 2023-06-14

## 2023-06-14 RX ADMIN — LISINOPRIL 5 MG: 5 TABLET ORAL at 11:06

## 2023-06-14 RX ADMIN — ASPIRIN 81 MG: 81 TABLET, COATED ORAL at 11:06

## 2023-06-14 RX ADMIN — ATORVASTATIN CALCIUM 20 MG: 20 TABLET, FILM COATED ORAL at 11:06

## 2023-06-14 RX ADMIN — TAMSULOSIN HYDROCHLORIDE 0.4 MG: 0.4 CAPSULE ORAL at 11:06

## 2023-06-14 NOTE — ED PROVIDER NOTES
Encounter Date: 6/14/2023       History     Chief Complaint   Patient presents with    MD ref for cards     Recent Holter monitor wore for 24 hours, abnormal reading, sent here for cardiology      Mr. North Najera is a 63 year old male with HTN, CAD, antiphospholipid syndrome on xarelto, HLD, and DM. He presents today at the request of his cardiologist for cardiac arrhythmia found on Holter monitor. Patient states that last Thursday he was was evaluated for dizziness, diaporesis and presyncope at work. Workup at that time was negative for abnormalities, he was subsequently evaluated by cardiology and Holter monitor placed. Patient states that dizzy episodes occur frequently 2-3 times a day associated with BP readings as low as 70s systolic. Patient states episodes usually occur on exertion but denies associated chest pain, SOB, orthopnea, fevers/chills, N/V.     Holter results show predominant rhythm sinus rhythm with HR varying between 43 and 200 BPM with occassional runs of monomoprhic ventricular tachycardia, longest of 1038 beats.     He is a cigarette smoker, negative stress testing in 2017. Last A1c 6.4. TTE performed today with EF 40%, prior TTE with EF 55%.     Review of patient's allergies indicates:   Allergen Reactions    Codeine Diarrhea     Patient can not tolerate Tylenol #3, states he does okay with Codeine with cough medications     Nickel sutures [surgical stainless steel] Rash     Past Medical History:   Diagnosis Date    APS (antiphospholipid syndrome)     Cancer     skin cancer- R. hand    Coronary artery disease involving native coronary artery of native heart without angina pectoris 6/9/2023    Elevated homocysteine     Screening for colon cancer 5/4/2017     Past Surgical History:   Procedure Laterality Date    APPENDECTOMY  1976    CHOLECYSTECTOMY  1987    COLONOSCOPY N/A 5/4/2017    Procedure: COLONOSCOPY;  Surgeon: Gabriel Saini MD;  Location: [facility];  Service: Endoscopy;  Laterality:  N/A;    COLONOSCOPY W/ BIOPSIES AND POLYPECTOMY  2011    SKIN SURGERY      piece of skin removed on R.hand- skin cancer     TONSILLECTOMY       Family History   Problem Relation Age of Onset    No Known Problems Mother     No Known Problems Father     Diabetes Maternal Grandmother      Social History     Tobacco Use    Smoking status: Every Day     Packs/day: 1.00     Years: 30.00     Pack years: 30.00     Types: Cigarettes     Start date: 3/28/1987     Last attempt to quit: 8/3/2019     Years since quitting: 3.8    Smokeless tobacco: Never   Substance Use Topics    Alcohol use: Not Currently     Comment: Occasionally 3-4 drinks per month at the most    Drug use: No     Review of Systems   Constitutional:         See HPI     Physical Exam     Initial Vitals [06/14/23 1550]   BP Pulse Resp Temp SpO2   (!) 142/85 60 18 98.1 °F (36.7 °C) 98 %      MAP       --         Physical Exam    Nursing note and vitals reviewed.  Constitutional: He appears well-developed and well-nourished. He is not diaphoretic. No distress.   HENT:   Head: Normocephalic.   Eyes: Pupils are equal, round, and reactive to light. No scleral icterus.   Neck: No JVD present.   Normal range of motion.  Cardiovascular:  Normal rate, regular rhythm, normal heart sounds and intact distal pulses.           No murmur heard.  Pulmonary/Chest: Breath sounds normal. No stridor. No respiratory distress. He has no wheezes. He has no rhonchi.   Abdominal: Abdomen is soft. Bowel sounds are normal. He exhibits no distension. There is no abdominal tenderness.   Musculoskeletal:         General: Edema present. Normal range of motion.      Cervical back: Normal range of motion.      Comments: Lower extremity swelling non pitting     Neurological: He is alert.   Skin: Skin is warm. Capillary refill takes less than 2 seconds. No rash noted. No erythema.       ED Course   Procedures  Labs Reviewed   CBC W/ AUTO DIFFERENTIAL - Abnormal; Notable for the following  components:       Result Value    RBC 4.57 (*)     MCV 99 (*)     MCH 33.7 (*)     Immature Grans (Abs) 0.05 (*)     All other components within normal limits    Narrative:     Release to patient->Immediate   COMPREHENSIVE METABOLIC PANEL - Abnormal; Notable for the following components:    Total Bilirubin 1.9 (*)     All other components within normal limits    Narrative:     Release to patient->Immediate   LIPID PANEL - Abnormal; Notable for the following components:    Cholesterol 119 (*)     Triglycerides 193 (*)     HDL 31 (*)     LDL Cholesterol 49.4 (*)     All other components within normal limits    Narrative:     Release to patient->Immediate   TROPONIN I    Narrative:     Release to patient->Immediate   B-TYPE NATRIURETIC PEPTIDE    Narrative:     Release to patient->Immediate   TSH    Narrative:     Release to patient->Immediate   HIV 1 / 2 ANTIBODY    Narrative:     Release to patient->Immediate   HEPATITIS C ANTIBODY    Narrative:     Release to patient->Immediate   TROPONIN ISTAT   TROPONIN I   POCT TROPONIN   POCT TROPONIN     EKG Readings: (Independently Interpreted)   Initial Reading: No STEMI. Rhythm: Normal Sinus Rhythm.     Imaging Results              X-Ray Chest AP Portable (Final result)  Result time 06/14/23 19:12:33      Final result by Gabriel Marin MD (06/14/23 19:12:33)                   Impression:      No detrimental change or radiographic acute intrathoracic process seen.      Electronically signed by: Gabriel Marin MD  Date:    06/14/2023  Time:    19:12               Narrative:    EXAMINATION:  XR CHEST AP PORTABLE    CLINICAL HISTORY:  Chest Pain;    TECHNIQUE:  AP portable view of the chest.    COMPARISON:  Chest radiograph 06/08/2023    FINDINGS:  Monitoring leads overlie the chest.  Patient is slightly rotated.    The lungs are symmetrically well expanded and clear, with normal appearance of pulmonary vasculature and no pleural effusion or pneumothorax.    The cardiac  silhouette is normal in size. Similar tortuosity of the aorta.  Hilar contours are within normal limits.    No acute osseous process seen.  PA and lateral views can be obtained.                                       Medications - No data to display  Medical Decision Making:   Initial Assessment:   Patient presents at the request of cardiologist due to concern of monomorphic ventricular tachycardia noted on Holter monitor. On initial exam, patient is HDS, asymptomatic. Initial EKG with NSR with sinus arrhythmia, HR 66. Differentials for episodic wide complex tachycardia are ischemia, electrolyte abnormalities, or infection.   Clinical Tests:   Lab Tests: Ordered and Reviewed  Medical Tests: Ordered and Reviewed  ED Management:  Blood work with no electrolyte abnormalities, TSH WNL. T bili is chronically elevated, patient without abdominal pain or jaundice. Discussed patient with cardiology on call and would like admit to HM for observation and cardiology consultation for ischemic evaluation. Patient placed in observation with HM.  Other:   I have discussed this case with another health care provider.                        Clinical Impression:   Final diagnoses:  [R07.9] Chest pain  [I49.9] Cardiac arrhythmia, unspecified cardiac arrhythmia type (Primary)        ED Disposition Condition    Observation Stable                Yoel Mitchell MD  Resident  06/14/23 4635

## 2023-06-14 NOTE — TELEPHONE ENCOUNTER
I was contacted by cardiopulmonary regarding abnormal Holter with wide complex tachycardia.  He has had dizzy spells which prompted the cardiology consult.  The full Holter is still pending.    I spoke to the patient via telephone today.  I am scheduling an echo and an appointment with him hopefully for later today.  He has not had any worsening events but did report dizziness at time of arrhythmia on Holter.  His heart rate was in the 140s.

## 2023-06-14 NOTE — ED NOTES
Patient identifiers verified and correct for Mr Najera  C/C: Abnormal EKG  APPEARANCE: awake and alert in NAD. PAIN  0/10  SKIN: warm, dry and intact. No breakdown or bruising.  MUSCULOSKELETAL: Patient moving all extremities spontaneously, no obvious swelling or deformities noted. Ambulates independently.  RESPIRATORY: Denies shortness of breath.Respirations unlabored.   CARDIAC: Denies CP, 2+ distal pulses; no peripheral edema  ABDOMEN: S/ND/NT, Denies nausea  : voids spontaneously, denies difficulty  Neurologic: AAO x 4; follows commands equal strength in all extremities; denies numbness/tingling. Denies dizziness  Denies new weakness

## 2023-06-14 NOTE — PROVIDER PROGRESS NOTES - EMERGENCY DEPT.
Encounter Date: 6/14/2023    ED Physician Progress Notes        Physician Note:   Initially triaged to intake.  Sent to emergency department for wide complex tachycardia on Holter monitor he had been being evaluated for dizziness.  Currently not in white complex tachycardia with benign vitals and well-appearing however given this finding on Holter monitor will placed in ED bed for close monitoring and tele monitoring, and have main ED team see patient.

## 2023-06-14 NOTE — PROVIDER PROGRESS NOTES - EMERGENCY DEPT.
Encounter Date: 6/14/2023    ED Physician Progress Notes         EKG - STEMI Decision  Initial Reading: No STEMI present.

## 2023-06-14 NOTE — ED NOTES
"Patient states abnormal Holter reading, states " concerning rhythms"  on monitor, reports had echo today, told to come to  [facility]  immed. States he turned monitor in Saturday am. Patient states he was told his HR was "over 200" denies current symptoms  "

## 2023-06-15 PROBLEM — R17 ELEVATED BILIRUBIN: Status: ACTIVE | Noted: 2023-06-15

## 2023-06-15 PROBLEM — I50.41 ACUTE COMBINED SYSTOLIC AND DIASTOLIC CONGESTIVE HEART FAILURE: Status: ACTIVE | Noted: 2023-06-15

## 2023-06-15 LAB
ALBUMIN SERPL BCP-MCNC: 3.6 G/DL (ref 3.5–5.2)
ALP SERPL-CCNC: 53 U/L (ref 55–135)
ALT SERPL W/O P-5'-P-CCNC: 17 U/L (ref 10–44)
ANION GAP SERPL CALC-SCNC: 8 MMOL/L (ref 8–16)
AST SERPL-CCNC: 14 U/L (ref 10–40)
BASOPHILS # BLD AUTO: 0.04 K/UL (ref 0–0.2)
BASOPHILS NFR BLD: 0.5 % (ref 0–1.9)
BILIRUB SERPL-MCNC: 1.6 MG/DL (ref 0.1–1)
BUN SERPL-MCNC: 12 MG/DL (ref 8–23)
CALCIUM SERPL-MCNC: 8.8 MG/DL (ref 8.7–10.5)
CHLORIDE SERPL-SCNC: 106 MMOL/L (ref 95–110)
CO2 SERPL-SCNC: 23 MMOL/L (ref 23–29)
CREAT SERPL-MCNC: 0.7 MG/DL (ref 0.5–1.4)
DIFFERENTIAL METHOD: ABNORMAL
EOSINOPHIL # BLD AUTO: 0.2 K/UL (ref 0–0.5)
EOSINOPHIL NFR BLD: 2.4 % (ref 0–8)
ERYTHROCYTE [DISTWIDTH] IN BLOOD BY AUTOMATED COUNT: 13.6 % (ref 11.5–14.5)
EST. GFR  (NO RACE VARIABLE): >60 ML/MIN/1.73 M^2
GLUCOSE SERPL-MCNC: 118 MG/DL (ref 70–110)
HCT VFR BLD AUTO: 43.7 % (ref 40–54)
HGB BLD-MCNC: 14.4 G/DL (ref 14–18)
IMM GRANULOCYTES # BLD AUTO: 0.05 K/UL (ref 0–0.04)
IMM GRANULOCYTES NFR BLD AUTO: 0.6 % (ref 0–0.5)
LYMPHOCYTES # BLD AUTO: 2.9 K/UL (ref 1–4.8)
LYMPHOCYTES NFR BLD: 33.7 % (ref 18–48)
MAGNESIUM SERPL-MCNC: 1.9 MG/DL (ref 1.6–2.6)
MCH RBC QN AUTO: 32.5 PG (ref 27–31)
MCHC RBC AUTO-ENTMCNC: 33 G/DL (ref 32–36)
MCV RBC AUTO: 99 FL (ref 82–98)
MONOCYTES # BLD AUTO: 0.8 K/UL (ref 0.3–1)
MONOCYTES NFR BLD: 9.6 % (ref 4–15)
NEUTROPHILS # BLD AUTO: 4.6 K/UL (ref 1.8–7.7)
NEUTROPHILS NFR BLD: 53.2 % (ref 38–73)
NRBC BLD-RTO: 0 /100 WBC
PLATELET # BLD AUTO: 196 K/UL (ref 150–450)
PMV BLD AUTO: 9.7 FL (ref 9.2–12.9)
POCT GLUCOSE: 127 MG/DL (ref 70–110)
POCT GLUCOSE: 141 MG/DL (ref 70–110)
POCT GLUCOSE: 148 MG/DL (ref 70–110)
POCT GLUCOSE: 155 MG/DL (ref 70–110)
POTASSIUM SERPL-SCNC: 3.7 MMOL/L (ref 3.5–5.1)
PROT SERPL-MCNC: 6.3 G/DL (ref 6–8.4)
RBC # BLD AUTO: 4.43 M/UL (ref 4.6–6.2)
SODIUM SERPL-SCNC: 137 MMOL/L (ref 136–145)
WBC # BLD AUTO: 8.72 K/UL (ref 3.9–12.7)

## 2023-06-15 PROCEDURE — G0378 HOSPITAL OBSERVATION PER HR: HCPCS

## 2023-06-15 PROCEDURE — 99233 SBSQ HOSP IP/OBS HIGH 50: CPT | Mod: ,,, | Performed by: STUDENT IN AN ORGANIZED HEALTH CARE EDUCATION/TRAINING PROGRAM

## 2023-06-15 PROCEDURE — 25000003 PHARM REV CODE 250: Performed by: NURSE PRACTITIONER

## 2023-06-15 PROCEDURE — 93005 ELECTROCARDIOGRAM TRACING: CPT

## 2023-06-15 PROCEDURE — 63600175 PHARM REV CODE 636 W HCPCS: Performed by: NURSE PRACTITIONER

## 2023-06-15 PROCEDURE — 93010 ELECTROCARDIOGRAM REPORT: CPT | Mod: ,,, | Performed by: INTERNAL MEDICINE

## 2023-06-15 PROCEDURE — 99233 PR SUBSEQUENT HOSPITAL CARE,LEVL III: ICD-10-PCS | Mod: ,,, | Performed by: STUDENT IN AN ORGANIZED HEALTH CARE EDUCATION/TRAINING PROGRAM

## 2023-06-15 PROCEDURE — 36415 COLL VENOUS BLD VENIPUNCTURE: CPT | Performed by: HOSPITALIST

## 2023-06-15 PROCEDURE — 93010 EKG 12-LEAD: ICD-10-PCS | Mod: ,,, | Performed by: INTERNAL MEDICINE

## 2023-06-15 PROCEDURE — 99204 OFFICE O/P NEW MOD 45 MIN: CPT | Mod: ,,, | Performed by: STUDENT IN AN ORGANIZED HEALTH CARE EDUCATION/TRAINING PROGRAM

## 2023-06-15 PROCEDURE — 99204 PR OFFICE/OUTPT VISIT, NEW, LEVL IV, 45-59 MIN: ICD-10-PCS | Mod: ,,, | Performed by: STUDENT IN AN ORGANIZED HEALTH CARE EDUCATION/TRAINING PROGRAM

## 2023-06-15 PROCEDURE — 80053 COMPREHEN METABOLIC PANEL: CPT | Performed by: HOSPITALIST

## 2023-06-15 PROCEDURE — 94761 N-INVAS EAR/PLS OXIMETRY MLT: CPT

## 2023-06-15 PROCEDURE — 25000003 PHARM REV CODE 250: Performed by: STUDENT IN AN ORGANIZED HEALTH CARE EDUCATION/TRAINING PROGRAM

## 2023-06-15 PROCEDURE — 85025 COMPLETE CBC W/AUTO DIFF WBC: CPT | Performed by: HOSPITALIST

## 2023-06-15 PROCEDURE — 96365 THER/PROPH/DIAG IV INF INIT: CPT

## 2023-06-15 PROCEDURE — 25000003 PHARM REV CODE 250: Performed by: HOSPITALIST

## 2023-06-15 RX ORDER — IBUPROFEN 200 MG
24 TABLET ORAL
Status: DISCONTINUED | OUTPATIENT
Start: 2023-06-15 | End: 2023-06-15

## 2023-06-15 RX ORDER — PROCHLORPERAZINE EDISYLATE 5 MG/ML
5 INJECTION INTRAMUSCULAR; INTRAVENOUS EVERY 6 HOURS PRN
Status: DISCONTINUED | OUTPATIENT
Start: 2023-06-15 | End: 2023-06-16 | Stop reason: HOSPADM

## 2023-06-15 RX ORDER — TALC
6 POWDER (GRAM) TOPICAL NIGHTLY PRN
Status: DISCONTINUED | OUTPATIENT
Start: 2023-06-15 | End: 2023-06-16 | Stop reason: HOSPADM

## 2023-06-15 RX ORDER — NALOXONE HCL 0.4 MG/ML
0.02 VIAL (ML) INJECTION
Status: DISCONTINUED | OUTPATIENT
Start: 2023-06-15 | End: 2023-06-16 | Stop reason: HOSPADM

## 2023-06-15 RX ORDER — IBUPROFEN 200 MG
16 TABLET ORAL
Status: DISCONTINUED | OUTPATIENT
Start: 2023-06-15 | End: 2023-06-15

## 2023-06-15 RX ORDER — SODIUM CHLORIDE 0.9 % (FLUSH) 0.9 %
10 SYRINGE (ML) INJECTION
Status: DISCONTINUED | OUTPATIENT
Start: 2023-06-15 | End: 2023-06-16 | Stop reason: HOSPADM

## 2023-06-15 RX ORDER — ACETAMINOPHEN 325 MG/1
650 TABLET ORAL EVERY 4 HOURS PRN
Status: DISCONTINUED | OUTPATIENT
Start: 2023-06-15 | End: 2023-06-16 | Stop reason: HOSPADM

## 2023-06-15 RX ORDER — MAGNESIUM SULFATE 1 G/100ML
1 INJECTION INTRAVENOUS ONCE
Status: COMPLETED | OUTPATIENT
Start: 2023-06-15 | End: 2023-06-15

## 2023-06-15 RX ORDER — ONDANSETRON 2 MG/ML
4 INJECTION INTRAMUSCULAR; INTRAVENOUS EVERY 8 HOURS PRN
Status: DISCONTINUED | OUTPATIENT
Start: 2023-06-15 | End: 2023-06-16 | Stop reason: HOSPADM

## 2023-06-15 RX ORDER — POTASSIUM CHLORIDE 750 MG/1
30 CAPSULE, EXTENDED RELEASE ORAL ONCE
Status: COMPLETED | OUTPATIENT
Start: 2023-06-15 | End: 2023-06-15

## 2023-06-15 RX ADMIN — ASPIRIN 81 MG: 81 TABLET, COATED ORAL at 08:06

## 2023-06-15 RX ADMIN — METOPROLOL SUCCINATE 25 MG: 25 TABLET, EXTENDED RELEASE ORAL at 08:06

## 2023-06-15 RX ADMIN — RIVAROXABAN 10 MG: 10 TABLET, FILM COATED ORAL at 06:06

## 2023-06-15 RX ADMIN — POTASSIUM CHLORIDE 30 MEQ: 10 CAPSULE, COATED, EXTENDED RELEASE ORAL at 09:06

## 2023-06-15 RX ADMIN — METOPROLOL SUCCINATE 25 MG: 25 TABLET, EXTENDED RELEASE ORAL at 01:06

## 2023-06-15 RX ADMIN — ATORVASTATIN CALCIUM 20 MG: 20 TABLET, FILM COATED ORAL at 08:06

## 2023-06-15 RX ADMIN — MAGNESIUM SULFATE HEPTAHYDRATE 1 G: 500 INJECTION, SOLUTION INTRAMUSCULAR; INTRAVENOUS at 04:06

## 2023-06-15 RX ADMIN — LISINOPRIL 5 MG: 5 TABLET ORAL at 08:06

## 2023-06-15 RX ADMIN — RIVAROXABAN 10 MG: 10 TABLET, FILM COATED ORAL at 12:06

## 2023-06-15 RX ADMIN — TAMSULOSIN HYDROCHLORIDE 0.4 MG: 0.4 CAPSULE ORAL at 09:06

## 2023-06-15 NOTE — ASSESSMENT & PLAN NOTE
Patient is identified as having Combined Systolic and Diastolic heart failure that is Acute. CHF is currently controlled. Latest ECHO performed and demonstrates- Results for orders placed during the hospital encounter of 06/14/23    Echo    Interpretation Summary  · The left ventricle is moderately enlarged with mildly decreased systolic function.  · The estimated ejection fraction is 40%.  · There is mild left ventricular global hypokinesis.  · Grade I left ventricular diastolic dysfunction.  · Mild mitral regurgitation.  · Mild tricuspid regurgitation.  · Normal right ventricular size with normal right ventricular systolic function.  · There is no pulmonary hypertension.  . Continue Beta Blocker and ACE/ARB and monitor clinical status closely. Monitor on telemetry. Patient is off CHF pathway.  Monitor strict Is&Os and daily weights.  Place on fluid restriction of 1.5 L. Continue to stress to patient importance of self efficacy and  on diet for CHF. Last BNP reviewed- and noted below   Recent Labs   Lab 06/14/23  1745   BNP 58     - Patient appears to have new-onset heart failure  - Cardiology consulted- obtaining stress test to evaluate for ischemic heart disease  - currently on metoprolol, lisinopril   - tele  - K>4, Mg >2  - 1.5L fluid restriction

## 2023-06-15 NOTE — SUBJECTIVE & OBJECTIVE
Past Medical History:   Diagnosis Date    APS (antiphospholipid syndrome)     Cancer     skin cancer- R. hand    Coronary artery disease involving native coronary artery of native heart without angina pectoris 6/9/2023    Elevated homocysteine     HTN (hypertension) 6/14/2023    Screening for colon cancer 5/4/2017     Past Surgical History:   Procedure Laterality Date    APPENDECTOMY  1976    CHOLECYSTECTOMY  1987    COLONOSCOPY N/A 5/4/2017    Procedure: COLONOSCOPY;  Surgeon: Gabriel Saini MD;  Location: [facility];  Service: Endoscopy;  Laterality: N/A;    COLONOSCOPY W/ BIOPSIES AND POLYPECTOMY  2011    SKIN SURGERY      piece of skin removed on R.hand- skin cancer     TONSILLECTOMY       Review of patient's allergies indicates:   Allergen Reactions    Codeine Diarrhea     Patient can not tolerate Tylenol #3, states he does okay with Codeine with cough medications     Nickel sutures [surgical stainless steel] Rash     No current facility-administered medications on file prior to encounter.     Current Outpatient Medications on File Prior to Encounter   Medication Sig    aspirin (ECOTRIN) 81 MG EC tablet Take 81 mg by mouth once daily.    atorvastatin (LIPITOR) 20 MG tablet Take 1 tablet (20 mg total) by mouth once daily IN THE MORNING    lisinopriL (PRINIVIL,ZESTRIL) 5 MG tablet Take 1 tablet (5 mg total) by mouth once daily.    metoprolol succinate (TOPROL-XL) 25 MG 24 hr tablet Take 1 tablet (25 mg total) by mouth once daily.    pioglitazone (ACTOS) 30 MG tablet Take 1 tablet (30 mg total) by mouth once daily.    rivaroxaban (XARELTO) 10 mg Tab Take 1 tablet (10 mg total) by mouth once daily.    SITagliptin phosphate (JANUVIA) 50 MG Tab Take 1 tablet (50 mg total) by mouth once daily.    tamsulosin (FLOMAX) 0.4 mg Cap Take 1 capsule (0.4 mg total) by mouth once daily.    cholecalciferol, vitamin D3, (VITAMIN D3) 50 mcg (2,000 unit) Cap Take 1 capsule by mouth once daily.    fish oil-omega-3 fatty acids  300-1,000 mg capsule Take 2 g by mouth once daily.    MULTIVIT-MIN/FA/LYCOPEN/LUTEIN (CENTRUM SILVER MEN ORAL) Take by mouth.     Family History       Problem Relation (Age of Onset)    Diabetes Maternal Grandmother    No Known Problems Mother, Father          Tobacco Use    Smoking status: Every Day     Packs/day: 1.00     Years: 30.00     Pack years: 30.00     Types: Cigarettes     Start date: 3/28/1987     Last attempt to quit: 8/3/2019     Years since quitting: 3.8    Smokeless tobacco: Never   Substance and Sexual Activity    Alcohol use: Not Currently     Comment: Occasionally 3-4 drinks per month at the most    Drug use: No    Sexual activity: Yes     Comment: single-in a relationship     Review of Systems   Constitutional:  Negative for activity change, chills, fever and unexpected weight change.   HENT:  Negative for congestion and sore throat.    Respiratory:  Negative for cough, shortness of breath and wheezing.    Cardiovascular:  Negative for chest pain, palpitations and leg swelling.   Gastrointestinal:  Negative for abdominal pain, blood in stool, nausea and vomiting.   Genitourinary:  Negative for dysuria and hematuria.   Musculoskeletal:  Negative for arthralgias and neck pain.   Skin:  Negative for color change and rash.   Neurological:  Positive for weakness and light-headedness. Negative for dizziness, seizures and numbness.   Psychiatric/Behavioral:  Negative for hallucinations and suicidal ideas.    Objective:     Vital Signs (Most Recent):  Temp: 97.8 °F (36.6 °C) (06/14/23 2333)  Pulse: 64 (06/15/23 0045)  Resp: (!) 25 (06/15/23 0045)  BP: 135/89 (06/15/23 0045)  SpO2: 95 % (06/15/23 0045) Vital Signs (24h Range):  Temp:  [97.4 °F (36.3 °C)-98.1 °F (36.7 °C)] 97.8 °F (36.6 °C)  Pulse:  [50-64] 64  Resp:  [11-25] 25  SpO2:  [94 %-98 %] 95 %  BP: (121-156)/(66-89) 135/89     Weight: 127.1 kg (280 lb 3.3 oz)  Body mass index is 35.98 kg/m².     Physical Exam  Constitutional:       General: He is  not in acute distress.     Appearance: He is well-developed. He is not diaphoretic.   HENT:      Head: Normocephalic and atraumatic.   Eyes:      Pupils: Pupils are equal, round, and reactive to light.   Neck:      Vascular: No JVD.   Cardiovascular:      Rate and Rhythm: Normal rate and regular rhythm.      Heart sounds: No murmur heard.    No friction rub. No gallop.      Comments: Frequent ectopy on telemetry while sleeping.  Pulmonary:      Effort: Pulmonary effort is normal. No respiratory distress.      Breath sounds: No wheezing.   Abdominal:      General: Bowel sounds are normal. There is no distension.      Tenderness: There is no abdominal tenderness.   Musculoskeletal:         General: No swelling.   Skin:     General: Skin is warm and dry.      Capillary Refill: Capillary refill takes less than 2 seconds.   Neurological:      Mental Status: He is alert and oriented to person, place, and time.   Psychiatric:         Mood and Affect: Mood normal.         Thought Content: Thought content normal.     Significant Labs: All pertinent labs within the past 24 hours have been reviewed.    Significant Imaging: I have reviewed all pertinent imaging results/findings within the past 24 hours.

## 2023-06-15 NOTE — PLAN OF CARE
06/15/23 0312   Post-Acute Status   Post-Acute Authorization Other   Coverage [employer]   Other Status No Post-Acute Service Needs   Discharge Delays None known at this time   Discharge Plan   Discharge Plan A Home     Patient lives with a room mate.     Patient stated that he was independent and can rely on self for any needs.     Patient stated that he can safely return to his home on discharge.     Patient denies any post acute needs at this time.     Patient wanted to add another emergency contact due to his sister having dementia Beny Rust (Brother n law) Ph#[phone].  SW will update patient's chart with this information.     ISIAH Blackburn, MSW-LMSW  Medical Social Worker/  ER Department

## 2023-06-15 NOTE — H&P
"[facility] - Cardiology [facility] Medicine  History & Physical    Patient Name: [place] Najera  MRN: [id number]  Patient Class: OP- Observation  Admission Date: 6/14/2023  Attending Physician: Peg Dutton MD   Primary Care Provider: Irma Biswas NP         Patient information was obtained from patient, past medical records and ER records.     Subjective:     Principal Problem:Non-sustained ventricular tachycardia    Chief Complaint:   Chief Complaint   Patient presents with    MD ref for cards     Recent Holter monitor wore for 24 hours, abnormal reading, sent here for cardiology         HPI: 64 yo M with PMHx DM2, HTN, HLD, and antiphospohlipid syndrome on xarelto who presents to the ED at recommendation of his cardiologist for lightheadedness/dizziness x several weeks with wise complex tachycardia noted on his Holter monitor. Pt. Reports that he has been having episodes of presyncope/lightheadness for a few months now. He presented to the ED 6/8 for these symptoms, and the work-up was largely negative with EKG showing NSR. He was proveded with a referral to cardiology and was able to be seen the next day, 6/9, and a holter monitor & TTE were ordered. Pt. Cardiologist Dr. Constantino was notified that patient Holter monitor was abnormal with wide complex tachycardia and rates as high as 140s. "predominant rhythm sinus rhythm with HR varying between 43 and 200 BPM with occassional runs of monomoprhic ventricular tachycardia, longest of 1038 beats." Additionally, his TTE performed today revealed mildly decreased systolic function (EF 40%). Pt. Denies any chest pain or SOB, but he does have occasional palpitations. He reports that he has had ~2 episodes of lightheadedness today and they frequently happen (more than once a day) when he exerts himself.      Past Medical History:   Diagnosis Date    APS (antiphospholipid syndrome)     Cancer     skin cancer- R. hand    Coronary artery disease involving " native coronary artery of native heart without angina pectoris 6/9/2023    Elevated homocysteine     Screening for colon cancer 5/4/2017       Past Surgical History:   Procedure Laterality Date    APPENDECTOMY  1976    CHOLECYSTECTOMY  1987    COLONOSCOPY N/A 5/4/2017    Procedure: COLONOSCOPY;  Surgeon: Gabriel Saini MD;  Location: [facility];  Service: Endoscopy;  Laterality: N/A;    COLONOSCOPY W/ BIOPSIES AND POLYPECTOMY  2011    SKIN SURGERY      piece of skin removed on R.hand- skin cancer     TONSILLECTOMY         Review of patient's allergies indicates:   Allergen Reactions    Codeine Diarrhea     Patient can not tolerate Tylenol #3, states he does okay with Codeine with cough medications     Nickel sutures [surgical stainless steel] Rash       No current facility-administered medications on file prior to encounter.     Current Outpatient Medications on File Prior to Encounter   Medication Sig    aspirin (ECOTRIN) 81 MG EC tablet Take 81 mg by mouth once daily.    atorvastatin (LIPITOR) 20 MG tablet Take 1 tablet (20 mg total) by mouth once daily IN THE MORNING    lisinopriL (PRINIVIL,ZESTRIL) 5 MG tablet Take 1 tablet (5 mg total) by mouth once daily.    metoprolol succinate (TOPROL-XL) 25 MG 24 hr tablet Take 1 tablet (25 mg total) by mouth once daily.    pioglitazone (ACTOS) 30 MG tablet Take 1 tablet (30 mg total) by mouth once daily.    rivaroxaban (XARELTO) 10 mg Tab Take 1 tablet (10 mg total) by mouth once daily.    SITagliptin phosphate (JANUVIA) 50 MG Tab Take 1 tablet (50 mg total) by mouth once daily.    tamsulosin (FLOMAX) 0.4 mg Cap Take 1 capsule (0.4 mg total) by mouth once daily.    cholecalciferol, vitamin D3, (VITAMIN D3) 50 mcg (2,000 unit) Cap Take 1 capsule by mouth once daily.    fish oil-omega-3 fatty acids 300-1,000 mg capsule Take 2 g by mouth once daily.    MULTIVIT-MIN/FA/LYCOPEN/LUTEIN (CENTRUM SILVER MEN ORAL) Take by mouth.     Family History       Problem  Relation (Age of Onset)    Diabetes Maternal Grandmother    No Known Problems Mother, Father          Tobacco Use    Smoking status: Every Day     Packs/day: 1.00     Years: 30.00     Pack years: 30.00     Types: Cigarettes     Start date: 3/28/1987     Last attempt to quit: 8/3/2019     Years since quitting: 3.8    Smokeless tobacco: Never   Substance and Sexual Activity    Alcohol use: Not Currently     Comment: Occasionally 3-4 drinks per month at the most    Drug use: No    Sexual activity: Yes     Comment: single-in a relationship     Review of Systems   Constitutional:  Negative for activity change, chills, fever and unexpected weight change.   HENT:  Negative for congestion and sore throat.    Respiratory:  Negative for cough, shortness of breath and wheezing.    Cardiovascular:  Negative for chest pain, palpitations and leg swelling.   Gastrointestinal:  Negative for abdominal pain, blood in stool, nausea and vomiting.   Genitourinary:  Negative for dysuria and hematuria.   Musculoskeletal:  Negative for arthralgias and neck pain.   Skin:  Negative for color change and rash.   Neurological:  Positive for weakness and light-headedness. Negative for dizziness, seizures and numbness.   Psychiatric/Behavioral:  Negative for hallucinations and suicidal ideas.    Objective:     Vital Signs (Most Recent):  Temp: 98.1 °F (36.7 °C) (06/14/23 1550)  Pulse: (!) 54 (06/14/23 2000)  Resp: 19 (06/14/23 2000)  BP: (!) 153/87 (06/14/23 2000)  SpO2: 96 % (06/14/23 2000) Vital Signs (24h Range):  Temp:  [98.1 °F (36.7 °C)] 98.1 °F (36.7 °C)  Pulse:  [52-60] 54  Resp:  [11-19] 19  SpO2:  [95 %-98 %] 96 %  BP: (121-156)/(78-87) 153/87     Weight: 127 kg (280 lb)  Body mass index is 35.95 kg/m².     Physical Exam  Vitals reviewed.   Constitutional:       General: He is not in acute distress.     Appearance: He is well-developed.   HENT:      Head: Normocephalic and atraumatic.   Eyes:      Extraocular Movements: Extraocular  movements intact.      Pupils: Pupils are equal, round, and reactive to light.   Neck:      Vascular: No JVD.      Trachea: No tracheal deviation.   Cardiovascular:      Rate and Rhythm: Normal rate and regular rhythm.      Heart sounds: No murmur heard.    No friction rub. No gallop.   Pulmonary:      Effort: No respiratory distress.      Breath sounds: Normal breath sounds. No wheezing or rales.   Abdominal:      General: Bowel sounds are normal. There is no distension.      Palpations: Abdomen is soft. There is no mass.      Tenderness: There is no abdominal tenderness.   Musculoskeletal:         General: No deformity.      Cervical back: Neck supple.   Lymphadenopathy:      Cervical: No cervical adenopathy.   Skin:     General: Skin is warm and dry.      Findings: No rash.   Neurological:      Mental Status: He is alert and oriented to person, place, and time.            CRANIAL NERVES     CN III, IV, VI   Pupils are equal, round, and reactive to light.     Significant Labs: All pertinent labs within the past 24 hours have been reviewed.    Significant Imaging: I have reviewed all pertinent imaging results/findings within the past 24 hours.    Assessment/Plan:     * Non-sustained ventricular tachycardia  -Pt. With intermittent episodes of lightheadedness, holter monitor shows occasional runs of VT, longest run 1038 beats  -TTE shows some structural disease with EF 40%, etiology possibly related to CAD  -Pt. In NSR on admit, continue home metoprolol and monitor on telemetry. Cards consult for additional assistance with work-up in am, will leave pt. NPO @ [facility]        HTN (hypertension)  -Continue home lisinopril and metoprolol    Antiphospholipid syndrome  -Hx of DVT 2002, no events since. Continue home xarelto      Hyperlipidemia LDL goal <70  -Continue home statin      Type 2 diabetes mellitus treated without insulin  -A1c 6.4, well-controlled on actos and januvia  -Hold home DM meds while admitted, LDSSI and  diabetic diet ordered      VTE Risk Mitigation (From admission, onward)         Ordered     rivaroxaban tablet 10 mg  Daily         06/14/23 2053                   On 06/14/2023, patient should be placed in hospital observation services under my care.        Rm Hidalgo MD  Department of Hospital Medicine  [facility] - Cardiology Stepdown

## 2023-06-15 NOTE — HPI
"63yoM w/PMHx DM2, HTN, HLD, and antiphospohlipid syndrome on xarelto who presents to the ED at recommendation of his cardiologist for lightheadedness/dizziness x several weeks with wise complex tachycardia noted on his Holter monitor. Pt. Reports that he has been having episodes of presyncope/lightheadness for a few months now. He presented to the ED 6/8 for these symptoms, and the work-up was largely negative with EKG showing NSR. He was proveded with a referral to cardiology and was able to be seen the next day, 6/9, and a holter monitor & TTE were ordered. Pt. Cardiologist Dr. Constantino was notified that patient Holter monitor was abnormal with wide complex tachycardia and rates as high as 140s. "predominant rhythm sinus rhythm with HR varying between 43 and 200 BPM with occassional runs of monomoprhic ventricular tachycardia, longest of 1038 beats." Additionally, his TTE performed today revealed mildly decreased systolic function (EF 40%). Pt. Denies any chest pain or SOB, but he does have occasional palpitations. He reports that he has had ~2 episodes of lightheadedness today and they frequently happen (more than once a day) when he exerts himself.  "

## 2023-06-15 NOTE — NURSING
Patient ED transfer arrived in stable condition via stretcher escort transporter. Admitted for Non-sustained Vtach.  Placed on telemetry. Vitals signs WNL.  No c/o pain.  Admission assessment and documentation completed. Basic needs upon admission.

## 2023-06-15 NOTE — ASSESSMENT & PLAN NOTE
- Intermittent episodes of lightheadedness, holter monitor shows occasional runs of VT, longest run 1038 beats  - TTE shows some structural disease with EF 40%, etiology possibly related to CAD  - NSR on admit  - continue metoprolol 25mg qhs  - Cardiology consulted- planning for cardiac PET stress tomorrow am, npo at midnight- to evaluate for ischemic disease  - K>4, Mg>2  - Continue tele   - TSH wnl

## 2023-06-15 NOTE — CONSULTS
"[facility] - Cardiology Stepdown  Electrophysiology Consult Note    Patient Name: [place] Najera  MRN: [id number]  Admission Date: 6/14/2023  Hospital Length of Stay: 0 days  Code Status: Full Code   Attending Physician: Peg Dutton MD   Primary Care Physician: Irma Biswas NP  Expected Discharge Date:   Principal Problem:Non-sustained ventricular tachycardia    Subjective:      HPI: 62 yo M with PMHx DM2, HTN, HLD, and antiphospohlipid syndrome on xarelto who presents to the ED at recommendation of his cardiologist for lightheadedness/dizziness x several weeks with wise complex tachycardia noted on his Holter monitor.     Patient reports that he has been having episodes of presyncope/lightheadness for a few months now. He presented to the ED 6/8 for these symptoms, and the work-up was largely negative with EKG showing NSR. He was proveded with a referral to cardiology and was able to be seen the next day, 6/9, and a holter monitor & TTE were ordered. His cardiologist Dr. Constantino was notified that patient Holter monitor was abnormal with wide complex tachycardia and rates as high as 140s. "predominant rhythm sinus rhythm with HR varying between 43 and 200 BPM with occassional runs of monomoprhic ventricular tachycardia, longest of 1038 beats." Additionally, his TTE performed revealed mildly decreased systolic function (EF 40%). Denies any chest pain or SOB, but he does have occasional palpitations. He reports that he has had ~2 episodes of lightheadedness today and they frequently happen (more than once a day) when he exerts himself.    Cardiology consulted for NSVT, newly diagnosed HFrEF 40%.    TTE 6/14/23   The left ventricle is moderately enlarged with mildly decreased systolic function.   The estimated ejection fraction is 40%.   There is mild left ventricular global hypokinesis.   Grade I left ventricular diastolic dysfunction.   Mild mitral regurgitation.   Mild tricuspid regurgitation.   Normal " right ventricular size with normal right ventricular systolic function.   There is no pulmonary hypertension.     Holter Monitor 6/9/23  Predominant rhythm sinus rhythm with heart rates varying between 43 and 200 BPM with an average of 92 BPM.   There were very frequent PVCs totalling 80136 and averaging 3285.04 per hour. There were 1380 couplets. There were 867 triplets.  There were 3018 runs monomorphic ventricular tachycardia, longest 1038 beats, fastest 200 BPM.  There were occasional PACs totalling 267 and averaging 11.13 per hour.  A single diary entry was reported with dizziness after coughing.  Rhythm at that time 3 beat run of VT, some PVCs heart rate 91 BPM.    Hospital Course:   No notes on file    Past Medical History:   Diagnosis Date    APS (antiphospholipid syndrome)     Cancer     skin cancer- R. hand    Coronary artery disease involving native coronary artery of native heart without angina pectoris 6/9/2023    Elevated homocysteine     HTN (hypertension) 6/14/2023    Screening for colon cancer 5/4/2017     Past Surgical History:   Procedure Laterality Date    APPENDECTOMY  1976    CHOLECYSTECTOMY  1987    COLONOSCOPY N/A 5/4/2017    Procedure: COLONOSCOPY;  Surgeon: Gabriel Saini MD;  Location: [facility];  Service: Endoscopy;  Laterality: N/A;    COLONOSCOPY W/ BIOPSIES AND POLYPECTOMY  2011    SKIN SURGERY      piece of skin removed on R.hand- skin cancer     TONSILLECTOMY       Review of patient's allergies indicates:   Allergen Reactions    Codeine Diarrhea     Patient can not tolerate Tylenol #3, states he does okay with Codeine with cough medications     Nickel sutures [surgical stainless steel] Rash     No current facility-administered medications on file prior to encounter.     Current Outpatient Medications on File Prior to Encounter   Medication Sig    aspirin (ECOTRIN) 81 MG EC tablet Take 81 mg by mouth once daily.    atorvastatin (LIPITOR) 20 MG tablet Take 1 tablet (20  mg total) by mouth once daily IN THE MORNING    lisinopriL (PRINIVIL,ZESTRIL) 5 MG tablet Take 1 tablet (5 mg total) by mouth once daily.    metoprolol succinate (TOPROL-XL) 25 MG 24 hr tablet Take 1 tablet (25 mg total) by mouth once daily.    pioglitazone (ACTOS) 30 MG tablet Take 1 tablet (30 mg total) by mouth once daily.    rivaroxaban (XARELTO) 10 mg Tab Take 1 tablet (10 mg total) by mouth once daily.    SITagliptin phosphate (JANUVIA) 50 MG Tab Take 1 tablet (50 mg total) by mouth once daily.    tamsulosin (FLOMAX) 0.4 mg Cap Take 1 capsule (0.4 mg total) by mouth once daily.    cholecalciferol, vitamin D3, (VITAMIN D3) 50 mcg (2,000 unit) Cap Take 1 capsule by mouth once daily.    fish oil-omega-3 fatty acids 300-1,000 mg capsule Take 2 g by mouth once daily.    MULTIVIT-MIN/FA/LYCOPEN/LUTEIN (CENTRUM SILVER MEN ORAL) Take by mouth.     Family History       Problem Relation (Age of Onset)    Diabetes Maternal Grandmother    No Known Problems Mother, Father          Tobacco Use    Smoking status: Every Day     Packs/day: 1.00     Years: 30.00     Pack years: 30.00     Types: Cigarettes     Start date: 3/28/1987     Last attempt to quit: 8/3/2019     Years since quitting: 3.8    Smokeless tobacco: Never   Substance and Sexual Activity    Alcohol use: Not Currently     Comment: Occasionally 3-4 drinks per month at the most    Drug use: No    Sexual activity: Yes     Comment: single-in a relationship     Review of Systems   Constitutional:  Negative for activity change, chills, fever and unexpected weight change.   HENT:  Negative for congestion and sore throat.    Respiratory:  Negative for cough, shortness of breath and wheezing.    Cardiovascular:  Negative for chest pain, palpitations and leg swelling.   Gastrointestinal:  Negative for abdominal pain, blood in stool, nausea and vomiting.   Genitourinary:  Negative for dysuria and hematuria.   Musculoskeletal:  Negative for arthralgias and neck  pain.   Skin:  Negative for color change and rash.   Neurological:  Positive for weakness and light-headedness. Negative for dizziness, seizures and numbness.   Psychiatric/Behavioral:  Negative for hallucinations and suicidal ideas.    Objective:     Vital Signs (Most Recent):  Temp: 97.8 °F (36.6 °C) (06/14/23 2333)  Pulse: 64 (06/15/23 0045)  Resp: (!) 25 (06/15/23 0045)  BP: 135/89 (06/15/23 0045)  SpO2: 95 % (06/15/23 0045) Vital Signs (24h Range):  Temp:  [97.4 °F (36.3 °C)-98.1 °F (36.7 °C)] 97.8 °F (36.6 °C)  Pulse:  [50-64] 64  Resp:  [11-25] 25  SpO2:  [94 %-98 %] 95 %  BP: (121-156)/(66-89) 135/89     Weight: 127.1 kg (280 lb 3.3 oz)  Body mass index is 35.98 kg/m².     Physical Exam  Constitutional:       General: He is not in acute distress.     Appearance: He is well-developed. He is not diaphoretic.   HENT:      Head: Normocephalic and atraumatic.   Eyes:      Pupils: Pupils are equal, round, and reactive to light.   Neck:      Vascular: No JVD.   Cardiovascular:      Rate and Rhythm: Normal rate and regular rhythm.      Heart sounds: No murmur heard.    No friction rub. No gallop.      Comments: Frequent ectopy on telemetry while sleeping.  Pulmonary:      Effort: Pulmonary effort is normal. No respiratory distress.      Breath sounds: No wheezing.   Abdominal:      General: Bowel sounds are normal. There is no distension.      Tenderness: There is no abdominal tenderness.   Musculoskeletal:         General: No swelling.   Skin:     General: Skin is warm and dry.      Capillary Refill: Capillary refill takes less than 2 seconds.   Neurological:      Mental Status: He is alert and oriented to person, place, and time.   Psychiatric:         Mood and Affect: Mood normal.         Thought Content: Thought content normal.     Significant Labs: All pertinent labs within the past 24 hours have been reviewed.    Significant Imaging: I have reviewed all pertinent imaging results/findings within the past 24  "hours.      Assessment and Plan:     * Non-sustained ventricular tachycardia  Holter monitor was abnormal with wide complex tachycardia and rates as high as 140s. "predominant rhythm sinus rhythm with HR varying between 43 and 200 BPM with occassional runs of monomoprhic ventricular tachycardia, longest of 1038 beats." Additionally, his TTE performed revealed mildly decreased systolic function (EF 40%). Denies any chest pain or SOB, but he does have occasional palpitations. He reports that he has had ~2 episodes of lightheadedness today and they frequently happen (more than once a day) when he exerts himself.    Cardiology/EP consulted for NSVT, newly diagnosed HFrEF 40%.    Plan  -monitor on telemetry overnight  -K>4, Mg>2  -continue toprol 25mg daily  -on xarelto for recurrent DVT in setting of anti-phospholipid syndrome  -further recs on rhythm/rate control to follow in AM      VTE Risk Mitigation (From admission, onward)         Ordered     rivaroxaban tablet 10 mg  With dinner         06/14/23 0114                Noel Huitron MD  Cardiology  [facility] - Cardiology Stepdown  "

## 2023-06-15 NOTE — ASSESSMENT & PLAN NOTE
- A1c 6.4, well-controlled on actos and januvia  - Holding home DM meds while admitted  - DELPHINE  - accuchecks SHAYLA   93 Nurys Novak MD, [address], Luisa Gardner     April JOSH Jerome PA-C Lynna Rolls, MD, [address], Debbie Hernandez, JOSH Canales NP        [facility], [address] [address]as  22.     [phone]     FAX: [address] [place] [address], [address] [facility], [address]     [phone]     FAX: [phone]       Patient Care Team:  Capri Wright DO as PCP - General (Family Practice)  Barbara Vaughn MD (Gastroenterology)      Problem List  Date Reviewed: 11/7/2017          Codes Class Noted    Primary biliary cholangitis [facility]) ICD-10-CM: K74.3  ICD-9-CM: 571.6  4/10/2017        Depression ICD-10-CM: F32.9  ICD-9-CM: 467  4/10/2017        Asthma ICD-10-CM: J45.909  ICD-9-CM: 493.90  4/10/2017                Dejah Wilcox returns to the [address] for management of Primary Biliary Cholangitis. The patient is a 30 y.o. Black female who was noted to have abnormalities in liver transaminases and alkaline phosphase in 2/2017. Serologic studies were positive for AMA. Ultrasound of the liver was performed in 2/2017. The results of the imaging demonstrated a normal appearing liver. A liver biopsy was performed in 3/2017. This demonstrated mild inflammation, bile duct changes consistent with PBC and no fibrosis. The patient has been given a new prescription for ursodeoxycholic acid and started this medication in early 5/2017. At her last office visit in 6/2017, there had not been much change in her liver transaminases, but she had only been on the new MARIBELL dose of 900 mg daily for 5 weeks. She had initially reported tolerating reasonably well, but had noted an increase in her baseline symptoms of itching.   I had given her a new prescription for Periactin that she has found to be helpful when she takes at night. Patient relates that over the Summer months of 2017, she was not consistent with administration of MARIBELL on a daily basis. She estimates that she would take her medications 3-4 days of the week. Itching has remained a strong component. In early 10/2017, she had significant abdominal pain and was concerned and presented first to her PCP and then was sent to the EMD for evaluation. This showed marked increase in her liver enzymes and total bilirubin. She had negative ultrasound at that time. Patient was discharged and advised to resume her MARIBELL which she had stopped taking about a week prior to her hospital evaluation. She reports since 10/10/2017, she has been consistent with administration of the medication. At her last office visit in 11/2017, she had already had significant reduction in liver enzymes with resumption of MARIBELL. She has continued to have increased itching, particularly of the palms and soles of the feet. She is continuing to take Periactin for management and is drowsy with this medication. She is concerned about taking additional medications at this time. She has had increased bowel frequency with administration of the MARIBELL, she has better managed this with taking her medications in the evening. The most recent laboratory studies indicate that the liver transaminases are elevated, ALP is elevated, tests of hepatic synthetic and metabolic function are depressed, and the platelet count is normal.    The patient notes fatigue, dry eyes, dry mouth, itching, hair loss. The patient completes all daily activities without any functional limitations. The patient has not experienced yellowing of the eyes or skin, mylagias, arthralgias. She admits that she has had a hard time coming to terms with her new diagnosis of chronic liver disease and has recently started on Lexapro for management of symptoms.     ALLERGIES  Allergies   Allergen Reactions    Penicillin G Angioedema    Sulfa (Sulfonamide Antibiotics) Hives       MEDICATIONS  Current Outpatient Prescriptions   Medication Sig    ursodiol (ACTIGALL) 300 mg capsule Take 3 Caps by mouth daily.  cyproheptadine (PERIACTIN) 4 mg tablet Take 4 mg by mouth three (3) times daily as needed.  cetirizine (ZYRTEC) 10 mg tablet Take  by mouth daily as needed.  escitalopram oxalate (LEXAPRO) 10 mg tablet Take 10 mg by mouth daily.  ALPRAZolam (XANAX) 0.5 mg tablet Take  by mouth.  albuterol (PROAIR HFA) 90 mcg/actuation inhaler Take 2 Puffs by inhalation as needed for Wheezing. Indications: Acute Asthma Attack     No current facility-administered medications for this visit. SYSTEM REVIEW NOT RELATED TO LIVER DISEASE OR REVIEWED ABOVE:  Constitution systems: Negative for fever, chills, weight gain, weight loss. Positive for fatigue. Eyes: Negative for visual changes. ENT: Negative for sore throat, painful swallowing. Respiratory: Negative for cough, hemoptysis, SOB. Cardiology: Negative for chest pain, palpitations. GI:  Negative for constipation or diarrhea. : Negative for urinary frequency, dysuria, hematuria, nocturia. Skin: Negative for rash. Positive for itching. Hematology: Negative for easy bruising, blood clots. Musculo-skeletal: Negative for back pain, muscle pain, weakness. Neurologic: Negative for headaches, dizziness, vertigo, memory problems not related to HE. Psychology: Negative for anxiety, depression. FAMILY HISTORY:  The father has the following chronic diseases: DM and has some sort of liver problem. The mother has the following chronic diseases: CHOL, HTN. There is no family history of immune disorders. SOCIAL HISTORY:  The patient has never been . The patient has no children. The patient has never used tobacco products. The patient consumes alcohol on social occasions never in excess.     The patient currently works full time, 2rd . PHYSICAL EXAMINATION:  BP (!) 140/98 (BP 1 Location: Left arm, BP Patient Position: Sitting)  Pulse 84  Temp 98.2 °F (36.8 °C) (Tympanic)   Resp 18  Ht 5' (1.524 m)  Wt 134 lb (60.8 kg)  SpO2 98%  BMI 26.17 kg/m2  General: No acute distress. Eyes: Sclera anicteric. ENT: No oral lesions. Thyroid normal.  Nodes: No adenopathy. Skin: No spider angiomata. No jaundice. No palmar erythema. Respiratory: Lungs clear to auscultation. Cardiovascular: Regular heart rate. No murmurs. No JVD. Abdomen: Soft non-tender. Liver size normal to percussion/palpation. Spleen not palpable. No obvious ascites. Extremities: No edema. No muscle wasting. No gross arthritic changes. Neurologic: Alert and oriented. Cranial nerves grossly intact. No asterixis.     LABORATORY STUDIES:  [facility] [place] [address] Units 11/7/2017 10/10/2017   WBC 3.4 - 10.8 x10E3/uL 10.2    HGB 11.1 - 15.9 g/dL 10.9 (L)     - 379 x10E3/uL 504 (H)    AST 0 - 40 IU/L 117 (H) 156 (H)   ALT 0 - 32 IU/L 114 (H) 140 (H)   Alk Phos 39 - 117 IU/L 501 (H) 944 (H)   Bili, Total 0.0 - 1.2 mg/dL 1.7 (H) 2.3 (H)   Bili, Direct 0.00 - 0.40 mg/dL 1.24 (H)    Albumin 3.5 - 5.5 g/dL 3.9 3.2 (L)   BUN 6 - 20 mg/dL 10 7   Creat 0.57 - 1.00 mg/dL 0.77 0.84   Na 134 - 144 mmol/L 139 138   K 3.5 - 5.2 mmol/L 4.6 4.0   Cl 96 - 106 mmol/L 99 103   CO2 18 - 29 mmol/L 25 26   Glucose 65 - 99 mg/dL 93 91     [facility] [place] [facility] [place] Latest Ref Rng & Units 6/13/2017   WBC 3.4 - 10.8 x10E3/uL 9.5   HGB 11.1 - 15.9 g/dL 10.9 (L)    - 379 x10E3/uL 600 (H)   AST 0 - 40 IU/L 70 (H)   ALT 0 - 32 IU/L 71 (H)   Alk Phos 39 - 117 IU/L 487 (H)   Bili, Total 0.0 - 1.2 mg/dL 0.9   Bili, Direct 0.00 - 0.40 mg/dL 0.63 (H)   Albumin 3.5 - 5.5 g/dL 3.9   BUN 6 - 20 mg/dL    Creat 0.57 - 1.00 mg/dL    Na 134 - 144 mmol/L    K 3.5 - 5.2 mmol/L    Cl 96 - 106 mmol/L    CO2 18 - 29 mmol/L    Glucose 65 - 99 mg/dL    From 2/2017  AST/ALT/ALP/T Bili/ALB:  116/139/474/0.7/4.1  WBC/HB/PLT/INR:   9.5/10.3/605  BUN/CREAT:  11/0.9    Additional lab values drawn at today's office visit are pending at the time of documentation. SEROLOGIES:  2/2017. NYASIA positive, ASMA positive, AMA positive. LIVER HISTOLOGY:  3/2017. Portal inflammation. Bile duct injury consistent with PBC. No fibrosis. Biopsy slides not reviewed by MLS. ENDOSCOPIC PROCEDURES:  Not available or performed    RADIOLOGY:  2/2017. Ultrasound of liver. Normal appearing liver. No liver mass lesions. 10/2017. Ultrasound of liver. Echogenic consistent with chronic liver disease. No liver mass lesions. No dilated bile ducts. No ascites. No evidence of biliary obstruction or stone. OTHER TESTING:  Not available or performed    ASSESSMENT AND PLAN:  Primary Biliary Cholangitis with no fibrosis. Liver transaminases are recently markedly elevated. Alkaline phosphate is elevated. Liver function is normal.  The platelet count is normal.  These lab values will be repeated today to assess her response to consistent dosing of MARIBELL. She has been very adherent to dosing for the past 4 months and is tolerating reasonably well. I have advised that if there is a lack of response to this medication, we may need to pursue additional imaging with MRCP or add another medication for improved control of values. This could include Axel Pickard or participation in clinical trial.  Will review her labs with Dr Jai Calderon when available. Pruritis. I have advised that we could do more for symptom management and would recommend a trial of bile acid binder like cholestyramine. She is unwilling to try additional medication at this time and would like to see her current lab values prior to any start of medication. Will contact patient as soon as these are available to review symptom management options. Hypertension.   Patient has been noted on several past occasions of her elevation in value. She states that her PCP is aware and has been following this with her. No medications have been instituted as of yet. I have asked her to record and share this information with her PCP. The patient was directed to continue all current medications at the current dosages. There are no contraindications for the patient to take any medications that are necessary for treatment of other medical issues. The patient was counseled regarding alcohol consumption. The need for vaccination against viral hepatitis A and B will be assessed with serologic and instituted as appropriate. Anemia may be secondary to low iron stores. This has been shown in the past.  I have advised that she should remain on oral supplementation and will recheck her iron levels at present. Oral supplementation. I have reviewed with her the recommendations to supplement calcium and vitamin D. I spent an extended amount of time in the office, ~45 minutes, reviewing the course of her recent hospital presentation and potential outcomes of medical management with the patient. We have reviewed extensively the course and condition of the natural history of PBC and what she can expect long term for follow-up and care. All of the above issues were discussed with the patient. All questions were answered. The patient expressed a clear understanding of the above. [facility] 87 in 4 months pending review of recent labs.     Zandra Camargo PA-C  [facility] [place] of [address] [facility], [address] [address]  22.  [phone]

## 2023-06-15 NOTE — SUBJECTIVE & OBJECTIVE
Past Medical History:   Diagnosis Date    APS (antiphospholipid syndrome)     Cancer     skin cancer- R. hand    Coronary artery disease involving native coronary artery of native heart without angina pectoris 6/9/2023    Elevated homocysteine     Screening for colon cancer 5/4/2017       Past Surgical History:   Procedure Laterality Date    APPENDECTOMY  1976    CHOLECYSTECTOMY  1987    COLONOSCOPY N/A 5/4/2017    Procedure: COLONOSCOPY;  Surgeon: Gabriel Saini MD;  Location: [facility];  Service: Endoscopy;  Laterality: N/A;    COLONOSCOPY W/ BIOPSIES AND POLYPECTOMY  2011    SKIN SURGERY      piece of skin removed on R.hand- skin cancer     TONSILLECTOMY         Review of patient's allergies indicates:   Allergen Reactions    Codeine Diarrhea     Patient can not tolerate Tylenol #3, states he does okay with Codeine with cough medications     Nickel sutures [surgical stainless steel] Rash       No current facility-administered medications on file prior to encounter.     Current Outpatient Medications on File Prior to Encounter   Medication Sig    aspirin (ECOTRIN) 81 MG EC tablet Take 81 mg by mouth once daily.    atorvastatin (LIPITOR) 20 MG tablet Take 1 tablet (20 mg total) by mouth once daily IN THE MORNING    lisinopriL (PRINIVIL,ZESTRIL) 5 MG tablet Take 1 tablet (5 mg total) by mouth once daily.    metoprolol succinate (TOPROL-XL) 25 MG 24 hr tablet Take 1 tablet (25 mg total) by mouth once daily.    pioglitazone (ACTOS) 30 MG tablet Take 1 tablet (30 mg total) by mouth once daily.    rivaroxaban (XARELTO) 10 mg Tab Take 1 tablet (10 mg total) by mouth once daily.    SITagliptin phosphate (JANUVIA) 50 MG Tab Take 1 tablet (50 mg total) by mouth once daily.    tamsulosin (FLOMAX) 0.4 mg Cap Take 1 capsule (0.4 mg total) by mouth once daily.    cholecalciferol, vitamin D3, (VITAMIN D3) 50 mcg (2,000 unit) Cap Take 1 capsule by mouth once daily.    fish oil-omega-3 fatty acids 300-1,000 mg capsule Take 2 g by  mouth once daily.    MULTIVIT-MIN/FA/LYCOPEN/LUTEIN (CENTRUM SILVER MEN ORAL) Take by mouth.     Family History       Problem Relation (Age of Onset)    Diabetes Maternal Grandmother    No Known Problems Mother, Father          Tobacco Use    Smoking status: Every Day     Packs/day: 1.00     Years: 30.00     Pack years: 30.00     Types: Cigarettes     Start date: 3/28/1987     Last attempt to quit: 8/3/2019     Years since quitting: 3.8    Smokeless tobacco: Never   Substance and Sexual Activity    Alcohol use: Not Currently     Comment: Occasionally 3-4 drinks per month at the most    Drug use: No    Sexual activity: Yes     Comment: single-in a relationship     Review of Systems   Constitutional:  Negative for activity change, chills, fever and unexpected weight change.   HENT:  Negative for congestion and sore throat.    Respiratory:  Negative for cough, shortness of breath and wheezing.    Cardiovascular:  Negative for chest pain, palpitations and leg swelling.   Gastrointestinal:  Negative for abdominal pain, blood in stool, nausea and vomiting.   Genitourinary:  Negative for dysuria and hematuria.   Musculoskeletal:  Negative for arthralgias and neck pain.   Skin:  Negative for color change and rash.   Neurological:  Positive for weakness and light-headedness. Negative for dizziness, seizures and numbness.   Psychiatric/Behavioral:  Negative for hallucinations and suicidal ideas.    Objective:     Vital Signs (Most Recent):  Temp: 98.1 °F (36.7 °C) (06/14/23 1550)  Pulse: (!) 54 (06/14/23 2000)  Resp: 19 (06/14/23 2000)  BP: (!) 153/87 (06/14/23 2000)  SpO2: 96 % (06/14/23 2000) Vital Signs (24h Range):  Temp:  [98.1 °F (36.7 °C)] 98.1 °F (36.7 °C)  Pulse:  [52-60] 54  Resp:  [11-19] 19  SpO2:  [95 %-98 %] 96 %  BP: (121-156)/(78-87) 153/87     Weight: 127 kg (280 lb)  Body mass index is 35.95 kg/m².     Physical Exam  Vitals reviewed.   Constitutional:       General: He is not in acute distress.      Appearance: He is well-developed.   HENT:      Head: Normocephalic and atraumatic.   Eyes:      Extraocular Movements: Extraocular movements intact.      Pupils: Pupils are equal, round, and reactive to light.   Neck:      Vascular: No JVD.      Trachea: No tracheal deviation.   Cardiovascular:      Rate and Rhythm: Normal rate and regular rhythm.      Heart sounds: No murmur heard.    No friction rub. No gallop.   Pulmonary:      Effort: No respiratory distress.      Breath sounds: Normal breath sounds. No wheezing or rales.   Abdominal:      General: Bowel sounds are normal. There is no distension.      Palpations: Abdomen is soft. There is no mass.      Tenderness: There is no abdominal tenderness.   Musculoskeletal:         General: No deformity.      Cervical back: Neck supple.   Lymphadenopathy:      Cervical: No cervical adenopathy.   Skin:     General: Skin is warm and dry.      Findings: No rash.   Neurological:      Mental Status: He is alert and oriented to person, place, and time.            CRANIAL NERVES     CN III, IV, VI   Pupils are equal, round, and reactive to light.     Significant Labs: All pertinent labs within the past 24 hours have been reviewed.    Significant Imaging: I have reviewed all pertinent imaging results/findings within the past 24 hours.

## 2023-06-15 NOTE — ASSESSMENT & PLAN NOTE
"Holter monitor was abnormal with wide complex tachycardia and rates as high as 140s. "predominant rhythm sinus rhythm with HR varying between 43 and 200 BPM with occassional runs of monomoprhic ventricular tachycardia, longest of 1038 beats." Additionally, his TTE performed revealed mildly decreased systolic function (EF 40%). Denies any chest pain or SOB, but he does have occasional palpitations. He reports that he has had ~2 episodes of lightheadedness today and they frequently happen (more than once a day) when he exerts himself.    Cardiology/EP consulted for NSVT, newly diagnosed HFrEF 40%.    Plan  -monitor on telemetry overnight  -K>4, Mg>2  -continue toprol 25mg daily  -on xarelto for recurrent DVT in setting of anti-phospholipid syndrome  -further recs on rhythm/rate control to follow in AM  "

## 2023-06-15 NOTE — ASSESSMENT & PLAN NOTE
-Pt. With intermittent episodes of lightheadedness, holter monitor shows occasional runs of VT, longest run 1038 beats  -TTE shows some structural disease with EF 40%, etiology possibly related to CAD  -Pt. In NSR on admit, continue home metoprolol and monitor on telemetry. Cards consult for additional assistance with work-up in am, will leave pt. NPO @ MN

## 2023-06-15 NOTE — ASSESSMENT & PLAN NOTE
- elevated bili on admit  - no complaints of RUQ pain  - consideration of congestive hepatopathy with new-onset HF?  - will monitor- if worsening, then will obtain RUQ US

## 2023-06-15 NOTE — HOSPITAL COURSE
EP consulted on admit. Patient underwent stress test 06/16.   Conclusion    There are no clinically significant perfusion abnormalities. There is a small (<10%) amount of mild resting heterogeneity in the septal wall(s) that improves with stress.    The whole heart absolute myocardial perfusion values averaged 0.37 cc/min/g at rest, which is reduced; 0.78 cc/min/g at stress, which is severely reduced; and CFR is 2.11 , which is mildly reduced.    CT attenuation images demonstrate mild diffuse coronary calcifications in the LAD, LCX and RCA territory and no aortic calcifications.    The gated perfusion images showed an ejection fraction of 42% at rest and 48% during stress. A normal ejection fraction is greater than 47%.    There is mild global hypokinesis at rest and during stress.    The LV cavity size is mildly enlarged at rest and stress.    The ECG portion of the study is negative for ischemia.    During stress, occasional PVCs are noted.    The patient reported no chest pain during the stress test.    There are no prior studies for comparison.    Discussed with cardiology- recommended continuation of metoprolol, stopping ACTOs, starting metoformin or jardiance in place and f/u with Dr. Talley for exercise stress test.     Pt deemed appropriate for discharge. Plan discussed with pt, who was agreeable and amenable; medications were discussed and reviewed, outpatient follow-up scheduled, ER precautions were given, all questions were answered to the pt's satisfaction, and Mr. Najera was subsequently discharged.

## 2023-06-15 NOTE — PROGRESS NOTES
"[facility] - Cardiology [facility] Medicine  Progress Note    Patient Name: North Najera  MRN: [id number]  Patient Class: OP- Observation   Admission Date: 6/14/2023  Length of Stay: 0 days  Attending Physician: Ingrid Marcos MD  Primary Care Provider: Irma Biswas NP        Subjective:     Principal Problem:Non-sustained ventricular tachycardia        HPI:  63yoM w/PMHx DM2, HTN, HLD, and antiphospohlipid syndrome on xarelto who presents to the ED at recommendation of his cardiologist for lightheadedness/dizziness x several weeks with wise complex tachycardia noted on his Holter monitor. Pt. Reports that he has been having episodes of presyncope/lightheadness for a few months now. He presented to the ED 6/8 for these symptoms, and the work-up was largely negative with EKG showing NSR. He was proveded with a referral to cardiology and was able to be seen the next day, 6/9, and a holter monitor & TTE were ordered. Pt. Cardiologist Dr. Constantino was notified that patient Holter monitor was abnormal with wide complex tachycardia and rates as high as 140s. "predominant rhythm sinus rhythm with HR varying between 43 and 200 BPM with occassional runs of monomoprhic ventricular tachycardia, longest of 1038 beats." Additionally, his TTE performed today revealed mildly decreased systolic function (EF 40%). Pt. Denies any chest pain or SOB, but he does have occasional palpitations. He reports that he has had ~2 episodes of lightheadedness today and they frequently happen (more than once a day) when he exerts himself.      Overview/Hospital Course:  EP consulted on admit. Planning for stress test for further evaluation of ischemic disease.       Interval History: Patient was seen and evaluated- no further dizziness or chest discomfort events. No nausea, vomiting, fevers, chills, night sweats, abd pain, diarrhea, constipation, shortness of breath, chest pain.     Objective:     Vital Signs (Most Recent):  Temp: " 97.5 °F (36.4 °C) (06/15/23 1108)  Pulse: 63 (06/15/23 1119)  Resp: 20 (06/15/23 0419)  BP: 117/81 (06/15/23 0420)  SpO2: 96 % (06/15/23 0419) Vital Signs (24h Range):  Temp:  [97.4 °F (36.3 °C)-98.1 °F (36.7 °C)] 97.5 °F (36.4 °C)  Pulse:  [50-64] 63  Resp:  [11-25] 20  SpO2:  [94 %-98 %] 96 %  BP: (117-156)/(66-89) 117/81     Weight: 127.1 kg (280 lb 3.3 oz)  Body mass index is 35.98 kg/m².    Intake/Output Summary (Last 24 hours) at 6/15/2023 1150  Last data filed at 6/15/2023 0506  Gross per 24 hour   Intake 99.75 ml   Output --   Net 99.75 ml      Physical Exam  Gen: in NAD, appears stated age  Neuro: AAOx3, motor, sensory, and strength grossly intact BL  HEENT: NTNC, EOMI, PERRL, MMM  CVS: RRR, no m/r/g; S1/S2 auscultated with no S3 or S4; capillary refill < 2 sec  Resp: lungs CTAB, no w/r/r; no belabored breathing or accessory muscle use appreciated   Abd: BS+ in all 4 quadrants; NTND, soft to palpation; no organomegaly appreciated   Extrem: pulses full, equal, and regular over all 4 extremities; no UE or LE edema BL    Significant Labs: All pertinent labs within the past 24 hours have been reviewed.  CBC:   Recent Labs   Lab 06/14/23  1745 06/15/23  0454   WBC 9.11 8.72   HGB 15.4 14.4   HCT 45.1 43.7    196     CMP:   Recent Labs   Lab 06/14/23  1745 06/15/23  0454    137   K 4.0 3.7    106   CO2 23 23   GLU 98 118*   BUN 12 12   CREATININE 0.7 0.7   CALCIUM 9.3 8.8   PROT 7.3 6.3   ALBUMIN 4.2 3.6   BILITOT 1.9* 1.6*   ALKPHOS 58 53*   AST 22 14   ALT 19 17   ANIONGAP 8 8     Troponin:   Recent Labs   Lab 06/14/23  1745 06/14/23 2016   TROPONINI 0.017 0.012     TSH:   Recent Labs   Lab 06/14/23 1745   TSH 1.725       Significant Imaging: I have reviewed all pertinent imaging results/findings within the past 24 hours.    TTE:   Summary     The left ventricle is moderately enlarged with mildly decreased systolic function.   The estimated ejection fraction is 40%.   There is mild left  ventricular global hypokinesis.   Grade I left ventricular diastolic dysfunction.   Mild mitral regurgitation.   Mild tricuspid regurgitation.   Normal right ventricular size with normal right ventricular systolic function.   There is no pulmonary hypertension.      Assessment/Plan:      * Non-sustained ventricular tachycardia  - Intermittent episodes of lightheadedness, holter monitor shows occasional runs of VT, longest run 1038 beats  - TTE shows some structural disease with EF 40%, etiology possibly related to CAD  - NSR on admit  - continue metoprolol 25mg qhs  - Cardiology consulted- planning for cardiac PET stress tomorrow am, npo at midnight- to evaluate for ischemic disease  - K>4, Mg>2  - Continue tele   - TSH wnl    Acute combined systolic and diastolic congestive heart failure  Patient is identified as having Combined Systolic and Diastolic heart failure that is Acute. CHF is currently controlled. Latest ECHO performed and demonstrates- Results for orders placed during the hospital encounter of 06/14/23    Echo    Interpretation Summary  · The left ventricle is moderately enlarged with mildly decreased systolic function.  · The estimated ejection fraction is 40%.  · There is mild left ventricular global hypokinesis.  · Grade I left ventricular diastolic dysfunction.  · Mild mitral regurgitation.  · Mild tricuspid regurgitation.  · Normal right ventricular size with normal right ventricular systolic function.  · There is no pulmonary hypertension.  . Continue Beta Blocker and ACE/ARB and monitor clinical status closely. Monitor on telemetry. Patient is off CHF pathway.  Monitor strict Is&Os and daily weights.  Place on fluid restriction of 1.5 L. Continue to stress to patient importance of self efficacy and  on diet for CHF. Last BNP reviewed- and noted below   Recent Labs   Lab 06/14/23  1745   BNP 58     - Patient appears to have new-onset heart failure  - Cardiology consulted- obtaining  stress test to evaluate for ischemic heart disease  - currently on metoprolol, lisinopril   - tele  - K>4, Mg >2  - 1.5L fluid restriction    Elevated bilirubin  - elevated bili on admit  - no complaints of RUQ pain  - consideration of congestive hepatopathy with new-onset HF?  - will monitor- if worsening, then will obtain RUQ US    HTN (hypertension)  - Continue home lisinopril and metoprolol    Antiphospholipid syndrome  - h/o DVT 2002, no events since  - Continue home xarelto    Hyperlipidemia LDL goal <70  - Continue home statin    Type 2 diabetes mellitus treated without insulin  - A1c 6.4, well-controlled on actos and januvia  - Holding home DM meds while admitted  - LDSSI  - accuchecks ACHS      VTE Risk Mitigation (From admission, onward)         Ordered     rivaroxaban tablet 10 mg  With dinner         06/14/23 1175                Discharge Planning   DAVID: 6/16/2023     Code Status: Full Code   Is the patient medically ready for discharge?: No    Reason for patient still in hospital (select all that apply): Patient trending condition  Discharge Plan A: Home   Discharge Delays: None known at this time                Ingrid Marcos MD  Department of Hospital Medicine   [facility] - Cardiology Stepdown

## 2023-06-15 NOTE — SUBJECTIVE & OBJECTIVE
Interval History: Patient was seen and evaluated- no further dizziness or chest discomfort events. No nausea, vomiting, fevers, chills, night sweats, abd pain, diarrhea, constipation, shortness of breath, chest pain.     Objective:     Vital Signs (Most Recent):  Temp: 97.5 °F (36.4 °C) (06/15/23 1108)  Pulse: 63 (06/15/23 1119)  Resp: 20 (06/15/23 0419)  BP: 117/81 (06/15/23 0420)  SpO2: 96 % (06/15/23 0419) Vital Signs (24h Range):  Temp:  [97.4 °F (36.3 °C)-98.1 °F (36.7 °C)] 97.5 °F (36.4 °C)  Pulse:  [50-64] 63  Resp:  [11-25] 20  SpO2:  [94 %-98 %] 96 %  BP: (117-156)/(66-89) 117/81     Weight: 127.1 kg (280 lb 3.3 oz)  Body mass index is 35.98 kg/m².    Intake/Output Summary (Last 24 hours) at 6/15/2023 1150  Last data filed at 6/15/2023 0506  Gross per 24 hour   Intake 99.75 ml   Output --   Net 99.75 ml      Physical Exam  Gen: in NAD, appears stated age  Neuro: AAOx3, motor, sensory, and strength grossly intact BL  HEENT: NTNC, EOMI, PERRL, MMM  CVS: RRR, no m/r/g; S1/S2 auscultated with no S3 or S4; capillary refill < 2 sec  Resp: lungs CTAB, no w/r/r; no belabored breathing or accessory muscle use appreciated   Abd: BS+ in all 4 quadrants; NTND, soft to palpation; no organomegaly appreciated   Extrem: pulses full, equal, and regular over all 4 extremities; no UE or LE edema BL    Significant Labs: All pertinent labs within the past 24 hours have been reviewed.  CBC:   Recent Labs   Lab 06/14/23  1745 06/15/23  0454   WBC 9.11 8.72   HGB 15.4 14.4   HCT 45.1 43.7    196     CMP:   Recent Labs   Lab 06/14/23  1745 06/15/23  0454    137   K 4.0 3.7    106   CO2 23 23   GLU 98 118*   BUN 12 12   CREATININE 0.7 0.7   CALCIUM 9.3 8.8   PROT 7.3 6.3   ALBUMIN 4.2 3.6   BILITOT 1.9* 1.6*   ALKPHOS 58 53*   AST 22 14   ALT 19 17   ANIONGAP 8 8     Troponin:   Recent Labs   Lab 06/14/23  1745 06/14/23 2016   TROPONINI 0.017 0.012     TSH:   Recent Labs   Lab 06/14/23 1745   TSH 1.725        Significant Imaging: I have reviewed all pertinent imaging results/findings within the past 24 hours.    TTE:   Summary    The left ventricle is moderately enlarged with mildly decreased systolic function.  The estimated ejection fraction is 40%.  There is mild left ventricular global hypokinesis.  Grade I left ventricular diastolic dysfunction.  Mild mitral regurgitation.  Mild tricuspid regurgitation.  Normal right ventricular size with normal right ventricular systolic function.  There is no pulmonary hypertension.

## 2023-06-15 NOTE — CARE UPDATE
EP care update    Refer to note from earlier today for full details    #Symptomatic frquent non-sha MMVT runs  #Sinus [place] 2/2 beta blocker use  #High PVC and NSVT burden on Holter    Plan for ischemic eval with PET stress tomorrow. Patient said can't run on treadmill so will do lexiscan PET.

## 2023-06-15 NOTE — PLAN OF CARE
SW met with patient. Patient lives with a room mate. Patient stated that his sister has dementia and wanted to add his brother n law to his emergency contacts. Beny Rust [phone].   James Barahona, [facility], MSW-[facility]  Medical Social Worker/  ER Department       [facility] - Cardiology Stepdown  Initial Discharge Assessment       Primary Care Provider: Irma Biswas NP    Admission Diagnosis: Chest pain [R07.9]  Cardiac arrhythmia, unspecified cardiac arrhythmia type [I49.9]    Admission Date: 6/14/2023  Expected Discharge Date:     Transition of Care Barriers: (P) None    Payor: BLUE CROSS BLUE SHIELD / Plan: BCBS OF [facility] LOCAL PLUS / Product Type: Commercial /     Extended Emergency Contact Information  Primary Emergency Contact: Moni Najera  Address: [address]           [place] [address] [place]  Mobile Phone: [phone]  Relation: Sister  Secondary Emergency Contact: Dewayne aguirre  Address: [address]           [place] [address] [place]  Mobile Phone: [phone]  Relation: Friend    Discharge Plan A: (P) Home         [facility]  [address] [place] [address]  Phone: [phone] Fax: [phone]    [facility] [address]  [address]  [facility] [address]  Phone: [phone] Fax: [phone]    [facility] [address] [place] [address] [address]  [address] [place] [id number]  Phone: [phone] Fax: [phone]      Initial Assessment (most recent)       Adult Discharge Assessment - 06/15/23 0307          Discharge Assessment    Assessment Type Discharge Planning Assessment (P)      Confirmed/corrected address, phone number and insurance Yes (P)      Confirmed Demographics Correct on Facesheet (P)      Source of Information patient (P)      When was your last doctors appointment? -- (P)    2 months ago    Does patient/caregiver understand observation status  Yes (P)      Communicated DAVID with patient/caregiver Yes (P)      Reason For Admission Dizzy Spells (P)      People in Home friend(s) (P)      Facility Arrived From: Home (P)      Do you expect to return to your current living situation? Yes (P)      Do you have help at home or someone to help you manage your care at home? No (P)      Prior to hospitilization cognitive status: Alert/Oriented (P)      Current cognitive status: Alert/Oriented (P)      Home Accessibility wheelchair accessible (P)      Home Layout Able to live on 1st floor (P)      Equipment Currently Used at Home none (P)      Readmission within 30 days? No (P)      Patient currently being followed by outpatient case management? No (P)      Do you currently have service(s) that help you manage your care at home? No (P)      Do you take prescription medications? Yes (P)      Do you have prescription coverage? Yes (P)      Coverage BCBS (P)      Do you have any problems affording any of your prescribed medications? No (P)      Is the patient taking medications as prescribed? yes (P)      Who is going to help you get home at discharge? Self (P)      How do you get to doctors appointments? car, drives self (P)      Are you on dialysis? No (P)      Do you take coumadin? No (P)      Discharge Plan A Home (P)      DME Needed Upon Discharge  none (P)      Discharge Plan discussed with: Patient (P)      Transition of Care Barriers None (P)         Physical Activity    On average, how many days per week do you engage in moderate to strenuous exercise (like a brisk walk)? 3 days (P)      On average, how many minutes do you engage in exercise at this level? 20 min (P)    Light walking       Financial Resource Strain    How hard is it for you to pay for the very basics like food, housing, medical care, and heating? Not hard at all (P)         Housing Stability    In the last 12 months, was there a time when you were not able to pay the mortgage or rent on time? No  (P)      In the last 12 months, how many places have you lived? 1 (P)      In the last 12 months, was there a time when you did not have a steady place to sleep or slept in a shelter (including now)? No (P)         Transportation Needs    In the past 12 months, has lack of transportation kept you from medical appointments or from getting medications? No (P)      In the past 12 months, has lack of transportation kept you from meetings, work, or from getting things needed for daily living? No (P)         Food Insecurity    Within the past 12 months, you worried that your food would run out before you got the money to buy more. Never true (P)      Within the past 12 months, the food you bought just didn't last and you didn't have money to get more. Never true (P)         Stress    Do you feel stress - tense, restless, nervous, or anxious, or unable to sleep at night because your mind is troubled all the time - these days? Not at all (P)         Social Connections    In a typical week, how many times do you talk on the phone with family, friends, or neighbors? More than three times a week (P)      How often do you get together with friends or relatives? Twice a week (P)      How often do you attend Taoist or Episcopalian services? More than 4 times per year (P)      Do you belong to any clubs or organizations such as Taoist groups, unions, fraternal or athletic groups, or school groups? No (P)      How often do you attend meetings of the clubs or organizations you belong to? Never (P)      Are you , , , , never , or living with a partner? Never  (P)         Alcohol Use    Q1: How often do you have a drink containing alcohol? Monthly or less (P)      Q2: How many drinks containing alcohol do you have on a typical day when you are drinking? 1 or 2 (P)      Q3: How often do you have six or more drinks on one occasion? Never (P)         OTHER    Name(s) of People in Home Patient has  a room mate (P)

## 2023-06-15 NOTE — PLAN OF CARE
[facility] - Cardiology Stepdown  Discharge Reassessment    Primary Care Provider: Irma Biswas NP    Expected Discharge Date: 6/16/2023    Reassessment (most recent)       Discharge Reassessment - 06/15/23 0907          Discharge Reassessment    Assessment Type Discharge Planning Reassessment     Discharge Plan discussed with: Patient     Communicated DAVID with patient/caregiver Date not available/Unable to determine     Discharge Plan A Home     Discharge Plan B Home Health     DME Needed Upon Discharge  none     Why the patient remains in the hospital Requires continued medical care                     CM met with patient at the bedside and reviewed his plans for discharge. Patient stated he plans to go home and he stated not sure when. Will continue to follow for discharge needs. Patient stated he was independent prior to coming in the hospital and used no DME.     Silvina Bales RN    [phone]

## 2023-06-15 NOTE — ASSESSMENT & PLAN NOTE
-A1c 6.4, well-controlled on actos and januvia  -Hold home DM meds while admitted, LDSSI and diabetic diet ordered

## 2023-06-16 ENCOUNTER — TELEPHONE (OUTPATIENT)
Dept: PHARMACY | Facility: CLINIC | Age: 63
End: 2023-06-16
Payer: COMMERCIAL

## 2023-06-16 VITALS
TEMPERATURE: 98 F | DIASTOLIC BLOOD PRESSURE: 92 MMHG | OXYGEN SATURATION: 97 % | HEART RATE: 51 BPM | HEIGHT: 74 IN | WEIGHT: 280 LBS | SYSTOLIC BLOOD PRESSURE: 152 MMHG | BODY MASS INDEX: 35.94 KG/M2 | RESPIRATION RATE: 17 BRPM

## 2023-06-16 DIAGNOSIS — E11.65 UNCONTROLLED TYPE 2 DIABETES MELLITUS WITH HYPERGLYCEMIA: ICD-10-CM

## 2023-06-16 PROBLEM — R00.1 BRADYCARDIA: Status: ACTIVE | Noted: 2023-06-16

## 2023-06-16 LAB
ALBUMIN SERPL BCP-MCNC: 3.8 G/DL (ref 3.5–5.2)
ALP SERPL-CCNC: 51 U/L (ref 55–135)
ALT SERPL W/O P-5'-P-CCNC: 17 U/L (ref 10–44)
ANION GAP SERPL CALC-SCNC: 7 MMOL/L (ref 8–16)
AST SERPL-CCNC: 14 U/L (ref 10–40)
BASOPHILS # BLD AUTO: 0.04 K/UL (ref 0–0.2)
BASOPHILS NFR BLD: 0.5 % (ref 0–1.9)
BILIRUB SERPL-MCNC: 1.9 MG/DL (ref 0.1–1)
BUN SERPL-MCNC: 9 MG/DL (ref 8–23)
CALCIUM SERPL-MCNC: 9.1 MG/DL (ref 8.7–10.5)
CFR FLOW - ANTERIOR: 2.27
CFR FLOW - INFERIOR: 2.06
CFR FLOW - LATERAL: 1.85
CFR FLOW - MAX: 2.95
CFR FLOW - MIN: 1.38
CFR FLOW - SEPTAL: 2.26
CFR FLOW - WHOLE HEART: 2.11
CHLORIDE SERPL-SCNC: 104 MMOL/L (ref 95–110)
CO2 SERPL-SCNC: 24 MMOL/L (ref 23–29)
CREAT SERPL-MCNC: 0.7 MG/DL (ref 0.5–1.4)
CV STRESS BASE HR: 45 BPM
DIASTOLIC BLOOD PRESSURE: 82 MMHG
DIFFERENTIAL METHOD: ABNORMAL
EJECTION FRACTION- HIGH: 59 %
END DIASTOLIC INDEX-HIGH: 155 ML/M2
END DIASTOLIC INDEX-LOW: 91 ML/M2
END SYSTOLIC INDEX-HIGH: 78 ML/M2
END SYSTOLIC INDEX-LOW: 40 ML/M2
EOSINOPHIL # BLD AUTO: 0.3 K/UL (ref 0–0.5)
EOSINOPHIL NFR BLD: 2.9 % (ref 0–8)
ERYTHROCYTE [DISTWIDTH] IN BLOOD BY AUTOMATED COUNT: 13.5 % (ref 11.5–14.5)
EST. GFR  (NO RACE VARIABLE): >60 ML/MIN/1.73 M^2
GLUCOSE SERPL-MCNC: 131 MG/DL (ref 70–110)
HCT VFR BLD AUTO: 43.5 % (ref 40–54)
HGB BLD-MCNC: 14.2 G/DL (ref 14–18)
IMM GRANULOCYTES # BLD AUTO: 0.04 K/UL (ref 0–0.04)
IMM GRANULOCYTES NFR BLD AUTO: 0.5 % (ref 0–0.5)
LYMPHOCYTES # BLD AUTO: 2.9 K/UL (ref 1–4.8)
LYMPHOCYTES NFR BLD: 33.7 % (ref 18–48)
MAGNESIUM SERPL-MCNC: 1.9 MG/DL (ref 1.6–2.6)
MCH RBC QN AUTO: 32.6 PG (ref 27–31)
MCHC RBC AUTO-ENTMCNC: 32.6 G/DL (ref 32–36)
MCV RBC AUTO: 100 FL (ref 82–98)
MONOCYTES # BLD AUTO: 0.8 K/UL (ref 0.3–1)
MONOCYTES NFR BLD: 8.9 % (ref 4–15)
NEUTROPHILS # BLD AUTO: 4.6 K/UL (ref 1.8–7.7)
NEUTROPHILS NFR BLD: 53.5 % (ref 38–73)
NRBC BLD-RTO: 0 /100 WBC
NUC REST DIASTOLIC VOLUME INDEX: 164
NUC REST EJECTION FRACTION: 42
NUC REST SYSTOLIC VOLUME INDEX: 94
NUC STRESS DIASTOLIC VOLUME INDEX: 168
NUC STRESS EJECTION FRACTION: 48 %
NUC STRESS SYSTOLIC VOLUME INDEX: 87
OHS CV CPX 1 MINUTE RECOVERY HEART RATE: 71 BPM
OHS CV CPX 85 PERCENT MAX PREDICTED HEART RATE MALE: 135
OHS CV CPX MAX PREDICTED HEART RATE: 159
OHS CV CPX PATIENT IS FEMALE: 0
OHS CV CPX PATIENT IS MALE: 1
OHS CV CPX PEAK DIASTOLIC BLOOD PRESSURE: 61 MMHG
OHS CV CPX PEAK HEAR RATE: 46 BPM
OHS CV CPX PEAK RATE PRESSURE PRODUCT: 5612
OHS CV CPX PEAK SYSTOLIC BLOOD PRESSURE: 122 MMHG
OHS CV CPX PERCENT MAX PREDICTED HEART RATE ACHIEVED: 29
OHS CV CPX RATE PRESSURE PRODUCT PRESENTING: 6255
PLATELET # BLD AUTO: 182 K/UL (ref 150–450)
PMV BLD AUTO: 9.6 FL (ref 9.2–12.9)
POCT GLUCOSE: 102 MG/DL (ref 70–110)
POCT GLUCOSE: 136 MG/DL (ref 70–110)
POCT GLUCOSE: 95 MG/DL (ref 70–110)
POTASSIUM SERPL-SCNC: 4.1 MMOL/L (ref 3.5–5.1)
PROT SERPL-MCNC: 6.5 G/DL (ref 6–8.4)
RBC # BLD AUTO: 4.36 M/UL (ref 4.6–6.2)
REST FLOW - ANTERIOR: 0.4 CC/MIN/G
REST FLOW - INFERIOR: 0.37 CC/MIN/G
REST FLOW - LATERAL: 0.43 CC/MIN/G
REST FLOW - MAX: 0.56 CC/MIN/G
REST FLOW - MIN: 0.21 CC/MIN/G
REST FLOW - SEPTAL: 0.29 CC/MIN/G
REST FLOW - WHOLE HEART: 0.37 CC/MIN/G
RETIRED EF AND QEF - SEE NOTES: 47 %
SODIUM SERPL-SCNC: 135 MMOL/L (ref 136–145)
STRESS FLOW - ANTERIOR: 0.9 CC/MIN/G
STRESS FLOW - INFERIOR: 0.76 CC/MIN/G
STRESS FLOW - LATERAL: 0.79 CC/MIN/G
STRESS FLOW - MAX: 1.14 CC/MIN/G
STRESS FLOW - MIN: 0.35 CC/MIN/G
STRESS FLOW - SEPTAL: 0.66 CC/MIN/G
STRESS FLOW - WHOLE HEART: 0.78 CC/MIN/G
SYSTOLIC BLOOD PRESSURE: 139 MMHG
WBC # BLD AUTO: 8.51 K/UL (ref 3.9–12.7)

## 2023-06-16 PROCEDURE — 25000003 PHARM REV CODE 250: Performed by: HOSPITALIST

## 2023-06-16 PROCEDURE — G0378 HOSPITAL OBSERVATION PER HR: HCPCS

## 2023-06-16 PROCEDURE — 99239 PR HOSPITAL DISCHARGE DAY,>30 MIN: ICD-10-PCS | Mod: ,,, | Performed by: STUDENT IN AN ORGANIZED HEALTH CARE EDUCATION/TRAINING PROGRAM

## 2023-06-16 PROCEDURE — 83735 ASSAY OF MAGNESIUM: CPT | Performed by: STUDENT IN AN ORGANIZED HEALTH CARE EDUCATION/TRAINING PROGRAM

## 2023-06-16 PROCEDURE — 63600175 PHARM REV CODE 636 W HCPCS: Performed by: STUDENT IN AN ORGANIZED HEALTH CARE EDUCATION/TRAINING PROGRAM

## 2023-06-16 PROCEDURE — 85025 COMPLETE CBC W/AUTO DIFF WBC: CPT | Performed by: HOSPITALIST

## 2023-06-16 PROCEDURE — 99239 HOSP IP/OBS DSCHRG MGMT >30: CPT | Mod: ,,, | Performed by: STUDENT IN AN ORGANIZED HEALTH CARE EDUCATION/TRAINING PROGRAM

## 2023-06-16 PROCEDURE — 80053 COMPREHEN METABOLIC PANEL: CPT | Performed by: HOSPITALIST

## 2023-06-16 PROCEDURE — 99214 PR OFFICE/OUTPT VISIT, EST, LEVL IV, 30-39 MIN: ICD-10-PCS | Mod: ,,, | Performed by: STUDENT IN AN ORGANIZED HEALTH CARE EDUCATION/TRAINING PROGRAM

## 2023-06-16 PROCEDURE — 99214 OFFICE O/P EST MOD 30 MIN: CPT | Mod: ,,, | Performed by: STUDENT IN AN ORGANIZED HEALTH CARE EDUCATION/TRAINING PROGRAM

## 2023-06-16 PROCEDURE — 36415 COLL VENOUS BLD VENIPUNCTURE: CPT | Performed by: HOSPITALIST

## 2023-06-16 RX ORDER — VERAPAMIL HYDROCHLORIDE 180 MG/1
180 TABLET, FILM COATED, EXTENDED RELEASE ORAL NIGHTLY
Qty: 30 TABLET | Refills: 11 | Status: SHIPPED | OUTPATIENT
Start: 2023-06-16 | End: 2024-02-02

## 2023-06-16 RX ORDER — VERAPAMIL HYDROCHLORIDE 180 MG/1
180 TABLET, FILM COATED, EXTENDED RELEASE ORAL NIGHTLY
Status: DISCONTINUED | OUTPATIENT
Start: 2023-06-16 | End: 2023-06-16 | Stop reason: HOSPADM

## 2023-06-16 RX ORDER — AMINOPHYLLINE 25 MG/ML
75 INJECTION, SOLUTION INTRAVENOUS ONCE
Status: COMPLETED | OUTPATIENT
Start: 2023-06-16 | End: 2023-06-16

## 2023-06-16 RX ORDER — REGADENOSON 0.08 MG/ML
0.4 INJECTION, SOLUTION INTRAVENOUS ONCE
Status: COMPLETED | OUTPATIENT
Start: 2023-06-16 | End: 2023-06-16

## 2023-06-16 RX ADMIN — REGADENOSON 0.4 MG: 0.08 INJECTION, SOLUTION INTRAVENOUS at 08:06

## 2023-06-16 RX ADMIN — TAMSULOSIN HYDROCHLORIDE 0.4 MG: 0.4 CAPSULE ORAL at 09:06

## 2023-06-16 RX ADMIN — AMINOPHYLLINE 75 MG: 25 INJECTION, SOLUTION INTRAVENOUS at 08:06

## 2023-06-16 NOTE — ASSESSMENT & PLAN NOTE
- A1c 6.4, well-controlled on actos and januvia  - Holding home DM meds while admitted  - DELPHINE  - accuchecks SHAYLA

## 2023-06-16 NOTE — PROGRESS NOTES
"[facility] - Cardiology [facility] Medicine  Progress Note    Patient Name: North Najera  MRN: [id number]  Patient Class: OP- Observation   Admission Date: 6/14/2023  Length of Stay: 0 days  Attending Physician: Ingrid Marcos MD  Primary Care Provider: Irma Biswas NP        Subjective:     Principal Problem:Non-sustained ventricular tachycardia        HPI:  63yoM w/PMHx DM2, HTN, HLD, and antiphospohlipid syndrome on xarelto who presents to the ED at recommendation of his cardiologist for lightheadedness/dizziness x several weeks with wise complex tachycardia noted on his Holter monitor. Pt. Reports that he has been having episodes of presyncope/lightheadness for a few months now. He presented to the ED 6/8 for these symptoms, and the work-up was largely negative with EKG showing NSR. He was proveded with a referral to cardiology and was able to be seen the next day, 6/9, and a holter monitor & TTE were ordered. Pt. Cardiologist Dr. Constantino was notified that patient Holter monitor was abnormal with wide complex tachycardia and rates as high as 140s. "predominant rhythm sinus rhythm with HR varying between 43 and 200 BPM with occassional runs of monomoprhic ventricular tachycardia, longest of 1038 beats." Additionally, his TTE performed today revealed mildly decreased systolic function (EF 40%). Pt. Denies any chest pain or SOB, but he does have occasional palpitations. He reports that he has had ~2 episodes of lightheadedness today and they frequently happen (more than once a day) when he exerts himself.      Overview/Hospital Course:  EP consulted on admit. Patient underwent stress test 06/16.       Interval History: Patient was seen and evaluated- he did report some dizziness and headache- that seems similar to previous episodes, but not as intense. On tele review- it looks as if he had an episode of non-sustained vtach around 2253. Bradycardia as well. No nausea, vomiting, fevers, chills, " night sweats, abd pain, diarrhea, constipation, shortness of breath, chest pain.     Objective:     Vital Signs (Most Recent):  Temp: 98 °F (36.7 °C) (06/16/23 1054)  Pulse: (!) 46 (06/16/23 1107)  Resp: 17 (06/16/23 1054)  BP: (!) 143/71 (06/16/23 1054)  SpO2: 98 % (06/16/23 1054) Vital Signs (24h Range):  Temp:  [97.3 °F (36.3 °C)-98.1 °F (36.7 °C)] 98 °F (36.7 °C)  Pulse:  [44-57] 46  Resp:  [16-22] 17  SpO2:  [96 %-99 %] 98 %  BP: (112-156)/(61-87) 143/71     Weight: 127 kg (280 lb)  Body mass index is 35.95 kg/m².    Intake/Output Summary (Last 24 hours) at 6/16/2023 1304  Last data filed at 6/16/2023 0800  Gross per 24 hour   Intake 702 ml   Output 1000 ml   Net -298 ml      Physical Exam  Gen: in NAD, appears stated age, appears comfortable   Neuro: AAOx3, motor, sensory, and strength grossly intact BL  HEENT: NTNC, EOMI, PERRL, MMM  CVS: bradycardic, reg rhythm, no m/r/g; S1/S2 auscultated with no S3 or S4; capillary refill < 2 sec  Resp: lungs CTAB, no w/r/r; no belabored breathing or accessory muscle use appreciated   Abd: BS+ in all 4 quadrants; NTND, soft to palpation; no organomegaly appreciated   Extrem: pulses full, equal, and regular over all 4 extremities; no UE or LE edema BL    Significant Labs: All pertinent labs within the past 24 hours have been reviewed.  CBC:   Recent Labs   Lab 06/14/23  1745 06/15/23  0454 06/16/23  0550   WBC 9.11 8.72 8.51   HGB 15.4 14.4 14.2   HCT 45.1 43.7 43.5    196 182     CMP:   Recent Labs   Lab 06/14/23  1745 06/15/23  0454 06/16/23  0550    137 135*   K 4.0 3.7 4.1    106 104   CO2 23 23 24   GLU 98 118* 131*   BUN 12 12 9   CREATININE 0.7 0.7 0.7   CALCIUM 9.3 8.8 9.1   PROT 7.3 6.3 6.5   ALBUMIN 4.2 3.6 3.8   BILITOT 1.9* 1.6* 1.9*   ALKPHOS 58 53* 51*   AST 22 14 14   ALT 19 17 17   ANIONGAP 8 8 7*     Troponin:   Recent Labs   Lab 06/14/23  1745 06/14/23 2016   TROPONINI 0.017 0.012     TSH:   Recent Labs   Lab 06/14/23 1745   TSH 1.725        Significant Imaging: I have reviewed all pertinent imaging results/findings within the past 24 hours.    TTE:   Summary     The left ventricle is moderately enlarged with mildly decreased systolic function.   The estimated ejection fraction is 40%.   There is mild left ventricular global hypokinesis.   Grade I left ventricular diastolic dysfunction.   Mild mitral regurgitation.   Mild tricuspid regurgitation.   Normal right ventricular size with normal right ventricular systolic function.   There is no pulmonary hypertension.      Assessment/Plan:      * Non-sustained ventricular tachycardia  - Intermittent episodes of lightheadedness, holter monitor shows occasional runs of VT, longest run 1038 beats  - TTE shows some structural disease with EF 40%, etiology possibly related to CAD  - NSR on admit  - continue metoprolol 25mg qhs  - Cardiology consulted- stress test today- if positive, then will consult interventional cards, if negative, then will discuss with EP antiarrythmic medication recommendations  - K>4, Mg>2  - Continue tele   - TSH wnl    Acute combined systolic and diastolic congestive heart failure  Patient is identified as having Combined Systolic and Diastolic heart failure that is Acute. CHF is currently controlled. Latest ECHO performed and demonstrates- Results for orders placed during the hospital encounter of 06/14/23    Echo    Interpretation Summary  · The left ventricle is moderately enlarged with mildly decreased systolic function.  · The estimated ejection fraction is 40%.  · There is mild left ventricular global hypokinesis.  · Grade I left ventricular diastolic dysfunction.  · Mild mitral regurgitation.  · Mild tricuspid regurgitation.  · Normal right ventricular size with normal right ventricular systolic function.  · There is no pulmonary hypertension.  . Continue Beta Blocker and ACE/ARB and monitor clinical status closely. Monitor on telemetry. Patient is off CHF pathway.   Monitor strict Is&Os and daily weights.  Place on fluid restriction of 1.5 L. Continue to stress to patient importance of self efficacy and  on diet for CHF. Last BNP reviewed- and noted below   Recent Labs   Lab 06/14/23  1745   BNP 58     - Patient appears to have new-onset heart failure  - Cardiology consulted- stress test today- awaiting results   - currently on metoprolol, lisinopril   - tele  - K>4, Mg >2  - 1.5L fluid restriction    Bradycardia  - likely 2/2 metoprolol, but will discuss with cardiology medication changes    Elevated bilirubin  - elevated bili on admit  - no complaints of RUQ pain  - consideration of congestive hepatopathy with new-onset HF?  - will monitor- if worsening, then will obtain RUQ US    HTN (hypertension)  - Continue home lisinopril and metoprolol    Antiphospholipid syndrome  - h/o DVT 2002, no events since  - Continue home xarelto    Hyperlipidemia LDL goal <70  - Continue home statin    Type 2 diabetes mellitus treated without insulin  - A1c 6.4, well-controlled on actos and januvia  - Holding home DM meds while admitted  - LDSSI  - accuchecks ACHS    VTE Risk Mitigation (From admission, onward)         Ordered     rivaroxaban tablet 10 mg  With dinner         06/14/23 2960                Discharge Planning   DAVID: 6/16/2023     Code Status: Full Code   Is the patient medically ready for discharge?: No    Reason for patient still in hospital (select all that apply): Patient trending condition  Discharge Plan A: Home   Discharge Delays: None known at this time              Ingrid Marcos MD  Department of Hospital Medicine   [facility] - Cardiology Stepdown

## 2023-06-16 NOTE — PLAN OF CARE
[facility] - Cardiology Stepdown  Discharge Final Note    Primary Care Provider: Irma Biswas NP    Expected Discharge Date: 6/16/2023    Final Discharge Note (most recent)       Final Note - 06/16/23 1513          Final Note    Assessment Type Final Discharge Note     Anticipated Discharge Disposition Home or Self Care                   Kaylin Livingston LMSW  [facility]  [id number]      Future Appointments   Date Time Provider Department Center   6/16/2023  3:30 PM CARDIAC, PET IMAGING [facility]   6/19/2023 10:20 AM Neno Constantino MD [facility] CARD [place]   8/10/2023  9:40 AM Neno Constantino MD [facility] CARD [place]   11/3/2023  8:45 AM RAE, LAB [facility] IM [place]   11/10/2023  8:45 AM Irma Biswas NP [facility] [place]       Contact Info       Irma Biswas NP   Specialty: Family Medicine   Relationship: PCP - General  PCP - Internal Medicine    [address]  [place] [address]   Phone: [phone]       Next Steps: Follow up    Instructions: hospital discharge f/u    A Cece Talley MD   Specialty: Electrophysiology    [address]  [place] [address]   Phone: [phone]       Next Steps: Follow up

## 2023-06-16 NOTE — PLAN OF CARE
Problem: Adult Inpatient Plan of Care  Goal: Plan of Care Review  Outcome: Met  Goal: Patient-Specific Goal (Individualized)  Outcome: Met  Goal: Absence of Hospital-Acquired Illness or Injury  Outcome: Met  Goal: Optimal Comfort and Wellbeing  Outcome: Met  Goal: Readiness for Transition of Care  Outcome: Met     Problem: Diabetes Comorbidity  Goal: Blood Glucose Level Within Targeted Range  Outcome: Met     Problem: Fall Injury Risk  Goal: Absence of Fall and Fall-Related Injury  Outcome: Met   Pt is A+Ox4.  IV d/c. Tele d/c. Discharge education and instructions given. Pt verbalized understanding. Pt to  meds from [facility]. Friend to transfer pt home.

## 2023-06-16 NOTE — PROGRESS NOTES
[facility] - Cardiology Stepdown  Cardiac Electrophysiology  Progress Note    Admission Date: 6/14/2023  Code Status: Full Code   Attending Physician: Ingrid Marcos MD   Expected Discharge Date: 6/16/2023  Principal Problem:Non-sustained ventricular tachycardia    Subjective:   Going for Lexiscan PET stress test today  In sinus orlando 43 with toprol xl 25 daily  No runs of VT on tele in the past day   Past Medical History:   Diagnosis Date    Acute combined systolic and diastolic congestive heart failure 6/15/2023    APS (antiphospholipid syndrome)     Cancer     skin cancer- R. hand    Coronary artery disease involving native coronary artery of native heart without angina pectoris 6/9/2023    Elevated homocysteine     HTN (hypertension) 6/14/2023    Screening for colon cancer 5/4/2017     Past Surgical History:   Procedure Laterality Date    APPENDECTOMY  1976    CHOLECYSTECTOMY  1987    COLONOSCOPY N/A 5/4/2017    Procedure: COLONOSCOPY;  Surgeon: Gabriel Saini MD;  Location: [facility];  Service: Endoscopy;  Laterality: N/A;    COLONOSCOPY W/ BIOPSIES AND POLYPECTOMY  2011    SKIN SURGERY      piece of skin removed on R.hand- skin cancer     TONSILLECTOMY       Review of patient's allergies indicates:   Allergen Reactions    Codeine Diarrhea     Patient can not tolerate Tylenol #3, states he does okay with Codeine with cough medications     Nickel sutures [surgical stainless steel] Rash     No current facility-administered medications on file prior to encounter.     Current Outpatient Medications on File Prior to Encounter   Medication Sig    aspirin (ECOTRIN) 81 MG EC tablet Take 81 mg by mouth once daily.    atorvastatin (LIPITOR) 20 MG tablet Take 1 tablet (20 mg total) by mouth once daily IN THE MORNING    lisinopriL (PRINIVIL,ZESTRIL) 5 MG tablet Take 1 tablet (5 mg total) by mouth once daily.    metoprolol succinate (TOPROL-XL) 25 MG 24 hr tablet Take 1 tablet (25 mg total) by mouth  once daily.    pioglitazone (ACTOS) 30 MG tablet Take 1 tablet (30 mg total) by mouth once daily.    rivaroxaban (XARELTO) 10 mg Tab Take 1 tablet (10 mg total) by mouth once daily.    SITagliptin phosphate (JANUVIA) 50 MG Tab Take 1 tablet (50 mg total) by mouth once daily.    tamsulosin (FLOMAX) 0.4 mg Cap Take 1 capsule (0.4 mg total) by mouth once daily.    cholecalciferol, vitamin D3, (VITAMIN D3) 50 mcg (2,000 unit) Cap Take 1 capsule by mouth once daily.    fish oil-omega-3 fatty acids 300-1,000 mg capsule Take 2 g by mouth once daily.    MULTIVIT-MIN/FA/LYCOPEN/LUTEIN (CENTRUM SILVER MEN ORAL) Take by mouth.     Family History       Problem Relation (Age of Onset)    Diabetes Maternal Grandmother    No Known Problems Mother, Father          Tobacco Use    Smoking status: Every Day     Packs/day: 1.00     Years: 30.00     Pack years: 30.00     Types: Cigarettes     Start date: 3/28/1987     Last attempt to quit: 8/3/2019     Years since quitting: 3.8    Smokeless tobacco: Never   Substance and Sexual Activity    Alcohol use: Not Currently     Comment: Occasionally 3-4 drinks per month at the most    Drug use: No    Sexual activity: Yes     Comment: single-in a relationship     Review of Systems   Constitutional:  Negative for activity change, chills, fever and unexpected weight change.   HENT:  Negative for congestion and sore throat.    Respiratory:  Negative for cough, shortness of breath and wheezing.    Cardiovascular:  Negative for chest pain, palpitations and leg swelling.   Gastrointestinal:  Negative for abdominal pain, blood in stool, nausea and vomiting.   Genitourinary:  Negative for dysuria and hematuria.   Musculoskeletal:  Negative for arthralgias and neck pain.   Skin:  Negative for color change and rash.   Neurological:  Positive for weakness and light-headedness. Negative for dizziness, seizures and numbness.   Psychiatric/Behavioral:  Negative for hallucinations and suicidal  ideas.    Objective:     Vital Signs (Most Recent):  Temp: 97.3 °F (36.3 °C) (06/16/23 0745)  Pulse: (!) 47 (06/16/23 0831)  Resp: 16 (06/16/23 0831)  BP: 112/62 (06/16/23 0831)  SpO2: 97 % (06/16/23 0745) Vital Signs (24h Range):  Temp:  [97.3 °F (36.3 °C)-98.1 °F (36.7 °C)] 97.3 °F (36.3 °C)  Pulse:  [44-63] 47  Resp:  [16-22] 16  SpO2:  [96 %-99 %] 97 %  BP: (112-156)/(61-87) 112/62     Weight: 127 kg (280 lb)  Body mass index is 35.95 kg/m².     Physical Exam  Constitutional:       General: He is not in acute distress.     Appearance: He is well-developed. He is not diaphoretic.   HENT:      Head: Normocephalic and atraumatic.   Eyes:      Pupils: Pupils are equal, round, and reactive to light.   Neck:      Vascular: No JVD.   Cardiovascular:      Rate and Rhythm: Normal rate and regular rhythm.      Heart sounds: No murmur heard.    No friction rub. No gallop.      Comments: Frequent ectopy on telemetry while sleeping.  Pulmonary:      Effort: Pulmonary effort is normal. No respiratory distress.      Breath sounds: No wheezing.   Abdominal:      General: Bowel sounds are normal. There is no distension.      Tenderness: There is no abdominal tenderness.   Musculoskeletal:         General: No swelling.   Skin:     General: Skin is warm and dry.      Capillary Refill: Capillary refill takes less than 2 seconds.   Neurological:      Mental Status: He is alert and oriented to person, place, and time.   Psychiatric:         Mood and Affect: Mood normal.         Thought Content: Thought content normal.     Significant Labs: All pertinent labs within the past 24 hours have been reviewed.    Significant Imaging: I have reviewed all pertinent imaging results/findings within the past 24 hours.    Review of Systems   Constitutional: Negative for activity change, chills, fever and unexpected weight change.   HENT:  Negative for congestion and sore throat.    Cardiovascular:  Negative for chest pain, leg swelling and  "palpitations.   Respiratory:  Negative for cough, shortness of breath and wheezing.    Skin:  Negative for color change and rash.   Musculoskeletal:  Negative for arthralgias and neck pain.   Gastrointestinal:  Negative for abdominal pain, blood in stool, nausea and vomiting.   Genitourinary:  Negative for dysuria and hematuria.   Neurological:  Positive for light-headedness and weakness. Negative for dizziness, numbness and seizures.   Psychiatric/Behavioral:  Negative for hallucinations and suicidal ideas.    Physical Exam  Constitutional:       General: He is not in acute distress.     Appearance: He is well-developed. He is not diaphoretic.   HENT:      Head: Normocephalic and atraumatic.   Eyes:      Pupils: Pupils are equal, round, and reactive to light.   Neck:      Vascular: No JVD.   Cardiovascular:      Rate and Rhythm: Normal rate and regular rhythm.      Heart sounds: No murmur heard.    No friction rub. No gallop.      Comments: Frequent ectopy on telemetry while sleeping.  Pulmonary:      Effort: Pulmonary effort is normal. No respiratory distress.      Breath sounds: No wheezing.   Abdominal:      General: Bowel sounds are normal. There is no distension.      Tenderness: There is no abdominal tenderness.   Musculoskeletal:         General: No swelling.   Skin:     General: Skin is warm and dry.      Capillary Refill: Capillary refill takes less than 2 seconds.   Neurological:      Mental Status: He is alert and oriented to person, place, and time.   Psychiatric:         Mood and Affect: Mood normal.         Thought Content: Thought content normal.       Assessment and Plan:     * Non-sustained ventricular tachycardia  #Symptomatic frquent non-sha MMVT runs  #Sinus [place] 2/2 beta blocker use  #High PVC and NSVT burden on Holter    Holter monitor was abnormal with wide complex tachycardia and rates as high as 140s. "predominant rhythm sinus rhythm with HR varying between 43 and 200 BPM with occassional " "runs of monomoprhic ventricular tachycardia, longest of 1038 beats." Additionally, his TTE performed revealed mildly decreased systolic function (EF 40%). Denies any chest pain or SOB, but he does have occasional palpitations. He reports that he has had ~2 episodes of lightheadedness today and they frequently happen (more than once a day) when he exerts himself.     EP consulted for NSVT, newly diagnosed HFrEF 40%.     Plan  -PET scan to r/o ischemia as cause of low EF and MMVT runs  -monitor on telemetry  -K>4, Mg>2  -continue toprol 25mg daily, can't uptitrate further due to bradycardia issue  -on xarelto 10 mg daily for recurrent DVT in setting of anti-phospholipid syndrome  -further recs on rhythm/rate control to follow in AM      Elder Krishnamurthy MD  Cardiac Electrophysiology  [facility] - Cardiology Stepdown  "

## 2023-06-16 NOTE — ASSESSMENT & PLAN NOTE
- Intermittent episodes of lightheadedness, holter monitor shows occasional runs of VT, longest run 1038 beats  - TTE shows some structural disease with EF 40%, etiology possibly related to CAD  - NSR on admit  - continue metoprolol 25mg qhs  - Cardiology consulted- stress test today- if positive, then will consult interventional cards, if negative, then will discuss with EP antiarrythmic medication recommendations  - K>4, Mg>2  - Continue tele   - TSH wnl

## 2023-06-16 NOTE — SUBJECTIVE & OBJECTIVE
Past Medical History:   Diagnosis Date    Acute combined systolic and diastolic congestive heart failure 6/15/2023    APS (antiphospholipid syndrome)     Cancer     skin cancer- R. hand    Coronary artery disease involving native coronary artery of native heart without angina pectoris 6/9/2023    Elevated homocysteine     HTN (hypertension) 6/14/2023    Screening for colon cancer 5/4/2017     Past Surgical History:   Procedure Laterality Date    APPENDECTOMY  1976    CHOLECYSTECTOMY  1987    COLONOSCOPY N/A 5/4/2017    Procedure: COLONOSCOPY;  Surgeon: Gabriel Saini MD;  Location: [facility];  Service: Endoscopy;  Laterality: N/A;    COLONOSCOPY W/ BIOPSIES AND POLYPECTOMY  2011    SKIN SURGERY      piece of skin removed on R.hand- skin cancer     TONSILLECTOMY       Review of patient's allergies indicates:   Allergen Reactions    Codeine Diarrhea     Patient can not tolerate Tylenol #3, states he does okay with Codeine with cough medications     Nickel sutures [surgical stainless steel] Rash     No current facility-administered medications on file prior to encounter.     Current Outpatient Medications on File Prior to Encounter   Medication Sig    aspirin (ECOTRIN) 81 MG EC tablet Take 81 mg by mouth once daily.    atorvastatin (LIPITOR) 20 MG tablet Take 1 tablet (20 mg total) by mouth once daily IN THE MORNING    lisinopriL (PRINIVIL,ZESTRIL) 5 MG tablet Take 1 tablet (5 mg total) by mouth once daily.    metoprolol succinate (TOPROL-XL) 25 MG 24 hr tablet Take 1 tablet (25 mg total) by mouth once daily.    pioglitazone (ACTOS) 30 MG tablet Take 1 tablet (30 mg total) by mouth once daily.    rivaroxaban (XARELTO) 10 mg Tab Take 1 tablet (10 mg total) by mouth once daily.    SITagliptin phosphate (JANUVIA) 50 MG Tab Take 1 tablet (50 mg total) by mouth once daily.    tamsulosin (FLOMAX) 0.4 mg Cap Take 1 capsule (0.4 mg total) by mouth once daily.    cholecalciferol, vitamin D3, (VITAMIN D3) 50 mcg (2,000 unit) Cap  Take 1 capsule by mouth once daily.    fish oil-omega-3 fatty acids 300-1,000 mg capsule Take 2 g by mouth once daily.    MULTIVIT-MIN/FA/LYCOPEN/LUTEIN (CENTRUM SILVER MEN ORAL) Take by mouth.     Family History       Problem Relation (Age of Onset)    Diabetes Maternal Grandmother    No Known Problems Mother, Father          Tobacco Use    Smoking status: Every Day     Packs/day: 1.00     Years: 30.00     Pack years: 30.00     Types: Cigarettes     Start date: 3/28/1987     Last attempt to quit: 8/3/2019     Years since quitting: 3.8    Smokeless tobacco: Never   Substance and Sexual Activity    Alcohol use: Not Currently     Comment: Occasionally 3-4 drinks per month at the most    Drug use: No    Sexual activity: Yes     Comment: single-in a relationship     Review of Systems   Constitutional:  Negative for activity change, chills, fever and unexpected weight change.   HENT:  Negative for congestion and sore throat.    Respiratory:  Negative for cough, shortness of breath and wheezing.    Cardiovascular:  Negative for chest pain, palpitations and leg swelling.   Gastrointestinal:  Negative for abdominal pain, blood in stool, nausea and vomiting.   Genitourinary:  Negative for dysuria and hematuria.   Musculoskeletal:  Negative for arthralgias and neck pain.   Skin:  Negative for color change and rash.   Neurological:  Positive for weakness and light-headedness. Negative for dizziness, seizures and numbness.   Psychiatric/Behavioral:  Negative for hallucinations and suicidal ideas.    Objective:     Vital Signs (Most Recent):  Temp: 97.3 °F (36.3 °C) (06/16/23 0745)  Pulse: (!) 47 (06/16/23 0831)  Resp: 16 (06/16/23 0831)  BP: 112/62 (06/16/23 0831)  SpO2: 97 % (06/16/23 0745) Vital Signs (24h Range):  Temp:  [97.3 °F (36.3 °C)-98.1 °F (36.7 °C)] 97.3 °F (36.3 °C)  Pulse:  [44-63] 47  Resp:  [16-22] 16  SpO2:  [96 %-99 %] 97 %  BP: (112-156)/(61-87) 112/62     Weight: 127 kg (280 lb)  Body mass index is 35.95  kg/m².     Physical Exam  Constitutional:       General: He is not in acute distress.     Appearance: He is well-developed. He is not diaphoretic.   HENT:      Head: Normocephalic and atraumatic.   Eyes:      Pupils: Pupils are equal, round, and reactive to light.   Neck:      Vascular: No JVD.   Cardiovascular:      Rate and Rhythm: Normal rate and regular rhythm.      Heart sounds: No murmur heard.    No friction rub. No gallop.      Comments: Frequent ectopy on telemetry while sleeping.  Pulmonary:      Effort: Pulmonary effort is normal. No respiratory distress.      Breath sounds: No wheezing.   Abdominal:      General: Bowel sounds are normal. There is no distension.      Tenderness: There is no abdominal tenderness.   Musculoskeletal:         General: No swelling.   Skin:     General: Skin is warm and dry.      Capillary Refill: Capillary refill takes less than 2 seconds.   Neurological:      Mental Status: He is alert and oriented to person, place, and time.   Psychiatric:         Mood and Affect: Mood normal.         Thought Content: Thought content normal.     Significant Labs: All pertinent labs within the past 24 hours have been reviewed.    Significant Imaging: I have reviewed all pertinent imaging results/findings within the past 24 hours.    Review of Systems   Constitutional: Negative for activity change, chills, fever and unexpected weight change.   HENT:  Negative for congestion and sore throat.    Cardiovascular:  Negative for chest pain, leg swelling and palpitations.   Respiratory:  Negative for cough, shortness of breath and wheezing.    Skin:  Negative for color change and rash.   Musculoskeletal:  Negative for arthralgias and neck pain.   Gastrointestinal:  Negative for abdominal pain, blood in stool, nausea and vomiting.   Genitourinary:  Negative for dysuria and hematuria.   Neurological:  Positive for light-headedness and weakness. Negative for dizziness, numbness and seizures.    Psychiatric/Behavioral:  Negative for hallucinations and suicidal ideas.    Physical Exam  Constitutional:       General: He is not in acute distress.     Appearance: He is well-developed. He is not diaphoretic.   HENT:      Head: Normocephalic and atraumatic.   Eyes:      Pupils: Pupils are equal, round, and reactive to light.   Neck:      Vascular: No JVD.   Cardiovascular:      Rate and Rhythm: Normal rate and regular rhythm.      Heart sounds: No murmur heard.    No friction rub. No gallop.      Comments: Frequent ectopy on telemetry while sleeping.  Pulmonary:      Effort: Pulmonary effort is normal. No respiratory distress.      Breath sounds: No wheezing.   Abdominal:      General: Bowel sounds are normal. There is no distension.      Tenderness: There is no abdominal tenderness.   Musculoskeletal:         General: No swelling.   Skin:     General: Skin is warm and dry.      Capillary Refill: Capillary refill takes less than 2 seconds.   Neurological:      Mental Status: He is alert and oriented to person, place, and time.   Psychiatric:         Mood and Affect: Mood normal.         Thought Content: Thought content normal.

## 2023-06-16 NOTE — DISCHARGE SUMMARY
"[facility] - Cardiology [facility] Medicine  Discharge Summary      Patient Name: North Najera  MRN: [id number]  INOCENCIA: [id number]  Patient Class: OP- Observation  Admission Date: 6/14/2023  Hospital Length of Stay: 0 days  Discharge Date and Time:  06/16/2023 3:07 PM  Attending Physician: Ingrid Marcos MD   Discharging Provider: Ingrid Marcos MD  Primary Care Provider: Irma Biswas NP  Hospital Medicine Team: [facility] MED C Ingrid Marcos MD  Primary Care Team: [facility] HOS[facility] MED C    HPI:   63yoM w/PMHx DM2, HTN, HLD, and antiphospohlipid syndrome on xarelto who presents to the ED at recommendation of his cardiologist for lightheadedness/dizziness x several weeks with wise complex tachycardia noted on his Holter monitor. Pt. Reports that he has been having episodes of presyncope/lightheadness for a few months now. He presented to the ED 6/8 for these symptoms, and the work-up was largely negative with EKG showing NSR. He was proveded with a referral to cardiology and was able to be seen the next day, 6/9, and a holter monitor & TTE were ordered. Pt. Cardiologist Dr. Constantino was notified that patient Holter monitor was abnormal with wide complex tachycardia and rates as high as 140s. "predominant rhythm sinus rhythm with HR varying between 43 and 200 BPM with occassional runs of monomoprhic ventricular tachycardia, longest of 1038 beats." Additionally, his TTE performed today revealed mildly decreased systolic function (EF 40%). Pt. Denies any chest pain or SOB, but he does have occasional palpitations. He reports that he has had ~2 episodes of lightheadedness today and they frequently happen (more than once a day) when he exerts himself.    Hospital Course:   EP consulted on admit. Patient underwent stress test 06/16.   Conclusion    There are no clinically significant perfusion abnormalities. There is a small (<10%) amount of mild resting heterogeneity in the septal wall(s) that improves with " stress.    The whole heart absolute myocardial perfusion values averaged 0.37 cc/min/g at rest, which is reduced; 0.78 cc/min/g at stress, which is severely reduced; and CFR is 2.11 , which is mildly reduced.    CT attenuation images demonstrate mild diffuse coronary calcifications in the LAD, LCX and RCA territory and no aortic calcifications.    The gated perfusion images showed an ejection fraction of 42% at rest and 48% during stress. A normal ejection fraction is greater than 47%.    There is mild global hypokinesis at rest and during stress.    The LV cavity size is mildly enlarged at rest and stress.    The ECG portion of the study is negative for ischemia.    During stress, occasional PVCs are noted.    The patient reported no chest pain during the stress test.    There are no prior studies for comparison.    Discussed with cardiology- recommended continuation of metoprolol, stopping ACTOs, starting metoformin or jardiance in place and f/u with Dr. Talley for exercise stress test.     Pt deemed appropriate for discharge. Plan discussed with pt, who was agreeable and amenable; medications were discussed and reviewed, outpatient follow-up scheduled, ER precautions were given, all questions were answered to the pt's satisfaction, and Mr. Najera was subsequently discharged.         Goals of Care Treatment Preferences:  Code Status: Full Code    Living Will: Yes            Consults:   Consults (From admission, onward)          Status Ordering Provider     Inpatient consult to Social Work  Once        Provider:  (Not yet assigned)    Acknowledged JOHANNA DARBY     Inpatient consult to Electrophysiology  Once        Provider:  (Not yet assigned)    Completed ZACHARY QUAN     Inpatient consult to Cardiology  Once        Provider:  (Not yet assigned)    Completed ZACH MARTINEZ          Final Active Diagnoses:    Diagnosis Date Noted POA    PRINCIPAL PROBLEM:  Non-sustained ventricular tachycardia  [I47.29] 06/14/2023 Unknown    Acute combined systolic and diastolic congestive heart failure [I50.41] 06/15/2023 Unknown    Bradycardia [R00.1] 06/16/2023 Unknown    Elevated bilirubin [R17] 06/15/2023 Unknown    HTN (hypertension) [I10] 06/14/2023 Unknown    Antiphospholipid syndrome [D68.61] 03/28/2017 Yes    Hyperlipidemia LDL goal <70 [E78.5] 03/28/2017 Yes    Type 2 diabetes mellitus treated without insulin [E11.9] 03/28/2017 Yes      Problems Resolved During this Admission:       Discharged Condition: stable    Disposition: Home or Self Care    Follow Up:   Follow-up Information       Irma Biswas NP Follow up.    Specialty: Family Medicine  Why: hospital discharge f/u  Contact information:  [address]  [facility] [address]  [phone]               ROSA Talley MD Follow up.    Specialty: Electrophysiology  Contact information:  [address] [facility] [id number] [phone]                           Patient Instructions:      Ambulatory referral/consult to [facility]   Standing Status: Future   Referral Priority: Routine Referral Type: Consultation   Referral Reason: Specialty Services Required   Requested Specialty: Cardiology   Number of Visits Requested: 1     Diet Cardiac   Order Comments: 2g Na restriction, 1.5L fluid restriction     Diet diabetic     Notify your health care provider if you experience any of the following:  severe uncontrolled pain     Notify your health care provider if you experience any of the following:  persistent dizziness, light-headedness, or visual disturbances     Notify your health care provider if you experience any of the following:  difficulty breathing or increased cough     Activity as tolerated       Significant Diagnostic Studies: Labs:   CMP   Recent Labs   Lab 06/14/23  1745 06/15/23  0454 06/16/23  0550    137 135*   K 4.0 3.7 4.1    106 104   CO2 23 23 24   GLU 98 118* 131*   BUN 12 12 9    CREATININE 0.7 0.7 0.7   CALCIUM 9.3 8.8 9.1   PROT 7.3 6.3 6.5   ALBUMIN 4.2 3.6 3.8   BILITOT 1.9* 1.6* 1.9*   ALKPHOS 58 53* 51*   AST 22 14 14   ALT 19 17 17   ANIONGAP 8 8 7*   , CBC   Recent Labs   Lab 06/14/23  1745 06/15/23  0454 06/16/23  0550   WBC 9.11 8.72 8.51   HGB 15.4 14.4 14.2   HCT 45.1 43.7 43.5    196 182    and All labs within the past 24 hours have been reviewed    Pending Diagnostic Studies:       Procedure Component Value Units Date/Time    US Liver with Doppler (xpd) [[id number]]     Order Status: Sent Lab Status: No result            Medications:  Reconciled Home Medications:      Medication List        START taking these medications      empagliflozin 10 mg tablet  Commonly known as: JARDIANCE  Take 1 tablet (10 mg total) by mouth once daily.     verapamiL 180 MG CR tablet  Commonly known as: CALAN-SR  Take 1 tablet (180 mg total) by mouth nightly.            CONTINUE taking these medications      aspirin 81 MG EC tablet  Commonly known as: ECOTRIN  Take 81 mg by mouth once daily.     atorvastatin 20 MG tablet  Commonly known as: LIPITOR  Take 1 tablet (20 mg total) by mouth once daily IN THE MORNING     CENTRUM SILVER MEN ORAL  Take by mouth.     cholecalciferol (vitamin D3) 50 mcg (2,000 unit) Cap capsule  Commonly known as: VITAMIN D3  Take 1 capsule by mouth once daily.     fish oil-omega-3 fatty acids 300-1,000 mg capsule  Take 2 g by mouth once daily.     lisinopriL 5 MG tablet  Commonly known as: PRINIVIL,ZESTRIL  Take 1 tablet (5 mg total) by mouth once daily.     rivaroxaban 10 mg Tab  Commonly known as: XARELTO  Take 1 tablet (10 mg total) by mouth once daily.     SITagliptin phosphate 50 MG Tab  Commonly known as: JANUVIA  Take 1 tablet (50 mg total) by mouth once daily.     tamsulosin 0.4 mg Cap  Commonly known as: FLOMAX  Take 1 capsule (0.4 mg total) by mouth once daily.            STOP taking these medications      metoprolol succinate 25 MG 24 hr tablet  Commonly  known as: TOPROL-XL     pioglitazone 30 MG tablet  Commonly known as: ACTOS              Indwelling Lines/Drains at time of discharge:   Lines/Drains/Airways       None                   Time spent on the discharge of patient: 35 minutes           Ingrid Marcos MD  Department of Hospital Medicine  [facility] - Cardiology Stepdown

## 2023-06-16 NOTE — SUBJECTIVE & OBJECTIVE
Interval History: Patient was seen and evaluated- he did report some dizziness and headache- that seems similar to previous episodes, but not as intense. On tele review- it looks as if he had an episode of non-sustained vtach around 2253. Bradycardia as well. No nausea, vomiting, fevers, chills, night sweats, abd pain, diarrhea, constipation, shortness of breath, chest pain.     Objective:     Vital Signs (Most Recent):  Temp: 98 °F (36.7 °C) (06/16/23 1054)  Pulse: (!) 46 (06/16/23 1107)  Resp: 17 (06/16/23 1054)  BP: (!) 143/71 (06/16/23 1054)  SpO2: 98 % (06/16/23 1054) Vital Signs (24h Range):  Temp:  [97.3 °F (36.3 °C)-98.1 °F (36.7 °C)] 98 °F (36.7 °C)  Pulse:  [44-57] 46  Resp:  [16-22] 17  SpO2:  [96 %-99 %] 98 %  BP: (112-156)/(61-87) 143/71     Weight: 127 kg (280 lb)  Body mass index is 35.95 kg/m².    Intake/Output Summary (Last 24 hours) at 6/16/2023 1304  Last data filed at 6/16/2023 0800  Gross per 24 hour   Intake 702 ml   Output 1000 ml   Net -298 ml      Physical Exam  Gen: in NAD, appears stated age, appears comfortable   Neuro: AAOx3, motor, sensory, and strength grossly intact BL  HEENT: NTNC, EOMI, PERRL, MMM  CVS: bradycardic, reg rhythm, no m/r/g; S1/S2 auscultated with no S3 or S4; capillary refill < 2 sec  Resp: lungs CTAB, no w/r/r; no belabored breathing or accessory muscle use appreciated   Abd: BS+ in all 4 quadrants; NTND, soft to palpation; no organomegaly appreciated   Extrem: pulses full, equal, and regular over all 4 extremities; no UE or LE edema BL    Significant Labs: All pertinent labs within the past 24 hours have been reviewed.  CBC:   Recent Labs   Lab 06/14/23  1745 06/15/23  0454 06/16/23  0550   WBC 9.11 8.72 8.51   HGB 15.4 14.4 14.2   HCT 45.1 43.7 43.5    196 182     CMP:   Recent Labs   Lab 06/14/23  1745 06/15/23  0454 06/16/23  0550    137 135*   K 4.0 3.7 4.1    106 104   CO2 23 23 24   GLU 98 118* 131*   BUN 12 12 9   CREATININE 0.7 0.7 0.7    CALCIUM 9.3 8.8 9.1   PROT 7.3 6.3 6.5   ALBUMIN 4.2 3.6 3.8   BILITOT 1.9* 1.6* 1.9*   ALKPHOS 58 53* 51*   AST 22 14 14   ALT 19 17 17   ANIONGAP 8 8 7*     Troponin:   Recent Labs   Lab 06/14/23  1745 06/14/23 2016   TROPONINI 0.017 0.012     TSH:   Recent Labs   Lab 06/14/23 1745   TSH 1.725       Significant Imaging: I have reviewed all pertinent imaging results/findings within the past 24 hours.    TTE:   Summary    The left ventricle is moderately enlarged with mildly decreased systolic function.  The estimated ejection fraction is 40%.  There is mild left ventricular global hypokinesis.  Grade I left ventricular diastolic dysfunction.  Mild mitral regurgitation.  Mild tricuspid regurgitation.  Normal right ventricular size with normal right ventricular systolic function.  There is no pulmonary hypertension.

## 2023-06-16 NOTE — PLAN OF CARE
[facility] scheduled Cards   Future Appointments   Date Time Provider Department Center   6/16/2023  3:30 PM CARDIAC, PET IMAGING NATASHA MAYER [facility]   6/19/2023 10:20 AM Neno Constantino MD [facility]   8/10/2023  9:40 AM Neno Constantino MD [facility]   11/3/2023  8:45 AM [facility], LAB LOC IM [place]   11/10/2023  8:45 AM Irma Biswas NP [facility] [place]

## 2023-06-16 NOTE — ASSESSMENT & PLAN NOTE
Patient is identified as having Combined Systolic and Diastolic heart failure that is Acute. CHF is currently controlled. Latest ECHO performed and demonstrates- Results for orders placed during the hospital encounter of 06/14/23    Echo    Interpretation Summary  · The left ventricle is moderately enlarged with mildly decreased systolic function.  · The estimated ejection fraction is 40%.  · There is mild left ventricular global hypokinesis.  · Grade I left ventricular diastolic dysfunction.  · Mild mitral regurgitation.  · Mild tricuspid regurgitation.  · Normal right ventricular size with normal right ventricular systolic function.  · There is no pulmonary hypertension.  . Continue Beta Blocker and ACE/ARB and monitor clinical status closely. Monitor on telemetry. Patient is off CHF pathway.  Monitor strict Is&Os and daily weights.  Place on fluid restriction of 1.5 L. Continue to stress to patient importance of self efficacy and  on diet for CHF. Last BNP reviewed- and noted below   Recent Labs   Lab 06/14/23  1745   BNP 58     - Patient appears to have new-onset heart failure  - Cardiology consulted- stress test today- awaiting results   - currently on metoprolol, lisinopril   - tele  - K>4, Mg >2  - 1.5L fluid restriction

## 2023-06-16 NOTE — ASSESSMENT & PLAN NOTE
"#Symptomatic frquent non-sha MMVT runs  #Sinus [place] 2/2 beta blocker use  #High PVC and NSVT burden on Holter    Holter monitor was abnormal with wide complex tachycardia and rates as high as 140s. "predominant rhythm sinus rhythm with HR varying between 43 and 200 BPM with occassional runs of monomoprhic ventricular tachycardia, longest of 1038 beats." Additionally, his TTE performed revealed mildly decreased systolic function (EF 40%). Denies any chest pain or SOB, but he does have occasional palpitations. He reports that he has had ~2 episodes of lightheadedness today and they frequently happen (more than once a day) when he exerts himself.     EP consulted for NSVT, newly diagnosed HFrEF 40%.     Plan  -PET scan to r/o ischemia as cause of low EF and MMVT runs  -monitor on telemetry  -K>4, Mg>2  -continue toprol 25mg daily, can't uptitrate further due to bradycardia issue  -on xarelto 10 mg daily for recurrent DVT in setting of anti-phospholipid syndrome  -further recs on rhythm/rate control to follow in AM  "

## 2023-06-16 NOTE — DISCHARGE INSTRUCTIONS
Stop taking actos, continue metoprolol. Will begin jardiance instead of ACTOS.     Follow-up with Dr. Talley for exercise stress test.     In the case of worsening shortness of breath, dizziness, chest pain or pressure- return to emergency department for re-evaluation.

## 2023-06-19 ENCOUNTER — OFFICE VISIT (OUTPATIENT)
Dept: CARDIOLOGY | Facility: CLINIC | Age: 63
End: 2023-06-19
Payer: COMMERCIAL

## 2023-06-19 ENCOUNTER — TELEPHONE (OUTPATIENT)
Dept: INTERNAL MEDICINE | Facility: CLINIC | Age: 63
End: 2023-06-19
Payer: COMMERCIAL

## 2023-06-19 VITALS
BODY MASS INDEX: 35.36 KG/M2 | RESPIRATION RATE: 18 BRPM | SYSTOLIC BLOOD PRESSURE: 130 MMHG | WEIGHT: 275.56 LBS | OXYGEN SATURATION: 97 % | DIASTOLIC BLOOD PRESSURE: 86 MMHG | HEIGHT: 74 IN | HEART RATE: 67 BPM

## 2023-06-19 DIAGNOSIS — I47.20 VENTRICULAR TACHYCARDIA: ICD-10-CM

## 2023-06-19 DIAGNOSIS — I25.10 CORONARY ARTERY DISEASE INVOLVING NATIVE CORONARY ARTERY OF NATIVE HEART WITHOUT ANGINA PECTORIS: ICD-10-CM

## 2023-06-19 DIAGNOSIS — E11.9 DIABETES MELLITUS WITHOUT COMPLICATION: ICD-10-CM

## 2023-06-19 DIAGNOSIS — Z86.718 HISTORY OF DVT (DEEP VEIN THROMBOSIS): ICD-10-CM

## 2023-06-19 DIAGNOSIS — R00.1 BRADYCARDIA: ICD-10-CM

## 2023-06-19 DIAGNOSIS — I70.0 AORTIC ATHEROSCLEROSIS: ICD-10-CM

## 2023-06-19 DIAGNOSIS — E11.9 DIABETES MELLITUS WITHOUT COMPLICATION: Primary | ICD-10-CM

## 2023-06-19 DIAGNOSIS — I49.3 PVC'S (PREMATURE VENTRICULAR CONTRACTIONS): ICD-10-CM

## 2023-06-19 DIAGNOSIS — I10 PRIMARY HYPERTENSION: ICD-10-CM

## 2023-06-19 DIAGNOSIS — Z72.0 TOBACCO ABUSE: ICD-10-CM

## 2023-06-19 DIAGNOSIS — E66.01 SEVERE OBESITY (BMI 35.0-39.9) WITH COMORBIDITY: ICD-10-CM

## 2023-06-19 DIAGNOSIS — Z87.891 SMOKING HX: Primary | ICD-10-CM

## 2023-06-19 DIAGNOSIS — I50.41 ACUTE COMBINED SYSTOLIC AND DIASTOLIC CONGESTIVE HEART FAILURE: ICD-10-CM

## 2023-06-19 PROCEDURE — 99999 PR PBB SHADOW E&M-EST. PATIENT-LVL IV: CPT | Mod: PBBFAC,,, | Performed by: INTERNAL MEDICINE

## 2023-06-19 PROCEDURE — 3008F BODY MASS INDEX DOCD: CPT | Mod: CPTII,S$GLB,, | Performed by: INTERNAL MEDICINE

## 2023-06-19 PROCEDURE — 3066F PR DOCUMENTATION OF TREATMENT FOR NEPHROPATHY: ICD-10-PCS | Mod: CPTII,S$GLB,, | Performed by: INTERNAL MEDICINE

## 2023-06-19 PROCEDURE — 1160F PR REVIEW ALL MEDS BY PRESCRIBER/CLIN PHARMACIST DOCUMENTED: ICD-10-PCS | Mod: CPTII,S$GLB,, | Performed by: INTERNAL MEDICINE

## 2023-06-19 PROCEDURE — 3044F PR MOST RECENT HEMOGLOBIN A1C LEVEL <7.0%: ICD-10-PCS | Mod: CPTII,S$GLB,, | Performed by: INTERNAL MEDICINE

## 2023-06-19 PROCEDURE — 99214 OFFICE O/P EST MOD 30 MIN: CPT | Mod: S$GLB,,, | Performed by: INTERNAL MEDICINE

## 2023-06-19 PROCEDURE — 3075F SYST BP GE 130 - 139MM HG: CPT | Mod: CPTII,S$GLB,, | Performed by: INTERNAL MEDICINE

## 2023-06-19 PROCEDURE — 3066F NEPHROPATHY DOC TX: CPT | Mod: CPTII,S$GLB,, | Performed by: INTERNAL MEDICINE

## 2023-06-19 PROCEDURE — 3044F HG A1C LEVEL LT 7.0%: CPT | Mod: CPTII,S$GLB,, | Performed by: INTERNAL MEDICINE

## 2023-06-19 PROCEDURE — 99999 PR PBB SHADOW E&M-EST. PATIENT-LVL IV: ICD-10-PCS | Mod: PBBFAC,,, | Performed by: INTERNAL MEDICINE

## 2023-06-19 PROCEDURE — 3008F PR BODY MASS INDEX (BMI) DOCUMENTED: ICD-10-PCS | Mod: CPTII,S$GLB,, | Performed by: INTERNAL MEDICINE

## 2023-06-19 PROCEDURE — 3061F NEG MICROALBUMINURIA REV: CPT | Mod: CPTII,S$GLB,, | Performed by: INTERNAL MEDICINE

## 2023-06-19 PROCEDURE — 3061F PR NEG MICROALBUMINURIA RESULT DOCUMENTED/REVIEW: ICD-10-PCS | Mod: CPTII,S$GLB,, | Performed by: INTERNAL MEDICINE

## 2023-06-19 PROCEDURE — 99214 PR OFFICE/OUTPT VISIT, EST, LEVL IV, 30-39 MIN: ICD-10-PCS | Mod: S$GLB,,, | Performed by: INTERNAL MEDICINE

## 2023-06-19 PROCEDURE — 3075F PR MOST RECENT SYSTOLIC BLOOD PRESS GE 130-139MM HG: ICD-10-PCS | Mod: CPTII,S$GLB,, | Performed by: INTERNAL MEDICINE

## 2023-06-19 PROCEDURE — 4010F PR ACE/ARB THEARPY RXD/TAKEN: ICD-10-PCS | Mod: CPTII,S$GLB,, | Performed by: INTERNAL MEDICINE

## 2023-06-19 PROCEDURE — 3079F DIAST BP 80-89 MM HG: CPT | Mod: CPTII,S$GLB,, | Performed by: INTERNAL MEDICINE

## 2023-06-19 PROCEDURE — 1159F MED LIST DOCD IN RCRD: CPT | Mod: CPTII,S$GLB,, | Performed by: INTERNAL MEDICINE

## 2023-06-19 PROCEDURE — 1159F PR MEDICATION LIST DOCUMENTED IN MEDICAL RECORD: ICD-10-PCS | Mod: CPTII,S$GLB,, | Performed by: INTERNAL MEDICINE

## 2023-06-19 PROCEDURE — 4010F ACE/ARB THERAPY RXD/TAKEN: CPT | Mod: CPTII,S$GLB,, | Performed by: INTERNAL MEDICINE

## 2023-06-19 PROCEDURE — 3079F PR MOST RECENT DIASTOLIC BLOOD PRESSURE 80-89 MM HG: ICD-10-PCS | Mod: CPTII,S$GLB,, | Performed by: INTERNAL MEDICINE

## 2023-06-19 PROCEDURE — 1160F RVW MEDS BY RX/DR IN RCRD: CPT | Mod: CPTII,S$GLB,, | Performed by: INTERNAL MEDICINE

## 2023-06-19 RX ORDER — LISINOPRIL 5 MG/1
5 TABLET ORAL DAILY
Qty: 30 TABLET | Refills: 5 | Status: SHIPPED | OUTPATIENT
Start: 2023-06-19 | End: 2023-07-11 | Stop reason: SDUPTHER

## 2023-06-19 NOTE — ASSESSMENT & PLAN NOTE
A recent switch was made from Actos to Jardiance.  Condition stable.  He thinks he wants to initiate consultation with an endocrinologist.

## 2023-06-19 NOTE — ASSESSMENT & PLAN NOTE
The patient's ejection fraction is 40% range.  Jardiance, lisinopril will be continued.  Beta-blockers held due to bradycardia.  He does not report heart failure symptoms.      Etiology of reduced ejection fraction may be related to ventricular tachycardia runs.

## 2023-06-19 NOTE — ASSESSMENT & PLAN NOTE
Off metoprolol, heart rate today 67.  He did have asymptomatic bradycardia while on 25 mg Toprol.

## 2023-06-19 NOTE — PROGRESS NOTES
[facility]     Subjective:    Patient ID:  [place] N[place]era is a 63 y.o. male who presents for follow-up of PVCs, Hypertension, Dizziness, Diabetes Mellitus, and Hyperlipidemia    Review of patient's allergies indicates:   Allergen Reactions    Codeine Diarrhea     Patient can not tolerate Tylenol #3, states he does okay with Codeine with cough medications     Nickel sutures [surgical stainless steel] Rash        The patient was hospitalized at [facility] after Holter monitor showing frequent episodes of nonsustained ventricular tachycardia.  While he wore the Holter he was asymptomatic but did report episodes of lightheadedness with blood pressures in the 70s over the past several weeks.  He was seen by Dr. Talley  and ultimately verapamil was recommended.  Beta-blockers were not selected because of history of bradycardia.  There were considerations for antiarrhythmic therapy as well as ablation therapy if needed.  The patient was switched off Actos and started on Jardiance.  A PET stress was done showing no infarct ejection fraction 42%.  There was no significant ischemia.  He is diabetic.  He takes lisinopril for hypertension.  His blood pressures have been stable.  He was taken off metoprolol.      Since hospital discharge he has not had spells of weakness, hypotension.  He has never felt PVCs or tachycardia.  His blood pressures have been in good range.  He has not been back to work yet.  He wants to see an endocrinologist.  He wants a referral for smoking cessation.  Verapamil has been tolerated well.      Review of Systems   Constitutional: Negative for chills, decreased appetite, diaphoresis, fever, malaise/fatigue, night sweats, weight gain and weight loss.   HENT:  Negative for congestion, ear discharge, ear pain, hearing loss, hoarse voice, nosebleeds, odynophagia, sore throat, stridor and tinnitus.    Eyes:  Negative for  blurred vision, discharge, double vision, pain, photophobia, redness, vision loss in left eye, vision loss in right eye, visual disturbance and visual halos.   Cardiovascular:  Negative for chest pain, claudication, cyanosis, dyspnea on exertion, irregular heartbeat, leg swelling, near-syncope, orthopnea, palpitations, paroxysmal nocturnal dyspnea and syncope.   Respiratory:  Negative for cough, hemoptysis, shortness of breath, sleep disturbances due to breathing, snoring, sputum production and wheezing.    Endocrine: Negative for cold intolerance, heat intolerance, polydipsia, polyphagia and polyuria.   Hematologic/Lymphatic: Negative for adenopathy and bleeding problem. Does not bruise/bleed easily.   Skin:  Negative for color change, dry skin, flushing, itching, nail changes, poor wound healing, rash, skin cancer, suspicious lesions and unusual hair distribution.   Musculoskeletal:  Negative for arthritis, back pain, falls, gout, joint pain, joint swelling, muscle cramps, muscle weakness, myalgias, neck pain and stiffness.   Gastrointestinal:  Negative for bloating, abdominal pain, anorexia, change in bowel habit, bowel incontinence, constipation, diarrhea, dysphagia, excessive appetite, flatus, heartburn, hematemesis, hematochezia, hemorrhoids, jaundice, melena, nausea and vomiting.   Genitourinary:  Negative for bladder incontinence, decreased libido, dysuria, flank pain, frequency, genital sores, hematuria, hesitancy, incomplete emptying, nocturia and urgency.   Neurological:  Negative for aphonia, brief paralysis, difficulty with concentration, disturbances in coordination, excessive daytime sleepiness, dizziness, focal weakness, headaches, light-headedness, loss of balance, numbness, paresthesias, seizures, sensory change, tremors, vertigo and weakness.   Psychiatric/Behavioral:  Negative for altered mental status, depression, hallucinations, memory loss, substance abuse, suicidal ideas and thoughts of  "violence. The patient does not have insomnia and is not nervous/anxious.    Allergic/Immunologic: Negative for hives and persistent infections.      Objective:       Vitals:    06/19/23 1050   BP: 130/86   Pulse: 67   Resp: 18   SpO2: 97%   Weight: 125 kg (275 lb 9.2 oz)   Height: 6' 2" (1.88 m)    Physical Exam  Constitutional:       General: He is not in acute distress.     Appearance: He is well-developed. He is not diaphoretic.   HENT:      Head: Normocephalic and atraumatic.      Nose: Nose normal.   Eyes:      General: No scleral icterus.        Right eye: No discharge.      Conjunctiva/sclera: Conjunctivae normal.      Pupils: Pupils are equal, round, and reactive to light.   Neck:      Thyroid: No thyromegaly.      Vascular: No JVD.      Trachea: No tracheal deviation.   Cardiovascular:      Rate and Rhythm: Normal rate and regular rhythm.      Pulses:           Carotid pulses are 2+ on the right side and 2+ on the left side.       Radial pulses are 2+ on the right side and 2+ on the left side.        Dorsalis pedis pulses are 2+ on the right side and 2+ on the left side.        Posterior tibial pulses are 2+ on the right side and 2+ on the left side.      Heart sounds: Normal heart sounds. No murmur heard.    No friction rub. No gallop.   Pulmonary:      Effort: Pulmonary effort is normal. No respiratory distress.      Breath sounds: Normal breath sounds. No stridor. No wheezing or rales.   Chest:      Chest wall: No tenderness.   Abdominal:      General: Bowel sounds are normal. There is no distension.      Palpations: Abdomen is soft. There is no mass.      Tenderness: There is no abdominal tenderness. There is no guarding or rebound.   Musculoskeletal:         General: No tenderness. Normal range of motion.      Cervical back: Normal range of motion and neck supple.   Lymphadenopathy:      Cervical: No cervical adenopathy.   Skin:     General: Skin is warm and dry.      Coloration: Skin is not pale.      " Findings: No erythema or rash.   Neurological:      Mental Status: He is alert and oriented to person, place, and time.      Cranial Nerves: No cranial nerve deficit.      Coordination: Coordination normal.   Psychiatric:         Behavior: Behavior normal.         Thought Content: Thought content normal.         Judgment: Judgment normal.         Assessment:       1. Smoking hx    2. Acute combined systolic and diastolic congestive heart failure    3. Aortic atherosclerosis    4. Bradycardia    5. Coronary artery disease involving native coronary artery of native heart without angina pectoris    6. Primary hypertension    7. PVC's (premature ventricular contractions)    8. Ventricular tachycardia    9. History of DVT (deep vein thrombosis)    10. Diabetes mellitus without complication    11. Severe obesity (BMI 35.0-39.9) with comorbidity    12. Tobacco abuse      Results for orders placed or performed during the hospital encounter of 06/14/23   CBC auto differential   Result Value Ref Range    WBC 9.11 3.90 - 12.70 K/uL    RBC 4.57 (L) 4.60 - 6.20 M/uL    Hemoglobin 15.4 14.0 - 18.0 g/dL    Hematocrit 45.1 40.0 - 54.0 %    MCV 99 (H) 82 - 98 fL    MCH 33.7 (H) 27.0 - 31.0 pg    MCHC 34.1 32.0 - 36.0 g/dL    RDW 13.6 11.5 - 14.5 %    Platelets 188 150 - 450 K/uL    MPV 9.2 9.2 - 12.9 fL    Immature Granulocytes 0.5 0.0 - 0.5 %    Gran # (ANC) 5.5 1.8 - 7.7 K/uL    Immature Grans (Abs) 0.05 (H) 0.00 - 0.04 K/uL    Lymph # 2.6 1.0 - 4.8 K/uL    Mono # 0.8 0.3 - 1.0 K/uL    Eos # 0.2 0.0 - 0.5 K/uL    Baso # 0.04 0.00 - 0.20 K/uL    nRBC 0 0 /100 WBC    Gran % 60.3 38.0 - 73.0 %    Lymph % 28.1 18.0 - 48.0 %    Mono % 8.6 4.0 - 15.0 %    Eosinophil % 2.1 0.0 - 8.0 %    Basophil % 0.4 0.0 - 1.9 %    Differential Method Automated    Comprehensive metabolic panel   Result Value Ref Range    Sodium 137 136 - 145 mmol/L    Potassium 4.0 3.5 - 5.1 mmol/L    Chloride 106 95 - 110 mmol/L    CO2 23 23 - 29 mmol/L    Glucose 98  70 - 110 mg/dL    BUN 12 8 - 23 mg/dL    Creatinine 0.7 0.5 - 1.4 mg/dL    Calcium 9.3 8.7 - 10.5 mg/dL    Total Protein 7.3 6.0 - 8.4 g/dL    Albumin 4.2 3.5 - 5.2 g/dL    Total Bilirubin 1.9 (H) 0.1 - 1.0 mg/dL    Alkaline Phosphatase 58 55 - 135 U/L    AST 22 10 - 40 U/L    ALT 19 10 - 44 U/L    Anion Gap 8 8 - 16 mmol/L    eGFR >60.0 >60 mL/min/1.73 m^2   Troponin I #1   Result Value Ref Range    Troponin I 0.017 0.000 - 0.026 ng/mL   Troponin I #2   Result Value Ref Range    Troponin I 0.012 0.000 - 0.026 ng/mL   B-Type natriuretic peptide (BNP)   Result Value Ref Range    BNP 58 0 - 99 pg/mL   TSH   Result Value Ref Range    TSH 1.725 0.400 - 4.000 uIU/mL   Lipid panel   Result Value Ref Range    Cholesterol 119 (L) 120 - 199 mg/dL    Triglycerides 193 (H) 30 - 150 mg/dL    HDL 31 (L) 40 - 75 mg/dL    LDL Cholesterol 49.4 (L) 63.0 - 159.0 mg/dL    HDL/Cholesterol Ratio 26.1 20.0 - 50.0 %    Total Cholesterol/HDL Ratio 3.8 2.0 - 5.0    Non-HDL Cholesterol 88 mg/dL   HIV 1/2 Ag/Ab (4th Gen)   Result Value Ref Range    HIV 1/2 Ag/Ab Non-reactive Non-reactive   Hepatitis C Antibody   Result Value Ref Range    Hepatitis C Ab Non-reactive Non-reactive   Troponin ISTAT   Result Value Ref Range    POC Cardiac Troponin I 0.01 0.00 - 0.08 ng/mL    Sample unknown    Magnesium   Result Value Ref Range    Magnesium 1.9 1.6 - 2.6 mg/dL   Comprehensive Metabolic Panel (CMP)   Result Value Ref Range    Sodium 137 136 - 145 mmol/L    Potassium 3.7 3.5 - 5.1 mmol/L    Chloride 106 95 - 110 mmol/L    CO2 23 23 - 29 mmol/L    Glucose 118 (H) 70 - 110 mg/dL    BUN 12 8 - 23 mg/dL    Creatinine 0.7 0.5 - 1.4 mg/dL    Calcium 8.8 8.7 - 10.5 mg/dL    Total Protein 6.3 6.0 - 8.4 g/dL    Albumin 3.6 3.5 - 5.2 g/dL    Total Bilirubin 1.6 (H) 0.1 - 1.0 mg/dL    Alkaline Phosphatase 53 (L) 55 - 135 U/L    AST 14 10 - 40 U/L    ALT 17 10 - 44 U/L    Anion Gap 8 8 - 16 mmol/L    eGFR >60.0 >60 mL/min/1.73 m^2   CBC with Automated  Differential   Result Value Ref Range    WBC 8.72 3.90 - 12.70 K/uL    RBC 4.43 (L) 4.60 - 6.20 M/uL    Hemoglobin 14.4 14.0 - 18.0 g/dL    Hematocrit 43.7 40.0 - 54.0 %    MCV 99 (H) 82 - 98 fL    MCH 32.5 (H) 27.0 - 31.0 pg    MCHC 33.0 32.0 - 36.0 g/dL    RDW 13.6 11.5 - 14.5 %    Platelets 196 150 - 450 K/uL    MPV 9.7 9.2 - 12.9 fL    Immature Granulocytes 0.6 (H) 0.0 - 0.5 %    Gran # (ANC) 4.6 1.8 - 7.7 K/uL    Immature Grans (Abs) 0.05 (H) 0.00 - 0.04 K/uL    Lymph # 2.9 1.0 - 4.8 K/uL    Mono # 0.8 0.3 - 1.0 K/uL    Eos # 0.2 0.0 - 0.5 K/uL    Baso # 0.04 0.00 - 0.20 K/uL    nRBC 0 0 /100 WBC    Gran % 53.2 38.0 - 73.0 %    Lymph % 33.7 18.0 - 48.0 %    Mono % 9.6 4.0 - 15.0 %    Eosinophil % 2.4 0.0 - 8.0 %    Basophil % 0.5 0.0 - 1.9 %    Differential Method Automated    Comprehensive Metabolic Panel (CMP)   Result Value Ref Range    Sodium 135 (L) 136 - 145 mmol/L    Potassium 4.1 3.5 - 5.1 mmol/L    Chloride 104 95 - 110 mmol/L    CO2 24 23 - 29 mmol/L    Glucose 131 (H) 70 - 110 mg/dL    BUN 9 8 - 23 mg/dL    Creatinine 0.7 0.5 - 1.4 mg/dL    Calcium 9.1 8.7 - 10.5 mg/dL    Total Protein 6.5 6.0 - 8.4 g/dL    Albumin 3.8 3.5 - 5.2 g/dL    Total Bilirubin 1.9 (H) 0.1 - 1.0 mg/dL    Alkaline Phosphatase 51 (L) 55 - 135 U/L    AST 14 10 - 40 U/L    ALT 17 10 - 44 U/L    Anion Gap 7 (L) 8 - 16 mmol/L    eGFR >60.0 >60 mL/min/1.73 m^2   CBC with Automated Differential   Result Value Ref Range    WBC 8.51 3.90 - 12.70 K/uL    RBC 4.36 (L) 4.60 - 6.20 M/uL    Hemoglobin 14.2 14.0 - 18.0 g/dL    Hematocrit 43.5 40.0 - 54.0 %     (H) 82 - 98 fL    MCH 32.6 (H) 27.0 - 31.0 pg    MCHC 32.6 32.0 - 36.0 g/dL    RDW 13.5 11.5 - 14.5 %    Platelets 182 150 - 450 K/uL    MPV 9.6 9.2 - 12.9 fL    Immature Granulocytes 0.5 0.0 - 0.5 %    Gran # (ANC) 4.6 1.8 - 7.7 K/uL    Immature Grans (Abs) 0.04 0.00 - 0.04 K/uL    Lymph # 2.9 1.0 - 4.8 K/uL    Mono # 0.8 0.3 - 1.0 K/uL    Eos # 0.3 0.0 - 0.5 K/uL    Baso #  0.04 0.00 - 0.20 K/uL    nRBC 0 0 /100 WBC    Gran % 53.5 38.0 - 73.0 %    Lymph % 33.7 18.0 - 48.0 %    Mono % 8.9 4.0 - 15.0 %    Eosinophil % 2.9 0.0 - 8.0 %    Basophil % 0.5 0.0 - 1.9 %    Differential Method Automated    Magnesium   Result Value Ref Range    Magnesium 1.9 1.6 - 2.6 mg/dL   Cardiac PET Scan Stress   Result Value Ref Range    85% Max Predicted      Max Predicted      [facility] CV CPX PATIENT IS MALE 1.0     [facility] CV CPX PATIENT IS FEMALE 0.0     Systolic blood pressure 139 mmHg    Diastolic blood pressure 82 mmHg    HR at rest 45 bpm    Peak Systolic  mmHg    Peak Diatolic BP 61 mmHg    Peak HR 46 bpm    % Max HR Achieved 29     1 Minute Recovery HR 71 bpm    RPP 6,255     Peak RPP 5,612     Lateral Flow at Rest 0.43 cc/min/g    Inferior Flow at Rest 0.37 cc/min/g    Septal Flow at Rest 0.29 cc/min/g    Anterior Flow at Rest 0.40 cc/min/g    Minimum Flow at Rest 0.21 cc/min/g    Maximum Flow at Rest 0.56 cc/min/g    Whole Heart Flow at Rest 0.37 cc/min/g    Lateral Flow at Stress 0.79 cc/min/g    Inferior Flow at Stress 0.76 cc/min/g    Septal Flow at Stress 0.66 cc/min/g    Anterior Flow at Stress 0.90 cc/min/g    Minimum Flow at Stress 0.35 cc/min/g    Maximum Flow at Stress 1.14 cc/min/g    Whole Heart Flow at Rest 0.78 cc/min/g    Lateral Flow - CFR 1.85     Inferior Flow - CFR 2.06     Septal Flow - CFR 2.26     Anterior Flow - CFR 2.27     Minimum Flow - CFR 1.38     Maximum Flow - CFR 2.95     Whole Heart Flow - CFR 2.11     EF + QEF 47 %    Ejection Fraction- High Stress 59 %    End diastolic index (mL/m2) 91 mL/m2    End diastolic index (mL/m2) 155 mL/m2    End systolic index (mL/m2) 40 mL/m2    End systolic index (mL/m2) 78 mL/m2    Nuc Rest EF 42     Nuc Rest Diastolic Volume Index 164     Nuc Rest Systolic Volume Index 94     Nuc Stress EF 48 %    Nuc Rest Diastolic Volume Index 168     Nuc Rest Systolic Volume Index 87    POCT glucose   Result Value Ref Range    POCT  Glucose 157 (H) 70 - 110 mg/dL   POCT glucose   Result Value Ref Range    POCT Glucose 127 (H) 70 - 110 mg/dL   POCT glucose   Result Value Ref Range    POCT Glucose 148 (H) 70 - 110 mg/dL   POCT glucose   Result Value Ref Range    POCT Glucose 155 (H) 70 - 110 mg/dL   POCT glucose   Result Value Ref Range    POCT Glucose 141 (H) 70 - 110 mg/dL   POCT glucose   Result Value Ref Range    POCT Glucose 136 (H) 70 - 110 mg/dL   POCT glucose   Result Value Ref Range    POCT Glucose 95 70 - 110 mg/dL   POCT glucose   Result Value Ref Range    POCT Glucose 102 70 - 110 mg/dL         Current Outpatient Medications:     aspirin (ECOTRIN) 81 MG EC tablet, Take 81 mg by mouth once daily., Disp: , Rfl:     atorvastatin (LIPITOR) 20 MG tablet, Take 1 tablet (20 mg total) by mouth once daily IN THE MORNING, Disp: 30 tablet, Rfl: 5    cholecalciferol, vitamin D3, (VITAMIN D3) 50 mcg (2,000 unit) Cap, Take 1 capsule by mouth once daily., Disp: , Rfl:     empagliflozin (JARDIANCE) 10 mg tablet, Take 1 tablet (10 mg total) by mouth once daily., Disp: 30 tablet, Rfl: 0    MULTIVIT-MIN/FA/LYCOPEN/LUTEIN (CENTRUM SILVER MEN ORAL), Take by mouth., Disp: , Rfl:     rivaroxaban (XARELTO) 10 mg Tab, Take 1 tablet (10 mg total) by mouth once daily., Disp: 90 tablet, Rfl: 3    tamsulosin (FLOMAX) 0.4 mg Cap, Take 1 capsule (0.4 mg total) by mouth once daily., Disp: 30 capsule, Rfl: 5    verapamiL (CALAN-SR) 180 MG CR tablet, Take 1 tablet (180 mg total) by mouth nightly., Disp: 30 tablet, Rfl: 11    lisinopriL (PRINIVIL,ZESTRIL) 5 MG tablet, Take 1 tablet (5 mg total) by mouth once daily., Disp: 30 tablet, Rfl: 5     Lab Results   Component Value Date    WBC 8.51 06/16/2023    RBC 4.36 (L) 06/16/2023    HGB 14.2 06/16/2023    HCT 43.5 06/16/2023     (H) 06/16/2023    MCH 32.6 (H) 06/16/2023    MCHC 32.6 06/16/2023    RDW 13.5 06/16/2023     06/16/2023    MPV 9.6 06/16/2023    GRAN 4.6 06/16/2023    GRAN 53.5 06/16/2023    LYMPH  2.9 06/16/2023    LYMPH 33.7 06/16/2023    MONO 0.8 06/16/2023    MONO 8.9 06/16/2023    EOS 0.3 06/16/2023    BASO 0.04 06/16/2023    EOSINOPHIL 2.9 06/16/2023    BASOPHIL 0.5 06/16/2023    MG 1.9 06/16/2023        CMP  Lab Results   Component Value Date     (L) 06/16/2023    K 4.1 06/16/2023     06/16/2023    CO2 24 06/16/2023     (H) 06/16/2023    BUN 9 06/16/2023    CREATININE 0.7 06/16/2023    CALCIUM 9.1 06/16/2023    PROT 6.5 06/16/2023    ALBUMIN 3.8 06/16/2023    BILITOT 1.9 (H) 06/16/2023    ALKPHOS 51 (L) 06/16/2023    AST 14 06/16/2023    ALT 17 06/16/2023    ANIONGAP 7 (L) 06/16/2023    ESTGFRAFRICA >60 04/12/2022    EGFRNONAA >60 04/12/2022        Lab Results   Component Value Date    LABURIN (A) 06/30/2021     METHICILLIN RESISTANT STAPHYLOCOCCUS AUREUS  >100,000cfu/ml              Results for orders placed or performed during the hospital encounter of 06/14/23   EKG 12-lead    Collection Time: 06/15/23 11:33 PM    Narrative    Test Reason : R00.1,    Vent. Rate : 046 BPM     Atrial Rate : 046 BPM     P-R Int : 212 ms          QRS Dur : 106 ms      QT Int : 492 ms       P-R-T Axes : 040 -10 -21 degrees     QTc Int : 430 ms    Sinus bradycardia with 1st degree A-V block  Otherwise normal ECG  When compared with ECG of 15-LUIS ALBERTO-2023 00:35,  Premature ventricular complexes are no longer Present  Vent. rate has decreased BY  71 BPM  Confirmed by Basil Wolfe MD ([id number]) on 6/16/2023 8:16:49 AM    Referred By: [email or web address]   SELF           Confirmed By:Basil Wolfe MD                  Plan:       Problem List Items Addressed This Visit          Cardiac/Vascular    PVC's (premature ventricular contractions)       Noted on exam today.  He does not feel palpitations generally.         Aortic atherosclerosis       Condition stable.         Coronary artery disease involving native coronary artery of native heart without angina pectoris       His recent PET stress was negative for significant  ischemia.  No infarct pattern noted.  Condition stable.  Coronary calcifications noted on imaging studies of the chest.  He is on aspirin.  Current LDL 49 mg%.  Statin therapy not required.         Ventricular tachycardia      Recent evaluation completed and verapamil initiated.  Follow-up scheduled.  He has not documented any episodes of tachycardia since hospital release.         Primary hypertension       He is now on lisinopril and verapamil.  He is monitoring his blood pressures at home and they have been in good range.  Condition stable.         Acute combined systolic and diastolic congestive heart failure       The patient's ejection fraction is 40% range.  Jardiance, lisinopril will be continued.  Beta-blockers held due to bradycardia.  He does not report heart failure symptoms.      Etiology of reduced ejection fraction may be related to ventricular tachycardia runs.             Bradycardia       Off metoprolol, heart rate today 67.  He did have asymptomatic bradycardia while on 25 mg Toprol.            Hematology    History of DVT (deep vein thrombosis)       Xarelto 10 mg to continue long-term.            Endocrine    Severe obesity (BMI 35.0-39.9) with comorbidity       Weight loss encouraged.         Diabetes mellitus without complication       A recent switch was made from Actos to Jardiance.  Condition stable.  He thinks he wants to initiate consultation with an endocrinologist.            Other    Tobacco abuse       Smoking cessation clinic consult ordered.  He is interested.          Other Visit Diagnoses       Smoking hx    -  Primary    Relevant Orders    Ambulatory referral/consult to Smoking Cessation Program                 I will see him back in 2 months.  No medication changes made.  The feeling was that perhaps the frequent episodes of tachycardia had triggered his reduced ejection fraction and there is optimism for normalization of ejection fraction.  He has never had heart failure  symptoms thus far.             Neno Constantino MD  06/19/2023   11:27 AM

## 2023-06-19 NOTE — LETTER
June 19, 2023    [place] Najera  [address] [address]              [facility] To whom it may concerned:     [place] Najera is my patient and has been ill requiring being off work June 14 until June 21 and is now cleared to return to work on June 22.      Please call if further information is needed.     Neno Constantino MD [facility]    [address] [facility] [address]  Phone: [phone]  Fax: [phone]

## 2023-06-19 NOTE — TELEPHONE ENCOUNTER
----- Message from Lisa Robledo MA sent at 2023  1:52 PM CDT -----  North Najera  MRN: [id number]  : 1960  PCP: Irma Biswas  Home Phone      [phone]  Work Phone      Not on file.  Mobile          [phone]      MESSAGE:     Patient bought a new meter.  [employer] FOR[employer]IGN.  Please send script for lancets and test strips.     Send to [facility] ([place])

## 2023-06-19 NOTE — PATIENT INSTRUCTIONS
To whom it may concerned:    [place] Najera is my patient and has been ill requiring being off work June 14 until June 21 and is now cleared to return to work on June 22.     Please call if further information is needed.    Neno Constantino MD FACC

## 2023-06-19 NOTE — ASSESSMENT & PLAN NOTE
Recent evaluation completed and verapamil initiated.  Follow-up scheduled.  He has not documented any episodes of tachycardia since hospital release.

## 2023-06-19 NOTE — LETTER
June 19, 2023    North Najera  [address] [address]             [place] [facility]   [address] [facility] [address]  Phone: [phone]  Fax: [phone]  To whom it may concerned:     [place] Najera is my patient and has been ill requiring being off work June 14 until June 21 and is now cleared to return to work on June 22.      Please call if further information is needed.     Neno Constantino MD FACC

## 2023-06-19 NOTE — ASSESSMENT & PLAN NOTE
His recent PET stress was negative for significant ischemia.  No infarct pattern noted.  Condition stable.  Coronary calcifications noted on imaging studies of the chest.  He is on aspirin.  Current LDL 49 mg%.  Statin therapy not required.

## 2023-06-19 NOTE — ASSESSMENT & PLAN NOTE
He is now on lisinopril and verapamil.  He is monitoring his blood pressures at home and they have been in good range.  Condition stable.

## 2023-06-19 NOTE — TELEPHONE ENCOUNTER
LOV 5/4/2023    Pt requesting RX refill for lisinopriL (PRINIVIL,ZESTRIL) 5 MG tablet      pharmacy confirmed   rx pending  please advise

## 2023-06-20 RX ORDER — DEXTROSE 4 G
1 TABLET,CHEWABLE ORAL DAILY
Qty: 1 EACH | Refills: 0 | Status: SHIPPED | OUTPATIENT
Start: 2023-06-20 | End: 2023-10-18

## 2023-06-20 RX ORDER — LANCETS 28 GAUGE
1 EACH MISCELLANEOUS DAILY
Qty: 100 EACH | Refills: 3 | Status: SHIPPED | OUTPATIENT
Start: 2023-06-20

## 2023-06-22 ENCOUNTER — TELEPHONE (OUTPATIENT)
Dept: CARDIOLOGY | Facility: CLINIC | Age: 63
End: 2023-06-22
Payer: COMMERCIAL

## 2023-06-22 ENCOUNTER — OFFICE VISIT (OUTPATIENT)
Dept: INTERNAL MEDICINE | Facility: CLINIC | Age: 63
End: 2023-06-22
Payer: COMMERCIAL

## 2023-06-22 VITALS
HEIGHT: 74 IN | RESPIRATION RATE: 18 BRPM | SYSTOLIC BLOOD PRESSURE: 132 MMHG | HEART RATE: 70 BPM | WEIGHT: 279.13 LBS | DIASTOLIC BLOOD PRESSURE: 80 MMHG | BODY MASS INDEX: 35.82 KG/M2 | OXYGEN SATURATION: 97 %

## 2023-06-22 DIAGNOSIS — E11.65 UNCONTROLLED TYPE 2 DIABETES MELLITUS WITH HYPERGLYCEMIA: ICD-10-CM

## 2023-06-22 DIAGNOSIS — I47.20 VENTRICULAR TACHYCARDIA: Primary | ICD-10-CM

## 2023-06-22 PROCEDURE — 4010F ACE/ARB THERAPY RXD/TAKEN: CPT | Mod: CPTII,S$GLB,, | Performed by: NURSE PRACTITIONER

## 2023-06-22 PROCEDURE — 3044F HG A1C LEVEL LT 7.0%: CPT | Mod: CPTII,S$GLB,, | Performed by: NURSE PRACTITIONER

## 2023-06-22 PROCEDURE — 3044F PR MOST RECENT HEMOGLOBIN A1C LEVEL <7.0%: ICD-10-PCS | Mod: CPTII,S$GLB,, | Performed by: NURSE PRACTITIONER

## 2023-06-22 PROCEDURE — 1160F PR REVIEW ALL MEDS BY PRESCRIBER/CLIN PHARMACIST DOCUMENTED: ICD-10-PCS | Mod: CPTII,S$GLB,, | Performed by: NURSE PRACTITIONER

## 2023-06-22 PROCEDURE — 3061F PR NEG MICROALBUMINURIA RESULT DOCUMENTED/REVIEW: ICD-10-PCS | Mod: CPTII,S$GLB,, | Performed by: NURSE PRACTITIONER

## 2023-06-22 PROCEDURE — 3061F NEG MICROALBUMINURIA REV: CPT | Mod: CPTII,S$GLB,, | Performed by: NURSE PRACTITIONER

## 2023-06-22 PROCEDURE — 99214 PR OFFICE/OUTPT VISIT, EST, LEVL IV, 30-39 MIN: ICD-10-PCS | Mod: S$GLB,,, | Performed by: NURSE PRACTITIONER

## 2023-06-22 PROCEDURE — 3075F SYST BP GE 130 - 139MM HG: CPT | Mod: CPTII,S$GLB,, | Performed by: NURSE PRACTITIONER

## 2023-06-22 PROCEDURE — 99214 OFFICE O/P EST MOD 30 MIN: CPT | Mod: S$GLB,,, | Performed by: NURSE PRACTITIONER

## 2023-06-22 PROCEDURE — 99999 PR PBB SHADOW E&M-EST. PATIENT-LVL IV: ICD-10-PCS | Mod: PBBFAC,,, | Performed by: NURSE PRACTITIONER

## 2023-06-22 PROCEDURE — 1159F PR MEDICATION LIST DOCUMENTED IN MEDICAL RECORD: ICD-10-PCS | Mod: CPTII,S$GLB,, | Performed by: NURSE PRACTITIONER

## 2023-06-22 PROCEDURE — 3075F PR MOST RECENT SYSTOLIC BLOOD PRESS GE 130-139MM HG: ICD-10-PCS | Mod: CPTII,S$GLB,, | Performed by: NURSE PRACTITIONER

## 2023-06-22 PROCEDURE — 1159F MED LIST DOCD IN RCRD: CPT | Mod: CPTII,S$GLB,, | Performed by: NURSE PRACTITIONER

## 2023-06-22 PROCEDURE — 3066F NEPHROPATHY DOC TX: CPT | Mod: CPTII,S$GLB,, | Performed by: NURSE PRACTITIONER

## 2023-06-22 PROCEDURE — 3066F PR DOCUMENTATION OF TREATMENT FOR NEPHROPATHY: ICD-10-PCS | Mod: CPTII,S$GLB,, | Performed by: NURSE PRACTITIONER

## 2023-06-22 PROCEDURE — 1160F RVW MEDS BY RX/DR IN RCRD: CPT | Mod: CPTII,S$GLB,, | Performed by: NURSE PRACTITIONER

## 2023-06-22 PROCEDURE — 3079F DIAST BP 80-89 MM HG: CPT | Mod: CPTII,S$GLB,, | Performed by: NURSE PRACTITIONER

## 2023-06-22 PROCEDURE — 99999 PR PBB SHADOW E&M-EST. PATIENT-LVL IV: CPT | Mod: PBBFAC,,, | Performed by: NURSE PRACTITIONER

## 2023-06-22 PROCEDURE — 4010F PR ACE/ARB THEARPY RXD/TAKEN: ICD-10-PCS | Mod: CPTII,S$GLB,, | Performed by: NURSE PRACTITIONER

## 2023-06-22 PROCEDURE — 3008F BODY MASS INDEX DOCD: CPT | Mod: CPTII,S$GLB,, | Performed by: NURSE PRACTITIONER

## 2023-06-22 PROCEDURE — 3079F PR MOST RECENT DIASTOLIC BLOOD PRESSURE 80-89 MM HG: ICD-10-PCS | Mod: CPTII,S$GLB,, | Performed by: NURSE PRACTITIONER

## 2023-06-22 PROCEDURE — 3008F PR BODY MASS INDEX (BMI) DOCUMENTED: ICD-10-PCS | Mod: CPTII,S$GLB,, | Performed by: NURSE PRACTITIONER

## 2023-06-22 RX ORDER — DEXTROSE 4 G
1 TABLET,CHEWABLE ORAL 2 TIMES DAILY
Qty: 1 EACH | Refills: 0 | Status: SHIPPED | OUTPATIENT
Start: 2023-06-22 | End: 2023-10-18

## 2023-06-22 RX ORDER — MUPIROCIN 20 MG/G
OINTMENT TOPICAL 3 TIMES DAILY
Qty: 22 G | Refills: 1 | Status: SHIPPED | OUTPATIENT
Start: 2023-06-22 | End: 2023-06-29

## 2023-06-22 RX ORDER — LANCETS
1 EACH MISCELLANEOUS 2 TIMES DAILY
Qty: 100 EACH | Refills: 2 | Status: SHIPPED | OUTPATIENT
Start: 2023-06-22

## 2023-06-22 NOTE — TELEPHONE ENCOUNTER
"Rn attempted to enroll pt in [facility]. As RN explained program details pt reports he "has never had problem with fluid." He stated he wanted to speak to his "regular doctor." RN assured pt program is voluntary in nature. RN will call pt again tomorrow to assess interest in enrollment tomorrow.   "

## 2023-06-22 NOTE — PROGRESS NOTES
Subjective:       Patient ID: North Najera is a 63 y.o. male.    Chief Complaint: Follow-up (X hospital- )    Patient is known, to me and presents with   Chief Complaint   Patient presents with    Follow-up     X hospital-    .  Denies chest pain and shortness of breath. Patient presents with post hospital follow up for V tach. Doing much better now since on verapamil. He has a question on jardiance and januvia. Was told that jardiance and januvia are the same. Explained to him they are not the same. Two different mechanism of action.     Follow-up  Pertinent negatives include no abdominal pain, arthralgias, chest pain, chills, congestion, coughing, diaphoresis, fatigue, fever, headaches, joint swelling, myalgias, nausea, neck pain, numbness, rash, sore throat, vomiting or weakness.   Review of Systems   Constitutional: Negative.  Negative for activity change, appetite change, chills, diaphoresis, fatigue, fever and unexpected weight change.   HENT: Negative.  Negative for congestion, ear discharge, ear pain, facial swelling, hearing loss, nosebleeds, postnasal drip, rhinorrhea, sinus pressure, sneezing, sore throat, tinnitus, trouble swallowing and voice change.    Eyes: Negative.  Negative for photophobia, pain, discharge, redness, itching and visual disturbance.   Respiratory: Negative.  Negative for apnea, cough, choking, chest tightness, shortness of breath, wheezing and stridor.    Cardiovascular: Negative.  Negative for chest pain, palpitations and leg swelling.   Gastrointestinal:  Negative for abdominal distention, abdominal pain, anal bleeding, blood in stool, constipation, diarrhea, nausea and vomiting.   Genitourinary: Negative.  Negative for difficulty urinating, dysuria, enuresis, flank pain, frequency, hematuria, penile discharge, penile pain, penile swelling, scrotal swelling, testicular pain and urgency.   Musculoskeletal: Negative.  Negative for arthralgias, back pain, gait problem, joint  swelling, myalgias, neck pain and neck stiffness.   Skin: Negative.  Negative for color change, pallor, rash and wound.   Neurological:  Negative for dizziness, tremors, seizures, syncope, facial asymmetry, speech difficulty, weakness, light-headedness, numbness and headaches.   Hematological:  Negative for adenopathy. Does not bruise/bleed easily.   Psychiatric/Behavioral: Negative.  Negative for agitation, dysphoric mood, sleep disturbance and suicidal ideas. The patient is not nervous/anxious.      Objective:      Physical Exam  Vitals and nursing note reviewed.   Constitutional:       General: He is not in acute distress.     Appearance: He is well-developed. He is not diaphoretic.   HENT:      Head: Normocephalic and atraumatic.      Right Ear: External ear normal.      Left Ear: External ear normal.      Nose: Nose normal.      Mouth/Throat:      Pharynx: No oropharyngeal exudate.   Eyes:      General:         Right eye: No discharge.         Left eye: No discharge.      Conjunctiva/sclera: Conjunctivae normal.      Pupils: Pupils are equal, round, and reactive to light.   Neck:      Thyroid: No thyromegaly.      Vascular: No JVD.      Trachea: No tracheal deviation.   Cardiovascular:      Rate and Rhythm: Normal rate and regular rhythm.      Heart sounds: Normal heart sounds. No murmur heard.    No friction rub. No gallop.   Pulmonary:      Effort: Pulmonary effort is normal. No respiratory distress.      Breath sounds: Normal breath sounds. No stridor. No wheezing or rales.   Chest:      Chest wall: No tenderness.   Abdominal:      General: Bowel sounds are normal. There is no distension.      Palpations: Abdomen is soft.      Tenderness: There is no abdominal tenderness. There is no guarding or rebound.   Musculoskeletal:         General: No tenderness. Normal range of motion.      Cervical back: Normal range of motion and neck supple.   Lymphadenopathy:      Cervical: No cervical adenopathy.   Skin:      "General: Skin is warm and dry.      Capillary Refill: Capillary refill takes less than 2 seconds.      Coloration: Skin is not jaundiced or pale.      Findings: No bruising, erythema, lesion or rash.   Neurological:      General: No focal deficit present.      Mental Status: He is alert and oriented to person, place, and time.      Cranial Nerves: No cranial nerve deficit.      Sensory: No sensory deficit.      Motor: No weakness or abnormal muscle tone.      Coordination: Coordination normal.      Gait: Gait normal.      Deep Tendon Reflexes: Reflexes are normal and symmetric. Reflexes normal.   Psychiatric:         Mood and Affect: Mood and affect normal. Mood is not anxious or depressed.         Behavior: Behavior normal.         Thought Content: Thought content normal. Thought content does not include homicidal or suicidal ideation. Thought content does not include homicidal or suicidal plan.         Judgment: Judgment normal.       Assessment:       1. Ventricular tachycardia    2. Uncontrolled type 2 diabetes mellitus with hyperglycemia        Plan:   1. Ventricular tachycardia    2. Uncontrolled type 2 diabetes mellitus with hyperglycemia  -     blood-glucose meter (ACCU-CHEK LAURA PLUS METER) Misc; 1 Device by Misc.(Non-Drug; Combo Route) route 2 (two) times daily.  Dispense: 1 each; Refill: 0  -     blood sugar diagnostic (ACCU-CHEK LAURA PLUS TEST STRP) Strp; 1 strip by Misc.(Non-Drug; Combo Route) route 2 (two) times daily.  Dispense: 100 strip; Refill: 5  -     lancets (ONETOUCH ULTRASOFT LANCETS) Misc; 1 Stick by Misc.(Non-Drug; Combo Route) route 2 (two) times daily.  Dispense: 100 each; Refill: 2  -     SITagliptin phosphate (JANUVIA) 50 MG Tab; Take 1 tablet (50 mg total) by mouth once daily.  Dispense: 30 tablet; Refill: 3    Other orders  -     mupirocin (BACTROBAN) 2 % ointment; Apply topically 3 (three) times daily. for 7 days  Dispense: 30 g; Refill: 1       "This note will not be shared with " "the patient."  [facility]   Rtc as scheduled  "

## 2023-06-23 ENCOUNTER — TELEPHONE (OUTPATIENT)
Dept: PHARMACY | Facility: CLINIC | Age: 63
End: 2023-06-23
Payer: COMMERCIAL

## 2023-06-23 NOTE — TELEPHONE ENCOUNTER
Hello,     The prior authorization for [place] Najera's JANUVIA prescription has been APPROVED FROM 06/23/2023 TO 06/23/2024 with copayment of $55.00.       [facility] @ [phone] will reach out to patient for further correspondence.       If there are any additional questions or concerns, please contact me.    Sincerely,  Farheen Devine  Prior Authorization Department  [facility]  [phone]

## 2023-06-27 ENCOUNTER — TELEPHONE (OUTPATIENT)
Dept: CARDIOLOGY | Facility: CLINIC | Age: 63
End: 2023-06-27
Payer: COMMERCIAL

## 2023-06-27 NOTE — TELEPHONE ENCOUNTER
RN contacted pt to enroll in HFTCC program per referral. Pt reports he does not take diuretics after his hospital dc. RN sent secure message to [facility] for further guidance.  RN will contact pt with final result.     @1120:   [facility]     ([facility]) Team notified of pt referral via Ambulatory Referral to Heart Failure Transitional Care ([id number]).    Patient re screened today June 27, 2023 by provider and RN for enrollment to program.      Pt was deemed not a candidate for enrollment at this time related to patient refused.  Pt was adamant he does not have HF, he reports he does not take diuretics at all. [facility] reviewed pt information and accepts pt refusal of enrollment.    Pt will require additional follow up planning per primary team.     If pt status, diagnosis, or treatment plan changes , please place AMB referral to [facility] ([id number]) for HFTCC enrollment re-evalution.

## 2023-07-11 ENCOUNTER — CLINICAL SUPPORT (OUTPATIENT)
Dept: SMOKING CESSATION | Facility: CLINIC | Age: 63
End: 2023-07-11
Payer: COMMERCIAL

## 2023-07-11 DIAGNOSIS — F17.200 NICOTINE DEPENDENCE: Primary | ICD-10-CM

## 2023-07-11 DIAGNOSIS — E11.65 UNCONTROLLED TYPE 2 DIABETES MELLITUS WITH HYPERGLYCEMIA: ICD-10-CM

## 2023-07-11 PROCEDURE — 99999 PR PBB SHADOW E&M-EST. PATIENT-LVL I: CPT | Mod: PBBFAC,,,

## 2023-07-11 PROCEDURE — 99404 PREV MED CNSL INDIV APPRX 60: CPT | Mod: S$GLB,,,

## 2023-07-11 PROCEDURE — 99404 PR PREVENT COUNSEL,INDIV,60 MIN: ICD-10-PCS | Mod: S$GLB,,,

## 2023-07-11 PROCEDURE — 99999 PR PBB SHADOW E&M-EST. PATIENT-LVL I: ICD-10-PCS | Mod: PBBFAC,,,

## 2023-07-11 RX ORDER — LISINOPRIL 5 MG/1
5 TABLET ORAL DAILY
Qty: 30 TABLET | Refills: 5 | Status: SHIPPED | OUTPATIENT
Start: 2023-07-11 | End: 2024-02-07 | Stop reason: SDUPTHER

## 2023-07-12 DIAGNOSIS — I10 ESSENTIAL HYPERTENSION: ICD-10-CM

## 2023-07-12 RX ORDER — VARENICLINE TARTRATE 1 MG/1
1 TABLET, FILM COATED ORAL 2 TIMES DAILY
Qty: 56 TABLET | Refills: 0 | Status: SHIPPED | OUTPATIENT
Start: 2023-07-12 | End: 2023-08-08 | Stop reason: SDUPTHER

## 2023-07-12 RX ORDER — IBUPROFEN 200 MG
1 TABLET ORAL DAILY
Qty: 28 PATCH | Refills: 0 | Status: SHIPPED | OUTPATIENT
Start: 2023-07-12 | End: 2023-08-08 | Stop reason: SDUPTHER

## 2023-07-12 RX ORDER — PIOGLITAZONEHYDROCHLORIDE 30 MG/1
30 TABLET ORAL DAILY
Qty: 90 TABLET | Refills: 1 | Status: SHIPPED | OUTPATIENT
Start: 2023-07-12 | End: 2023-08-10

## 2023-07-19 ENCOUNTER — CLINICAL SUPPORT (OUTPATIENT)
Dept: SMOKING CESSATION | Facility: CLINIC | Age: 63
End: 2023-07-19
Payer: COMMERCIAL

## 2023-07-19 DIAGNOSIS — F17.200 NICOTINE DEPENDENCE: Primary | ICD-10-CM

## 2023-07-19 PROCEDURE — 99404 PREV MED CNSL INDIV APPRX 60: CPT | Mod: S$GLB,,,

## 2023-07-19 PROCEDURE — 99404 PR PREVENT COUNSEL,INDIV,60 MIN: ICD-10-PCS | Mod: S$GLB,,,

## 2023-07-19 NOTE — PROGRESS NOTES
"Individual Follow-Up Form    7/19/2023    Quit Date: TBD    Clinical Status of Patient: Outpatient    Continuing Medication: yes  Chantix or Patches    Other Medications: none     Target Symptoms: Withdrawal and medication side effects. The following were  rated moderate (3) to severe (4) on TCRS:  Moderate (3): none  Severe (4): none    Comments: Patient seen in clinic regarding smoking cessation progress update. Patient reports that he is currently smoking 30 cpd, which is an increase from our last meeting. He states that this has been a difficult week. Encouraged pt to keep trying and to remain positive with his cessation journey.Goal is to decrease down to 1 ppd. Pt started on NRT 14 mg nicotine patch QD and 1 mg Chantix BID (starter instructions provided.) No adverse reactions noted at this time and pt denies any unusually low moods or abnormal changes in behavior. Pt started on rate reduction and wait times prior to smoking. Goal quit date is 10/4. Identified patient personal reason for wanting to quit as "It is a disgusting habit." Reviewed learned addiction model, triggers, cues, and short/long term consequences of tobacco use. Pt identifies personal triggers as following meals, stress, anxiety, sadness, and boredom. Pt's exhaled carbon monoxide level was 16 ppm as per Smokerlyzer. (non- smoker = 0-5 ppm.)  Pt to continue with counseling in 2 weeks.    Diagnosis: F17.200    Next Visit: 2 weeks    "

## 2023-07-25 DIAGNOSIS — E78.5 HYPERLIPIDEMIA LDL GOAL <70: ICD-10-CM

## 2023-07-25 RX ORDER — ATORVASTATIN CALCIUM 20 MG/1
20 TABLET, FILM COATED ORAL DAILY
Qty: 30 TABLET | Refills: 5 | Status: CANCELLED | OUTPATIENT
Start: 2023-07-25

## 2023-07-26 DIAGNOSIS — E78.5 HYPERLIPIDEMIA LDL GOAL <70: ICD-10-CM

## 2023-07-27 RX ORDER — ATORVASTATIN CALCIUM 20 MG/1
20 TABLET, FILM COATED ORAL DAILY
Qty: 30 TABLET | Refills: 5 | Status: SHIPPED | OUTPATIENT
Start: 2023-07-27 | End: 2024-01-10 | Stop reason: SDUPTHER

## 2023-07-27 NOTE — TELEPHONE ENCOUNTER
----- Message from Lisa Robledo MA sent at 2023  9:06 AM CDT -----  North Najera  MRN: [id number]  : 1960  PCP: Irma Biswas  Home Phone      [phone]  Work Phone      Not on file.  Mobile          [phone]      MESSAGE:     [facility] preparing his Pill Packs.Please fill Atorvastatin to [facility].

## 2023-08-01 ENCOUNTER — TELEPHONE (OUTPATIENT)
Dept: INTERNAL MEDICINE | Facility: CLINIC | Age: 63
End: 2023-08-01
Payer: COMMERCIAL

## 2023-08-01 NOTE — TELEPHONE ENCOUNTER
----- Message from Devora Lakhani MA sent at 2023  9:36 AM CDT -----    ----- Message -----  From: Lisa Robledo MA  Sent: 2023   9:27 AM CDT  To: [facility] Staff    North Najera  MRN: [id number]  : 1960  PCP: Irma Biswas  Home Phone      [phone]  Work Phone      Not on file.  Mobile          [phone]      MESSAGE:     Patient c/o Right elbow pain and swelling.  Any recommendations.   He does not want to see Sarah    Please Advise:  [phone]

## 2023-08-02 ENCOUNTER — CLINICAL SUPPORT (OUTPATIENT)
Dept: SMOKING CESSATION | Facility: CLINIC | Age: 63
End: 2023-08-02
Payer: COMMERCIAL

## 2023-08-02 DIAGNOSIS — F17.200 NICOTINE DEPENDENCE: Primary | ICD-10-CM

## 2023-08-02 PROCEDURE — 99403 PREV MED CNSL INDIV APPRX 45: CPT | Mod: S$GLB,,,

## 2023-08-02 PROCEDURE — 99403 PR PREVENT COUNSEL,INDIV,45 MIN: ICD-10-PCS | Mod: S$GLB,,,

## 2023-08-02 NOTE — PROGRESS NOTES
Individual Follow-Up Form    8/2/2023    Quit Date: TBD    Clinical Status of Patient: Outpatient    Continuing Medication: yes  Chantix or Patches, 2 mg nicotine lozenge    Other Medications: none      Target Symptoms: Withdrawal and medication side effects. The following were  rated moderate (3) to severe (4) on TCRS:  Moderate (3): none  Severe (4): none    Comments: Patient seen in clinic regarding smoking cessation follow up. Patient reports that he is currently smoking 20-25 cpd. Goal is to decrease down to 1 ppd consistently. Pt remains on NRT 14 mg nicotine patch QD and 1 mg Chantix BID. No adverse reactions noted at this time and pt denies any unusually low moods or abnormal changes in behavior. Completion of TCRS (Tobacco Cessation Rating Scale) reviewed strategies, cues, and triggers. Introduced the negative impact of tobacco on health, the health advantages of discontinuing the use of tobacco, time line improved health changes after a quit, withdrawal issues to expect from nicotine and habit, and ways to achieve the goal of a quit. Pt's exhaled carbon monoxide level was 15 ppm as per Smokerlyzer. (non- smoker = 0-5 ppm.)  Pt to continue with counseling in 2 weeks.    Diagnosis: F17.200    Next Visit: 2 weeks

## 2023-08-08 DIAGNOSIS — F17.200 NICOTINE DEPENDENCE: ICD-10-CM

## 2023-08-08 RX ORDER — IBUPROFEN 200 MG
1 TABLET ORAL DAILY
Qty: 28 PATCH | Refills: 0 | Status: SHIPPED | OUTPATIENT
Start: 2023-08-08 | End: 2023-09-20 | Stop reason: SDUPTHER

## 2023-08-08 RX ORDER — VARENICLINE TARTRATE 1 MG/1
1 TABLET, FILM COATED ORAL 2 TIMES DAILY
Qty: 56 TABLET | Refills: 0 | Status: SHIPPED | OUTPATIENT
Start: 2023-08-08 | End: 2023-08-25 | Stop reason: SDUPTHER

## 2023-08-10 ENCOUNTER — OFFICE VISIT (OUTPATIENT)
Dept: CARDIOLOGY | Facility: CLINIC | Age: 63
End: 2023-08-10
Payer: COMMERCIAL

## 2023-08-10 VITALS
OXYGEN SATURATION: 98 % | RESPIRATION RATE: 18 BRPM | HEIGHT: 74 IN | HEART RATE: 71 BPM | BODY MASS INDEX: 35.08 KG/M2 | SYSTOLIC BLOOD PRESSURE: 120 MMHG | DIASTOLIC BLOOD PRESSURE: 70 MMHG | WEIGHT: 273.38 LBS

## 2023-08-10 DIAGNOSIS — I70.0 AORTIC ATHEROSCLEROSIS: ICD-10-CM

## 2023-08-10 DIAGNOSIS — Z86.718 HISTORY OF DVT (DEEP VEIN THROMBOSIS): ICD-10-CM

## 2023-08-10 DIAGNOSIS — E11.9 DIABETES MELLITUS WITHOUT COMPLICATION: ICD-10-CM

## 2023-08-10 DIAGNOSIS — I50.41 ACUTE COMBINED SYSTOLIC AND DIASTOLIC CONGESTIVE HEART FAILURE: ICD-10-CM

## 2023-08-10 DIAGNOSIS — I47.20 VENTRICULAR TACHYCARDIA: Primary | ICD-10-CM

## 2023-08-10 DIAGNOSIS — E78.5 HYPERLIPIDEMIA LDL GOAL <70: ICD-10-CM

## 2023-08-10 DIAGNOSIS — I10 PRIMARY HYPERTENSION: ICD-10-CM

## 2023-08-10 DIAGNOSIS — I25.10 CORONARY ARTERY DISEASE INVOLVING NATIVE CORONARY ARTERY OF NATIVE HEART WITHOUT ANGINA PECTORIS: ICD-10-CM

## 2023-08-10 DIAGNOSIS — E66.01 SEVERE OBESITY (BMI 35.0-39.9) WITH COMORBIDITY: ICD-10-CM

## 2023-08-10 PROBLEM — R55 VASOVAGAL NEAR-SYNCOPE: Status: RESOLVED | Noted: 2023-06-09 | Resolved: 2023-08-10

## 2023-08-10 PROCEDURE — 3008F PR BODY MASS INDEX (BMI) DOCUMENTED: ICD-10-PCS | Mod: CPTII,S$GLB,, | Performed by: INTERNAL MEDICINE

## 2023-08-10 PROCEDURE — 3061F PR NEG MICROALBUMINURIA RESULT DOCUMENTED/REVIEW: ICD-10-PCS | Mod: CPTII,S$GLB,, | Performed by: INTERNAL MEDICINE

## 2023-08-10 PROCEDURE — 1159F MED LIST DOCD IN RCRD: CPT | Mod: CPTII,S$GLB,, | Performed by: INTERNAL MEDICINE

## 2023-08-10 PROCEDURE — 4010F ACE/ARB THERAPY RXD/TAKEN: CPT | Mod: CPTII,S$GLB,, | Performed by: INTERNAL MEDICINE

## 2023-08-10 PROCEDURE — 1159F PR MEDICATION LIST DOCUMENTED IN MEDICAL RECORD: ICD-10-PCS | Mod: CPTII,S$GLB,, | Performed by: INTERNAL MEDICINE

## 2023-08-10 PROCEDURE — 3066F PR DOCUMENTATION OF TREATMENT FOR NEPHROPATHY: ICD-10-PCS | Mod: CPTII,S$GLB,, | Performed by: INTERNAL MEDICINE

## 2023-08-10 PROCEDURE — 3074F PR MOST RECENT SYSTOLIC BLOOD PRESSURE < 130 MM HG: ICD-10-PCS | Mod: CPTII,S$GLB,, | Performed by: INTERNAL MEDICINE

## 2023-08-10 PROCEDURE — 3078F DIAST BP <80 MM HG: CPT | Mod: CPTII,S$GLB,, | Performed by: INTERNAL MEDICINE

## 2023-08-10 PROCEDURE — 3008F BODY MASS INDEX DOCD: CPT | Mod: CPTII,S$GLB,, | Performed by: INTERNAL MEDICINE

## 2023-08-10 PROCEDURE — 99214 PR OFFICE/OUTPT VISIT, EST, LEVL IV, 30-39 MIN: ICD-10-PCS | Mod: S$GLB,,, | Performed by: INTERNAL MEDICINE

## 2023-08-10 PROCEDURE — 1160F PR REVIEW ALL MEDS BY PRESCRIBER/CLIN PHARMACIST DOCUMENTED: ICD-10-PCS | Mod: CPTII,S$GLB,, | Performed by: INTERNAL MEDICINE

## 2023-08-10 PROCEDURE — 3074F SYST BP LT 130 MM HG: CPT | Mod: CPTII,S$GLB,, | Performed by: INTERNAL MEDICINE

## 2023-08-10 PROCEDURE — 99214 OFFICE O/P EST MOD 30 MIN: CPT | Mod: S$GLB,,, | Performed by: INTERNAL MEDICINE

## 2023-08-10 PROCEDURE — 99999 PR PBB SHADOW E&M-EST. PATIENT-LVL IV: ICD-10-PCS | Mod: PBBFAC,,, | Performed by: INTERNAL MEDICINE

## 2023-08-10 PROCEDURE — 3061F NEG MICROALBUMINURIA REV: CPT | Mod: CPTII,S$GLB,, | Performed by: INTERNAL MEDICINE

## 2023-08-10 PROCEDURE — 1160F RVW MEDS BY RX/DR IN RCRD: CPT | Mod: CPTII,S$GLB,, | Performed by: INTERNAL MEDICINE

## 2023-08-10 PROCEDURE — 3078F PR MOST RECENT DIASTOLIC BLOOD PRESSURE < 80 MM HG: ICD-10-PCS | Mod: CPTII,S$GLB,, | Performed by: INTERNAL MEDICINE

## 2023-08-10 PROCEDURE — 99999 PR PBB SHADOW E&M-EST. PATIENT-LVL IV: CPT | Mod: PBBFAC,,, | Performed by: INTERNAL MEDICINE

## 2023-08-10 PROCEDURE — 3066F NEPHROPATHY DOC TX: CPT | Mod: CPTII,S$GLB,, | Performed by: INTERNAL MEDICINE

## 2023-08-10 PROCEDURE — 4010F PR ACE/ARB THEARPY RXD/TAKEN: ICD-10-PCS | Mod: CPTII,S$GLB,, | Performed by: INTERNAL MEDICINE

## 2023-08-10 PROCEDURE — 3044F HG A1C LEVEL LT 7.0%: CPT | Mod: CPTII,S$GLB,, | Performed by: INTERNAL MEDICINE

## 2023-08-10 PROCEDURE — 3044F PR MOST RECENT HEMOGLOBIN A1C LEVEL <7.0%: ICD-10-PCS | Mod: CPTII,S$GLB,, | Performed by: INTERNAL MEDICINE

## 2023-08-10 NOTE — ASSESSMENT & PLAN NOTE
Repeat echo ordered.  Presumed diagnosis for LV systolic dysfunction ventricular tachycardia with tachycardia mediated cardiomyopathy.    Hopefully his ejection fraction has normalized.

## 2023-08-10 NOTE — ASSESSMENT & PLAN NOTE
June 2023 PET stress negative for ischemia.  Ejection fraction 42%.  Coronary calcifications noted on imaging studies of the chest.  He is on aspirin.

## 2023-08-10 NOTE — PROGRESS NOTES
[facility]     Subjective:    Patient ID:  [place] Najera is a 63 y.o. male who presents for follow-up of Diabetes Mellitus, VTACH, Hypertension, Hyperlipidemia, and Coronary Artery Disease    Review of patient's allergies indicates:   Allergen Reactions    Codeine Diarrhea     Patient can not tolerate Tylenol #3, states he does okay with Codeine with cough medications     Nickel sutures [surgical stainless steel] Rash      He has been on verapamil recommended by electrophysiology for ventricular tachycardia.  He had a markedly abnormal Holter monitor.  He also takes lisinopril for hypertension.  His PET stress ejection fraction was 42%, 40% by echo.  The studies were done June 2023.    Since hospital release he has had 1 episode of heart rate 120.  He was at [facility].  He was lightheaded but did not pass out.  His arrhythmia event lasted less than a minute.  He had a fit bit watch but he is not sure that it was an accurate heart rate.  His Holter V-tach rate was 160.    He has not had chest pain or breathing problems.  His last A1c was 6.4.  He is on Jardiance and Januvia.  He takes Xarelto for DVT prophylaxis.  His LDL is well controlled at 49 mg%.  He is on aspirin.        Review of Systems   Constitutional: Negative for chills, decreased appetite, diaphoresis, fever, malaise/fatigue, night sweats, weight gain and weight loss.   HENT:  Negative for congestion, ear discharge, ear pain, hearing loss, hoarse voice, nosebleeds, odynophagia, sore throat, stridor and tinnitus.    Eyes:  Negative for blurred vision, discharge, double vision, pain, photophobia, redness, vision loss in left eye, vision loss in right eye, visual disturbance and visual halos.   Cardiovascular:  Positive for palpitations. Negative for chest pain, claudication, cyanosis, dyspnea on exertion, irregular heartbeat, leg swelling, near-syncope, orthopnea, paroxysmal  "nocturnal dyspnea and syncope.   Respiratory:  Negative for cough, hemoptysis, shortness of breath, sleep disturbances due to breathing, snoring, sputum production and wheezing.    Endocrine: Negative for cold intolerance, heat intolerance, polydipsia, polyphagia and polyuria.   Hematologic/Lymphatic: Negative for adenopathy and bleeding problem. Does not bruise/bleed easily.   Skin:  Negative for color change, dry skin, flushing, itching, nail changes, poor wound healing, rash, skin cancer, suspicious lesions and unusual hair distribution.   Musculoskeletal:  Negative for arthritis, back pain, falls, gout, joint pain, joint swelling, muscle cramps, muscle weakness, myalgias, neck pain and stiffness.   Gastrointestinal:  Negative for bloating, abdominal pain, anorexia, change in bowel habit, bowel incontinence, constipation, diarrhea, dysphagia, excessive appetite, flatus, heartburn, hematemesis, hematochezia, hemorrhoids, jaundice, melena, nausea and vomiting.   Genitourinary:  Negative for bladder incontinence, decreased libido, dysuria, flank pain, frequency, genital sores, hematuria, hesitancy, incomplete emptying, nocturia and urgency.   Neurological:  Negative for aphonia, brief paralysis, difficulty with concentration, disturbances in coordination, excessive daytime sleepiness, dizziness, focal weakness, headaches, light-headedness, loss of balance, numbness, paresthesias, seizures, sensory change, tremors, vertigo and weakness.   Psychiatric/Behavioral:  Negative for altered mental status, depression, hallucinations, memory loss, substance abuse, suicidal ideas and thoughts of violence. The patient does not have insomnia and is not nervous/anxious.    Allergic/Immunologic: Negative for hives and persistent infections.        Objective:       Vitals:    08/10/23 0952   BP: 120/70   Pulse: 71   Resp: 18   SpO2: 98%   Weight: 124 kg (273 lb 5.9 oz)   Height: 6' 2" (1.88 m)    Physical Exam  Constitutional:      "  General: He is not in acute distress.     Appearance: He is well-developed. He is not diaphoretic.   HENT:      Head: Normocephalic and atraumatic.      Nose: Nose normal.   Eyes:      General: No scleral icterus.        Right eye: No discharge.      Conjunctiva/sclera: Conjunctivae normal.      Pupils: Pupils are equal, round, and reactive to light.   Neck:      Thyroid: No thyromegaly.      Vascular: No JVD.      Trachea: No tracheal deviation.   Cardiovascular:      Rate and Rhythm: Normal rate and regular rhythm.      Pulses:           Carotid pulses are 2+ on the right side and 2+ on the left side.       Radial pulses are 2+ on the right side and 2+ on the left side.        Dorsalis pedis pulses are 2+ on the right side and 2+ on the left side.        Posterior tibial pulses are 2+ on the right side and 2+ on the left side.      Heart sounds: Normal heart sounds. No murmur heard.     No friction rub. No gallop.   Pulmonary:      Effort: Pulmonary effort is normal. No respiratory distress.      Breath sounds: Normal breath sounds. No stridor. No wheezing or rales.   Chest:      Chest wall: No tenderness.   Abdominal:      General: Bowel sounds are normal. There is no distension.      Palpations: Abdomen is soft. There is no mass.      Tenderness: There is no abdominal tenderness. There is no guarding or rebound.   Musculoskeletal:         General: No tenderness. Normal range of motion.      Cervical back: Normal range of motion and neck supple.   Lymphadenopathy:      Cervical: No cervical adenopathy.   Skin:     General: Skin is warm and dry.      Coloration: Skin is not pale.      Findings: No erythema or rash.   Neurological:      Mental Status: He is alert and oriented to person, place, and time.      Cranial Nerves: No cranial nerve deficit.      Coordination: Coordination normal.   Psychiatric:         Behavior: Behavior normal.         Thought Content: Thought content normal.         Judgment: Judgment  normal.           Assessment:       1. Ventricular tachycardia    2. Primary hypertension    3. Hyperlipidemia LDL goal <70    4. Coronary artery disease involving native coronary artery of native heart without angina pectoris    5. Aortic atherosclerosis    6. Acute combined systolic and diastolic congestive heart failure    7. Severe obesity (BMI 35.0-39.9) with comorbidity    8. Diabetes mellitus without complication    9. History of DVT (deep vein thrombosis)      Results for orders placed or performed during the hospital encounter of 06/14/23   CBC auto differential   Result Value Ref Range    WBC 9.11 3.90 - 12.70 K/uL    RBC 4.57 (L) 4.60 - 6.20 M/uL    Hemoglobin 15.4 14.0 - 18.0 g/dL    Hematocrit 45.1 40.0 - 54.0 %    MCV 99 (H) 82 - 98 fL    MCH 33.7 (H) 27.0 - 31.0 pg    MCHC 34.1 32.0 - 36.0 g/dL    RDW 13.6 11.5 - 14.5 %    Platelets 188 150 - 450 K/uL    MPV 9.2 9.2 - 12.9 fL    Immature Granulocytes 0.5 0.0 - 0.5 %    Gran # (ANC) 5.5 1.8 - 7.7 K/uL    Immature Grans (Abs) 0.05 (H) 0.00 - 0.04 K/uL    Lymph # 2.6 1.0 - 4.8 K/uL    Mono # 0.8 0.3 - 1.0 K/uL    Eos # 0.2 0.0 - 0.5 K/uL    Baso # 0.04 0.00 - 0.20 K/uL    nRBC 0 0 /100 WBC    Gran % 60.3 38.0 - 73.0 %    Lymph % 28.1 18.0 - 48.0 %    Mono % 8.6 4.0 - 15.0 %    Eosinophil % 2.1 0.0 - 8.0 %    Basophil % 0.4 0.0 - 1.9 %    Differential Method Automated    Comprehensive metabolic panel   Result Value Ref Range    Sodium 137 136 - 145 mmol/L    Potassium 4.0 3.5 - 5.1 mmol/L    Chloride 106 95 - 110 mmol/L    CO2 23 23 - 29 mmol/L    Glucose 98 70 - 110 mg/dL    BUN 12 8 - 23 mg/dL    Creatinine 0.7 0.5 - 1.4 mg/dL    Calcium 9.3 8.7 - 10.5 mg/dL    Total Protein 7.3 6.0 - 8.4 g/dL    Albumin 4.2 3.5 - 5.2 g/dL    Total Bilirubin 1.9 (H) 0.1 - 1.0 mg/dL    Alkaline Phosphatase 58 55 - 135 U/L    AST 22 10 - 40 U/L    ALT 19 10 - 44 U/L    Anion Gap 8 8 - 16 mmol/L    eGFR >60.0 >60 mL/min/1.73 m^2   Troponin I #1   Result Value Ref Range     Troponin I 0.017 0.000 - 0.026 ng/mL   Troponin I #2   Result Value Ref Range    Troponin I 0.012 0.000 - 0.026 ng/mL   B-Type natriuretic peptide (BNP)   Result Value Ref Range    BNP 58 0 - 99 pg/mL   TSH   Result Value Ref Range    TSH 1.725 0.400 - 4.000 uIU/mL   Lipid panel   Result Value Ref Range    Cholesterol 119 (L) 120 - 199 mg/dL    Triglycerides 193 (H) 30 - 150 mg/dL    HDL 31 (L) 40 - 75 mg/dL    LDL Cholesterol 49.4 (L) 63.0 - 159.0 mg/dL    HDL/Cholesterol Ratio 26.1 20.0 - 50.0 %    Total Cholesterol/HDL Ratio 3.8 2.0 - 5.0    Non-HDL Cholesterol 88 mg/dL   HIV 1/2 Ag/Ab (4th Gen)   Result Value Ref Range    HIV 1/2 Ag/Ab Non-reactive Non-reactive   Hepatitis C Antibody   Result Value Ref Range    Hepatitis C Ab Non-reactive Non-reactive   Troponin ISTAT   Result Value Ref Range    POC Cardiac Troponin I 0.01 0.00 - 0.08 ng/mL    Sample unknown    Magnesium   Result Value Ref Range    Magnesium 1.9 1.6 - 2.6 mg/dL   Comprehensive Metabolic Panel (CMP)   Result Value Ref Range    Sodium 137 136 - 145 mmol/L    Potassium 3.7 3.5 - 5.1 mmol/L    Chloride 106 95 - 110 mmol/L    CO2 23 23 - 29 mmol/L    Glucose 118 (H) 70 - 110 mg/dL    BUN 12 8 - 23 mg/dL    Creatinine 0.7 0.5 - 1.4 mg/dL    Calcium 8.8 8.7 - 10.5 mg/dL    Total Protein 6.3 6.0 - 8.4 g/dL    Albumin 3.6 3.5 - 5.2 g/dL    Total Bilirubin 1.6 (H) 0.1 - 1.0 mg/dL    Alkaline Phosphatase 53 (L) 55 - 135 U/L    AST 14 10 - 40 U/L    ALT 17 10 - 44 U/L    Anion Gap 8 8 - 16 mmol/L    eGFR >60.0 >60 mL/min/1.73 m^2   CBC with Automated Differential   Result Value Ref Range    WBC 8.72 3.90 - 12.70 K/uL    RBC 4.43 (L) 4.60 - 6.20 M/uL    Hemoglobin 14.4 14.0 - 18.0 g/dL    Hematocrit 43.7 40.0 - 54.0 %    MCV 99 (H) 82 - 98 fL    MCH 32.5 (H) 27.0 - 31.0 pg    MCHC 33.0 32.0 - 36.0 g/dL    RDW 13.6 11.5 - 14.5 %    Platelets 196 150 - 450 K/uL    MPV 9.7 9.2 - 12.9 fL    Immature Granulocytes 0.6 (H) 0.0 - 0.5 %    Gran # (ANC) 4.6 1.8 - 7.7  K/uL    Immature Grans (Abs) 0.05 (H) 0.00 - 0.04 K/uL    Lymph # 2.9 1.0 - 4.8 K/uL    Mono # 0.8 0.3 - 1.0 K/uL    Eos # 0.2 0.0 - 0.5 K/uL    Baso # 0.04 0.00 - 0.20 K/uL    nRBC 0 0 /100 WBC    Gran % 53.2 38.0 - 73.0 %    Lymph % 33.7 18.0 - 48.0 %    Mono % 9.6 4.0 - 15.0 %    Eosinophil % 2.4 0.0 - 8.0 %    Basophil % 0.5 0.0 - 1.9 %    Differential Method Automated    Comprehensive Metabolic Panel (CMP)   Result Value Ref Range    Sodium 135 (L) 136 - 145 mmol/L    Potassium 4.1 3.5 - 5.1 mmol/L    Chloride 104 95 - 110 mmol/L    CO2 24 23 - 29 mmol/L    Glucose 131 (H) 70 - 110 mg/dL    BUN 9 8 - 23 mg/dL    Creatinine 0.7 0.5 - 1.4 mg/dL    Calcium 9.1 8.7 - 10.5 mg/dL    Total Protein 6.5 6.0 - 8.4 g/dL    Albumin 3.8 3.5 - 5.2 g/dL    Total Bilirubin 1.9 (H) 0.1 - 1.0 mg/dL    Alkaline Phosphatase 51 (L) 55 - 135 U/L    AST 14 10 - 40 U/L    ALT 17 10 - 44 U/L    Anion Gap 7 (L) 8 - 16 mmol/L    eGFR >60.0 >60 mL/min/1.73 m^2   CBC with Automated Differential   Result Value Ref Range    WBC 8.51 3.90 - 12.70 K/uL    RBC 4.36 (L) 4.60 - 6.20 M/uL    Hemoglobin 14.2 14.0 - 18.0 g/dL    Hematocrit 43.5 40.0 - 54.0 %     (H) 82 - 98 fL    MCH 32.6 (H) 27.0 - 31.0 pg    MCHC 32.6 32.0 - 36.0 g/dL    RDW 13.5 11.5 - 14.5 %    Platelets 182 150 - 450 K/uL    MPV 9.6 9.2 - 12.9 fL    Immature Granulocytes 0.5 0.0 - 0.5 %    Gran # (ANC) 4.6 1.8 - 7.7 K/uL    Immature Grans (Abs) 0.04 0.00 - 0.04 K/uL    Lymph # 2.9 1.0 - 4.8 K/uL    Mono # 0.8 0.3 - 1.0 K/uL    Eos # 0.3 0.0 - 0.5 K/uL    Baso # 0.04 0.00 - 0.20 K/uL    nRBC 0 0 /100 WBC    Gran % 53.5 38.0 - 73.0 %    Lymph % 33.7 18.0 - 48.0 %    Mono % 8.9 4.0 - 15.0 %    Eosinophil % 2.9 0.0 - 8.0 %    Basophil % 0.5 0.0 - 1.9 %    Differential Method Automated    Magnesium   Result Value Ref Range    Magnesium 1.9 1.6 - 2.6 mg/dL   Cardiac PET Scan Stress   Result Value Ref Range    85% Max Predicted      Max Predicted      [facility] CV CPX  PATIENT IS MALE 1.0     [facility] CV CPX PATIENT IS FEMALE 0.0     Systolic blood pressure 139 mmHg    Diastolic blood pressure 82 mmHg    HR at rest 45 bpm    Peak Systolic  mmHg    Peak Diatolic BP 61 mmHg    Peak HR 46 bpm    % Max HR Achieved 29     1 Minute Recovery HR 71 bpm    RPP 6,255     Peak RPP 5,612     Lateral Flow at Rest 0.43 cc/min/g    Inferior Flow at Rest 0.37 cc/min/g    Septal Flow at Rest 0.29 cc/min/g    Anterior Flow at Rest 0.40 cc/min/g    Minimum Flow at Rest 0.21 cc/min/g    Maximum Flow at Rest 0.56 cc/min/g    Whole Heart Flow at Rest 0.37 cc/min/g    Lateral Flow at Stress 0.79 cc/min/g    Inferior Flow at Stress 0.76 cc/min/g    Septal Flow at Stress 0.66 cc/min/g    Anterior Flow at Stress 0.90 cc/min/g    Minimum Flow at Stress 0.35 cc/min/g    Maximum Flow at Stress 1.14 cc/min/g    Whole Heart Flow at Rest 0.78 cc/min/g    Lateral Flow - CFR 1.85     Inferior Flow - CFR 2.06     Septal Flow - CFR 2.26     Anterior Flow - CFR 2.27     Minimum Flow - CFR 1.38     Maximum Flow - CFR 2.95     Whole Heart Flow - CFR 2.11     EF + QEF 47 %    Ejection Fraction- High Stress 59 %    End diastolic index (mL/m2) 91 mL/m2    End diastolic index (mL/m2) 155 mL/m2    End systolic index (mL/m2) 40 mL/m2    End systolic index (mL/m2) 78 mL/m2    Nuc Rest EF 42     Nuc Rest Diastolic Volume Index 164     Nuc Rest Systolic Volume Index 94     Nuc Stress EF 48 %    Nuc Rest Diastolic Volume Index 168     Nuc Rest Systolic Volume Index 87    POCT glucose   Result Value Ref Range    POCT Glucose 157 (H) 70 - 110 mg/dL   POCT glucose   Result Value Ref Range    POCT Glucose 127 (H) 70 - 110 mg/dL   POCT glucose   Result Value Ref Range    POCT Glucose 148 (H) 70 - 110 mg/dL   POCT glucose   Result Value Ref Range    POCT Glucose 155 (H) 70 - 110 mg/dL   POCT glucose   Result Value Ref Range    POCT Glucose 141 (H) 70 - 110 mg/dL   POCT glucose   Result Value Ref Range    POCT Glucose 136 (H) 70 - 110  mg/dL   POCT glucose   Result Value Ref Range    POCT Glucose 95 70 - 110 mg/dL   POCT glucose   Result Value Ref Range    POCT Glucose 102 70 - 110 mg/dL         Current Outpatient Medications:     aspirin (ECOTRIN) 81 MG EC tablet, Take 81 mg by mouth once daily., Disp: , Rfl:     atorvastatin (LIPITOR) 20 MG tablet, Take 1 tablet (20 mg total) by mouth once daily IN THE MORNING, Disp: 30 tablet, Rfl: 5    MULTIVIT-MIN/FA/LYCOPEN/LUTEIN (CENTRUM SILVER MEN ORAL), Take by mouth., Disp: , Rfl:     nicotine (NICODERM CQ) 14 mg/24 hr, Place 1 patch onto the skin once daily., Disp: 28 patch, Rfl: 0    rivaroxaban (XARELTO) 10 mg Tab, Take 1 tablet (10 mg total) by mouth once daily., Disp: 90 tablet, Rfl: 3    tamsulosin (FLOMAX) 0.4 mg Cap, Take 1 capsule (0.4 mg total) by mouth once daily., Disp: 30 capsule, Rfl: 5    varenicline (CHANTIX) 1 mg Tab, Take 1 tablet (1 mg total) by mouth 2 (two) times daily., Disp: 56 tablet, Rfl: 0    verapamiL (CALAN-SR) 180 MG CR tablet, Take 1 tablet (180 mg total) by mouth nightly., Disp: 30 tablet, Rfl: 11    blood sugar diagnostic (ACCU-CHEK LAURA PLUS TEST STRP) Strp, 1 strip by Misc.(Non-Drug; Combo Route) route 2 (two) times daily., Disp: 100 strip, Rfl: 5    blood sugar diagnostic Strp, 1 strip by Misc.(Non-Drug; Combo Route) route once daily., Disp: 100 each, Rfl: 3    blood-glucose meter (ACCU-CHEK LAURA PLUS METER) Misc, 1 Device by Misc.(Non-Drug; Combo Route) route 2 (two) times daily., Disp: 1 each, Rfl: 0    blood-glucose meter Misc, 1 application by Misc.(Non-Drug; Combo Route) route Daily., Disp: 1 each, Rfl: 0    empagliflozin (JARDIANCE) 10 mg tablet, Take 1 tablet (10 mg total) by mouth once daily., Disp: 30 tablet, Rfl: 5    lancets (FREESTYLE LANCETS) 28 gauge Misc, 1 lancet by Misc.(Non-Drug; Combo Route) route Daily., Disp: 100 each, Rfl: 3    lancets (ONETOUCH ULTRASOFT LANCETS) Misc, 1 Stick by Misc.(Non-Drug; Combo Route) route 2 (two) times daily., Disp:  100 each, Rfl: 2    lisinopriL (PRINIVIL,ZESTRIL) 5 MG tablet, Take 1 tablet (5 mg total) by mouth once daily., Disp: 30 tablet, Rfl: 5    SITagliptin phosphate (JANUVIA) 50 MG Tab, Take 1 tablet (50 mg total) by mouth once daily., Disp: 30 tablet, Rfl: 3     Lab Results   Component Value Date    WBC 8.51 06/16/2023    RBC 4.36 (L) 06/16/2023    HGB 14.2 06/16/2023    HCT 43.5 06/16/2023     (H) 06/16/2023    MCH 32.6 (H) 06/16/2023    MCHC 32.6 06/16/2023    RDW 13.5 06/16/2023     06/16/2023    MPV 9.6 06/16/2023    GRAN 4.6 06/16/2023    GRAN 53.5 06/16/2023    LYMPH 2.9 06/16/2023    LYMPH 33.7 06/16/2023    MONO 0.8 06/16/2023    MONO 8.9 06/16/2023    EOS 0.3 06/16/2023    BASO 0.04 06/16/2023    EOSINOPHIL 2.9 06/16/2023    BASOPHIL 0.5 06/16/2023    MG 1.9 06/16/2023        CMP  Lab Results   Component Value Date     (L) 06/16/2023    K 4.1 06/16/2023     06/16/2023    CO2 24 06/16/2023     (H) 06/16/2023    BUN 9 06/16/2023    CREATININE 0.7 06/16/2023    CALCIUM 9.1 06/16/2023    PROT 6.5 06/16/2023    ALBUMIN 3.8 06/16/2023    BILITOT 1.9 (H) 06/16/2023    ALKPHOS 51 (L) 06/16/2023    AST 14 06/16/2023    ALT 17 06/16/2023    ANIONGAP 7 (L) 06/16/2023    ESTGFRAFRICA >60 04/12/2022    EGFRNONAA >60 04/12/2022        Lab Results   Component Value Date    LABURIN (A) 06/30/2021     METHICILLIN RESISTANT STAPHYLOCOCCUS AUREUS  >100,000cfu/ml              Results for orders placed or performed during the hospital encounter of 06/14/23   EKG 12-lead    Collection Time: 06/15/23 11:33 PM    Narrative    Test Reason : R00.1,    Vent. Rate : 046 BPM     Atrial Rate : 046 BPM     P-R Int : 212 ms          QRS Dur : 106 ms      QT Int : 492 ms       P-R-T Axes : 040 -10 -21 degrees     QTc Int : 430 ms    Sinus bradycardia with 1st degree A-V block  Otherwise normal ECG  When compared with ECG of 15-LUIS ALBERTO-2023 00:35,  Premature ventricular complexes are no longer Present  Vent. rate  has decreased BY  71 BPM  Confirmed by Basil Wolfe MD ([id number]) on 6/16/2023 8:16:49 AM    Referred By: [email or web address]   SELF           Confirmed By:Basil Wolfe MD                  Plan:       Problem List Items Addressed This Visit          Cardiac/Vascular    Hyperlipidemia LDL goal <70     Current LDL 49 mg % with atorvastatin 20 mg daily.  Condition controlled.         Aortic atherosclerosis     Condition stable.         Coronary artery disease involving native coronary artery of native heart without angina pectoris     June 2023 PET stress negative for ischemia.  Ejection fraction 42%.  Coronary calcifications noted on imaging studies of the chest.  He is on aspirin.         Ventricular tachycardia - Primary     Some palpitations reported.  Repeat Holter monitor ordered.  He is tolerating verapamil without side effects.  On exam his rhythm is regular today.         Relevant Orders    Echo    Holter monitor - 48 hour    Primary hypertension     Verapamil, lisinopril to be continued.  His blood pressures are stable.         Acute combined systolic and diastolic congestive heart failure     Repeat echo ordered.  Presumed diagnosis for LV systolic dysfunction ventricular tachycardia with tachycardia mediated cardiomyopathy.    Hopefully his ejection fraction has normalized.              Hematology    History of DVT (deep vein thrombosis)     Prophylaxis doses of Xarelto utilized 10 mg daily.  It is tolerated well.            Endocrine    Severe obesity (BMI 35.0-39.9) with comorbidity     Weight loss encouraged.         Diabetes mellitus without complication     Most recent A1c 6.4.  He is on Jardiance and Januvia.             An echo is ordered.  A Holter is ordered.  I made a four-month follow-up.  No med changes made today.  He is asymptomatic currently.               Neno Constantino MD  08/10/2023   10:15 AM

## 2023-08-10 NOTE — ASSESSMENT & PLAN NOTE
Some palpitations reported.  Repeat Holter monitor ordered.  He is tolerating verapamil without side effects.  On exam his rhythm is regular today.

## 2023-08-16 ENCOUNTER — HOSPITAL ENCOUNTER (OUTPATIENT)
Dept: PULMONOLOGY | Facility: HOSPITAL | Age: 63
Discharge: HOME | End: 2023-08-16
Attending: INTERNAL MEDICINE
Payer: COMMERCIAL

## 2023-08-16 DIAGNOSIS — I47.20 VENTRICULAR TACHYCARDIA: ICD-10-CM

## 2023-08-16 LAB — RA PRESSURE ESTIMATED: 3 MMHG

## 2023-08-16 PROCEDURE — 93225 XTRNL ECG REC<48 HRS REC: CPT

## 2023-08-16 PROCEDURE — 93227 XTRNL ECG REC<48 HR R&I: CPT | Mod: ,,, | Performed by: INTERNAL MEDICINE

## 2023-08-16 PROCEDURE — 93306 TTE W/DOPPLER COMPLETE: CPT | Mod: 26,,, | Performed by: INTERNAL MEDICINE

## 2023-08-16 PROCEDURE — 93306 ECHO (CUPID ONLY): ICD-10-PCS | Mod: 26,,, | Performed by: INTERNAL MEDICINE

## 2023-08-16 PROCEDURE — 93227 HOLTER MONITOR - 48 HOUR (CUPID ONLY): ICD-10-PCS | Mod: ,,, | Performed by: INTERNAL MEDICINE

## 2023-08-16 PROCEDURE — 93306 TTE W/DOPPLER COMPLETE: CPT

## 2023-08-17 ENCOUNTER — PATIENT MESSAGE (OUTPATIENT)
Dept: CARDIOLOGY | Facility: CLINIC | Age: 63
End: 2023-08-17
Payer: COMMERCIAL

## 2023-08-23 LAB
OHS CV EVENT MONITOR DAY: 0
OHS CV HOLTER LENGTH DECIMAL HOURS: 48
OHS CV HOLTER LENGTH HOURS: 48
OHS CV HOLTER LENGTH MINUTES: 0
OHS CV HOLTER SINUS AVERAGE HR: 79
OHS CV HOLTER SINUS MAX HR: 118
OHS CV HOLTER SINUS MIN HR: 45

## 2023-08-24 DIAGNOSIS — I48.0 PAROXYSMAL ATRIAL FIBRILLATION: Primary | ICD-10-CM

## 2023-08-25 ENCOUNTER — TELEPHONE (OUTPATIENT)
Dept: CARDIOLOGY | Facility: CLINIC | Age: 63
End: 2023-08-25
Payer: COMMERCIAL

## 2023-08-25 DIAGNOSIS — F17.200 NICOTINE DEPENDENCE: ICD-10-CM

## 2023-08-25 RX ORDER — VARENICLINE TARTRATE 1 MG/1
1 TABLET, FILM COATED ORAL 2 TIMES DAILY
Qty: 56 TABLET | Refills: 0 | Status: SHIPPED | OUTPATIENT
Start: 2023-08-25 | End: 2023-10-17 | Stop reason: SDUPTHER

## 2023-08-25 NOTE — TELEPHONE ENCOUNTER
I called the patient and left a voicemail.  I told the patient that his Holter still showed a lot of arrhythmia.  I spoke to Dr. Mcbride who will be contacting the patient for follow-up.

## 2023-09-04 NOTE — PROGRESS NOTES
Subjective:   Patient ID:  North Najera is a 63 y.o. male who presents for follow-up of PVCs    Referring Cardiologist: Neno Constantino MD  Primary Care Provider: Irma Biswas NP    HPI  I had the pleasure of seeing Mr. Najera today in our electrophysiology clinic in consultation for his PVCs/VT. As you are aware he is a pleasant 63 year-old man with type 2 diabetes mellitus, obesity, anti-phospholipid syndrome with prior DVT and frequent PVCs/VT with LVEF of 40%. He was in his usual state of health until 6/2023 when he developed weakness/light-headedness at work. He went to the ER and was observed. He wore a holter monitor which showed very frequent PVCs (58% burden) and VT. He was instructed to go to [facility] [place] ER. He was observed. He was seen by the EP consult team, Dr. Talley. ECHO noted a LVEF of 40%. He was initiated on metoprolol. PET stress test noted no significant indications of obstructive disease. PVCs were morphologically para-His by ECG. Recommendation was to change to verapamil and he was discharged. Repeat holter in August of 2023 noted PVC burden of 24% still with runs of VT and echo showed LVEF improved to 50-55%. He has a 10% PAC burden.    I reviewed available ECGs in Epic which show sinus rhythm. Only a few isolated PVCs are visible. One ECG has PVCs in limb leads that are inferiorly directed.    My interpretation of today's ECG is sinus rhythm with PACs.    Review of Systems   Constitutional: Negative for fever and malaise/fatigue.   HENT:  Negative for congestion and sore throat.    Eyes:  Negative for blurred vision and visual disturbance.   Cardiovascular:  Negative for chest pain, dyspnea on exertion, irregular heartbeat, near-syncope, palpitations and syncope.   Respiratory:  Negative for cough and shortness of breath.    Hematologic/Lymphatic: Negative for bleeding problem. Does not bruise/bleed easily.   Skin: Negative.    Musculoskeletal: Negative.    Gastrointestinal:  Negative  for bloating, abdominal pain, hematochezia and melena.   Neurological:  Positive for light-headedness. Negative for focal weakness and weakness.   Psychiatric/Behavioral: Negative.         Objective:   Physical Exam  Vitals reviewed.   Constitutional:       General: He is not in acute distress.     Appearance: He is well-developed. He is not diaphoretic.   HENT:      Head: Normocephalic and atraumatic.   Eyes:      General:         Right eye: No discharge.         Left eye: No discharge.      Conjunctiva/sclera: Conjunctivae normal.   Cardiovascular:      Rate and Rhythm: Normal rate and regular rhythm. Frequent Extrasystoles are present.     Heart sounds: No murmur heard.     No friction rub. No gallop.   Pulmonary:      Effort: Pulmonary effort is normal. No respiratory distress.      Breath sounds: Normal breath sounds. No wheezing or rales.   Abdominal:      General: Bowel sounds are normal. There is no distension.      Palpations: Abdomen is soft.      Tenderness: There is no abdominal tenderness.   Musculoskeletal:      Cervical back: Neck supple.   Skin:     General: Skin is warm and dry.   Neurological:      Mental Status: He is alert and oriented to person, place, and time.   Psychiatric:         Behavior: Behavior normal.         Thought Content: Thought content normal.         Judgment: Judgment normal.         Assessment:      1. Ventricular tachycardia    2. PVC's (premature ventricular contractions)    3. Aortic atherosclerosis    4. Primary hypertension    5. Severe obesity (BMI 35.0-39.9) with comorbidity    6. Type 2 diabetes mellitus treated without insulin    7. Antiphospholipid syndrome    8. History of DVT (deep vein thrombosis)        Plan:   In summary, Mr. Najera is a pleasant 63 year-old man with type 2 diabetes mellitus, obesity, anti-phospholipid syndrome with prior DVT and frequent PVCs/VT with LVEF of 40%. EF improved to normal with reducing ectopic burden with verapamil but still has  PVC burden of 24% and still with runs of VT. Focus appears the same on ambulatory monitor. Likely outflow tract, although Dr. Talley was suspicious for para-His location. I do not see an ECG showing this. He currently has a normal EF and no ischemia/scar on PET stress test. QRS is narrow. Discussed adding anti-arrhythmic drug therapy versus mapping/ablation. I spent about a half hour discussing the nature of EP study and ablation, including possible retrograde aortic access. We discussed risks and benefits at length. Our discussion included, but was not limited to the risk of death, infection, bleeding, stroke, MI, cardiac perforation, vascular injury, valvular injury, embolism, cardiac tamponade, skin burns, and other organic injury including the possibility for need for surgery or pacemaker implantation. He understood and after a long conversation he would like to try AAD therapy first. Would add 100mg bid of flecainide and repeat a holter in 4 weeks. Will call him on the phone and discuss results. If he continues to have a high burden of PVCs/VT then he indicated he would proceed with ablation.    Thank you for allowing me to participate in the care of this patient. Please do not hesitate to call me with any questions or concerns.    Norbert Mcbride MD, PhD  Cardiac Electrophysiology

## 2023-09-05 ENCOUNTER — HOSPITAL ENCOUNTER (OUTPATIENT)
Dept: CARDIOLOGY | Facility: CLINIC | Age: 63
Discharge: HOME | End: 2023-09-05
Payer: COMMERCIAL

## 2023-09-05 ENCOUNTER — OFFICE VISIT (OUTPATIENT)
Dept: ELECTROPHYSIOLOGY | Facility: CLINIC | Age: 63
End: 2023-09-05
Payer: COMMERCIAL

## 2023-09-05 VITALS
BODY MASS INDEX: 35.62 KG/M2 | HEART RATE: 71 BPM | DIASTOLIC BLOOD PRESSURE: 80 MMHG | WEIGHT: 277.56 LBS | SYSTOLIC BLOOD PRESSURE: 128 MMHG | HEIGHT: 74 IN

## 2023-09-05 DIAGNOSIS — E11.9 TYPE 2 DIABETES MELLITUS TREATED WITHOUT INSULIN: ICD-10-CM

## 2023-09-05 DIAGNOSIS — I70.0 AORTIC ATHEROSCLEROSIS: ICD-10-CM

## 2023-09-05 DIAGNOSIS — Z86.718 HISTORY OF DVT (DEEP VEIN THROMBOSIS): ICD-10-CM

## 2023-09-05 DIAGNOSIS — I49.3 PVC'S (PREMATURE VENTRICULAR CONTRACTIONS): ICD-10-CM

## 2023-09-05 DIAGNOSIS — E66.01 SEVERE OBESITY (BMI 35.0-39.9) WITH COMORBIDITY: ICD-10-CM

## 2023-09-05 DIAGNOSIS — D68.61 ANTIPHOSPHOLIPID SYNDROME: ICD-10-CM

## 2023-09-05 DIAGNOSIS — I10 PRIMARY HYPERTENSION: ICD-10-CM

## 2023-09-05 DIAGNOSIS — I47.20 VENTRICULAR TACHYCARDIA: Primary | ICD-10-CM

## 2023-09-05 DIAGNOSIS — I48.0 PAROXYSMAL ATRIAL FIBRILLATION: ICD-10-CM

## 2023-09-05 PROCEDURE — 4010F PR ACE/ARB THEARPY RXD/TAKEN: ICD-10-PCS | Mod: CPTII,S$GLB,, | Performed by: INTERNAL MEDICINE

## 2023-09-05 PROCEDURE — 4010F ACE/ARB THERAPY RXD/TAKEN: CPT | Mod: CPTII,S$GLB,, | Performed by: INTERNAL MEDICINE

## 2023-09-05 PROCEDURE — 3079F PR MOST RECENT DIASTOLIC BLOOD PRESSURE 80-89 MM HG: ICD-10-PCS | Mod: CPTII,S$GLB,, | Performed by: INTERNAL MEDICINE

## 2023-09-05 PROCEDURE — 3079F DIAST BP 80-89 MM HG: CPT | Mod: CPTII,S$GLB,, | Performed by: INTERNAL MEDICINE

## 2023-09-05 PROCEDURE — 93005 RHYTHM STRIP: ICD-10-PCS | Mod: S$GLB,,, | Performed by: INTERNAL MEDICINE

## 2023-09-05 PROCEDURE — 3044F HG A1C LEVEL LT 7.0%: CPT | Mod: CPTII,S$GLB,, | Performed by: INTERNAL MEDICINE

## 2023-09-05 PROCEDURE — 1159F PR MEDICATION LIST DOCUMENTED IN MEDICAL RECORD: ICD-10-PCS | Mod: CPTII,S$GLB,, | Performed by: INTERNAL MEDICINE

## 2023-09-05 PROCEDURE — 99999 PR PBB SHADOW E&M-EST. PATIENT-LVL IV: ICD-10-PCS | Mod: PBBFAC,,, | Performed by: INTERNAL MEDICINE

## 2023-09-05 PROCEDURE — 3066F NEPHROPATHY DOC TX: CPT | Mod: CPTII,S$GLB,, | Performed by: INTERNAL MEDICINE

## 2023-09-05 PROCEDURE — 99214 OFFICE O/P EST MOD 30 MIN: CPT | Mod: S$GLB,,, | Performed by: INTERNAL MEDICINE

## 2023-09-05 PROCEDURE — 93010 RHYTHM STRIP: ICD-10-PCS | Mod: S$GLB,,, | Performed by: INTERNAL MEDICINE

## 2023-09-05 PROCEDURE — 1159F MED LIST DOCD IN RCRD: CPT | Mod: CPTII,S$GLB,, | Performed by: INTERNAL MEDICINE

## 2023-09-05 PROCEDURE — 3066F PR DOCUMENTATION OF TREATMENT FOR NEPHROPATHY: ICD-10-PCS | Mod: CPTII,S$GLB,, | Performed by: INTERNAL MEDICINE

## 2023-09-05 PROCEDURE — 93010 ELECTROCARDIOGRAM REPORT: CPT | Mod: S$GLB,,, | Performed by: INTERNAL MEDICINE

## 2023-09-05 PROCEDURE — 3061F PR NEG MICROALBUMINURIA RESULT DOCUMENTED/REVIEW: ICD-10-PCS | Mod: CPTII,S$GLB,, | Performed by: INTERNAL MEDICINE

## 2023-09-05 PROCEDURE — 93005 ELECTROCARDIOGRAM TRACING: CPT | Mod: S$GLB,,, | Performed by: INTERNAL MEDICINE

## 2023-09-05 PROCEDURE — 99214 PR OFFICE/OUTPT VISIT, EST, LEVL IV, 30-39 MIN: ICD-10-PCS | Mod: S$GLB,,, | Performed by: INTERNAL MEDICINE

## 2023-09-05 PROCEDURE — 3008F PR BODY MASS INDEX (BMI) DOCUMENTED: ICD-10-PCS | Mod: CPTII,S$GLB,, | Performed by: INTERNAL MEDICINE

## 2023-09-05 PROCEDURE — 1160F RVW MEDS BY RX/DR IN RCRD: CPT | Mod: CPTII,S$GLB,, | Performed by: INTERNAL MEDICINE

## 2023-09-05 PROCEDURE — 3074F SYST BP LT 130 MM HG: CPT | Mod: CPTII,S$GLB,, | Performed by: INTERNAL MEDICINE

## 2023-09-05 PROCEDURE — 3061F NEG MICROALBUMINURIA REV: CPT | Mod: CPTII,S$GLB,, | Performed by: INTERNAL MEDICINE

## 2023-09-05 PROCEDURE — 1160F PR REVIEW ALL MEDS BY PRESCRIBER/CLIN PHARMACIST DOCUMENTED: ICD-10-PCS | Mod: CPTII,S$GLB,, | Performed by: INTERNAL MEDICINE

## 2023-09-05 PROCEDURE — 3008F BODY MASS INDEX DOCD: CPT | Mod: CPTII,S$GLB,, | Performed by: INTERNAL MEDICINE

## 2023-09-05 PROCEDURE — 99999 PR PBB SHADOW E&M-EST. PATIENT-LVL IV: CPT | Mod: PBBFAC,,, | Performed by: INTERNAL MEDICINE

## 2023-09-05 PROCEDURE — 3074F PR MOST RECENT SYSTOLIC BLOOD PRESSURE < 130 MM HG: ICD-10-PCS | Mod: CPTII,S$GLB,, | Performed by: INTERNAL MEDICINE

## 2023-09-05 PROCEDURE — 3044F PR MOST RECENT HEMOGLOBIN A1C LEVEL <7.0%: ICD-10-PCS | Mod: CPTII,S$GLB,, | Performed by: INTERNAL MEDICINE

## 2023-09-05 RX ORDER — FLECAINIDE ACETATE 100 MG/1
100 TABLET ORAL EVERY 12 HOURS
Qty: 60 TABLET | Refills: 11 | Status: ON HOLD | OUTPATIENT
Start: 2023-09-05 | End: 2023-12-21 | Stop reason: HOSPADM

## 2023-09-14 ENCOUNTER — OFFICE VISIT (OUTPATIENT)
Dept: INTERNAL MEDICINE | Facility: CLINIC | Age: 63
End: 2023-09-14
Payer: COMMERCIAL

## 2023-09-14 VITALS
WEIGHT: 273.38 LBS | RESPIRATION RATE: 18 BRPM | SYSTOLIC BLOOD PRESSURE: 124 MMHG | BODY MASS INDEX: 35.08 KG/M2 | DIASTOLIC BLOOD PRESSURE: 80 MMHG | HEART RATE: 61 BPM | OXYGEN SATURATION: 98 % | HEIGHT: 74 IN

## 2023-09-14 DIAGNOSIS — E66.01 SEVERE OBESITY (BMI 35.0-39.9) WITH COMORBIDITY: ICD-10-CM

## 2023-09-14 DIAGNOSIS — E78.5 HYPERLIPIDEMIA LDL GOAL <70: ICD-10-CM

## 2023-09-14 DIAGNOSIS — I10 PRIMARY HYPERTENSION: ICD-10-CM

## 2023-09-14 DIAGNOSIS — G47.9 SLEEP DISORDER: ICD-10-CM

## 2023-09-14 DIAGNOSIS — Z12.5 PROSTATE CANCER SCREENING: ICD-10-CM

## 2023-09-14 DIAGNOSIS — E11.65 UNCONTROLLED TYPE 2 DIABETES MELLITUS WITH HYPERGLYCEMIA: Primary | ICD-10-CM

## 2023-09-14 PROCEDURE — 4010F ACE/ARB THERAPY RXD/TAKEN: CPT | Mod: CPTII,S$GLB,, | Performed by: NURSE PRACTITIONER

## 2023-09-14 PROCEDURE — 99999 PR PBB SHADOW E&M-EST. PATIENT-LVL IV: ICD-10-PCS | Mod: PBBFAC,,, | Performed by: NURSE PRACTITIONER

## 2023-09-14 PROCEDURE — 3074F PR MOST RECENT SYSTOLIC BLOOD PRESSURE < 130 MM HG: ICD-10-PCS | Mod: CPTII,S$GLB,, | Performed by: NURSE PRACTITIONER

## 2023-09-14 PROCEDURE — 1159F PR MEDICATION LIST DOCUMENTED IN MEDICAL RECORD: ICD-10-PCS | Mod: CPTII,S$GLB,, | Performed by: NURSE PRACTITIONER

## 2023-09-14 PROCEDURE — 99999 PR PBB SHADOW E&M-EST. PATIENT-LVL IV: CPT | Mod: PBBFAC,,, | Performed by: NURSE PRACTITIONER

## 2023-09-14 PROCEDURE — 99214 PR OFFICE/OUTPT VISIT, EST, LEVL IV, 30-39 MIN: ICD-10-PCS | Mod: S$GLB,,, | Performed by: NURSE PRACTITIONER

## 2023-09-14 PROCEDURE — 3044F PR MOST RECENT HEMOGLOBIN A1C LEVEL <7.0%: ICD-10-PCS | Mod: CPTII,S$GLB,, | Performed by: NURSE PRACTITIONER

## 2023-09-14 PROCEDURE — 3079F DIAST BP 80-89 MM HG: CPT | Mod: CPTII,S$GLB,, | Performed by: NURSE PRACTITIONER

## 2023-09-14 PROCEDURE — 3066F NEPHROPATHY DOC TX: CPT | Mod: CPTII,S$GLB,, | Performed by: NURSE PRACTITIONER

## 2023-09-14 PROCEDURE — 3079F PR MOST RECENT DIASTOLIC BLOOD PRESSURE 80-89 MM HG: ICD-10-PCS | Mod: CPTII,S$GLB,, | Performed by: NURSE PRACTITIONER

## 2023-09-14 PROCEDURE — 3008F BODY MASS INDEX DOCD: CPT | Mod: CPTII,S$GLB,, | Performed by: NURSE PRACTITIONER

## 2023-09-14 PROCEDURE — 2023F DILAT RTA XM W/O RTNOPTHY: CPT | Mod: CPTII,S$GLB,, | Performed by: NURSE PRACTITIONER

## 2023-09-14 PROCEDURE — 4010F PR ACE/ARB THEARPY RXD/TAKEN: ICD-10-PCS | Mod: CPTII,S$GLB,, | Performed by: NURSE PRACTITIONER

## 2023-09-14 PROCEDURE — 3066F PR DOCUMENTATION OF TREATMENT FOR NEPHROPATHY: ICD-10-PCS | Mod: CPTII,S$GLB,, | Performed by: NURSE PRACTITIONER

## 2023-09-14 PROCEDURE — 3008F PR BODY MASS INDEX (BMI) DOCUMENTED: ICD-10-PCS | Mod: CPTII,S$GLB,, | Performed by: NURSE PRACTITIONER

## 2023-09-14 PROCEDURE — 3061F NEG MICROALBUMINURIA REV: CPT | Mod: CPTII,S$GLB,, | Performed by: NURSE PRACTITIONER

## 2023-09-14 PROCEDURE — 99214 OFFICE O/P EST MOD 30 MIN: CPT | Mod: S$GLB,,, | Performed by: NURSE PRACTITIONER

## 2023-09-14 PROCEDURE — 3074F SYST BP LT 130 MM HG: CPT | Mod: CPTII,S$GLB,, | Performed by: NURSE PRACTITIONER

## 2023-09-14 PROCEDURE — 1159F MED LIST DOCD IN RCRD: CPT | Mod: CPTII,S$GLB,, | Performed by: NURSE PRACTITIONER

## 2023-09-14 PROCEDURE — 2023F PR DILATED RETINAL EXAM W/O EVID OF RETINOPATHY: ICD-10-PCS | Mod: CPTII,S$GLB,, | Performed by: NURSE PRACTITIONER

## 2023-09-14 PROCEDURE — 3061F PR NEG MICROALBUMINURIA RESULT DOCUMENTED/REVIEW: ICD-10-PCS | Mod: CPTII,S$GLB,, | Performed by: NURSE PRACTITIONER

## 2023-09-14 PROCEDURE — 3044F HG A1C LEVEL LT 7.0%: CPT | Mod: CPTII,S$GLB,, | Performed by: NURSE PRACTITIONER

## 2023-09-14 NOTE — TELEPHONE ENCOUNTER
Informed pt.    Infliximab Counseling:  I discussed with the patient the risks of infliximab including but not limited to myelosuppression, immunosuppression, autoimmune hepatitis, demyelinating diseases, lymphoma, and serious infections.  The patient understands that monitoring is required including a PPD at baseline and must alert us or the primary physician if symptoms of infection or other concerning signs are noted.

## 2023-09-14 NOTE — PROGRESS NOTES
Subjective:       Patient ID: North Najera is a 63 y.o. male.    Chief Complaint: Follow-up (3 month f/u- )    Patient is known, to me and presents with   Chief Complaint   Patient presents with    Follow-up     3 month f/u-    .  Denies chest pain and shortness of breath.  Patient presents with check up but not due to have blood work until Nov. Also feeling tired during the day and does snore. He is thinking he would need a sleep study. He does see cards.    Follow-up  Pertinent negatives include no abdominal pain, arthralgias, chest pain, chills, congestion, coughing, diaphoresis, fatigue, fever, headaches, joint swelling, myalgias, nausea, neck pain, numbness, rash, sore throat, vomiting or weakness.     Review of Systems   Constitutional: Negative.  Negative for activity change, appetite change, chills, diaphoresis, fatigue, fever and unexpected weight change.   HENT: Negative.  Negative for congestion, ear discharge, ear pain, facial swelling, hearing loss, nosebleeds, postnasal drip, rhinorrhea, sinus pressure, sneezing, sore throat, tinnitus, trouble swallowing and voice change.    Eyes: Negative.  Negative for photophobia, pain, discharge, redness, itching and visual disturbance.   Respiratory: Negative.  Negative for apnea, cough, choking, chest tightness, shortness of breath, wheezing and stridor.    Cardiovascular: Negative.  Negative for chest pain, palpitations and leg swelling.   Gastrointestinal:  Negative for abdominal distention, abdominal pain, anal bleeding, blood in stool, constipation, diarrhea, nausea and vomiting.   Genitourinary: Negative.  Negative for difficulty urinating, dysuria, enuresis, flank pain, frequency, hematuria, penile discharge, penile pain, penile swelling, scrotal swelling, testicular pain and urgency.   Musculoskeletal: Negative.  Negative for arthralgias, back pain, gait problem, joint swelling, myalgias, neck pain and neck stiffness.   Skin: Negative.  Negative for  color change, pallor, rash and wound.   Neurological:  Negative for dizziness, tremors, seizures, syncope, facial asymmetry, speech difficulty, weakness, light-headedness, numbness and headaches.   Hematological:  Negative for adenopathy. Does not bruise/bleed easily.   Psychiatric/Behavioral: Negative.  Negative for agitation, dysphoric mood, sleep disturbance and suicidal ideas. The patient is not nervous/anxious.        Objective:      Physical Exam  Vitals and nursing note reviewed.   Constitutional:       General: He is not in acute distress.     Appearance: He is well-developed. He is not diaphoretic.   HENT:      Head: Normocephalic and atraumatic.      Right Ear: External ear normal.      Left Ear: External ear normal.      Nose: Nose normal.      Mouth/Throat:      Pharynx: No oropharyngeal exudate.   Eyes:      General:         Right eye: No discharge.         Left eye: No discharge.      Conjunctiva/sclera: Conjunctivae normal.      Pupils: Pupils are equal, round, and reactive to light.   Neck:      Thyroid: No thyromegaly.      Vascular: No JVD.      Trachea: No tracheal deviation.   Cardiovascular:      Rate and Rhythm: Normal rate and regular rhythm.      Heart sounds: Normal heart sounds. No murmur heard.     No friction rub. No gallop.   Pulmonary:      Effort: Pulmonary effort is normal. No respiratory distress.      Breath sounds: Normal breath sounds. No stridor. No wheezing or rales.   Chest:      Chest wall: No tenderness.   Abdominal:      General: Bowel sounds are normal. There is no distension.      Palpations: Abdomen is soft.      Tenderness: There is no abdominal tenderness. There is no guarding or rebound.   Musculoskeletal:         General: No tenderness. Normal range of motion.      Cervical back: Normal range of motion and neck supple.   Lymphadenopathy:      Cervical: No cervical adenopathy.   Skin:     General: Skin is warm and dry.      Capillary Refill: Capillary refill takes less  than 2 seconds.      Coloration: Skin is not pale.      Findings: No erythema or rash.   Neurological:      General: No focal deficit present.      Mental Status: He is alert and oriented to person, place, and time.      Cranial Nerves: No cranial nerve deficit.      Motor: No abnormal muscle tone.      Coordination: Coordination normal.      Deep Tendon Reflexes: Reflexes are normal and symmetric. Reflexes normal.   Psychiatric:         Mood and Affect: Mood and affect normal. Mood is not anxious or depressed. Affect is not tearful.         Behavior: Behavior normal.         Thought Content: Thought content normal. Thought content does not include homicidal or suicidal ideation. Thought content does not include homicidal or suicidal plan.         Judgment: Judgment normal.         Assessment:       1. Uncontrolled type 2 diabetes mellitus with hyperglycemia    2. Primary hypertension    3. Hyperlipidemia LDL goal <70    4. Sleep disorder    5. Severe obesity (BMI 35.0-39.9) with comorbidity    6. Prostate cancer screening        Plan:   1. Uncontrolled type 2 diabetes mellitus with hyperglycemia  -     CBC Auto Differential; Future; Expected date: 11/06/2023  -     Comprehensive Metabolic Panel; Future; Expected date: 11/06/2023  -     Microalbumin/Creatinine Ratio, Urine; Future; Expected date: 11/06/2023  -     Hemoglobin A1C; Future; Expected date: 11/06/2023    2. Primary hypertension  -     CBC Auto Differential; Future; Expected date: 11/06/2023  -     Comprehensive Metabolic Panel; Future; Expected date: 11/06/2023  -     Microalbumin/Creatinine Ratio, Urine; Future; Expected date: 11/06/2023    3. Hyperlipidemia LDL goal <70  -     CBC Auto Differential; Future; Expected date: 11/06/2023  -     Comprehensive Metabolic Panel; Future; Expected date: 11/06/2023  -     TSH; Future; Expected date: 11/06/2023  -     Lipid Panel; Future; Expected date: 11/06/2023    4. Sleep disorder  -     Polysomnogram (CPAP  "will be added if patient meets diagnostic criteria.); Future    5. Severe obesity (BMI 35.0-39.9) with comorbidity  -     CBC Auto Differential; Future; Expected date: 11/06/2023  -     Comprehensive Metabolic Panel; Future; Expected date: 11/06/2023    6. Prostate cancer screening  -     PSA, Screening; Future; Expected date: 11/06/2023       "This note will not be shared with the patient."  Check CBC, CMP, TSH, Fasting lipid profile, HgA1c, Microalbuminuria in 6  months.  Medication compliance was discussed with the patient.  The patient was instructed to monitor glucoses closely using glucometer at home and to record the values in a log.  The patient was instructed to obtain an Optometry exam and microalbumin q year.  The patient was instructed to obtain a hemoglobin A1C q 3-4 months.  The patient was instructed to check the feet visually daily and to monitor for infection.  The patient was instructed to exercise at least 3 times a week.  The patient was instructed to follow a 1800 ADA diet.  The patient was instructed to monitor weight daily and try to keep it as close to ideal body weight as possible.  The patient was instructed on using exercise frequently to reduce BP.  The patient was instructed on using a low salt diet.  Monitor BP at home and to record the values in a log.  RTC in 6  months.   "

## 2023-09-19 DIAGNOSIS — G47.9 SLEEP DISORDER: Primary | ICD-10-CM

## 2023-09-20 ENCOUNTER — CLINICAL SUPPORT (OUTPATIENT)
Dept: SMOKING CESSATION | Facility: CLINIC | Age: 63
End: 2023-09-20
Payer: COMMERCIAL

## 2023-09-20 DIAGNOSIS — F17.200 NICOTINE DEPENDENCE: Primary | ICD-10-CM

## 2023-09-20 PROCEDURE — 99403 PR PREVENT COUNSEL,INDIV,45 MIN: ICD-10-PCS | Mod: S$GLB,,,

## 2023-09-20 PROCEDURE — 99403 PREV MED CNSL INDIV APPRX 45: CPT | Mod: S$GLB,,,

## 2023-09-20 RX ORDER — IBUPROFEN 200 MG
1 TABLET ORAL DAILY
Qty: 28 PATCH | Refills: 0 | Status: SHIPPED | OUTPATIENT
Start: 2023-09-20 | End: 2023-10-17 | Stop reason: SDUPTHER

## 2023-09-20 RX ORDER — MICONAZOLE NITRATE 2 %
2 CREAM (GRAM) TOPICAL
Qty: 100 EACH | Refills: 0 | Status: SHIPPED | OUTPATIENT
Start: 2023-09-20

## 2023-09-20 NOTE — PROGRESS NOTES
Individual Follow-Up Form    9/20/2023    Quit Date: TBD  Clinical Status of Patient: Outpatient    Length of Service: 45 minutes    Continuing Medication: yes  Chantix, Patches, or Nicotine gum (new order)    Other Medications: none     Target Symptoms: Withdrawal and medication side effects. The following were  rated moderate (3) to severe (4) on TCRS:  Moderate (3): none  Severe (4): none    Comments: Patient seen in clinic regarding smoking cessation follow up. Pt reports that in recent days, he has cut back to approximately 1/2 ppd, but over the weekends, he increases to 1.5 ppd. Of note, pt was able to go 14 hours without smoking. Commended pt for effort thus far and encouraged him to attempt more dry runs. Pt continues 14 mg nicotine patch QD and 1 mg chantix BID with no adverse effects, low moods, or abnormal changes in behaviors at this time. New order placed for 2 mg nicotine gum prn. completion of TCRS (Tobacco Cessation Rating Scale) reviewed strategies, controlling environment, cues, triggers, new goals set. Introduced high risk situations with preparation interventions, caffeine similarities with withdrawal issues of habit and nicotine, Alcohol, Understanding urges, cravings, stress and relaxation. Open discussion with intervention discussion.   Pt's exhaled carbon monoxide level was 11 ppm as per Smokerlyzer. (non- smoker = 0-5 ppm.)  Pt to continue with counseling in 2 weeks.    Diagnosis: F17.200    Next Visit: 2 weeks

## 2023-09-22 ENCOUNTER — HOSPITAL ENCOUNTER (OUTPATIENT)
Dept: SLEEP MEDICINE | Facility: HOSPITAL | Age: 63
Discharge: HOME | End: 2023-09-22
Attending: NURSE PRACTITIONER
Payer: COMMERCIAL

## 2023-09-22 DIAGNOSIS — G47.9 SLEEP DISORDER: ICD-10-CM

## 2023-09-26 ENCOUNTER — HOSPITAL ENCOUNTER (OUTPATIENT)
Dept: PULMONOLOGY | Facility: HOSPITAL | Age: 63
Discharge: HOME | End: 2023-09-26
Attending: INTERNAL MEDICINE
Payer: COMMERCIAL

## 2023-09-26 DIAGNOSIS — I47.20 VENTRICULAR TACHYCARDIA: ICD-10-CM

## 2023-09-26 PROCEDURE — 93227 XTRNL ECG REC<48 HR R&I: CPT | Mod: ,,, | Performed by: STUDENT IN AN ORGANIZED HEALTH CARE EDUCATION/TRAINING PROGRAM

## 2023-09-26 PROCEDURE — 93227 HOLTER MONITOR - 24 HOUR (CUPID ONLY): ICD-10-PCS | Mod: ,,, | Performed by: STUDENT IN AN ORGANIZED HEALTH CARE EDUCATION/TRAINING PROGRAM

## 2023-09-26 PROCEDURE — 93226 XTRNL ECG REC<48 HR SCAN A/R: CPT

## 2023-09-28 LAB
OHS CV EVENT MONITOR DAY: 0
OHS CV HOLTER LENGTH DECIMAL HOURS: 24
OHS CV HOLTER LENGTH HOURS: 24
OHS CV HOLTER LENGTH MINUTES: 0
OHS CV HOLTER SINUS AVERAGE HR: 72
OHS CV HOLTER SINUS MAX HR: 95
OHS CV HOLTER SINUS MIN HR: 42

## 2023-09-29 ENCOUNTER — TELEPHONE (OUTPATIENT)
Dept: ELECTROPHYSIOLOGY | Facility: CLINIC | Age: 63
End: 2023-09-29
Payer: COMMERCIAL

## 2023-09-29 NOTE — TELEPHONE ENCOUNTER
Spoke with Mr. Najera regarding his repeat Holter. He is still having a high burden of PVCs and VT (35% PVC burden with runs of VT up to 48 beats). Highly suspicious for main focus being outflow tract in location. Reviewed his ECGs and there is 1 ECG from his hospital stay showing a 2nd focus likely coming from the mid-basal septum near the conduction system. Discussed risks/benefits. Discussed potential need to go retrograde aortic. Discussed risk of AV block and needing a PPM. Discussed going after the primary focus and then mapping the mid-septal focus if present at a high percentage. May not ablate that one if too close to the conduction system. He understood.    Plan  PVC ablation  Carto  Anesthesia  Hold xarelto 2 days prior  Hold verpamil/flecainide 5 days prior  Hold lisinopril AM of procedure  Likely will need interventional cardiology assistance.

## 2023-10-04 ENCOUNTER — CLINICAL SUPPORT (OUTPATIENT)
Dept: SMOKING CESSATION | Facility: CLINIC | Age: 63
End: 2023-10-04
Payer: COMMERCIAL

## 2023-10-04 ENCOUNTER — PATIENT MESSAGE (OUTPATIENT)
Dept: ELECTROPHYSIOLOGY | Facility: CLINIC | Age: 63
End: 2023-10-04
Payer: COMMERCIAL

## 2023-10-04 DIAGNOSIS — F17.200 NICOTINE DEPENDENCE: Primary | ICD-10-CM

## 2023-10-04 PROCEDURE — 99999 PR PBB SHADOW E&M-EST. PATIENT-LVL I: ICD-10-PCS | Mod: PBBFAC,,,

## 2023-10-04 PROCEDURE — 99401 PREV MED CNSL INDIV APPRX 15: CPT | Mod: S$GLB,,,

## 2023-10-04 PROCEDURE — 99401 PR PREVENT COUNSEL,INDIV,15 MIN: ICD-10-PCS | Mod: S$GLB,,,

## 2023-10-04 PROCEDURE — 99999 PR PBB SHADOW E&M-EST. PATIENT-LVL I: CPT | Mod: PBBFAC,,,

## 2023-10-04 NOTE — PROGRESS NOTES
Individual Follow-Up Form    10/4/2023    Quit Date: TBD    Clinical Status of Patient: Outpatient    Continuing Medication: yes  Chantix or Patches    Other Medications: none     Target Symptoms: Withdrawal and medication side effects. The following were  rated moderate (3) to severe (4) on TCRS:  Moderate (3): none  Severe (4): none    Comments: Spoke with patient regarding smoking cessation follow up. Pt states that he was able to stop smoking for 2 days. His neighbor passed away and he ended up smoking again. Commended pt for 2 days smoke-free and encouraged him to attempt to quit again. Discussed preparations including controlling environment. Pt continues 14 mg nicotine patch QD and 1 mg chantix BID with no adverse effects, low moods, or abnormal changes in behaviors at this time. He has not picked up nicotine gum yet. Pt to continue with 1 on 1 in person counseling in 2 weeks.    Diagnosis: F17.200    Next Visit: 2 weeks

## 2023-10-11 ENCOUNTER — CLINICAL SUPPORT (OUTPATIENT)
Dept: SMOKING CESSATION | Facility: CLINIC | Age: 63
End: 2023-10-11
Payer: COMMERCIAL

## 2023-10-11 DIAGNOSIS — G47.9 SLEEP DISORDER: Primary | ICD-10-CM

## 2023-10-11 DIAGNOSIS — F17.200 NICOTINE DEPENDENCE: Primary | ICD-10-CM

## 2023-10-11 PROCEDURE — 99999 PR PBB SHADOW E&M-EST. PATIENT-LVL I: CPT | Mod: PBBFAC,,,

## 2023-10-11 PROCEDURE — 99407 BEHAV CHNG SMOKING > 10 MIN: CPT | Mod: S$GLB,,,

## 2023-10-11 PROCEDURE — 99407 PR TOBACCO USE CESSATION INTENSIVE >10 MINUTES: ICD-10-PCS | Mod: S$GLB,,,

## 2023-10-11 PROCEDURE — 99999 PR PBB SHADOW E&M-EST. PATIENT-LVL I: ICD-10-PCS | Mod: PBBFAC,,,

## 2023-10-11 NOTE — PROGRESS NOTES
Spoke with patient today in regard to smoking cessation progress for 3 month telephone follow up, he states not tobacco free. Patient states quitting for 3 days and then having a lapse and is now back on track, using the patch and Chantix. Commended patient on the accomplishment thus far. Informed patient of benefit period, future follow ups, and contact information if any further help or support is needed. Will complete smart form for 3 month follow up on Quit attempt #5.

## 2023-10-12 ENCOUNTER — HOSPITAL ENCOUNTER (OUTPATIENT)
Dept: SLEEP MEDICINE | Facility: HOSPITAL | Age: 63
Discharge: HOME | End: 2023-10-12
Attending: NURSE PRACTITIONER
Payer: COMMERCIAL

## 2023-10-12 ENCOUNTER — PATIENT MESSAGE (OUTPATIENT)
Dept: ELECTROPHYSIOLOGY | Facility: CLINIC | Age: 63
End: 2023-10-12
Payer: COMMERCIAL

## 2023-10-12 DIAGNOSIS — G47.9 SLEEP DISORDER: ICD-10-CM

## 2023-10-12 PROCEDURE — 95806 SLEEP STUDY UNATT&RESP EFFT: CPT

## 2023-10-13 ENCOUNTER — TELEPHONE (OUTPATIENT)
Dept: ELECTROPHYSIOLOGY | Facility: CLINIC | Age: 63
End: 2023-10-13
Payer: COMMERCIAL

## 2023-10-13 NOTE — TELEPHONE ENCOUNTER
Attempted to contact patient to discuss procedure scheduling but no answer received. Voicemail left requesting return call to discuss procedure scheduling and advised that patient portal messages have also been sent regarding Dr Mcbride' current available date.

## 2023-10-16 PROBLEM — G47.9 SLEEP DISORDER: Status: ACTIVE | Noted: 2023-10-16

## 2023-10-17 ENCOUNTER — CLINICAL SUPPORT (OUTPATIENT)
Dept: SMOKING CESSATION | Facility: CLINIC | Age: 63
End: 2023-10-17
Payer: COMMERCIAL

## 2023-10-17 DIAGNOSIS — F17.200 NICOTINE DEPENDENCE: ICD-10-CM

## 2023-10-17 PROCEDURE — 99999 PR PBB SHADOW E&M-EST. PATIENT-LVL I: ICD-10-PCS | Mod: PBBFAC,,,

## 2023-10-17 PROCEDURE — 99403 PREV MED CNSL INDIV APPRX 45: CPT | Mod: S$GLB,,,

## 2023-10-17 PROCEDURE — 99999 PR PBB SHADOW E&M-EST. PATIENT-LVL I: CPT | Mod: PBBFAC,,,

## 2023-10-17 PROCEDURE — 99403 PR PREVENT COUNSEL,INDIV,45 MIN: ICD-10-PCS | Mod: S$GLB,,,

## 2023-10-17 RX ORDER — IBUPROFEN 200 MG
1 TABLET ORAL DAILY
Qty: 28 PATCH | Refills: 0 | Status: SHIPPED | OUTPATIENT
Start: 2023-10-17

## 2023-10-17 RX ORDER — VARENICLINE TARTRATE 1 MG/1
1 TABLET, FILM COATED ORAL 2 TIMES DAILY
Qty: 56 TABLET | Refills: 0 | Status: SHIPPED | OUTPATIENT
Start: 2023-10-17 | End: 2023-11-03 | Stop reason: SDUPTHER

## 2023-10-17 NOTE — PROGRESS NOTES
Individual Follow-Up Form    10/17/2023    Quit Date: TBD    Clinical Status of Patient: Outpatient    Length of Service: 45 minutes    Continuing Medication: yes  Chantix or Patches    Other Medications: none     Target Symptoms: Withdrawal and medication side effects. The following were  rated moderate (3) to severe (4) on TCRS:  Moderate (3): none  Severe (4): none    Comments: Patient seen in clinic regarding smoking cessation follow up. Pt reports that he is smoking 6 cpd and has been able to go 2-3 hours between cigarettes. Commended pt for progress made and encouraged pt to completely cease smoking inside. Pt plans to attempt to quit over the weekend. Discussed preparations and highly encouraged pt to rely on preparation rather than willpower. Reviewed past quit attempts and encouraged pt to use his previous experiences as learning opportunities. Pt continues 14 mg nicotine patch QD and 1 mg chantix BID with no adverse effects, low moods, or abnormal changes in behaviors at this time. Pt's exhaled carbon monoxide level was 12 ppm as per Smokerlyzer. (non- smoker = 0-5 ppm.)  Pt to continue with counseling in 2 weeks.    Diagnosis: F17.200    Next Visit: 2 weeks

## 2023-10-18 ENCOUNTER — OFFICE VISIT (OUTPATIENT)
Dept: CARDIOLOGY | Facility: CLINIC | Age: 63
End: 2023-10-18
Payer: COMMERCIAL

## 2023-10-18 VITALS
HEART RATE: 68 BPM | BODY MASS INDEX: 36.52 KG/M2 | RESPIRATION RATE: 18 BRPM | WEIGHT: 275.56 LBS | OXYGEN SATURATION: 96 % | HEIGHT: 73 IN | SYSTOLIC BLOOD PRESSURE: 132 MMHG | DIASTOLIC BLOOD PRESSURE: 70 MMHG

## 2023-10-18 DIAGNOSIS — D68.61 ANTIPHOSPHOLIPID SYNDROME: ICD-10-CM

## 2023-10-18 DIAGNOSIS — E66.01 SEVERE OBESITY (BMI 35.0-39.9) WITH COMORBIDITY: ICD-10-CM

## 2023-10-18 DIAGNOSIS — I25.10 CORONARY ARTERY DISEASE INVOLVING NATIVE CORONARY ARTERY OF NATIVE HEART WITHOUT ANGINA PECTORIS: ICD-10-CM

## 2023-10-18 DIAGNOSIS — I47.20 VENTRICULAR TACHYCARDIA: Primary | ICD-10-CM

## 2023-10-18 DIAGNOSIS — R07.9 CHEST PAIN, UNSPECIFIED TYPE: ICD-10-CM

## 2023-10-18 DIAGNOSIS — I10 PRIMARY HYPERTENSION: ICD-10-CM

## 2023-10-18 DIAGNOSIS — I70.0 AORTIC ATHEROSCLEROSIS: ICD-10-CM

## 2023-10-18 DIAGNOSIS — E78.5 HYPERLIPIDEMIA LDL GOAL <70: ICD-10-CM

## 2023-10-18 DIAGNOSIS — I50.41 ACUTE COMBINED SYSTOLIC AND DIASTOLIC CONGESTIVE HEART FAILURE: ICD-10-CM

## 2023-10-18 DIAGNOSIS — E11.9 DIABETES MELLITUS WITHOUT COMPLICATION: ICD-10-CM

## 2023-10-18 PROCEDURE — 3044F PR MOST RECENT HEMOGLOBIN A1C LEVEL <7.0%: ICD-10-PCS | Mod: CPTII,S$GLB,, | Performed by: INTERNAL MEDICINE

## 2023-10-18 PROCEDURE — 4010F PR ACE/ARB THEARPY RXD/TAKEN: ICD-10-PCS | Mod: CPTII,S$GLB,, | Performed by: INTERNAL MEDICINE

## 2023-10-18 PROCEDURE — 99214 OFFICE O/P EST MOD 30 MIN: CPT | Mod: S$GLB,,, | Performed by: INTERNAL MEDICINE

## 2023-10-18 PROCEDURE — 99999 PR PBB SHADOW E&M-EST. PATIENT-LVL IV: CPT | Mod: PBBFAC,,, | Performed by: INTERNAL MEDICINE

## 2023-10-18 PROCEDURE — 3008F PR BODY MASS INDEX (BMI) DOCUMENTED: ICD-10-PCS | Mod: CPTII,S$GLB,, | Performed by: INTERNAL MEDICINE

## 2023-10-18 PROCEDURE — 3078F PR MOST RECENT DIASTOLIC BLOOD PRESSURE < 80 MM HG: ICD-10-PCS | Mod: CPTII,S$GLB,, | Performed by: INTERNAL MEDICINE

## 2023-10-18 PROCEDURE — 3044F HG A1C LEVEL LT 7.0%: CPT | Mod: CPTII,S$GLB,, | Performed by: INTERNAL MEDICINE

## 2023-10-18 PROCEDURE — 1159F MED LIST DOCD IN RCRD: CPT | Mod: CPTII,S$GLB,, | Performed by: INTERNAL MEDICINE

## 2023-10-18 PROCEDURE — 3066F NEPHROPATHY DOC TX: CPT | Mod: CPTII,S$GLB,, | Performed by: INTERNAL MEDICINE

## 2023-10-18 PROCEDURE — 1159F PR MEDICATION LIST DOCUMENTED IN MEDICAL RECORD: ICD-10-PCS | Mod: CPTII,S$GLB,, | Performed by: INTERNAL MEDICINE

## 2023-10-18 PROCEDURE — 3066F PR DOCUMENTATION OF TREATMENT FOR NEPHROPATHY: ICD-10-PCS | Mod: CPTII,S$GLB,, | Performed by: INTERNAL MEDICINE

## 2023-10-18 PROCEDURE — 3061F PR NEG MICROALBUMINURIA RESULT DOCUMENTED/REVIEW: ICD-10-PCS | Mod: CPTII,S$GLB,, | Performed by: INTERNAL MEDICINE

## 2023-10-18 PROCEDURE — 3078F DIAST BP <80 MM HG: CPT | Mod: CPTII,S$GLB,, | Performed by: INTERNAL MEDICINE

## 2023-10-18 PROCEDURE — 4010F ACE/ARB THERAPY RXD/TAKEN: CPT | Mod: CPTII,S$GLB,, | Performed by: INTERNAL MEDICINE

## 2023-10-18 PROCEDURE — 3061F NEG MICROALBUMINURIA REV: CPT | Mod: CPTII,S$GLB,, | Performed by: INTERNAL MEDICINE

## 2023-10-18 PROCEDURE — 1160F PR REVIEW ALL MEDS BY PRESCRIBER/CLIN PHARMACIST DOCUMENTED: ICD-10-PCS | Mod: CPTII,S$GLB,, | Performed by: INTERNAL MEDICINE

## 2023-10-18 PROCEDURE — 99214 PR OFFICE/OUTPT VISIT, EST, LEVL IV, 30-39 MIN: ICD-10-PCS | Mod: S$GLB,,, | Performed by: INTERNAL MEDICINE

## 2023-10-18 PROCEDURE — 3008F BODY MASS INDEX DOCD: CPT | Mod: CPTII,S$GLB,, | Performed by: INTERNAL MEDICINE

## 2023-10-18 PROCEDURE — 3075F PR MOST RECENT SYSTOLIC BLOOD PRESS GE 130-139MM HG: ICD-10-PCS | Mod: CPTII,S$GLB,, | Performed by: INTERNAL MEDICINE

## 2023-10-18 PROCEDURE — 3075F SYST BP GE 130 - 139MM HG: CPT | Mod: CPTII,S$GLB,, | Performed by: INTERNAL MEDICINE

## 2023-10-18 PROCEDURE — 99999 PR PBB SHADOW E&M-EST. PATIENT-LVL IV: ICD-10-PCS | Mod: PBBFAC,,, | Performed by: INTERNAL MEDICINE

## 2023-10-18 PROCEDURE — 1160F RVW MEDS BY RX/DR IN RCRD: CPT | Mod: CPTII,S$GLB,, | Performed by: INTERNAL MEDICINE

## 2023-10-18 RX ORDER — NITROGLYCERIN 0.4 MG/1
0.4 TABLET SUBLINGUAL EVERY 5 MIN PRN
Qty: 25 EACH | Refills: 1 | Status: SHIPPED | OUTPATIENT
Start: 2023-10-18

## 2023-10-18 NOTE — PROGRESS NOTES
[facility]     Subjective:    Patient ID:  [place] is a 63 y.o. male who presents for follow-up of Diabetes Mellitus, VTACH, Hypertension, Hyperlipidemia, Coronary Artery Disease, and Chest Pain    Review of patient's allergies indicates:   Allergen Reactions    Codeine Diarrhea     Patient can not tolerate Tylenol #3, states he does okay with Codeine with cough medications     Nickel sutures [surgical stainless steel] Rash      He saw electrophysiology and was started on flecainide.  He remains on verapamil.  He also takes lisinopril for hypertension.  There is discussion of ablation procedure for ventricular tachycardia in January.  He has a Holter monitor that is pending.  He has been compliant with flecainide.  He feels occasional palpitations and rare dizzy spells.  His main complaint is intermittent chest pain.  It is not exertion related.  It can last 3-5 minutes.  It is left pectoral area.  The patient did have a PET stress showing less than 10% ischemia, he does have three-vessel coronary calcifications.  His chest pains were never exertion related.  He also has some dyspnea with activity, most recent EF 45%.  He does take Januvia for his diabetes.        Review of Systems   Constitutional: Negative for chills, decreased appetite, diaphoresis, fever, malaise/fatigue, night sweats, weight gain and weight loss.   HENT:  Negative for congestion, ear discharge, ear pain, hearing loss, hoarse voice, nosebleeds, odynophagia, sore throat, stridor and tinnitus.    Eyes:  Negative for blurred vision, discharge, double vision, pain, photophobia, redness, vision loss in left eye, vision loss in right eye, visual disturbance and visual halos.   Cardiovascular:  Positive for chest pain, dyspnea on exertion and palpitations. Negative for claudication, cyanosis, irregular heartbeat, leg swelling, near-syncope, orthopnea, paroxysmal  "nocturnal dyspnea and syncope.   Respiratory:  Negative for cough, hemoptysis, shortness of breath, sleep disturbances due to breathing, snoring, sputum production and wheezing.    Endocrine: Negative for cold intolerance, heat intolerance, polydipsia, polyphagia and polyuria.   Hematologic/Lymphatic: Negative for adenopathy and bleeding problem. Does not bruise/bleed easily.   Skin:  Negative for color change, dry skin, flushing, itching, nail changes, poor wound healing, rash, skin cancer, suspicious lesions and unusual hair distribution.   Musculoskeletal:  Negative for arthritis, back pain, falls, gout, joint pain, joint swelling, muscle cramps, muscle weakness, myalgias, neck pain and stiffness.   Gastrointestinal:  Negative for bloating, abdominal pain, anorexia, change in bowel habit, bowel incontinence, constipation, diarrhea, dysphagia, excessive appetite, flatus, heartburn, hematemesis, hematochezia, hemorrhoids, jaundice, melena, nausea and vomiting.   Genitourinary:  Negative for bladder incontinence, decreased libido, dysuria, flank pain, frequency, genital sores, hematuria, hesitancy, incomplete emptying, nocturia and urgency.   Neurological:  Positive for dizziness. Negative for aphonia, brief paralysis, difficulty with concentration, disturbances in coordination, excessive daytime sleepiness, focal weakness, headaches, light-headedness, loss of balance, numbness, paresthesias, seizures, sensory change, tremors, vertigo and weakness.   Psychiatric/Behavioral:  Negative for altered mental status, depression, hallucinations, memory loss, substance abuse, suicidal ideas and thoughts of violence. The patient is nervous/anxious. The patient does not have insomnia.    Allergic/Immunologic: Negative for hives and persistent infections.        Objective:       Vitals:    10/18/23 1502   BP: 132/70   Pulse: 68   Resp: 18   SpO2: 96%   Weight: 125 kg (275 lb 9.2 oz)   Height: 6' 1" (1.854 m)    Physical " Exam  Constitutional:       General: He is not in acute distress.     Appearance: He is well-developed. He is not diaphoretic.   HENT:      Head: Normocephalic and atraumatic.      Nose: Nose normal.   Eyes:      General: No scleral icterus.        Right eye: No discharge.      Conjunctiva/sclera: Conjunctivae normal.      Pupils: Pupils are equal, round, and reactive to light.   Neck:      Thyroid: No thyromegaly.      Vascular: No JVD.      Trachea: No tracheal deviation.   Cardiovascular:      Rate and Rhythm: Normal rate and regular rhythm.      Pulses:           Carotid pulses are 2+ on the right side and 2+ on the left side.       Radial pulses are 2+ on the right side and 2+ on the left side.        Dorsalis pedis pulses are 2+ on the right side and 2+ on the left side.        Posterior tibial pulses are 2+ on the right side and 2+ on the left side.      Heart sounds: Normal heart sounds. No murmur heard.     No friction rub. No gallop.   Pulmonary:      Effort: Pulmonary effort is normal. No respiratory distress.      Breath sounds: Normal breath sounds. No stridor. No wheezing or rales.   Chest:      Chest wall: No tenderness.   Abdominal:      General: Bowel sounds are normal. There is no distension.      Palpations: Abdomen is soft. There is no mass.      Tenderness: There is no abdominal tenderness. There is no guarding or rebound.   Musculoskeletal:         General: No tenderness. Normal range of motion.      Cervical back: Normal range of motion and neck supple.   Lymphadenopathy:      Cervical: No cervical adenopathy.   Skin:     General: Skin is warm and dry.      Coloration: Skin is not pale.      Findings: No erythema or rash.   Neurological:      Mental Status: He is alert and oriented to person, place, and time.      Cranial Nerves: No cranial nerve deficit.      Coordination: Coordination normal.   Psychiatric:         Behavior: Behavior normal.         Thought Content: Thought content normal.          Judgment: Judgment normal.           Assessment:       1. Ventricular tachycardia    2. Chest pain, unspecified type    3. Acute combined systolic and diastolic congestive heart failure    4. Aortic atherosclerosis    5. Coronary artery disease involving native coronary artery of native heart without angina pectoris    6. Primary hypertension    7. Hyperlipidemia LDL goal <70    8. Diabetes mellitus without complication    9. Antiphospholipid syndrome    10. Severe obesity (BMI 35.0-39.9) with comorbidity      Results for orders placed or performed during the hospital encounter of 09/26/23   Holter monitor - 24 hour   Result Value Ref Range    Event Monitor Day 0     holter length minutes 0     Holter length hours 24     holter length dec hours 24.00     Sinus min HR 42     Sinus max hr 95     Sinus avg hr 72          Current Outpatient Medications:     atorvastatin (LIPITOR) 20 MG tablet, Take 1 tablet (20 mg total) by mouth once daily IN THE MORNING, Disp: 30 tablet, Rfl: 5    empagliflozin (JARDIANCE) 10 mg tablet, Take 1 tablet (10 mg total) by mouth once daily., Disp: 30 tablet, Rfl: 5    flecainide (TAMBOCOR) 100 MG Tab, Take 1 tablet (100 mg total) by mouth every 12 (twelve) hours., Disp: 60 tablet, Rfl: 11    lancets (FREESTYLE LANCETS) 28 gauge Misc, 1 lancet by Misc.(Non-Drug; Combo Route) route Daily., Disp: 100 each, Rfl: 3    lancets (ONETOUCH ULTRASOFT LANCETS) Misc, 1 Stick by Misc.(Non-Drug; Combo Route) route 2 (two) times daily., Disp: 100 each, Rfl: 2    lisinopriL (PRINIVIL,ZESTRIL) 5 MG tablet, Take 1 tablet (5 mg total) by mouth once daily., Disp: 30 tablet, Rfl: 5    MULTIVIT-MIN/FA/LYCOPEN/LUTEIN (CENTRUM SILVER MEN ORAL), Take by mouth., Disp: , Rfl:     nicotine (NICODERM CQ) 14 mg/24 hr, Place 1 patch onto the skin once daily., Disp: 28 patch, Rfl: 0    nicotine, polacrilex, (NICORETTE) 2 mg Gum, Take 1 each (2 mg total) by mouth as needed (Do not exceed 10 pieces per day)., Disp:  100 each, Rfl: 0    rivaroxaban (XARELTO) 10 mg Tab, Take 1 tablet (10 mg total) by mouth once daily., Disp: 90 tablet, Rfl: 3    SITagliptin phosphate (JANUVIA) 50 MG Tab, Take 1 tablet (50 mg total) by mouth once daily., Disp: 30 tablet, Rfl: 3    tamsulosin (FLOMAX) 0.4 mg Cap, Take 1 capsule (0.4 mg total) by mouth once daily., Disp: 30 capsule, Rfl: 5    varenicline (CHANTIX) 1 mg Tab, Take 1 tablet (1 mg total) by mouth 2 (two) times daily., Disp: 56 tablet, Rfl: 0    verapamiL (CALAN-SR) 180 MG CR tablet, Take 1 tablet (180 mg total) by mouth nightly., Disp: 30 tablet, Rfl: 11    aspirin (ECOTRIN) 81 MG EC tablet, Take 81 mg by mouth once daily., Disp: , Rfl:     nitroGLYCERIN (NITROSTAT) 0.4 MG SL tablet, Place 1 tablet (0.4 mg total) under the tongue every 5 (five) minutes as needed for Chest pain., Disp: 25 each, Rfl: 1     Lab Results   Component Value Date    WBC 8.51 06/16/2023    RBC 4.36 (L) 06/16/2023    HGB 14.2 06/16/2023    HCT 43.5 06/16/2023     (H) 06/16/2023    MCH 32.6 (H) 06/16/2023    MCHC 32.6 06/16/2023    RDW 13.5 06/16/2023     06/16/2023    MPV 9.6 06/16/2023    GRAN 4.6 06/16/2023    GRAN 53.5 06/16/2023    LYMPH 2.9 06/16/2023    LYMPH 33.7 06/16/2023    MONO 0.8 06/16/2023    MONO 8.9 06/16/2023    EOS 0.3 06/16/2023    BASO 0.04 06/16/2023    EOSINOPHIL 2.9 06/16/2023    BASOPHIL 0.5 06/16/2023    MG 1.9 06/16/2023        CMP  Lab Results   Component Value Date     (L) 06/16/2023    K 4.1 06/16/2023     06/16/2023    CO2 24 06/16/2023     (H) 06/16/2023    BUN 9 06/16/2023    CREATININE 0.7 06/16/2023    CALCIUM 9.1 06/16/2023    PROT 6.5 06/16/2023    ALBUMIN 3.8 06/16/2023    BILITOT 1.9 (H) 06/16/2023    ALKPHOS 51 (L) 06/16/2023    AST 14 06/16/2023    ALT 17 06/16/2023    ANIONGAP 7 (L) 06/16/2023    ESTGFRAFRICA >60 04/12/2022    EGFRNONAA >60 04/12/2022        Lab Results   Component Value Date    LABURIN (A) 06/30/2021     METHICILLIN  RESISTANT STAPHYLOCOCCUS AUREUS  >100,000cfu/ml              Results for orders placed or performed during the hospital encounter of 06/14/23   EKG 12-lead    Collection Time: 06/15/23 11:33 PM    Narrative    Test Reason : R00.1,    Vent. Rate : 046 BPM     Atrial Rate : 046 BPM     P-R Int : 212 ms          QRS Dur : 106 ms      QT Int : 492 ms       P-R-T Axes : 040 -10 -21 degrees     QTc Int : 430 ms    Sinus bradycardia with 1st degree A-V block  Otherwise normal ECG  When compared with ECG of 15-LUIS ALBERTO-2023 00:35,  Premature ventricular complexes are no longer Present  Vent. rate has decreased BY  71 BPM  Confirmed by Aiden EATON, Basil SOSA ([id number]) on 6/16/2023 8:16:49 AM    Referred By: HELEN   SELF           Confirmed By:Basil Wolfe MD                  Plan:       Problem List Items Addressed This Visit          Cardiac/Vascular    Hyperlipidemia LDL goal <70     Atorvastatin 20 mg to continue.  His readings have been stable.  Condition controlled.  He has mixed hyperlipidemia picture.         Aortic atherosclerosis     Condition stable.         Coronary artery disease involving native coronary artery of native heart without angina pectoris     He is having chest pain.  Nitroglycerin prescribed for assessment of clinical improvement with nitroglycerin.  Currently most of his chest pains are occurring at rest.  They never occur with activity.    Based on his PET stress, no heart catheterization advised at this time but we did discuss the possibility given confirmed coronary disease with coronary calcifications.         Ventricular tachycardia - Primary     There are discussions of ablation therapy in January.  Holter monitor ordered today.  Flecainide, verapamil to continue.         Relevant Orders    Holter monitor - 24 hour    Primary hypertension     Lisinopril 5 mg, verapamil 180 mg to continue.  Blood pressure today 132/70 mm Hg.         Acute combined systolic and diastolic congestive heart failure      Most recent estimation of EF 45%.  Potential etiology includes ventricular tachycardia with prolonged runs chronically.  He does have coronary disease but his PET stress was negative for significant ischemia.              Hematology    Antiphospholipid syndrome     History of DVT remote.  Xarelto 10 mg ordered for DVT prophylaxis.            Endocrine    Severe obesity (BMI 35.0-39.9) with comorbidity     Weight loss encouraged.         Diabetes mellitus without complication     His treatment is currently Januvia.  It will be continued.          Other Visit Diagnoses       Chest pain, unspecified type        Relevant Medications    nitroGLYCERIN (NITROSTAT) 0.4 MG SL tablet                 I ordered the Holter monitor.  I will see him in February.  We discussed his chest pain.  I prescribed sublingual nitroglycerin.  I would like to see if he has a clinical response.  My suspicion for angina is intermediate to low.           Neno Constantino MD  10/19/2023   3:40 PM

## 2023-10-19 NOTE — ASSESSMENT & PLAN NOTE
Most recent estimation of EF 45%.  Potential etiology includes ventricular tachycardia with prolonged runs chronically.  He does have coronary disease but his PET stress was negative for significant ischemia.

## 2023-10-19 NOTE — ASSESSMENT & PLAN NOTE
He is having chest pain.  Nitroglycerin prescribed for assessment of clinical improvement with nitroglycerin.  Currently most of his chest pains are occurring at rest.  They never occur with activity.    Based on his PET stress, no heart catheterization advised at this time but we did discuss the possibility given confirmed coronary disease with coronary calcifications.

## 2023-10-19 NOTE — ASSESSMENT & PLAN NOTE
There are discussions of ablation therapy in January.  Holter monitor ordered today.  Flecainide, verapamil to continue.

## 2023-10-19 NOTE — ASSESSMENT & PLAN NOTE
Atorvastatin 20 mg to continue.  His readings have been stable.  Condition controlled.  He has mixed hyperlipidemia picture.

## 2023-10-24 ENCOUNTER — HOSPITAL ENCOUNTER (OUTPATIENT)
Dept: PULMONOLOGY | Facility: HOSPITAL | Age: 63
Discharge: HOME | End: 2023-10-24
Attending: INTERNAL MEDICINE
Payer: COMMERCIAL

## 2023-10-24 DIAGNOSIS — I47.20 VENTRICULAR TACHYCARDIA: ICD-10-CM

## 2023-10-24 PROCEDURE — 93227 XTRNL ECG REC<48 HR R&I: CPT | Mod: ,,, | Performed by: INTERNAL MEDICINE

## 2023-10-24 PROCEDURE — 93225 XTRNL ECG REC<48 HRS REC: CPT

## 2023-10-24 PROCEDURE — 93227 HOLTER MONITOR - 24 HOUR (CUPID ONLY): ICD-10-PCS | Mod: ,,, | Performed by: INTERNAL MEDICINE

## 2023-10-27 ENCOUNTER — PATIENT MESSAGE (OUTPATIENT)
Dept: CARDIOLOGY | Facility: CLINIC | Age: 63
End: 2023-10-27
Payer: COMMERCIAL

## 2023-10-27 LAB
OHS CV EVENT MONITOR DAY: 0
OHS CV HOLTER LENGTH DECIMAL HOURS: 24
OHS CV HOLTER LENGTH HOURS: 24
OHS CV HOLTER LENGTH MINUTES: 0
OHS CV HOLTER SINUS AVERAGE HR: 77
OHS CV HOLTER SINUS MAX HR: 98
OHS CV HOLTER SINUS MIN HR: 46

## 2023-10-30 ENCOUNTER — TELEPHONE (OUTPATIENT)
Dept: INTERNAL MEDICINE | Facility: CLINIC | Age: 63
End: 2023-10-30
Payer: COMMERCIAL

## 2023-10-30 NOTE — TELEPHONE ENCOUNTER
----- Message from Wilma Yoon sent at 10/30/2023 12:44 PM CDT -----  Contact: Christin/ [facility] sleep lab  [place]  MRN: [id number]  : 1960  PCP: Irma Biswas  Home Phone      [phone]  Work Phone      Not on file.  Mobile          [phone]      MESSAGE:     Christin with [facility] sleep lab called stating the pt failed 2 home sleep studies and insurance keeps denying in Lab and would like to do a peer to peer to get this approved.       Please advise  [phone]

## 2023-10-31 ENCOUNTER — CLINICAL SUPPORT (OUTPATIENT)
Dept: SMOKING CESSATION | Facility: CLINIC | Age: 63
End: 2023-10-31
Payer: COMMERCIAL

## 2023-10-31 DIAGNOSIS — F17.200 NICOTINE DEPENDENCE: Primary | ICD-10-CM

## 2023-10-31 PROCEDURE — 99403 PREV MED CNSL INDIV APPRX 45: CPT | Mod: S$GLB,,,

## 2023-10-31 PROCEDURE — 99999 PR PBB SHADOW E&M-EST. PATIENT-LVL I: ICD-10-PCS | Mod: PBBFAC,,,

## 2023-10-31 PROCEDURE — 99999 PR PBB SHADOW E&M-EST. PATIENT-LVL I: CPT | Mod: PBBFAC,,,

## 2023-10-31 PROCEDURE — 99403 PR PREVENT COUNSEL,INDIV,45 MIN: ICD-10-PCS | Mod: S$GLB,,,

## 2023-11-01 NOTE — PROGRESS NOTES
Individual Follow-Up Form    11/1/2023    Quit Date: 10/29/23    Clinical Status of Patient: Outpatient    Length of Service: 30 minutes    Continuing Medication: yes  Chantix or Patches    Other Medications: none     Target Symptoms: Withdrawal and medication side effects. The following were  rated moderate (3) to severe (4) on TCRS:  Moderate (3): none  Severe (4): none    Comments: Patient seen in clinic regarding smoking cessation follow up. Pt reports that he quit smoking on 10/29/23. Congratulated pt for meeting his goal. Mr. Kat expresses that day 3 is typically tough for him. Discussed preparations to aid with urges/cravings/withdrawal symptoms. Pt remains on 1 mg Chantix BID and 14 mg nicotine patch QD. No adverse effects, low moods, or abnormal changes in behaviors at this time. Discussed mindset at length. Encouraged pt to visualize his life being free from tobacco. Notified pt of upcoming benefit end date and pt opts to renew in order to continue with program. Pt's exhaled carbon monoxide level was 3 ppm as per [employer]. (non- smoker = 0-5 ppm.)  Pt to continue with counseling in 2 weeks.    Diagnosis: F17.200    Next Visit: 2 weeks

## 2023-11-03 ENCOUNTER — LAB VISIT (OUTPATIENT)
Dept: LAB | Facility: HOSPITAL | Age: 63
End: 2023-11-03
Attending: NURSE PRACTITIONER
Payer: COMMERCIAL

## 2023-11-03 DIAGNOSIS — E78.5 HYPERLIPIDEMIA LDL GOAL <70: ICD-10-CM

## 2023-11-03 DIAGNOSIS — R79.89 LOW TESTOSTERONE IN MALE: ICD-10-CM

## 2023-11-03 DIAGNOSIS — I10 PRIMARY HYPERTENSION: ICD-10-CM

## 2023-11-03 DIAGNOSIS — Z12.5 PROSTATE CANCER SCREENING: ICD-10-CM

## 2023-11-03 DIAGNOSIS — E11.65 UNCONTROLLED TYPE 2 DIABETES MELLITUS WITH HYPERGLYCEMIA: ICD-10-CM

## 2023-11-03 DIAGNOSIS — F17.200 NICOTINE DEPENDENCE: ICD-10-CM

## 2023-11-03 DIAGNOSIS — Z11.59 NEED FOR HEPATITIS C SCREENING TEST: ICD-10-CM

## 2023-11-03 DIAGNOSIS — E66.01 SEVERE OBESITY (BMI 35.0-39.9) WITH COMORBIDITY: ICD-10-CM

## 2023-11-03 LAB
ALBUMIN SERPL BCP-MCNC: 4.5 G/DL (ref 3.5–5.2)
ALBUMIN/CREAT UR: 36 UG/MG (ref 0–30)
ALP SERPL-CCNC: 69 U/L (ref 55–135)
ALT SERPL W/O P-5'-P-CCNC: 41 U/L (ref 10–44)
ANION GAP SERPL CALC-SCNC: 14 MMOL/L (ref 8–16)
AST SERPL-CCNC: 24 U/L (ref 10–40)
BASOPHILS # BLD AUTO: 0.06 K/UL (ref 0–0.2)
BASOPHILS NFR BLD: 0.6 % (ref 0–1.9)
BILIRUB SERPL-MCNC: 1.6 MG/DL (ref 0.1–1)
BUN SERPL-MCNC: 14 MG/DL (ref 8–23)
CALCIUM SERPL-MCNC: 9.9 MG/DL (ref 8.7–10.5)
CHLORIDE SERPL-SCNC: 102 MMOL/L (ref 95–110)
CHOLEST SERPL-MCNC: 131 MG/DL (ref 120–199)
CHOLEST/HDLC SERPL: 4 {RATIO} (ref 2–5)
CO2 SERPL-SCNC: 21 MMOL/L (ref 23–29)
COMPLEXED PSA SERPL-MCNC: 0.46 NG/ML (ref 0–4)
CREAT SERPL-MCNC: 1 MG/DL (ref 0.5–1.4)
CREAT UR-MCNC: 116.6 MG/DL (ref 23–375)
DIFFERENTIAL METHOD: ABNORMAL
EOSINOPHIL # BLD AUTO: 0.2 K/UL (ref 0–0.5)
EOSINOPHIL NFR BLD: 1.7 % (ref 0–8)
ERYTHROCYTE [DISTWIDTH] IN BLOOD BY AUTOMATED COUNT: 13.1 % (ref 11.5–14.5)
EST. GFR  (NO RACE VARIABLE): >60 ML/MIN/1.73 M^2
ESTIMATED AVG GLUCOSE: 157 MG/DL (ref 68–131)
GLUCOSE SERPL-MCNC: 151 MG/DL (ref 70–110)
HBA1C MFR BLD: 7.1 % (ref 4–5.6)
HCT VFR BLD AUTO: 52.3 % (ref 40–54)
HCV AB SERPL QL IA: NORMAL
HDLC SERPL-MCNC: 33 MG/DL (ref 40–75)
HDLC SERPL: 25.2 % (ref 20–50)
HGB BLD-MCNC: 17.8 G/DL (ref 14–18)
IMM GRANULOCYTES # BLD AUTO: 0.11 K/UL (ref 0–0.04)
IMM GRANULOCYTES NFR BLD AUTO: 1.1 % (ref 0–0.5)
LDLC SERPL CALC-MCNC: 62 MG/DL (ref 63–159)
LYMPHOCYTES # BLD AUTO: 2.3 K/UL (ref 1–4.8)
LYMPHOCYTES NFR BLD: 22 % (ref 18–48)
MCH RBC QN AUTO: 32.1 PG (ref 27–31)
MCHC RBC AUTO-ENTMCNC: 34 G/DL (ref 32–36)
MCV RBC AUTO: 94 FL (ref 82–98)
MICROALBUMIN UR DL<=1MG/L-MCNC: 42 UG/ML
MONOCYTES # BLD AUTO: 0.9 K/UL (ref 0.3–1)
MONOCYTES NFR BLD: 9 % (ref 4–15)
NEUTROPHILS # BLD AUTO: 6.9 K/UL (ref 1.8–7.7)
NEUTROPHILS NFR BLD: 65.6 % (ref 38–73)
NONHDLC SERPL-MCNC: 98 MG/DL
NRBC BLD-RTO: 0 /100 WBC
PLATELET # BLD AUTO: 203 K/UL (ref 150–450)
PMV BLD AUTO: 8.8 FL (ref 9.2–12.9)
POTASSIUM SERPL-SCNC: 4.7 MMOL/L (ref 3.5–5.1)
PROT SERPL-MCNC: 7.9 G/DL (ref 6–8.4)
RBC # BLD AUTO: 5.55 M/UL (ref 4.6–6.2)
SODIUM SERPL-SCNC: 137 MMOL/L (ref 136–145)
TRIGL SERPL-MCNC: 180 MG/DL (ref 30–150)
TSH SERPL DL<=0.005 MIU/L-ACNC: 1.99 UIU/ML (ref 0.4–4)
WBC # BLD AUTO: 10.46 K/UL (ref 3.9–12.7)

## 2023-11-03 PROCEDURE — 83036 HEMOGLOBIN GLYCOSYLATED A1C: CPT | Performed by: NURSE PRACTITIONER

## 2023-11-03 PROCEDURE — 85025 COMPLETE CBC W/AUTO DIFF WBC: CPT | Performed by: NURSE PRACTITIONER

## 2023-11-03 PROCEDURE — 84270 ASSAY OF SEX HORMONE GLOBUL: CPT | Performed by: NURSE PRACTITIONER

## 2023-11-03 PROCEDURE — 84403 ASSAY OF TOTAL TESTOSTERONE: CPT | Performed by: NURSE PRACTITIONER

## 2023-11-03 PROCEDURE — 80053 COMPREHEN METABOLIC PANEL: CPT | Performed by: NURSE PRACTITIONER

## 2023-11-03 PROCEDURE — 80061 LIPID PANEL: CPT | Performed by: NURSE PRACTITIONER

## 2023-11-03 PROCEDURE — 84153 ASSAY OF PSA TOTAL: CPT | Performed by: NURSE PRACTITIONER

## 2023-11-03 PROCEDURE — 86803 HEPATITIS C AB TEST: CPT | Performed by: NURSE PRACTITIONER

## 2023-11-03 PROCEDURE — 82570 ASSAY OF URINE CREATININE: CPT | Performed by: NURSE PRACTITIONER

## 2023-11-03 PROCEDURE — 36415 COLL VENOUS BLD VENIPUNCTURE: CPT | Performed by: NURSE PRACTITIONER

## 2023-11-03 PROCEDURE — 84443 ASSAY THYROID STIM HORMONE: CPT | Performed by: NURSE PRACTITIONER

## 2023-11-03 RX ORDER — VARENICLINE TARTRATE 1 MG/1
1 TABLET, FILM COATED ORAL 2 TIMES DAILY
Qty: 56 TABLET | Refills: 0 | Status: SHIPPED | OUTPATIENT
Start: 2023-11-03 | End: 2024-02-14 | Stop reason: SDUPTHER

## 2023-11-06 ENCOUNTER — PATIENT MESSAGE (OUTPATIENT)
Dept: INTERNAL MEDICINE | Facility: CLINIC | Age: 63
End: 2023-11-06
Payer: COMMERCIAL

## 2023-11-06 DIAGNOSIS — I10 PRIMARY HYPERTENSION: ICD-10-CM

## 2023-11-06 DIAGNOSIS — I49.3 PVC'S (PREMATURE VENTRICULAR CONTRACTIONS): Primary | ICD-10-CM

## 2023-11-06 DIAGNOSIS — Z01.818 PRE-OP TESTING: ICD-10-CM

## 2023-11-06 DIAGNOSIS — E66.01 SEVERE OBESITY (BMI 35.0-39.9) WITH COMORBIDITY: ICD-10-CM

## 2023-11-06 DIAGNOSIS — I49.9 CARDIAC ARRHYTHMIA, UNSPECIFIED CARDIAC ARRHYTHMIA TYPE: ICD-10-CM

## 2023-11-06 NOTE — TELEPHONE ENCOUNTER
Hey I see that he is scheduled to see Dr. Oconnor for his lab follow up. Just wondering what happened if you can find out.

## 2023-11-07 DIAGNOSIS — E11.65 UNCONTROLLED TYPE 2 DIABETES MELLITUS WITH HYPERGLYCEMIA: ICD-10-CM

## 2023-11-08 ENCOUNTER — OFFICE VISIT (OUTPATIENT)
Dept: INTERNAL MEDICINE | Facility: CLINIC | Age: 63
End: 2023-11-08
Payer: COMMERCIAL

## 2023-11-08 VITALS
OXYGEN SATURATION: 98 % | BODY MASS INDEX: 36.61 KG/M2 | HEIGHT: 73 IN | DIASTOLIC BLOOD PRESSURE: 78 MMHG | RESPIRATION RATE: 16 BRPM | SYSTOLIC BLOOD PRESSURE: 110 MMHG | WEIGHT: 276.25 LBS | HEART RATE: 76 BPM

## 2023-11-08 DIAGNOSIS — Z72.0 TOBACCO ABUSE: ICD-10-CM

## 2023-11-08 DIAGNOSIS — I47.20 VENTRICULAR TACHYCARDIA: ICD-10-CM

## 2023-11-08 DIAGNOSIS — E11.65 UNCONTROLLED TYPE 2 DIABETES MELLITUS WITH HYPERGLYCEMIA: ICD-10-CM

## 2023-11-08 DIAGNOSIS — D68.61 ANTIPHOSPHOLIPID SYNDROME: ICD-10-CM

## 2023-11-08 DIAGNOSIS — I50.42 CHRONIC COMBINED SYSTOLIC AND DIASTOLIC CONGESTIVE HEART FAILURE: ICD-10-CM

## 2023-11-08 DIAGNOSIS — E66.01 SEVERE OBESITY (BMI 35.0-39.9) WITH COMORBIDITY: ICD-10-CM

## 2023-11-08 DIAGNOSIS — E78.5 HYPERLIPIDEMIA LDL GOAL <70: ICD-10-CM

## 2023-11-08 DIAGNOSIS — I25.10 CORONARY ARTERY DISEASE INVOLVING NATIVE CORONARY ARTERY OF NATIVE HEART WITHOUT ANGINA PECTORIS: ICD-10-CM

## 2023-11-08 DIAGNOSIS — R17 ELEVATED BILIRUBIN: ICD-10-CM

## 2023-11-08 DIAGNOSIS — I70.0 AORTIC ATHEROSCLEROSIS: ICD-10-CM

## 2023-11-08 DIAGNOSIS — I10 PRIMARY HYPERTENSION: ICD-10-CM

## 2023-11-08 DIAGNOSIS — Z76.89 ENCOUNTER TO ESTABLISH CARE: Primary | ICD-10-CM

## 2023-11-08 PROBLEM — R94.31 ABNORMAL EKG: Status: RESOLVED | Noted: 2020-01-31 | Resolved: 2023-11-08

## 2023-11-08 PROBLEM — Z12.11 SCREENING FOR COLON CANCER: Status: RESOLVED | Noted: 2017-05-04 | Resolved: 2023-11-08

## 2023-11-08 PROBLEM — Z71.6 TOBACCO ABUSE COUNSELING: Status: RESOLVED | Noted: 2020-01-31 | Resolved: 2023-11-08

## 2023-11-08 PROBLEM — E11.9 TYPE 2 DIABETES MELLITUS TREATED WITHOUT INSULIN: Status: RESOLVED | Noted: 2017-03-28 | Resolved: 2023-11-08

## 2023-11-08 PROBLEM — E11.9 DIABETES MELLITUS WITHOUT COMPLICATION: Status: RESOLVED | Noted: 2023-06-09 | Resolved: 2023-11-08

## 2023-11-08 PROCEDURE — 2023F DILAT RTA XM W/O RTNOPTHY: CPT | Mod: CPTII,S$GLB,, | Performed by: INTERNAL MEDICINE

## 2023-11-08 PROCEDURE — 3060F PR POS MICROALBUMINURIA RESULT DOCUMENTED/REVIEW: ICD-10-PCS | Mod: CPTII,S$GLB,, | Performed by: INTERNAL MEDICINE

## 2023-11-08 PROCEDURE — 1159F PR MEDICATION LIST DOCUMENTED IN MEDICAL RECORD: ICD-10-PCS | Mod: CPTII,S$GLB,, | Performed by: INTERNAL MEDICINE

## 2023-11-08 PROCEDURE — 3074F PR MOST RECENT SYSTOLIC BLOOD PRESSURE < 130 MM HG: ICD-10-PCS | Mod: CPTII,S$GLB,, | Performed by: INTERNAL MEDICINE

## 2023-11-08 PROCEDURE — 1160F RVW MEDS BY RX/DR IN RCRD: CPT | Mod: CPTII,S$GLB,, | Performed by: INTERNAL MEDICINE

## 2023-11-08 PROCEDURE — 3066F NEPHROPATHY DOC TX: CPT | Mod: CPTII,S$GLB,, | Performed by: INTERNAL MEDICINE

## 2023-11-08 PROCEDURE — 3078F DIAST BP <80 MM HG: CPT | Mod: CPTII,S$GLB,, | Performed by: INTERNAL MEDICINE

## 2023-11-08 PROCEDURE — 99999 PR PBB SHADOW E&M-EST. PATIENT-LVL V: CPT | Mod: PBBFAC,,, | Performed by: INTERNAL MEDICINE

## 2023-11-08 PROCEDURE — 2023F PR DILATED RETINAL EXAM W/O EVID OF RETINOPATHY: ICD-10-PCS | Mod: CPTII,S$GLB,, | Performed by: INTERNAL MEDICINE

## 2023-11-08 PROCEDURE — 99215 PR OFFICE/OUTPT VISIT, EST, LEVL V, 40-54 MIN: ICD-10-PCS | Mod: S$GLB,,, | Performed by: INTERNAL MEDICINE

## 2023-11-08 PROCEDURE — 3008F PR BODY MASS INDEX (BMI) DOCUMENTED: ICD-10-PCS | Mod: CPTII,S$GLB,, | Performed by: INTERNAL MEDICINE

## 2023-11-08 PROCEDURE — 4010F ACE/ARB THERAPY RXD/TAKEN: CPT | Mod: CPTII,S$GLB,, | Performed by: INTERNAL MEDICINE

## 2023-11-08 PROCEDURE — 99215 OFFICE O/P EST HI 40 MIN: CPT | Mod: S$GLB,,, | Performed by: INTERNAL MEDICINE

## 2023-11-08 PROCEDURE — 3008F BODY MASS INDEX DOCD: CPT | Mod: CPTII,S$GLB,, | Performed by: INTERNAL MEDICINE

## 2023-11-08 PROCEDURE — 99999 PR PBB SHADOW E&M-EST. PATIENT-LVL V: ICD-10-PCS | Mod: PBBFAC,,, | Performed by: INTERNAL MEDICINE

## 2023-11-08 PROCEDURE — 1160F PR REVIEW ALL MEDS BY PRESCRIBER/CLIN PHARMACIST DOCUMENTED: ICD-10-PCS | Mod: CPTII,S$GLB,, | Performed by: INTERNAL MEDICINE

## 2023-11-08 PROCEDURE — 3066F PR DOCUMENTATION OF TREATMENT FOR NEPHROPATHY: ICD-10-PCS | Mod: CPTII,S$GLB,, | Performed by: INTERNAL MEDICINE

## 2023-11-08 PROCEDURE — 3051F PR MOST RECENT HEMOGLOBIN A1C LEVEL 7.0 - < 8.0%: ICD-10-PCS | Mod: CPTII,S$GLB,, | Performed by: INTERNAL MEDICINE

## 2023-11-08 PROCEDURE — 1159F MED LIST DOCD IN RCRD: CPT | Mod: CPTII,S$GLB,, | Performed by: INTERNAL MEDICINE

## 2023-11-08 PROCEDURE — 4010F PR ACE/ARB THEARPY RXD/TAKEN: ICD-10-PCS | Mod: CPTII,S$GLB,, | Performed by: INTERNAL MEDICINE

## 2023-11-08 PROCEDURE — 3060F POS MICROALBUMINURIA REV: CPT | Mod: CPTII,S$GLB,, | Performed by: INTERNAL MEDICINE

## 2023-11-08 PROCEDURE — 3051F HG A1C>EQUAL 7.0%<8.0%: CPT | Mod: CPTII,S$GLB,, | Performed by: INTERNAL MEDICINE

## 2023-11-08 PROCEDURE — 3074F SYST BP LT 130 MM HG: CPT | Mod: CPTII,S$GLB,, | Performed by: INTERNAL MEDICINE

## 2023-11-08 PROCEDURE — 3078F PR MOST RECENT DIASTOLIC BLOOD PRESSURE < 80 MM HG: ICD-10-PCS | Mod: CPTII,S$GLB,, | Performed by: INTERNAL MEDICINE

## 2023-11-08 NOTE — PROGRESS NOTES
"Subjective:       Patient ID: [place] is a 63 y.o. male.    Chief Complaint: Establish Care      HPI:  Patient is new to me and presents to establish care. He has CAD, HTN, HLD, CHF, DM type 2, antiphospholipid syndrome, VT. Labs from 11/3/23 personally reviewed, interpreted and discussed today (ordered by EMILEE Biswas).     A1C 7.1%, increasing  Microalb + ratio; on ACEi  PSA 0.46, normal  LDL 62, at goal  , mild elevation  GFR > 60, normal    HTN: on lisinopril and verapamil (VT). BP at goal. Denies med side effects. Intermittent chest pains (see below) for which he sees cardiology. No swelling.     HLD: on atorvastatin 20mg LDL. At goa. Denies med side effects    CAD: following with Dr. Woodruff. NTG added 10/18/23 for recurrent chest pain to assess for improvement. PET stress was completed; possible LHC in the future.     Systolic CHF: EF 45%. On ACEi. No diuretics. He does get SOB a/w with VT and smoking but no reports of worsening swelling.     VT: following with EP and started on flecainide. Now planing for possible ablation.     DM type 2: on januvia and Jardiance. A1C 7.1%. Meds were adjusted within last 6-8 week however and home glucose ahs been at goal. Some spikes in the evening due to is "sweet tooth".   On acei and statin.     Antiphospholipid: diagnosed with DVT years ago (around 2018). ON Xarelto for DVT prophylaxis. No recurrent clots.       Tobacco use: working with smoking cessation clinic  EtOH use: no daily etoh use  Illicit drug use: denies use      Past Medical History:   Diagnosis Date    Acute combined systolic and diastolic congestive heart failure 6/15/2023    APS (antiphospholipid syndrome)     Cancer     skin cancer- R. hand    Coronary artery disease involving native coronary artery of native heart without angina pectoris 6/9/2023    Elevated homocysteine     Screening for colon cancer 5/4/2017       Family History   Problem Relation Age of Onset    No Known Problems Mother "     No Known Problems Father     Diabetes Maternal Grandmother        Social History     Socioeconomic History    Marital status: Single   Tobacco Use    Smoking status: Every Day     Current packs/day: 0.50     Average packs/day: 1 pack/day for 35.7 years (37.3 ttl pk-yrs)     Types: Cigarettes     Start date: 3/28/1987     Last attempt to quit: 8/3/2019    Smokeless tobacco: Never    Tobacco comments:     Patient started smoking cessation program 7/11/23   Substance and Sexual Activity    Alcohol use: Not Currently     Comment: Occasionally 3-4 drinks per month at the most    Drug use: No    Sexual activity: Yes     Comment: single-in a relationship     Social Determinants of Health     Financial Resource Strain: Low Risk  (11/7/2023)    Overall Financial Resource Strain (CARDIA)     Difficulty of Paying Living Expenses: Not very hard   Food Insecurity: No Food Insecurity (11/7/2023)    Hunger Vital Sign     Worried About Running Out of Food in the Last Year: Never true     Ran Out of Food in the Last Year: Never true   Transportation Needs: No Transportation Needs (11/7/2023)    PRAPARE - Transportation     Lack of Transportation (Medical): No     Lack of Transportation (Non-Medical): No   Physical Activity: Insufficiently Active (11/7/2023)    Exercise Vital Sign     Days of Exercise per Week: 2 days     Minutes of Exercise per Session: 30 min   Stress: No Stress Concern Present (11/7/2023)    [employer] - Occupational Stress Questionnaire     Feeling of Stress : Only a little   Social Connections: Moderately Integrated (11/7/2023)    Social Connection and Isolation Panel [NHANES]     Frequency of Communication with Friends and Family: More than three times a week     Frequency of Social Gatherings with Friends and Family: Twice a week     Attends Druze Services: More than 4 times per year     Active Member of Clubs or Organizations: Yes     Attends Club or Organization  Meetings: More than 4 times per year     Marital Status: Never    Housing Stability: High Risk (11/7/2023)    Housing Stability Vital Sign     Unable to Pay for Housing in the Last Year: Yes     Number of Places Lived in the Last Year: 1     Unstable Housing in the Last Year: No       Review of Systems   Constitutional:  Negative for activity change, fatigue, fever and unexpected weight change.   HENT:  Negative for congestion, ear pain, hearing loss, rhinorrhea and sore throat.    Eyes:  Negative for pain, redness and visual disturbance.   Respiratory:  Positive for shortness of breath. Negative for cough and wheezing.    Cardiovascular:  Positive for chest pain (chronic, intermittent) and palpitations. Negative for leg swelling.   Gastrointestinal:  Negative for abdominal pain, constipation, diarrhea, nausea and vomiting.   Genitourinary:  Negative for decreased urine volume, dysuria, frequency and urgency.   Musculoskeletal:  Negative for back pain, joint swelling and neck pain.   Skin:  Negative for color change, rash and wound.   Neurological:  Negative for dizziness, tremors, weakness, light-headedness and headaches.         Objective:      Physical Exam  Vitals reviewed.   Constitutional:       General: He is not in acute distress.     Appearance: He is well-developed. He is obese.   HENT:      Head: Normocephalic and atraumatic.      Right Ear: External ear normal.      Left Ear: External ear normal.   Eyes:      General:         Right eye: No discharge.         Left eye: No discharge.      Conjunctiva/sclera: Conjunctivae normal.   Neck:      Thyroid: No thyromegaly.   Cardiovascular:      Rate and Rhythm: Normal rate and regular rhythm.      Heart sounds: No murmur heard.  Pulmonary:      Effort: Pulmonary effort is normal. No respiratory distress.      Breath sounds: Normal breath sounds. No wheezing or rales.   Abdominal:      Palpations: Abdomen is soft.      Tenderness: There is no abdominal  tenderness.      Hernia: A hernia (umbilical hernia, stable) is present.   Skin:     General: Skin is warm and dry.   Neurological:      Mental Status: He is alert and oriented to person, place, and time.   Psychiatric:         Behavior: Behavior normal.         Thought Content: Thought content normal.         Assessment:       1. Encounter to establish care    2. Uncontrolled type 2 diabetes mellitus with hyperglycemia    3. Coronary artery disease involving native coronary artery of native heart without angina pectoris    4. Primary hypertension    5. Hyperlipidemia LDL goal <70    6. Aortic atherosclerosis    7. Chronic combined systolic and diastolic congestive heart failure    8. Ventricular tachycardia    9. Antiphospholipid syndrome    10. Severe obesity (BMI 35.0-39.9) with comorbidity    11. Elevated bilirubin    12. Tobacco abuse        Plan:       1. Encounter to establish care  Meds reconciled  Problem list reviewed and updated  PMH, PSH, SH and FH reviewed    2. Uncontrolled type 2 diabetes mellitus with hyperglycemia  Chronic, uncontrolled  A1C trended up--however, not up drastically and meds were adjusted within last few weeks  Home glucose at goal  Will cont same meds but see him back 3 months with repeat A1C. If not improved will increase Januvia  ADA diet discussed  -     CBC Auto Differential; Future; Expected date: 02/06/2024  -     Comprehensive Metabolic Panel; Future; Expected date: 02/06/2024  -     Hemoglobin A1C; Future; Expected date: 02/06/2024  -     Lipid Panel; Future; Expected date: 02/06/2024    3. Coronary artery disease involving native coronary artery of native heart without angina pectoris  Chronic stable  Cont cardiology follow up as planned  Cont statin therapy  Cont BP control  Glucose control as noted above    -     CBC Auto Differential; Future; Expected date: 02/06/2024  -     Comprehensive Metabolic Panel; Future; Expected date: 02/06/2024  -     Hemoglobin A1C; Future;  Expected date: 02/06/2024  -     Lipid Panel; Future; Expected date: 02/06/2024    4. Primary hypertension  Chronic controlled  Continue medications at same dose  Low Na diet  Exercise, weight loss  Check BP and keep log for next visit    -     CBC Auto Differential; Future; Expected date: 02/06/2024  -     Comprehensive Metabolic Panel; Future; Expected date: 02/06/2024  -     Hemoglobin A1C; Future; Expected date: 02/06/2024  -     Lipid Panel; Future; Expected date: 02/06/2024    5. Hyperlipidemia LDL goal <70  Chronic controlled  LDL at goal  Cont statin  -     CBC Auto Differential; Future; Expected date: 02/06/2024  -     Comprehensive Metabolic Panel; Future; Expected date: 02/06/2024  -     Hemoglobin A1C; Future; Expected date: 02/06/2024  -     Lipid Panel; Future; Expected date: 02/06/2024    6. Aortic atherosclerosis  Noted on prior imaging  Cont statin  -     CBC Auto Differential; Future; Expected date: 02/06/2024  -     Comprehensive Metabolic Panel; Future; Expected date: 02/06/2024  -     Hemoglobin A1C; Future; Expected date: 02/06/2024  -     Lipid Panel; Future; Expected date: 02/06/2024    7. Chronic combined systolic and diastolic congestive heart failure  Chronic stable  No signs of volume overload on exam today  Cont ACEi  Follow up cards as planned  -     CBC Auto Differential; Future; Expected date: 02/06/2024  -     Comprehensive Metabolic Panel; Future; Expected date: 02/06/2024  -     Hemoglobin A1C; Future; Expected date: 02/06/2024  -     Lipid Panel; Future; Expected date: 02/06/2024    8. Ventricular tachycardia  Chronic stable  RRR on exam today  Cont flecanide per EP Recs  Follow up EP as planned  -     CBC Auto Differential; Future; Expected date: 02/06/2024  -     Comprehensive Metabolic Panel; Future; Expected date: 02/06/2024  -     Hemoglobin A1C; Future; Expected date: 02/06/2024  -     Lipid Panel; Future; Expected date: 02/06/2024    9. Antiphospholipid syndrome  Chronic  stable  Cont Xarelto lifelong  -     CBC Auto Differential; Future; Expected date: 02/06/2024  -     Comprehensive Metabolic Panel; Future; Expected date: 02/06/2024  -     Hemoglobin A1C; Future; Expected date: 02/06/2024  -     Lipid Panel; Future; Expected date: 02/06/2024    10. Severe obesity (BMI 35.0-39.9) with comorbidity  Chronic stable  Diet, exercise, weight loss  -     CBC Auto Differential; Future; Expected date: 02/06/2024  -     Comprehensive Metabolic Panel; Future; Expected date: 02/06/2024  -     Hemoglobin A1C; Future; Expected date: 02/06/2024  -     Lipid Panel; Future; Expected date: 02/06/2024    11. Elevated bilirubin  Chronic stable  LFTs normal  S/p cholecystectomy  Likely benign, follow  -     CBC Auto Differential; Future; Expected date: 02/06/2024  -     Comprehensive Metabolic Panel; Future; Expected date: 02/06/2024  -     Hemoglobin A1C; Future; Expected date: 02/06/2024  -     Lipid Panel; Future; Expected date: 02/06/2024    12. Tobacco abuse  In smoking cessation clinic--work in progress       RTC 3 months with labs and PRN

## 2023-11-09 LAB
ALBUMIN SERPL-MCNC: 4.7 G/DL (ref 3.6–5.1)
SHBG SERPL-SCNC: 30 NMOL/L (ref 22–77)
TESTOST FREE SERPL-MCNC: 21.3 PG/ML (ref 46–224)
TESTOST SERPL-MCNC: 165 NG/DL (ref 250–1100)
TESTOSTERONE.FREE+WB SERPL-MCNC: 45.8 NG/DL

## 2023-11-14 ENCOUNTER — TELEPHONE (OUTPATIENT)
Dept: ELECTROPHYSIOLOGY | Facility: CLINIC | Age: 63
End: 2023-11-14
Payer: COMMERCIAL

## 2023-11-14 NOTE — TELEPHONE ENCOUNTER
----- Message from Corazon Polk sent at 11/14/2023  2:08 PM CST -----  Regarding: procedure  Pt is calling to confirm the procedure date you offered him on 12/21/23 and can be reached at [phone].    Thank you

## 2023-11-20 ENCOUNTER — PATIENT MESSAGE (OUTPATIENT)
Dept: ELECTROPHYSIOLOGY | Facility: CLINIC | Age: 63
End: 2023-11-20
Payer: COMMERCIAL

## 2023-11-20 ENCOUNTER — TELEPHONE (OUTPATIENT)
Dept: INTERNAL MEDICINE | Facility: CLINIC | Age: 63
End: 2023-11-20
Payer: COMMERCIAL

## 2023-11-20 NOTE — TELEPHONE ENCOUNTER
----- Message from Wilma Yoon sent at 2023  9:53 AM CST -----  Contact: Christin/ [facility] sleep study  [place]  MRN: [id number]  : 1960  PCP: Tessa Oconnor  Home Phone      [phone]  Work Phone      Not on file.  Mobile          [phone]      MESSAGE:     Christin with [facility] sleep study states to get peer to peer we need to call Insurance company to get this done. Christin states they need an update on this as well.       Please advise  [phone]

## 2023-11-27 ENCOUNTER — TELEPHONE (OUTPATIENT)
Dept: INTERNAL MEDICINE | Facility: CLINIC | Age: 63
End: 2023-11-27
Payer: COMMERCIAL

## 2023-11-27 NOTE — TELEPHONE ENCOUNTER
Patient had tried a home sleep study twice but failed both times because patient only slept for 2 hours and they did not have enough data for anything. Patient is trying to get an in lab sleep study done.   Phone number for peer review is [phone]

## 2023-12-04 ENCOUNTER — TELEPHONE (OUTPATIENT)
Dept: INTERNAL MEDICINE | Facility: CLINIC | Age: 63
End: 2023-12-04
Payer: COMMERCIAL

## 2023-12-04 NOTE — TELEPHONE ENCOUNTER
----- Message from Lisa Robledo MA sent at 2023  2:41 PM CST -----  [place] Najera  MRN: [id number]  : 1960  PCP: Tessa Oconnor  Home Phone      [phone]  Work Phone      Not on file.  Mobile          [phone]      MESSAGE: Sleep lab left a voicemail wanting the status of the sleep study. She says thhey have it on hold for a peer to peer since September.     Please Advise:  [phone]

## 2023-12-06 NOTE — TELEPHONE ENCOUNTER
Spoke with sleep lab. They said that the only other option is for an in home sleep study which patient does not want to do again since he already failed it twice. She said there is no direct number to call to speak with a physician for peer to peer.

## 2023-12-06 NOTE — TELEPHONE ENCOUNTER
Patient notified. He has an upcoming surgery. He will schedule something after that. He does not feel this is urgent.

## 2023-12-13 ENCOUNTER — CLINICAL SUPPORT (OUTPATIENT)
Dept: SMOKING CESSATION | Facility: CLINIC | Age: 63
End: 2023-12-13
Payer: COMMERCIAL

## 2023-12-13 DIAGNOSIS — F17.200 NICOTINE DEPENDENCE: Primary | ICD-10-CM

## 2023-12-13 PROCEDURE — 99999 PR PBB SHADOW E&M-EST. PATIENT-LVL I: ICD-10-PCS | Mod: PBBFAC,,,

## 2023-12-13 PROCEDURE — 99401 PR PREVENT COUNSEL,INDIV,15 MIN: ICD-10-PCS | Mod: S$GLB,,,

## 2023-12-13 PROCEDURE — 99401 PREV MED CNSL INDIV APPRX 15: CPT | Mod: S$GLB,,,

## 2023-12-13 PROCEDURE — 99999 PR PBB SHADOW E&M-EST. PATIENT-LVL I: CPT | Mod: PBBFAC,,,

## 2023-12-13 NOTE — PROGRESS NOTES
"Individual Follow-Up Form    12/13/2023    Quit Date: TBD    Clinical Status of Patient: Outpatient    Length of Service: 15 minutes    Continuing Medication: no (patient states that he will start again)    Other Medications: none     Target Symptoms: Withdrawal and medication side effects. The following were  rated moderate (3) to severe (4) on TCRS:  Moderate (3): desire/crave tobacco  Severe (4): none    Comments: Spoke with patient regarding smoking cessation follow up. Pt recently underwent surgery and states that he has been "on again, off again" with smoking. Mr. Kat reports that he is able to go 3-4 days without smoking, but then will smoke 1/2-3/4 of a pack. Pt has stopped using NRT and cessation medication. Directive is to begin using nicotine patch and taking Chantix again. Reviewed instructions for use. Encouraged pt to go back to the basics- keep cigarettes picked up (or don't buy any at all), wait 15-20 minutes prior to smoking, and to actively work on having a positive mindset regarding smoking cessation efforts. Pt to FU in approximately 3 weeks.    Diagnosis: F17.200    Next Visit: 3 weeks    "

## 2023-12-14 ENCOUNTER — LAB VISIT (OUTPATIENT)
Dept: LAB | Facility: HOSPITAL | Age: 63
End: 2023-12-14
Attending: INTERNAL MEDICINE
Payer: COMMERCIAL

## 2023-12-14 DIAGNOSIS — I49.3 PVC'S (PREMATURE VENTRICULAR CONTRACTIONS): ICD-10-CM

## 2023-12-14 DIAGNOSIS — E66.01 SEVERE OBESITY (BMI 35.0-39.9) WITH COMORBIDITY: ICD-10-CM

## 2023-12-14 DIAGNOSIS — I10 PRIMARY HYPERTENSION: ICD-10-CM

## 2023-12-14 DIAGNOSIS — Z01.818 PRE-OP TESTING: ICD-10-CM

## 2023-12-14 DIAGNOSIS — I49.9 CARDIAC ARRHYTHMIA, UNSPECIFIED CARDIAC ARRHYTHMIA TYPE: ICD-10-CM

## 2023-12-14 LAB
ANION GAP SERPL CALC-SCNC: 8 MMOL/L (ref 8–16)
APTT PPP: 29.7 SEC (ref 21–32)
BASOPHILS # BLD AUTO: 0.06 K/UL (ref 0–0.2)
BASOPHILS NFR BLD: 0.5 % (ref 0–1.9)
BUN SERPL-MCNC: 12 MG/DL (ref 8–23)
CALCIUM SERPL-MCNC: 9.4 MG/DL (ref 8.7–10.5)
CHLORIDE SERPL-SCNC: 105 MMOL/L (ref 95–110)
CO2 SERPL-SCNC: 24 MMOL/L (ref 23–29)
CREAT SERPL-MCNC: 0.9 MG/DL (ref 0.5–1.4)
DIFFERENTIAL METHOD: ABNORMAL
EOSINOPHIL # BLD AUTO: 0.2 K/UL (ref 0–0.5)
EOSINOPHIL NFR BLD: 1.5 % (ref 0–8)
ERYTHROCYTE [DISTWIDTH] IN BLOOD BY AUTOMATED COUNT: 13.5 % (ref 11.5–14.5)
EST. GFR  (NO RACE VARIABLE): >60 ML/MIN/1.73 M^2
GLUCOSE SERPL-MCNC: 129 MG/DL (ref 70–110)
HCT VFR BLD AUTO: 47.9 % (ref 40–54)
HGB BLD-MCNC: 15.9 G/DL (ref 14–18)
IMM GRANULOCYTES # BLD AUTO: 0.07 K/UL (ref 0–0.04)
IMM GRANULOCYTES NFR BLD AUTO: 0.6 % (ref 0–0.5)
INR PPP: 1.1 (ref 0.8–1.2)
LYMPHOCYTES # BLD AUTO: 2.4 K/UL (ref 1–4.8)
LYMPHOCYTES NFR BLD: 22.1 % (ref 18–48)
MCH RBC QN AUTO: 32.1 PG (ref 27–31)
MCHC RBC AUTO-ENTMCNC: 33.2 G/DL (ref 32–36)
MCV RBC AUTO: 97 FL (ref 82–98)
MONOCYTES # BLD AUTO: 1 K/UL (ref 0.3–1)
MONOCYTES NFR BLD: 9.4 % (ref 4–15)
NEUTROPHILS # BLD AUTO: 7.2 K/UL (ref 1.8–7.7)
NEUTROPHILS NFR BLD: 65.9 % (ref 38–73)
NRBC BLD-RTO: 0 /100 WBC
PLATELET # BLD AUTO: 236 K/UL (ref 150–450)
PMV BLD AUTO: 8.7 FL (ref 9.2–12.9)
POTASSIUM SERPL-SCNC: 4.1 MMOL/L (ref 3.5–5.1)
PROTHROMBIN TIME: 11.6 SEC (ref 9–12.5)
RBC # BLD AUTO: 4.96 M/UL (ref 4.6–6.2)
SODIUM SERPL-SCNC: 137 MMOL/L (ref 136–145)
WBC # BLD AUTO: 10.95 K/UL (ref 3.9–12.7)

## 2023-12-14 PROCEDURE — 36415 COLL VENOUS BLD VENIPUNCTURE: CPT | Performed by: INTERNAL MEDICINE

## 2023-12-14 PROCEDURE — 80048 BASIC METABOLIC PNL TOTAL CA: CPT | Performed by: INTERNAL MEDICINE

## 2023-12-14 PROCEDURE — 85025 COMPLETE CBC W/AUTO DIFF WBC: CPT | Performed by: INTERNAL MEDICINE

## 2023-12-14 PROCEDURE — 85730 THROMBOPLASTIN TIME PARTIAL: CPT | Performed by: INTERNAL MEDICINE

## 2023-12-14 PROCEDURE — 85610 PROTHROMBIN TIME: CPT | Performed by: INTERNAL MEDICINE

## 2023-12-19 ENCOUNTER — PATIENT MESSAGE (OUTPATIENT)
Dept: ELECTROPHYSIOLOGY | Facility: CLINIC | Age: 63
End: 2023-12-19
Payer: COMMERCIAL

## 2023-12-19 ENCOUNTER — TELEPHONE (OUTPATIENT)
Dept: ELECTROPHYSIOLOGY | Facility: CLINIC | Age: 63
End: 2023-12-19
Payer: COMMERCIAL

## 2023-12-19 NOTE — TELEPHONE ENCOUNTER
Attempted to contact patient to confirm procedure details for PVC, +/- dcPPM ablation  scheduled on 12/21/2023  but no answer received. Left detailed voicemail including: Procedure date/arrival time, NPO after midnight/fasting upon arrival, medication instructions, and request for return call to confirm procedure and patient instructions provided. Patient Instructions resent to the patient's portal for review with request to contact the office to confirm procedure.

## 2023-12-21 ENCOUNTER — ANESTHESIA (OUTPATIENT)
Dept: MEDSURG UNIT | Facility: HOSPITAL | Age: 63
End: 2023-12-21
Payer: COMMERCIAL

## 2023-12-21 ENCOUNTER — ANESTHESIA EVENT (OUTPATIENT)
Dept: MEDSURG UNIT | Facility: HOSPITAL | Age: 63
End: 2023-12-21
Payer: COMMERCIAL

## 2023-12-21 ENCOUNTER — HOSPITAL ENCOUNTER (OUTPATIENT)
Facility: HOSPITAL | Age: 63
Discharge: HOME | End: 2023-12-21
Attending: INTERNAL MEDICINE | Admitting: INTERNAL MEDICINE
Payer: COMMERCIAL

## 2023-12-21 VITALS
BODY MASS INDEX: 34.65 KG/M2 | HEART RATE: 75 BPM | RESPIRATION RATE: 16 BRPM | HEIGHT: 74 IN | DIASTOLIC BLOOD PRESSURE: 65 MMHG | SYSTOLIC BLOOD PRESSURE: 142 MMHG | TEMPERATURE: 98 F | WEIGHT: 270 LBS | OXYGEN SATURATION: 99 %

## 2023-12-21 DIAGNOSIS — I49.3 PVC'S (PREMATURE VENTRICULAR CONTRACTIONS): ICD-10-CM

## 2023-12-21 DIAGNOSIS — I47.20 VENTRICULAR TACHYCARDIA: Primary | ICD-10-CM

## 2023-12-21 DIAGNOSIS — Z86.79 S/P RADIOFREQUENCY ABLATION OPERATION FOR ARRHYTHMIA: ICD-10-CM

## 2023-12-21 DIAGNOSIS — Z98.890 S/P RADIOFREQUENCY ABLATION OPERATION FOR ARRHYTHMIA: ICD-10-CM

## 2023-12-21 DIAGNOSIS — I42.8 NONISCHEMIC CARDIOMYOPATHY: ICD-10-CM

## 2023-12-21 DIAGNOSIS — I49.9 ARRHYTHMIA: ICD-10-CM

## 2023-12-21 LAB
POCT GLUCOSE: 124 MG/DL (ref 70–110)
POCT GLUCOSE: 134 MG/DL (ref 70–110)

## 2023-12-21 PROCEDURE — 63600175 PHARM REV CODE 636 W HCPCS: Performed by: NURSE ANESTHETIST, CERTIFIED REGISTERED

## 2023-12-21 PROCEDURE — 93654 COMPRE EP EVAL TX VT: CPT | Performed by: INTERNAL MEDICINE

## 2023-12-21 PROCEDURE — D9220A PRA ANESTHESIA: ICD-10-PCS | Mod: CRNA,,, | Performed by: NURSE ANESTHETIST, CERTIFIED REGISTERED

## 2023-12-21 PROCEDURE — 93623 PR STIM/PACING HEART POST IV DRUG INFU: ICD-10-PCS | Mod: 26,,, | Performed by: INTERNAL MEDICINE

## 2023-12-21 PROCEDURE — 93655 ICAR CATH ABLTJ DSCRT ARRHYT: CPT | Mod: ,,, | Performed by: INTERNAL MEDICINE

## 2023-12-21 PROCEDURE — 93005 ELECTROCARDIOGRAM TRACING: CPT | Mod: 59

## 2023-12-21 PROCEDURE — 37000008 HC ANESTHESIA 1ST 15 MINUTES: Performed by: INTERNAL MEDICINE

## 2023-12-21 PROCEDURE — 93010 ELECTROCARDIOGRAM REPORT: CPT | Mod: ,,, | Performed by: INTERNAL MEDICINE

## 2023-12-21 PROCEDURE — 93654 COMPRE EP EVAL TX VT: CPT | Mod: ,,, | Performed by: INTERNAL MEDICINE

## 2023-12-21 PROCEDURE — C1894 INTRO/SHEATH, NON-LASER: HCPCS | Performed by: INTERNAL MEDICINE

## 2023-12-21 PROCEDURE — D9220A PRA ANESTHESIA: ICD-10-PCS | Mod: ANES,,, | Performed by: ANESTHESIOLOGY

## 2023-12-21 PROCEDURE — 82962 GLUCOSE BLOOD TEST: CPT | Performed by: INTERNAL MEDICINE

## 2023-12-21 PROCEDURE — 93005 ELECTROCARDIOGRAM TRACING: CPT

## 2023-12-21 PROCEDURE — C1760 CLOSURE DEV, VASC: HCPCS | Performed by: INTERNAL MEDICINE

## 2023-12-21 PROCEDURE — C1732 CATH, EP, DIAG/ABL, 3D/VECT: HCPCS | Performed by: INTERNAL MEDICINE

## 2023-12-21 PROCEDURE — 93655 PR ABLATE ARRHYTHMIA ADD ON: ICD-10-PCS | Mod: ,,, | Performed by: INTERNAL MEDICINE

## 2023-12-21 PROCEDURE — 93623 PRGRMD STIMJ&PACG IV RX NFS: CPT | Mod: 26,,, | Performed by: INTERNAL MEDICINE

## 2023-12-21 PROCEDURE — C1769 GUIDE WIRE: HCPCS | Performed by: INTERNAL MEDICINE

## 2023-12-21 PROCEDURE — 25000003 PHARM REV CODE 250: Performed by: NURSE ANESTHETIST, CERTIFIED REGISTERED

## 2023-12-21 PROCEDURE — 94761 N-INVAS EAR/PLS OXIMETRY MLT: CPT

## 2023-12-21 PROCEDURE — 93010 EKG 12-LEAD: ICD-10-PCS | Mod: ,,, | Performed by: INTERNAL MEDICINE

## 2023-12-21 PROCEDURE — 37000009 HC ANESTHESIA EA ADD 15 MINS: Performed by: INTERNAL MEDICINE

## 2023-12-21 PROCEDURE — 27000221 HC OXYGEN, UP TO 24 HOURS

## 2023-12-21 PROCEDURE — 93623 PRGRMD STIMJ&PACG IV RX NFS: CPT | Performed by: INTERNAL MEDICINE

## 2023-12-21 PROCEDURE — C1730 CATH, EP, 19 OR FEW ELECT: HCPCS | Performed by: INTERNAL MEDICINE

## 2023-12-21 PROCEDURE — 27201423 OPTIME MED/SURG SUP & DEVICES STERILE SUPPLY: Performed by: INTERNAL MEDICINE

## 2023-12-21 PROCEDURE — 93654 PR ELECTROPHYS EVAL, COMPREHEN, W/VENTRIC TACHYCARD/VENTRIC ECTOPY TRMT: ICD-10-PCS | Mod: ,,, | Performed by: INTERNAL MEDICINE

## 2023-12-21 PROCEDURE — D9220A PRA ANESTHESIA: Mod: CRNA,,, | Performed by: NURSE ANESTHETIST, CERTIFIED REGISTERED

## 2023-12-21 PROCEDURE — 25000003 PHARM REV CODE 250: Performed by: INTERNAL MEDICINE

## 2023-12-21 PROCEDURE — D9220A PRA ANESTHESIA: Mod: ANES,,, | Performed by: ANESTHESIOLOGY

## 2023-12-21 PROCEDURE — 93655 ICAR CATH ABLTJ DSCRT ARRHYT: CPT | Performed by: INTERNAL MEDICINE

## 2023-12-21 RX ORDER — AMIODARONE HYDROCHLORIDE 200 MG/1
400 TABLET ORAL 2 TIMES DAILY
Qty: 56 TABLET | Refills: 0 | Status: SHIPPED | OUTPATIENT
Start: 2023-12-21 | End: 2023-12-21 | Stop reason: SDUPTHER

## 2023-12-21 RX ORDER — PROCHLORPERAZINE EDISYLATE 5 MG/ML
5 INJECTION INTRAMUSCULAR; INTRAVENOUS EVERY 30 MIN PRN
Status: DISCONTINUED | OUTPATIENT
Start: 2023-12-21 | End: 2023-12-21 | Stop reason: HOSPADM

## 2023-12-21 RX ORDER — PROPOFOL 10 MG/ML
VIAL (ML) INTRAVENOUS CONTINUOUS PRN
Status: DISCONTINUED | OUTPATIENT
Start: 2023-12-21 | End: 2023-12-21

## 2023-12-21 RX ORDER — FENTANYL CITRATE 50 UG/ML
25 INJECTION, SOLUTION INTRAMUSCULAR; INTRAVENOUS EVERY 5 MIN PRN
Status: DISCONTINUED | OUTPATIENT
Start: 2023-12-21 | End: 2023-12-21 | Stop reason: HOSPADM

## 2023-12-21 RX ORDER — ACETAMINOPHEN 325 MG/1
650 TABLET ORAL EVERY 4 HOURS PRN
Status: DISCONTINUED | OUTPATIENT
Start: 2023-12-21 | End: 2023-12-21 | Stop reason: HOSPADM

## 2023-12-21 RX ORDER — PROTAMINE SULFATE 10 MG/ML
INJECTION, SOLUTION INTRAVENOUS
Status: DISCONTINUED | OUTPATIENT
Start: 2023-12-21 | End: 2023-12-21

## 2023-12-21 RX ORDER — HEPARIN SOD,PORCINE/0.9 % NACL 1000/500ML
INTRAVENOUS SOLUTION INTRAVENOUS
Status: DISCONTINUED | OUTPATIENT
Start: 2023-12-21 | End: 2023-12-21 | Stop reason: HOSPADM

## 2023-12-21 RX ORDER — AMIODARONE HYDROCHLORIDE 200 MG/1
TABLET ORAL
Qty: 72 TABLET | Refills: 11 | Status: SHIPPED | OUTPATIENT
Start: 2024-01-04 | End: 2024-01-10

## 2023-12-21 RX ORDER — HEPARIN SODIUM 1000 [USP'U]/ML
INJECTION, SOLUTION INTRAVENOUS; SUBCUTANEOUS
Status: DISCONTINUED | OUTPATIENT
Start: 2023-12-21 | End: 2023-12-21

## 2023-12-21 RX ORDER — PHENYLEPHRINE HYDROCHLORIDE 10 MG/ML
INJECTION INTRAVENOUS
Status: DISCONTINUED | OUTPATIENT
Start: 2023-12-21 | End: 2023-12-21

## 2023-12-21 RX ORDER — LIDOCAINE HYDROCHLORIDE 20 MG/ML
INJECTION, SOLUTION INFILTRATION; PERINEURAL
Status: DISCONTINUED | OUTPATIENT
Start: 2023-12-21 | End: 2023-12-21 | Stop reason: HOSPADM

## 2023-12-21 RX ADMIN — SODIUM CHLORIDE: 0.9 INJECTION, SOLUTION INTRAVENOUS at 07:12

## 2023-12-21 RX ADMIN — PROPOFOL 60 MG: 10 INJECTION, EMULSION INTRAVENOUS at 07:12

## 2023-12-21 RX ADMIN — PROTAMINE SULFATE 60 MG: 10 INJECTION, SOLUTION INTRAVENOUS at 10:12

## 2023-12-21 RX ADMIN — PHENYLEPHRINE HYDROCHLORIDE 100 MCG: 10 INJECTION INTRAVENOUS at 08:12

## 2023-12-21 RX ADMIN — PROPOFOL 150 MCG/KG/MIN: 10 INJECTION, EMULSION INTRAVENOUS at 07:12

## 2023-12-21 RX ADMIN — SODIUM CHLORIDE 0.2 MCG/KG/MIN: 9 INJECTION, SOLUTION INTRAVENOUS at 08:12

## 2023-12-21 RX ADMIN — ACETAMINOPHEN 650 MG: 325 TABLET ORAL at 01:12

## 2023-12-21 RX ADMIN — ISOPROTERENOL HYDROCHLORIDE 2 MCG/MIN: 0.2 INJECTION, SOLUTION INTRAMUSCULAR; INTRAVENOUS at 07:12

## 2023-12-21 RX ADMIN — HEPARIN SODIUM 12000 UNITS: 1000 INJECTION, SOLUTION INTRAVENOUS; SUBCUTANEOUS at 08:12

## 2023-12-21 NOTE — ANESTHESIA POSTPROCEDURE EVALUATION
Anesthesia Post Evaluation    Patient: [place]    Procedure(s) Performed: Procedure(s) (LRB):  Ablation (N/A)  Placement of Closure Device    Final Anesthesia Type: general      Patient location during evaluation: Cath Lab  Patient participation: Yes- Able to Participate  Level of consciousness: awake and alert  Post-procedure vital signs: reviewed and stable  Pain management: adequate  Airway patency: patent    PONV status at discharge: No PONV  Anesthetic complications: no      Cardiovascular status: hemodynamically stable  Respiratory status: unassisted, spontaneous ventilation and room air  Hydration status: euvolemic                Vitals Value Taken Time   /65 12/21/23 1430   Temp 36.6 °C (97.9 °F) 12/21/23 1331   Pulse 71 12/21/23 1513   Resp 16 12/21/23 1430   SpO2 98 % 12/21/23 1430         No case tracking events are documented in the log.      Pain/Jackeline Score: Pain Rating Prior to Med Admin: 3 (12/21/2023  1:44 PM)  Jackeline Score: 10 (12/21/2023  1:31 PM)

## 2023-12-21 NOTE — DISCHARGE SUMMARY
[facility] - Short Stay Cardiac Unit  Cardiac Electrophysiology  Discharge Summary      Patient Name: [place] Najera  MRN: [id number]  Admission Date: 12/21/2023  Hospital Length of Stay: 0 days  Discharge Date and Time:  12/21/2023 4:03 PM  Attending Physician: Norbert Mcbride MD    Discharging Provider: Nic Seymour MD  Primary Care Physician: Tessa Oconnor MD    HPI:   63 year-old man with type 2 diabetes mellitus, obesity, anti-phospholipid syndrome with prior DVT and frequent PVCs/VT with LVEF of 40% here for PVC ablation. Held flecainide and verapamil appropriately. Held OAC appropriately. ECG shows NSR with 1 PVC inferiorly directed.    He was in his usual state of health until 6/2023 when he developed weakness/light-headedness at work. He went to the ER and was observed. He wore a holter monitor which showed very frequent PVCs (58% burden) and VT. [place] [facility] He was observed. He was seen by the EP consult team, Dr. Talley. ECHO noted a LVEF of 40%. He was initiated on metoprolol. PET stress test noted no significant indications of obstructive disease. PVCs were morphologically para-His by ECG. Recommendation was to change to verapamil and he was discharged. Repeat holter in August of 2023 noted PVC burden of 24% still with runs of VT and echo showed LVEF improved to 50-55%. He has a 10% PAC burden.     I reviewed available ECGs in Epic which show sinus rhythm. Only a few isolated PVCs are visible. One ECG has PVCs in limb leads that are inferiorly directed.     My interpretation of today's ECG is sinus rhythm with PACs.    Procedure(s) (LRB):  Ablation (N/A)  Placement of Closure Device     Indwelling Lines/Drains at time of discharge:  Lines/Drains/Airways       None                   Hospital Course:  S/p PVC/VT ablation. Suspected septal midmyocardial VT near the region of the His bundle. Ablation in this region would likely result in AV block requiring PPM. Difficult to engage  CS (location of PVC). Will plan for amiodarone initiation 400 mg BID x 14 days then 200 mg daily thereafter. Will obtain cardiac MRI for assess as patient with multple PVC/VT morphologies. If ablation pursued in the future will likely implant CRT.    Goals of Care Treatment Preferences:  Code Status: Full Code    Living Will: Yes              Consults:     Significant Diagnostic Studies: Labs: All labs within the past 24 hours have been reviewed    Pending Diagnostic Studies:       None            Final Active Diagnoses:    Diagnosis Date Noted POA    PVC's (premature ventricular contractions) [I49.3] 01/31/2020 Yes      Problems Resolved During this Admission:     No new Assessment & Plan notes have been filed under this hospital service since the last note was generated.  Service: Arrhythmia      Discharged Condition: stable    Disposition: Home or Self Care    Follow Up:   Follow-up Information       Norbert Mcbride MD Follow up in 6 week(s).    Specialties: Electrophysiology, Cardiology  Contact information:  [address] [address]  [phone]                           Patient Instructions:   No discharge procedures on file.  Medications:  Reconciled Home Medications:      Medication List        START taking these medications      amiodarone 200 MG Tab  Commonly known as: PACERONE  Take 2 tablets (400 mg total) by mouth 2 (two) times daily. for 14 days then take 1 tablet (200 mg total) by mouth once daily.  Start taking on: January 4, 2024            CONTINUE taking these medications      atorvastatin 20 MG tablet  Commonly known as: LIPITOR  Take 1 tablet (20 mg total) by mouth once daily IN THE MORNING     * CENTRUM SILVER MEN ORAL  Take by mouth.     * CEROVITE SENIOR 0.4 mg-300 mcg- 250 mcg Tab  Generic drug: multivit-min-FA-lycopen-lutein  Take one a day     JANUVIA 50 MG Tab  Generic drug: SITagliptin phosphate  Take 1 tablet (50 mg total) by mouth once daily.     JARDIANCE 10 mg  tablet  Generic drug: empagliflozin  Take 1 tablet (10 mg total) by mouth once daily.     * lancets 28 gauge Misc  Commonly known as: FREESTYLE LANCETS  1 lancet by Misc.(Non-Drug; Combo Route) route Daily.     * lancets Misc  Commonly known as: ONETOUCH ULTRASOFT LANCETS  1 Stick by Misc.(Non-Drug; Combo Route) route 2 (two) times daily.     lisinopriL 5 MG tablet  Commonly known as: PRINIVIL,ZESTRIL  Take 1 tablet (5 mg total) by mouth once daily.     nicotine (polacrilex) 2 mg Gum  Commonly known as: NICORETTE  Take 1 each (2 mg total) by mouth as needed (Do not exceed 10 pieces per day).     nicotine 14 mg/24 hr  Commonly known as: NICODERM CQ  Place 1 patch onto the skin once daily.     tamsulosin 0.4 mg Cap  Commonly known as: FLOMAX  Take 1 capsule (0.4 mg total) by mouth once daily.     varenicline 1 mg Tab  Commonly known as: CHANTIX  Take 1 tablet (1 mg total) by mouth 2 (two) times daily.     verapamiL 180 MG CR tablet  Commonly known as: CALAN-SR  Take 1 tablet (180 mg total) by mouth nightly.     XARELTO 10 mg Tab  Generic drug: rivaroxaban  Take 1 tablet (10 mg total) by mouth once daily.           * This list has 4 medication(s) that are the same as other medications prescribed for you. Read the directions carefully, and ask your doctor or other care provider to review them with you.                STOP taking these medications      flecainide 100 MG Tab  Commonly known as: TAMBOCOR            ASK your doctor about these medications      nitroGLYCERIN 0.4 MG SL tablet  Commonly known as: NITROSTAT  Place 1 tablet (0.4 mg total) under the tongue every 5 (five) minutes as needed for Chest pain.              Time spent on the discharge of patient: 15 minutes    Nic Seymour MD  Cardiac Electrophysiology  [facility] - Short Stay Cardiac Unit

## 2023-12-21 NOTE — ASSESSMENT & PLAN NOTE
- EPS and RFA for treatment of PVCs    Anticoagulation: rivaroxaban  EF (most recent): 50-55%  AAD/AVN agents: flecainide and verapamil    The risks, benefits and alternatives of the procedure were explained to the patient, patient's family and/or surrogate decision maker. Risks include (but not limited to) bleeding, hematoma, infection, pain, vascular damage, damage to structures surrounding the vasculature, myocardial damage [perforation, valvular damage], cardiac tamponade, coronary artery injury, contrast-induced nephropathy, CVA, MI, and death. Patient is understanding that repeat ablations may be required. All questions were answered. Patient is understanding of these risks, and would like to proceed. Consents signed.

## 2023-12-21 NOTE — TRANSFER OF CARE
"Anesthesia Transfer of Care Note    Patient: [place]    Procedure(s) Performed: Procedure(s) (LRB):  Ablation (N/A)  Placement of Closure Device    Patient location: PACU    Anesthesia Type: general    Transport from OR: Transported from OR on 6-10 L/min O2 by face mask with adequate spontaneous ventilation    Post pain: adequate analgesia    Post assessment: no apparent anesthetic complications    Post vital signs: stable    Level of consciousness: awake    Nausea/Vomiting: no nausea/vomiting    Complications: none    Transfer of care protocol was followed    Last vitals: Visit Vitals  /83   Pulse (!) 56   Temp 37.2 °C (99 °F) (Temporal)   Resp 17   Ht 6' 2" (1.88 m)   Wt 122.5 kg (270 lb)   SpO2 98%   BMI 34.67 kg/m²     "

## 2023-12-21 NOTE — HPI
63 year-old man with type 2 diabetes mellitus, obesity, anti-phospholipid syndrome with prior DVT and frequent PVCs/VT with LVEF of 40% here for PVC ablation. Held flecainide and verapamil appropriately. Held OAC appropriately. ECG shows NSR with 1 PVC inferiorly directed.    He was in his usual state of health until 6/2023 when he developed weakness/light-headedness at work. He went to the ER and was observed. He wore a holter monitor which showed very frequent PVCs (58% burden) and VT. He was instructed to go to [facility] ER. He was observed. He was seen by the EP consult team, Dr. Talley. ECHO noted a LVEF of 40%. He was initiated on metoprolol. PET stress test noted no significant indications of obstructive disease. PVCs were morphologically para-His by ECG. Recommendation was to change to verapamil and he was discharged. Repeat holter in August of 2023 noted PVC burden of 24% still with runs of VT and echo showed LVEF improved to 50-55%. He has a 10% PAC burden.     I reviewed available ECGs in Epic which show sinus rhythm. Only a few isolated PVCs are visible. One ECG has PVCs in limb leads that are inferiorly directed.     My interpretation of today's ECG is sinus rhythm with PACs.

## 2023-12-21 NOTE — H&P
[facility] - Short Stay Cardiac Unit  Cardiac Electrophysiology  History and Physical     Admission Date: 12/21/2023  Code Status: Prior   Attending Provider: Norbert Mcbride MD   Principal Problem:<principal problem not specified>    Subjective:     Chief Complaint:  PVC/VT     HPI:  63 year-old man with type 2 diabetes mellitus, obesity, anti-phospholipid syndrome with prior DVT and frequent PVCs/VT with LVEF of 40% here for PVC ablation. Held flecainide and verapamil appropriately. Held OAC appropriately. ECG shows NSR with 1 PVC inferiorly directed.    He was in his usual state of health until 6/2023 when he developed weakness/light-headedness at work. He went to the ER and was observed. He wore a holter monitor which showed very frequent PVCs (58% burden) and VT. He was instructed to go to [facility] [place]. He was observed. He was seen by the EP consult team, Dr. Talley. ECHO noted a LVEF of 40%. He was initiated on metoprolol. PET stress test noted no significant indications of obstructive disease. PVCs were morphologically para-His by ECG. Recommendation was to change to verapamil and he was discharged. Repeat holter in August of 2023 noted PVC burden of 24% still with runs of VT and echo showed LVEF improved to 50-55%. He has a 10% PAC burden.     I reviewed available ECGs in Epic which show sinus rhythm. Only a few isolated PVCs are visible. One ECG has PVCs in limb leads that are inferiorly directed.     My interpretation of today's ECG is sinus rhythm with PACs.    Past Medical History:   Diagnosis Date    Acute combined systolic and diastolic congestive heart failure 6/15/2023    APS (antiphospholipid syndrome)     Cancer     skin cancer- R. hand    Coronary artery disease involving native coronary artery of native heart without angina pectoris 6/9/2023    Elevated homocysteine     Screening for colon cancer 5/4/2017       Past Surgical History:   Procedure Laterality Date    APPENDECTOMY  1976    CHOLECYSTECTOMY   1987    COLONOSCOPY N/A 5/4/2017    Procedure: COLONOSCOPY;  Surgeon: Gabriel Saini MD;  Location: [facility];  Service: Endoscopy;  Laterality: N/A;    COLONOSCOPY W/ BIOPSIES AND POLYPECTOMY  2011    SKIN SURGERY      piece of skin removed on R.hand- skin cancer     TONSILLECTOMY         Review of patient's allergies indicates:   Allergen Reactions    Codeine Diarrhea     Patient can not tolerate Tylenol #3, states he does okay with Codeine with cough medications     Nickel sutures [surgical stainless steel] Rash       No current facility-administered medications on file prior to encounter.     Current Outpatient Medications on File Prior to Encounter   Medication Sig    atorvastatin (LIPITOR) 20 MG tablet Take 1 tablet (20 mg total) by mouth once daily IN THE MORNING    lisinopriL (PRINIVIL,ZESTRIL) 5 MG tablet Take 1 tablet (5 mg total) by mouth once daily.    MULTIVIT-MIN/FA/LYCOPEN/LUTEIN (CENTRUM SILVER MEN ORAL) Take by mouth.    nicotine, polacrilex, (NICORETTE) 2 mg Gum Take 1 each (2 mg total) by mouth as needed (Do not exceed 10 pieces per day).    tamsulosin (FLOMAX) 0.4 mg Cap Take 1 capsule (0.4 mg total) by mouth once daily.    flecainide (TAMBOCOR) 100 MG Tab Take 1 tablet (100 mg total) by mouth every 12 (twelve) hours.    lancets (FREESTYLE LANCETS) 28 gauge Misc 1 lancet by Misc.(Non-Drug; Combo Route) route Daily.    lancets (ONETOUCH ULTRASOFT LANCETS) Misc 1 Stick by Misc.(Non-Drug; Combo Route) route 2 (two) times daily.    nicotine (NICODERM CQ) 14 mg/24 hr Place 1 patch onto the skin once daily.    nitroGLYCERIN (NITROSTAT) 0.4 MG SL tablet Place 1 tablet (0.4 mg total) under the tongue every 5 (five) minutes as needed for Chest pain. (Patient not taking: Reported on 11/8/2023)    rivaroxaban (XARELTO) 10 mg Tab Take 1 tablet (10 mg total) by mouth once daily.    varenicline (CHANTIX) 1 mg Tab Take 1 tablet (1 mg total) by mouth 2 (two) times daily.    verapamiL (CALAN-SR) 180 MG CR tablet  Take 1 tablet (180 mg total) by mouth nightly.     Family History       Problem Relation (Age of Onset)    Diabetes Maternal Grandmother    No Known Problems Mother, Father          Tobacco Use    Smoking status: Some Days     Current packs/day: 0.50     Average packs/day: 1 pack/day for 35.8 years (37.3 ttl pk-yrs)     Types: Cigarettes     Start date: 3/28/1987     Last attempt to quit: 8/3/2019    Smokeless tobacco: Never    Tobacco comments:     Patient active in smoking cessation program    Substance and Sexual Activity    Alcohol use: Yes     Comment: Occasionally 3-4 drinks per month at the most    Drug use: No    Sexual activity: Yes     Partners: Female     Comment: single-in a relationship     Review of Systems   All other systems reviewed and are negative.    Objective:     Vital Signs (Most Recent):  BP: 133/83 (12/21/23 0601) Vital Signs (24h Range):  BP: (133)/(83) 133/83          There is no height or weight on file to calculate BMI.             Physical Exam  General: NAD. AAO  HENT: EOMI  Neck: supple. No JVD  CV: RRR. Normal S1/S2. No gallops, rubs, or murmurs. 2+ radial pulses  Resp: CTAB. No increased work of breathing  Ext: warm. No edema.         Significant Labs: All pertinent lab results from the last 24 hours have been reviewed.    Significant Imaging:  Reviewed  Assessment and Plan:     PVC's (premature ventricular contractions)  - EPS and RFA for treatment of PVCs    Anticoagulation: rivaroxaban  EF (most recent): 50-55%  AAD/AVN agents: flecainide and verapamil    The risks, benefits and alternatives of the procedure were explained to the patient, patient's family and/or surrogate decision maker. Risks include (but not limited to) bleeding, hematoma, infection, pain, vascular damage, damage to structures surrounding the vasculature, myocardial damage [perforation, valvular damage], cardiac tamponade, coronary artery injury, contrast-induced nephropathy, CVA, MI, and death. Patient is  understanding that repeat ablations may be required. All questions were answered. Patient is understanding of these risks, and would like to proceed. Consents signed.         Nic Seymour MD  Cardiac Electrophysiology  [facility] - Short Stay Cardiac Unit

## 2023-12-21 NOTE — HOSPITAL COURSE
S/p PVC/VT ablation. Suspected septal midmyocardial VT near the region of the His bundle. Ablation in this region would likely result in AV block requiring PPM. Difficult to engage CS (location of PVC). Will plan for amiodarone initiation 400 mg BID x 14 days then 200 mg daily thereafter. Will obtain cardiac MRI for assess as patient with multple PVC/VT morphologies. If ablation pursued in the future will likely implant CRT.

## 2023-12-21 NOTE — NURSING TRANSFER
Nursing Transfer Note      Reason patient is being transferred: PACU    Transfer To: SSCU 09    Transfer via stretcher    Transfer with cardiac monitoring    Transported by Patient Transport     Medicines sent: None    Any special needs or follow-up needed: Routine Care    Chart send with patient: Yes    Notified: Sister    Patient reassessed at: 12/21/2023 1252 (date, time)

## 2023-12-21 NOTE — PLAN OF CARE
Patient arrived to room. PIV placed. Admit assessment completed. Plan of care discussed with patient. Will monitor. Call light within reach, instructed in use.  POC

## 2023-12-21 NOTE — ANESTHESIA PREPROCEDURE EVALUATION
12/21/2023  North Najera is a 63 y.o., male.  [facility]  Anesthesia Pre-Operative Evaluation       Patient Name: North Najera  YOB: 1960  MRN: [id number]  CSN: [id number]      Code Status: Prior   Date of Procedure: 12/21/2023  Anesthesia: Monitor Anesthesia Care Procedure: Procedure(s) (LRB):  Ablation (N/A)  INSERTION, CARDIAC PACEMAKER, DUAL CHAMBER (Left)  Pre-Operative Diagnosis: PVC's (premature ventricular contractions) [I49.3]  Proceduralist: Surgeon(s) and Role:     * Norbert Mcbride MD - Primary     * Nic Seymour MD - Fellow        SUBJECTIVE:   North Najera is a 63 y.o. male who is here today for Procedure(s) (LRB):  Ablation (N/A)  INSERTION, CARDIAC PACEMAKER, DUAL CHAMBER (Left).   63 year-old man with type 2 diabetes mellitus, obesity, anti-phospholipid syndrome with prior DVT and frequent PVCs/VT with LVEF of 40% here for PVC ablation. Held flecainide and verapamil appropriately. Held OAC appropriately. ECG shows NSR with 1 PVC inferiorly directed.    He was in his usual state of health until 6/2023 when he developed weakness/light-headedness at work. He went to the ER and was observed. He wore a holter monitor which showed very frequent PVCs (58% burden) and VT. He was [place] [facility] [place] He was observed. He was seen by the EP consult team, Dr. Talley. ECHO noted a LVEF of 40%. He was initiated on metoprolol. PET stress test noted no significant indications of obstructive disease. PVCs were morphologically para-His by ECG. Recommendation was to change to verapamil and he was discharged. Repeat holter in August of 2023 noted PVC burden of 24% still with runs of VT and echo showed LVEF improved to 50-55%. He has a 10% PAC burden.     I reviewed available ECGs in Epic which show sinus rhythm. Only a few isolated PVCs are visible. One  ECG has PVCs in limb leads that are inferiorly directed.       Anticoagulants   Medication Route Frequency       he has a current medication list which includes the following long-term medication(s): atorvastatin, lisinopril, sitagliptin phosphate, tamsulosin, flecainide, nitroglycerin, and verapamil.   ALLERGIES:     Review of patient's allergies indicates:   Allergen Reactions    Codeine Diarrhea     Patient can not tolerate Tylenol #3, states he does okay with Codeine with cough medications     Nickel sutures [surgical stainless steel] Rash     LDA:          Lines/Drains/Airways       Peripheral Intravenous Line  Duration                  Peripheral IV - Single Lumen 12/21/23 0615 18 G Distal;Posterior;Right Forearm <1 day         Peripheral IV - Single Lumen 12/21/23 0620 20 G Left;Posterior Hand <1 day                   RELEVANT MEDICATIONS:     Antibiotics (From admission, onward)      Start     Stop Route Frequency Ordered    12/21/23 0543  ceFAZolin 2 g in dextrose 5 % in water (D5W) 50 mL IVPB (MB+)         -- IV On Call Procedure 12/21/23 0543          VTE Risk Mitigation (From admission, onward)      None            History:     Active Hospital Problems    Diagnosis  POA    PVC's (premature ventricular contractions) [I49.3]  Yes      Resolved Hospital Problems   No resolved problems to display.     Patient Active Problem List   Diagnosis    Hyperlipidemia LDL goal <70    Antiphospholipid syndrome    Tobacco abuse    History of DVT (deep vein thrombosis)    Uncontrolled type 2 diabetes mellitus with hyperglycemia    Severe obesity (BMI 35.0-39.9) with comorbidity    PVC's (premature ventricular contractions)    Low testosterone in male    Aortic atherosclerosis    Coronary artery disease involving native coronary artery of native heart without angina pectoris    Ventricular tachycardia    Primary hypertension    Elevated bilirubin    Chronic combined systolic and diastolic congestive heart failure     "Bradycardia    Sleep disorder     Medical History   Past Medical History:   Diagnosis Date    Acute combined systolic and diastolic congestive heart failure 6/15/2023    APS (antiphospholipid syndrome)     Cancer     skin cancer- R. hand    Coronary artery disease involving native coronary artery of native heart without angina pectoris 6/9/2023    Elevated homocysteine     Screening for colon cancer 5/4/2017     Surgical History:    has a past surgical history that includes Cholecystectomy (1987); Tonsillectomy; Colonoscopy w/ biopsies and polypectomy (2011); Appendectomy (1976); Colonoscopy (N/A, 5/4/2017); and Skin surgery.   Social History:    reports that he has been smoking cigarettes. He started smoking about 36 years ago. He has a 37.3 pack-year smoking history. He has never used smokeless tobacco. He reports current alcohol use. He reports that he does not use drugs.     OBJECTIVE:     Vital Signs (Most Recent):  Temp: 37.2 °C (99 °F) (12/21/23 0550)  Pulse: (!) 56 (12/21/23 0550)  Resp: 17 (12/21/23 0550)  BP: 133/83 (12/21/23 0601)  SpO2: 98 % (12/21/23 0550) Vital Signs Range (Last 24H):  Temp:  [37.2 °C (99 °F)]   Pulse:  [56]   Resp:  [17]   BP: (125-133)/(83)   SpO2:  [98 %]      Last 3 Vitals:       10/18/2023     3:02 PM 11/8/2023     2:56 PM 12/21/2023     5:50 AM   Vitals - 1 value per visit   SYSTOLIC 132 110 125   DIASTOLIC 70 78 83   Pulse 68 76 56   Temp   37.2 °C (99 °F)   Resp 18 16 17   SPO2 96 % 98 % 98 %   Weight (lb) 275.58 276.24 270   Weight (kg) 125 125.3 122.471   Height 6' 1" (1.854 m) 6' 1" (1.854 m) 6' 2" (1.88 m)   BMI (Calculated) 36.4 36.5 34.7   Pain Score Zero Zero Zero     Body mass index is 34.67 kg/m².   Wt Readings from Last 4 Encounters:   12/21/23 122.5 kg (270 lb)   11/08/23 125.3 kg (276 lb 3.8 oz)   10/18/23 125 kg (275 lb 9.2 oz)   09/14/23 124 kg (273 lb 5.9 oz)     Significant Labs:  Lab Results   Component Value Date    WBC 10.95 12/14/2023    HGB 15.9 12/14/2023 "    HCT 47.9 12/14/2023     12/14/2023     12/14/2023    K 4.1 12/14/2023     12/14/2023    CREATININE 0.9 12/14/2023    BUN 12 12/14/2023    CO2 24 12/14/2023     (H) 12/14/2023    CALCIUM 9.4 12/14/2023    MG 1.9 06/16/2023    PHOS 3.1 05/30/2006    ALKPHOS 69 11/03/2023    ALT 41 11/03/2023    AST 24 11/03/2023    ALBUMIN 4.7 11/03/2023    ALBUMIN 4.5 11/03/2023    INR 1.1 12/14/2023    APTT 29.7 12/14/2023    HGBA1C 7.1 (H) 11/03/2023     05/30/2006    TROPONINI 0.012 06/14/2023    BNP 58 06/14/2023     No LMP for male patient.      EKG:   Results for orders placed or performed during the hospital encounter of 06/14/23   EKG 12-lead    Collection Time: 06/15/23 11:33 PM    Narrative    Test Reason : R00.1,    Vent. Rate : 046 BPM     Atrial Rate : 046 BPM     P-R Int : 212 ms          QRS Dur : 106 ms      QT Int : 492 ms       P-R-T Axes : 040 -10 -21 degrees     QTc Int : 430 ms    Sinus bradycardia with 1st degree A-V block  Otherwise normal ECG  When compared with ECG of 15-LUIS ALBERTO-2023 00:35,  Premature ventricular complexes are no longer Present  Vent. rate has decreased BY  71 BPM  Confirmed by Basil Wolfe MD ([id number]) on 6/16/2023 8:16:49 AM    Referred By: [email or web address]   SELF           Confirmed By:Basil Wolfe MD       TTE:  Results for orders placed or performed during the hospital encounter of 08/16/23   Echo   Result Value Ref Range    Est. RA pres 3 mmHg    Narrative      Left Ventricle: The left ventricle is moderately dilated. Normal wall   thickness. Normal wall motion. There is low normal systolic function with   a visually estimated ejection fraction of 50 - 55%. There is normal   diastolic function.    Left Atrium: Left atrium is moderately dilated.    Right Ventricle: Normal right ventricular cavity size. Wall thickness   is normal. Right ventricle wall motion  is normal. Systolic function is   normal.    Mitral Valve: There is mild regurgitation.    Tricuspid  Valve: There is mild regurgitation.    Pulmonary Artery: No pulmonary hypertension.    IVC/SVC: Normal venous pressure at 3 mmHg.       EF   Date Value Ref Range Status   06/14/2023 40 % Final     Nuc Stress EF   Date Value Ref Range Status   06/16/2023 48 % Final     Nuc Rest EF   Date Value Ref Range Status   06/16/2023 42  Final          ASSESSMENT/PLAN:         Pre-op Assessment    I have reviewed the Patient Summary Reports.     I have reviewed the Nursing Notes. I have reviewed the NPO Status.   I have reviewed the Medications.     Review of Systems  Cardiovascular:     Hypertension   CAD       CHF           Coronary Artery Disease:               Congestive Heart Failure (CHF)                Hypertension         Endocrine:  Diabetes    Diabetes                          Physical Exam  General: Well nourished, Cooperative and Alert    Airway:  Mallampati: III / II  Mouth Opening: Normal  TM Distance: Normal  Tongue: Normal  Neck ROM: Normal ROM    Dental:  Intact    Chest/Lungs:  Normal Respiratory Rate    Heart:  Rate: Normal  Rhythm: Regular Rhythm        Anesthesia Plan  Type of Anesthesia, risks & benefits discussed:    Anesthesia Type: Gen Natural Airway, MAC  Intra-op Monitoring Plan: Standard ASA Monitors  Post Op Pain Control Plan: IV/PO Opioids PRN  Induction:  IV  Informed Consent: Informed consent signed with the Patient and all parties understand the risks and agree with anesthesia plan.  All questions answered.   ASA Score: 3  Day of Surgery Review of History & Physical: H&P Update referred to the surgeon/provider.    Ready For Surgery From Anesthesia Perspective.     .

## 2023-12-21 NOTE — NURSING
Ambulated around unit without difficulty.  Bilateral groin dressings remain clean dry and intact.  Right and left groins soft.  No bleeding or hematoma noted.  Pt and pt family verbalized an understanding of d/c instruction.  IV's d/c'd x 2 with cath tips intact.  Pt voiding without difficulty.  Off unit via wheelchair for discharge home.  Family at pt side.

## 2023-12-21 NOTE — SUBJECTIVE & OBJECTIVE
Past Medical History:   Diagnosis Date    Acute combined systolic and diastolic congestive heart failure 6/15/2023    APS (antiphospholipid syndrome)     Cancer     skin cancer- R. hand    Coronary artery disease involving native coronary artery of native heart without angina pectoris 6/9/2023    Elevated homocysteine     Screening for colon cancer 5/4/2017       Past Surgical History:   Procedure Laterality Date    APPENDECTOMY  1976    CHOLECYSTECTOMY  1987    COLONOSCOPY N/A 5/4/2017    Procedure: COLONOSCOPY;  Surgeon: Gabriel Saini MD;  [facility] [facility];  Service: Endoscopy;  Laterality: N/A;    COLONOSCOPY W/ BIOPSIES AND POLYPECTOMY  2011    SKIN SURGERY      piece of skin removed on R.hand- skin cancer     TONSILLECTOMY         Review of patient's allergies indicates:   Allergen Reactions    Codeine Diarrhea     Patient can not tolerate Tylenol #3, states he does okay with Codeine with cough medications     Nickel sutures [surgical stainless steel] Rash       No current facility-administered medications on file prior to encounter.     Current Outpatient Medications on File Prior to Encounter   Medication Sig    atorvastatin (LIPITOR) 20 MG tablet Take 1 tablet (20 mg total) by mouth once daily IN THE MORNING    lisinopriL (PRINIVIL,ZESTRIL) 5 MG tablet Take 1 tablet (5 mg total) by mouth once daily.    MULTIVIT-MIN/FA/LYCOPEN/LUTEIN (CENTRUM SILVER MEN ORAL) Take by mouth.    nicotine, polacrilex, (NICORETTE) 2 mg Gum Take 1 each (2 mg total) by mouth as needed (Do not exceed 10 pieces per day).    tamsulosin (FLOMAX) 0.4 mg Cap Take 1 capsule (0.4 mg total) by mouth once daily.    flecainide (TAMBOCOR) 100 MG Tab Take 1 tablet (100 mg total) by mouth every 12 (twelve) hours.    lancets (FREESTYLE LANCETS) 28 gauge Misc 1 lancet by Misc.(Non-Drug; Combo Route) route Daily.    lancets (ONETOUCH ULTRASOFT LANCETS) Misc 1 Stick by Misc.(Non-Drug; Combo Route) route 2 (two) times daily.    nicotine (NICODERM  CQ) 14 mg/24 hr Place 1 patch onto the skin once daily.    nitroGLYCERIN (NITROSTAT) 0.4 MG SL tablet Place 1 tablet (0.4 mg total) under the tongue every 5 (five) minutes as needed for Chest pain. (Patient not taking: Reported on 11/8/2023)    rivaroxaban (XARELTO) 10 mg Tab Take 1 tablet (10 mg total) by mouth once daily.    varenicline (CHANTIX) 1 mg Tab Take 1 tablet (1 mg total) by mouth 2 (two) times daily.    verapamiL (CALAN-SR) 180 MG CR tablet Take 1 tablet (180 mg total) by mouth nightly.     Family History       Problem Relation (Age of Onset)    Diabetes Maternal Grandmother    No Known Problems Mother, Father          Tobacco Use    Smoking status: Some Days     Current packs/day: 0.50     Average packs/day: 1 pack/day for 35.8 years (37.3 ttl pk-yrs)     Types: Cigarettes     Start date: 3/28/1987     Last attempt to quit: 8/3/2019    Smokeless tobacco: Never    Tobacco comments:     Patient active in smoking cessation program    Substance and Sexual Activity    Alcohol use: Yes     Comment: Occasionally 3-4 drinks per month at the most    Drug use: No    Sexual activity: Yes     Partners: Female     Comment: single-in a relationship     Review of Systems   All other systems reviewed and are negative.    Objective:     Vital Signs (Most Recent):  BP: 133/83 (12/21/23 0601) Vital Signs (24h Range):  BP: (133)/(83) 133/83          There is no height or weight on file to calculate BMI.             Physical Exam  General: NAD. AAO  HENT: EOMI  Neck: supple. No JVD  CV: RRR. Normal S1/S2. No gallops, rubs, or murmurs. 2+ radial pulses  Resp: CTAB. No increased work of breathing  Ext: warm. No edema.         Significant Labs: All pertinent lab results from the last 24 hours have been reviewed.    Significant Imaging:  Reviewed

## 2023-12-21 NOTE — NURSING
Bilateral groin sutures removed without difficulty.  Gauze/tegaderm applied to bilateral groins.  IV d/c'd with cath tip intact to right wrist.  Pt HOB elevated to 45 degrees.  Pt instructed on splinting groins while coughing.  Pt demonstrated. Will continue to monitor.

## 2023-12-22 LAB
POC ACTIVATED CLOTTING TIME K: 228 SEC (ref 74–137)
POC ACTIVATED CLOTTING TIME K: 233 SEC (ref 74–137)
POC ACTIVATED CLOTTING TIME K: 234 SEC (ref 74–137)
POC ACTIVATED CLOTTING TIME K: 244 SEC (ref 74–137)
SAMPLE: ABNORMAL

## 2024-01-03 ENCOUNTER — CLINICAL SUPPORT (OUTPATIENT)
Dept: SMOKING CESSATION | Facility: CLINIC | Age: 64
End: 2024-01-03
Payer: COMMERCIAL

## 2024-01-03 DIAGNOSIS — F17.200 NICOTINE DEPENDENCE: Primary | ICD-10-CM

## 2024-01-03 PROCEDURE — 99999 PR PBB SHADOW E&M-EST. PATIENT-LVL I: CPT | Mod: PBBFAC,,,

## 2024-01-03 PROCEDURE — 99402 PREV MED CNSL INDIV APPRX 30: CPT | Mod: S$GLB,,,

## 2024-01-03 NOTE — PROGRESS NOTES
Individual Follow-Up Form    1/3/2024    Quit Date: TBD    Clinical Status of Patient: Outpatient    Length of Service: 30 minutes    Continuing Medication: no    Other Medications: none     Target Symptoms: Withdrawal and medication side effects. The following were  rated moderate (3) to severe (4) on TCRS:  Moderate (3): desire/crave tobacco  Severe (4): none    Comments: Patient seen in clinic regarding smoking cessation follow up. Mr. Kat reports that he is smoking every day again and has not started cessation medication or NRT again. He is dealing with other medical issues that he would prefer to address before diving back into cessation journey. Discussed increased health risks of continued tobacco abuse. Highly encouraged patient to smoke outside only especially after he retires from his job next week. Pt requests to continue with counseling after he is able to get himself into a new routine. Pt's exhaled carbon monoxide level was 18 ppm as per [employer]. (non- smoker = 0-5 ppm.)  Pt to continue with counseling in 3 weeks.       Diagnosis: F17.200    Next Visit: 3 weeks

## 2024-01-04 ENCOUNTER — HOSPITAL ENCOUNTER (OUTPATIENT)
Dept: RADIOLOGY | Facility: HOSPITAL | Age: 64
Discharge: HOME | End: 2024-01-04
Attending: INTERNAL MEDICINE
Payer: COMMERCIAL

## 2024-01-04 DIAGNOSIS — I47.20 VENTRICULAR TACHYCARDIA: ICD-10-CM

## 2024-01-04 DIAGNOSIS — I42.8 NONISCHEMIC CARDIOMYOPATHY: ICD-10-CM

## 2024-01-04 PROCEDURE — 25500020 PHARM REV CODE 255: Performed by: INTERNAL MEDICINE

## 2024-01-04 PROCEDURE — 75561 CARDIAC MRI FOR MORPH W/DYE: CPT | Mod: 26,,, | Performed by: PEDIATRICS

## 2024-01-04 PROCEDURE — 75561 CARDIAC MRI FOR MORPH W/DYE: CPT | Mod: 26,,, | Performed by: RADIOLOGY

## 2024-01-04 PROCEDURE — 75561 CARDIAC MRI FOR MORPH W/DYE: CPT | Mod: TC

## 2024-01-04 PROCEDURE — A9577 INJ MULTIHANCE: HCPCS | Performed by: INTERNAL MEDICINE

## 2024-01-04 RX ADMIN — GADOBENATE DIMEGLUMINE 20 ML: 529 INJECTION, SOLUTION INTRAVENOUS at 08:01

## 2024-01-10 ENCOUNTER — TELEPHONE (OUTPATIENT)
Dept: ELECTROPHYSIOLOGY | Facility: CLINIC | Age: 64
End: 2024-01-10
Payer: COMMERCIAL

## 2024-01-10 DIAGNOSIS — E78.5 HYPERLIPIDEMIA LDL GOAL <70: ICD-10-CM

## 2024-01-10 RX ORDER — MULTIVIT-MIN/FA/LYCOPEN/LUTEIN .4-300-25
TABLET ORAL
Qty: 28 TABLET | OUTPATIENT
Start: 2024-01-10

## 2024-01-10 RX ORDER — ATORVASTATIN CALCIUM 20 MG/1
20 TABLET, FILM COATED ORAL DAILY
Qty: 30 TABLET | Refills: 5 | Status: SHIPPED | OUTPATIENT
Start: 2024-01-10

## 2024-01-10 RX ORDER — AMIODARONE HYDROCHLORIDE 200 MG/1
200 TABLET ORAL 2 TIMES DAILY
Qty: 90 TABLET | Refills: 3 | Status: SHIPPED | OUTPATIENT
Start: 2024-01-10 | End: 2024-01-23

## 2024-01-10 NOTE — PROGRESS NOTES
Please notify the patient that his heart MRI did not show any scarring in the heart, which is good news. Recommend 24 hour holter and follow-up in either [facility] or [facility] in 4 weeks.

## 2024-01-10 NOTE — TELEPHONE ENCOUNTER
----- Message from Norbert Mcbride MD sent at 1/10/2024  2:19 PM CST -----  Please notify the patient that his heart MRI did not show any scarring in the heart, which is good news. Recommend 24 hour holter and follow-up in either [place] or [facility] in 4 weeks.

## 2024-01-11 ENCOUNTER — TELEPHONE (OUTPATIENT)
Dept: ELECTROPHYSIOLOGY | Facility: CLINIC | Age: 64
End: 2024-01-11
Payer: COMMERCIAL

## 2024-01-11 ENCOUNTER — HOSPITAL ENCOUNTER (OUTPATIENT)
Dept: PULMONOLOGY | Facility: HOSPITAL | Age: 64
Discharge: HOME | End: 2024-01-11
Attending: INTERNAL MEDICINE
Payer: COMMERCIAL

## 2024-01-11 DIAGNOSIS — I48.0 PAROXYSMAL ATRIAL FIBRILLATION: ICD-10-CM

## 2024-01-11 DIAGNOSIS — I48.0 PAROXYSMAL ATRIAL FIBRILLATION: Primary | ICD-10-CM

## 2024-01-11 PROCEDURE — 93010 ELECTROCARDIOGRAM REPORT: CPT | Mod: ,,, | Performed by: INTERNAL MEDICINE

## 2024-01-11 PROCEDURE — 93005 ELECTROCARDIOGRAM TRACING: CPT

## 2024-01-11 NOTE — TELEPHONE ENCOUNTER
Notified by patient's pharmacist with concern that patient is taking both flecainide and amiodarone despite flecainide being discontinued on 12/21/2023. This was discussed with the patient at that time. Spoke with patient and he stated he must have misunderstood and was taking both. I instructed him to stop flecainide immediately and to not take amiodarone again until he was certain he was not taking flecainide.     Will request an ECG tomorrow or Friday. Patient currently has no symptoms.

## 2024-01-11 NOTE — TELEPHONE ENCOUNTER
Spoke with patient and advised that Dr Mcbride would like him to have an EKG done today or tomorrow if possible. Patient states that he can go this afternoon at [facility]. EKG Appt scheduled for today at 4 pm.

## 2024-01-15 ENCOUNTER — TELEPHONE (OUTPATIENT)
Dept: PHARMACY | Facility: CLINIC | Age: 64
End: 2024-01-15
Payer: COMMERCIAL

## 2024-01-22 ENCOUNTER — TELEPHONE (OUTPATIENT)
Dept: ELECTROPHYSIOLOGY | Facility: CLINIC | Age: 64
End: 2024-01-22
Payer: COMMERCIAL

## 2024-01-22 NOTE — TELEPHONE ENCOUNTER
Spoke with patient and let him know that Dr Mcbride wants him to go to Amiodarone 200 mg daily as maintenance dose. Em ivy RN        Patient calling in because he was with the understanding that after he had finished the 2 week loading period of Amiodarone 200 mg po BID he would go to 200 mg daily however the pill pack he just opened has him taking 200 mg BID ( when he thought he should only be taking 1 tablet daily. He has a great deal of dizziness and lightheadedness with some nausea on the BID dosing and not sure he can continue BID dosing. Will review with Dr Mcbride. Em Ivy RN

## 2024-01-22 NOTE — TELEPHONE ENCOUNTER
----- Message from Corazon Polk sent at 1/22/2024  1:26 PM CST -----  Regarding: medication clarification  Pt is calling to clarify directions for   amiodarone (PACERONE) 200 MG Tab.  He can be reached at [phone].    Thank you

## 2024-01-23 RX ORDER — AMIODARONE HYDROCHLORIDE 200 MG/1
200 TABLET ORAL DAILY
Qty: 90 TABLET | Refills: 3 | Status: SHIPPED | OUTPATIENT
Start: 2024-01-23

## 2024-01-24 ENCOUNTER — LAB VISIT (OUTPATIENT)
Dept: LAB | Facility: HOSPITAL | Age: 64
End: 2024-01-24
Attending: INTERNAL MEDICINE
Payer: COMMERCIAL

## 2024-01-24 ENCOUNTER — PATIENT OUTREACH (OUTPATIENT)
Dept: ADMINISTRATIVE | Facility: OTHER | Age: 64
End: 2024-01-24
Payer: COMMERCIAL

## 2024-01-24 ENCOUNTER — CLINICAL SUPPORT (OUTPATIENT)
Dept: SMOKING CESSATION | Facility: CLINIC | Age: 64
End: 2024-01-24
Payer: COMMERCIAL

## 2024-01-24 VITALS — OXYGEN SATURATION: 99 % | SYSTOLIC BLOOD PRESSURE: 122 MMHG | HEART RATE: 60 BPM | DIASTOLIC BLOOD PRESSURE: 76 MMHG

## 2024-01-24 DIAGNOSIS — R17 ELEVATED BILIRUBIN: ICD-10-CM

## 2024-01-24 DIAGNOSIS — I50.42 CHRONIC COMBINED SYSTOLIC AND DIASTOLIC CONGESTIVE HEART FAILURE: ICD-10-CM

## 2024-01-24 DIAGNOSIS — I25.10 CORONARY ARTERY DISEASE INVOLVING NATIVE CORONARY ARTERY OF NATIVE HEART WITHOUT ANGINA PECTORIS: ICD-10-CM

## 2024-01-24 DIAGNOSIS — F17.200 NICOTINE DEPENDENCE: Primary | ICD-10-CM

## 2024-01-24 DIAGNOSIS — I10 PRIMARY HYPERTENSION: ICD-10-CM

## 2024-01-24 DIAGNOSIS — I47.20 VENTRICULAR TACHYCARDIA: ICD-10-CM

## 2024-01-24 DIAGNOSIS — E78.5 HYPERLIPIDEMIA LDL GOAL <70: ICD-10-CM

## 2024-01-24 DIAGNOSIS — E11.65 UNCONTROLLED TYPE 2 DIABETES MELLITUS WITH HYPERGLYCEMIA: ICD-10-CM

## 2024-01-24 DIAGNOSIS — I70.0 AORTIC ATHEROSCLEROSIS: ICD-10-CM

## 2024-01-24 DIAGNOSIS — E66.01 SEVERE OBESITY (BMI 35.0-39.9) WITH COMORBIDITY: ICD-10-CM

## 2024-01-24 DIAGNOSIS — D68.61 ANTIPHOSPHOLIPID SYNDROME: ICD-10-CM

## 2024-01-24 LAB
ALBUMIN SERPL BCP-MCNC: 4.3 G/DL (ref 3.5–5.2)
ALP SERPL-CCNC: 66 U/L (ref 55–135)
ALT SERPL W/O P-5'-P-CCNC: 28 U/L (ref 10–44)
ANION GAP SERPL CALC-SCNC: 9 MMOL/L (ref 8–16)
AST SERPL-CCNC: 17 U/L (ref 10–40)
BASOPHILS # BLD AUTO: 0.05 K/UL (ref 0–0.2)
BASOPHILS NFR BLD: 0.5 % (ref 0–1.9)
BILIRUB SERPL-MCNC: 1.3 MG/DL (ref 0.1–1)
BUN SERPL-MCNC: 14 MG/DL (ref 8–23)
CALCIUM SERPL-MCNC: 9.5 MG/DL (ref 8.7–10.5)
CHLORIDE SERPL-SCNC: 108 MMOL/L (ref 95–110)
CHOLEST SERPL-MCNC: 116 MG/DL (ref 120–199)
CHOLEST/HDLC SERPL: 3.3 {RATIO} (ref 2–5)
CO2 SERPL-SCNC: 22 MMOL/L (ref 23–29)
CREAT SERPL-MCNC: 0.9 MG/DL (ref 0.5–1.4)
DIFFERENTIAL METHOD BLD: ABNORMAL
EOSINOPHIL # BLD AUTO: 0.2 K/UL (ref 0–0.5)
EOSINOPHIL NFR BLD: 1.7 % (ref 0–8)
ERYTHROCYTE [DISTWIDTH] IN BLOOD BY AUTOMATED COUNT: 13.5 % (ref 11.5–14.5)
EST. GFR  (NO RACE VARIABLE): >60 ML/MIN/1.73 M^2
ESTIMATED AVG GLUCOSE: 151 MG/DL (ref 68–131)
GLUCOSE SERPL-MCNC: 121 MG/DL (ref 70–110)
HBA1C MFR BLD: 6.9 % (ref 4–5.6)
HCT VFR BLD AUTO: 49.7 % (ref 40–54)
HDLC SERPL-MCNC: 35 MG/DL (ref 40–75)
HDLC SERPL: 30.2 % (ref 20–50)
HGB BLD-MCNC: 16.4 G/DL (ref 14–18)
IMM GRANULOCYTES # BLD AUTO: 0.09 K/UL (ref 0–0.04)
IMM GRANULOCYTES NFR BLD AUTO: 0.9 % (ref 0–0.5)
LDLC SERPL CALC-MCNC: 62 MG/DL (ref 63–159)
LYMPHOCYTES # BLD AUTO: 2.6 K/UL (ref 1–4.8)
LYMPHOCYTES NFR BLD: 24.8 % (ref 18–48)
MCH RBC QN AUTO: 32 PG (ref 27–31)
MCHC RBC AUTO-ENTMCNC: 33 G/DL (ref 32–36)
MCV RBC AUTO: 97 FL (ref 82–98)
MONOCYTES # BLD AUTO: 0.9 K/UL (ref 0.3–1)
MONOCYTES NFR BLD: 8.2 % (ref 4–15)
NEUTROPHILS # BLD AUTO: 6.7 K/UL (ref 1.8–7.7)
NEUTROPHILS NFR BLD: 63.9 % (ref 38–73)
NONHDLC SERPL-MCNC: 81 MG/DL
NRBC BLD-RTO: 0 /100 WBC
PLATELET # BLD AUTO: 226 K/UL (ref 150–450)
PMV BLD AUTO: 8.6 FL (ref 9.2–12.9)
POTASSIUM SERPL-SCNC: 4.5 MMOL/L (ref 3.5–5.1)
PROT SERPL-MCNC: 7.4 G/DL (ref 6–8.4)
RBC # BLD AUTO: 5.13 M/UL (ref 4.6–6.2)
SODIUM SERPL-SCNC: 139 MMOL/L (ref 136–145)
TRIGL SERPL-MCNC: 95 MG/DL (ref 30–150)
WBC # BLD AUTO: 10.43 K/UL (ref 3.9–12.7)

## 2024-01-24 PROCEDURE — 85025 COMPLETE CBC W/AUTO DIFF WBC: CPT | Performed by: INTERNAL MEDICINE

## 2024-01-24 PROCEDURE — 80053 COMPREHEN METABOLIC PANEL: CPT | Performed by: INTERNAL MEDICINE

## 2024-01-24 PROCEDURE — 36415 COLL VENOUS BLD VENIPUNCTURE: CPT | Performed by: INTERNAL MEDICINE

## 2024-01-24 PROCEDURE — 80061 LIPID PANEL: CPT | Performed by: INTERNAL MEDICINE

## 2024-01-24 PROCEDURE — 83036 HEMOGLOBIN GLYCOSYLATED A1C: CPT | Performed by: INTERNAL MEDICINE

## 2024-01-24 PROCEDURE — 99403 PREV MED CNSL INDIV APPRX 45: CPT | Mod: S$GLB,,,

## 2024-01-24 NOTE — PROGRESS NOTES
Individual Follow-Up Form    1/24/2024    Quit Date: TBD    Clinical Status of Patient: Outpatient    Length of Service: 45 minutes    Continuing Medication: yes  Chantix or Patches    Other Medications: none     Target Symptoms: Withdrawal and medication side effects. The following were  rated moderate (3) to severe (4) on TCRS:  Moderate (3): desire/crave tobacco  Severe (4): none    Comments: Patient seen in clinic regarding smoking cessation follow up. Pt is returning from a break from the program and reports that he is currently smoking 1-1.5 ppd. Encouraged pt to limit himself to no more than 1 cigarette per hour in order to cut back. Pt also commits to keeping his cigarettes picked up. As of 1/22/24, pt is taking 1 mg chantix BID and wearing 14 mg nicotine patch QD. DELLA noted at this time and pt denies any abnormally low moods or unusual changes in behaviors.Highly encouraged pt to set an alarm to take medication on schedule. Discussed the importance of consistency. Reviewed triggers, urges, routines, hobbies, time-management, and controlling environment. Pt's exhaled carbon monoxide level was 8 ppm as per Smokerlyzer. (non- smoker = 0-5 ppm.)  Pt to continue with counseling in 2 weeks.     Diagnosis: F17.200    Next Visit: 2 weeks

## 2024-01-30 DIAGNOSIS — I49.8 OTHER SPECIFIED CARDIAC ARRHYTHMIAS: Primary | ICD-10-CM

## 2024-02-02 ENCOUNTER — TELEPHONE (OUTPATIENT)
Dept: CARDIOLOGY | Facility: CLINIC | Age: 64
End: 2024-02-02
Payer: COMMERCIAL

## 2024-02-02 ENCOUNTER — OFFICE VISIT (OUTPATIENT)
Dept: INTERNAL MEDICINE | Facility: CLINIC | Age: 64
End: 2024-02-02
Payer: COMMERCIAL

## 2024-02-02 VITALS
DIASTOLIC BLOOD PRESSURE: 66 MMHG | WEIGHT: 270.5 LBS | HEIGHT: 74 IN | OXYGEN SATURATION: 98 % | SYSTOLIC BLOOD PRESSURE: 124 MMHG | RESPIRATION RATE: 16 BRPM | HEART RATE: 68 BPM | BODY MASS INDEX: 34.72 KG/M2

## 2024-02-02 DIAGNOSIS — Z72.0 TOBACCO ABUSE: ICD-10-CM

## 2024-02-02 DIAGNOSIS — I25.10 CORONARY ARTERY DISEASE INVOLVING NATIVE CORONARY ARTERY OF NATIVE HEART WITHOUT ANGINA PECTORIS: ICD-10-CM

## 2024-02-02 DIAGNOSIS — I50.42 CHRONIC COMBINED SYSTOLIC AND DIASTOLIC CONGESTIVE HEART FAILURE: ICD-10-CM

## 2024-02-02 DIAGNOSIS — I70.0 AORTIC ATHEROSCLEROSIS: ICD-10-CM

## 2024-02-02 DIAGNOSIS — I10 PRIMARY HYPERTENSION: Primary | ICD-10-CM

## 2024-02-02 DIAGNOSIS — D68.61 ANTIPHOSPHOLIPID SYNDROME: ICD-10-CM

## 2024-02-02 DIAGNOSIS — E78.5 HYPERLIPIDEMIA LDL GOAL <70: ICD-10-CM

## 2024-02-02 DIAGNOSIS — E66.09 CLASS 1 OBESITY DUE TO EXCESS CALORIES WITH SERIOUS COMORBIDITY AND BODY MASS INDEX (BMI) OF 34.0 TO 34.9 IN ADULT: ICD-10-CM

## 2024-02-02 DIAGNOSIS — R00.1 BRADYCARDIA: ICD-10-CM

## 2024-02-02 DIAGNOSIS — I47.20 VENTRICULAR TACHYCARDIA, UNSPECIFIED: ICD-10-CM

## 2024-02-02 DIAGNOSIS — E11.9 TYPE 2 DIABETES MELLITUS WITHOUT COMPLICATION, WITHOUT LONG-TERM CURRENT USE OF INSULIN: ICD-10-CM

## 2024-02-02 DIAGNOSIS — Z12.5 PROSTATE CANCER SCREENING: ICD-10-CM

## 2024-02-02 PROBLEM — E66.811 CLASS 1 OBESITY DUE TO EXCESS CALORIES WITH BODY MASS INDEX (BMI) OF 34.0 TO 34.9 IN ADULT: Status: ACTIVE | Noted: 2020-01-31

## 2024-02-02 PROCEDURE — 3078F DIAST BP <80 MM HG: CPT | Mod: CPTII,S$GLB,, | Performed by: INTERNAL MEDICINE

## 2024-02-02 PROCEDURE — 99215 OFFICE O/P EST HI 40 MIN: CPT | Mod: S$GLB,,, | Performed by: INTERNAL MEDICINE

## 2024-02-02 PROCEDURE — 3074F SYST BP LT 130 MM HG: CPT | Mod: CPTII,S$GLB,, | Performed by: INTERNAL MEDICINE

## 2024-02-02 PROCEDURE — 3044F HG A1C LEVEL LT 7.0%: CPT | Mod: CPTII,S$GLB,, | Performed by: INTERNAL MEDICINE

## 2024-02-02 PROCEDURE — 4010F ACE/ARB THERAPY RXD/TAKEN: CPT | Mod: CPTII,S$GLB,, | Performed by: INTERNAL MEDICINE

## 2024-02-02 PROCEDURE — 99999 PR PBB SHADOW E&M-EST. PATIENT-LVL V: CPT | Mod: PBBFAC,,, | Performed by: INTERNAL MEDICINE

## 2024-02-02 PROCEDURE — 1159F MED LIST DOCD IN RCRD: CPT | Mod: CPTII,S$GLB,, | Performed by: INTERNAL MEDICINE

## 2024-02-02 PROCEDURE — 1160F RVW MEDS BY RX/DR IN RCRD: CPT | Mod: CPTII,S$GLB,, | Performed by: INTERNAL MEDICINE

## 2024-02-02 PROCEDURE — 3008F BODY MASS INDEX DOCD: CPT | Mod: CPTII,S$GLB,, | Performed by: INTERNAL MEDICINE

## 2024-02-02 NOTE — Clinical Note
Good afternoon,  I saw our mutual patient today and he is reporting spells of dizziness and pre-syncope. Denies chest pain, palpitations. Notes his HR has been variable and has seen as low as 40s. HR 60s and regular today on exam. Taking amiodarone 200mg daily. Unsure if he is still taking verapamil. Asymptomatic in clinic. I asked him to document HR, BP and glucose during a spell and let me know but also wanted to make you aware and see if you wanted any cardiac testing with these episodes. Thanks!

## 2024-02-02 NOTE — PROGRESS NOTES
"Subjective:       Patient ID: [place] N[place] is a 63 y.o. male.    Chief Complaint: Follow-up      HPI:  Patient is new to me and presents to establish care. He has CAD, HTN, HLD, CHF, DM type 2, antiphospholipid syndrome, VT. Labs from 1/24/24 personally reviewed, interpreted and discussed today.     A1C 6.9%, stable  Microalb + ratio; on ACEi  PSA 0.46, normal 11/2023  LDL 62, at goal  TG 95, improved  GFR > 60, normal     HTN: on lisinopril and ? verapamil (VT). BP at goal. Denies med side effects. No swelling. He can't recall if still on the verapamil post ablation.      HLD: on atorvastatin 20mg LDL. At goal. Denies med side effects     CAD: following with Dr. Woodruff. NTG added 10/18/23 for recurrent chest pain to assess for improvement. PET stress was completed.     Systolic CHF: EF 45%. On ACEi. No diuretics. He does get SOB a/w with VT and smoking but no reports of worsening swelling.      VT: following with EP and started on amiodarone s/p ablation 12/2023. He was loaded on amiodarone BID and decreased to once daily a few weeks ago.  He is unsure if still on verapamil-I can't find documentation that he was to stop after ablation.     He is getting episodes of dizziness and feeling like he might pass out. Lasts a few mins and resolves without intervention. No LOC. NO chest pains. No palpitations. Not associated with smoking a cigerette. Has not check blood sugar during an episode. Described as feeling off balance and gets a flushed feeling. Sits down and sx pass. No spinning sensation. Has not documented HR during a spell but notes his HR is very variable and has been down into the 40s.        DM type 2: on januvia and Jardiance. A1C 6.9%. Some spikes in the evening due to is "sweet tooth".   On acei and statin.      Antiphospholipid: diagnosed with DVT years ago (around 2018). ON Xarelto for DVT prophylaxis. No recurrent clots.         Tobacco use: working with smoking cessation clinic  EtOH use: no " daily etoh use  Illicit drug use: denies use    Past Medical History:   Diagnosis Date    Acute combined systolic and diastolic congestive heart failure 6/15/2023    APS (antiphospholipid syndrome)     Cancer     skin cancer- R. hand    Coronary artery disease involving native coronary artery of native heart without angina pectoris 6/9/2023    Elevated homocysteine     Screening for colon cancer 5/4/2017       Family History   Problem Relation Age of Onset    No Known Problems Mother     No Known Problems Father     Diabetes Maternal Grandmother        Social History     Socioeconomic History    Marital status: Single   Tobacco Use    Smoking status: Every Day     Current packs/day: 1.00     Average packs/day: 1 pack/day for 35.9 years (37.6 ttl pk-yrs)     Types: Cigarettes     Start date: 3/28/1987     Last attempt to quit: 8/3/2019    Smokeless tobacco: Never    Tobacco comments:     Patient active in smoking cessation program    Substance and Sexual Activity    Alcohol use: Yes     Comment: Occasionally 3-4 drinks per month at the most    Drug use: No    Sexual activity: Yes     Partners: Female     Comment: single-in a relationship     Social Determinants of Health     Financial Resource Strain: Low Risk  (11/7/2023)    Overall Financial Resource Strain (CARDIA)     Difficulty of Paying Living Expenses: Not very hard   Food Insecurity: No Food Insecurity (11/7/2023)    Hunger Vital Sign     Worried About Running Out of Food in the Last Year: Never true     Ran Out of Food in the Last Year: Never true   Transportation Needs: No Transportation Needs (11/7/2023)    PRAPARE - Transportation     Lack of Transportation (Medical): No     Lack of Transportation (Non-Medical): No   Physical Activity: Insufficiently Active (11/7/2023)    Exercise Vital Sign     Days of Exercise per Week: 2 days     Minutes of Exercise per Session: 30 min   Stress: No Stress Concern Present (11/7/2023)    [place] [place] [employer]  [employer] - Occupational Stress Questionnaire     Feeling of Stress : Only a little   Social Connections: Moderately Integrated (11/7/2023)    Social Connection and Isolation Panel [NHANES]     Frequency of Communication with Friends and Family: More than three times a week     Frequency of Social Gatherings with Friends and Family: Twice a week     Attends [employer] Services: More than 4 times per year     Active Member of Clubs or Organizations: Yes     Attends Club or Organization Meetings: More than 4 times per year     Marital Status: Never    Housing Stability: High Risk (11/7/2023)    Housing Stability Vital Sign     Unable to Pay for Housing in the Last Year: Yes     Number of Places Lived in the Last Year: 1     Unstable Housing in the Last Year: No       Review of Systems   Constitutional:  Negative for activity change, fatigue, fever and unexpected weight change.   HENT:  Negative for congestion, ear pain, hearing loss, rhinorrhea and sore throat.    Eyes:  Negative for redness and visual disturbance.   Respiratory:  Negative for cough, shortness of breath and wheezing.    Cardiovascular:  Negative for chest pain, palpitations and leg swelling.   Gastrointestinal:  Negative for abdominal pain, constipation, diarrhea, nausea and vomiting.   Genitourinary:  Negative for decreased urine volume, dysuria, frequency and urgency.   Musculoskeletal:  Negative for back pain, joint swelling and neck pain.   Skin:  Negative for color change, rash and wound.   Neurological:  Positive for dizziness and light-headedness. Negative for headaches.         Objective:      Physical Exam  Vitals reviewed.   Constitutional:       General: He is not in acute distress.     Appearance: He is well-developed. He is obese.   HENT:      Head: Normocephalic and atraumatic.      Right Ear: External ear normal.      Left Ear: External ear normal.   Eyes:      General:         Right eye: No discharge.         Left eye: No discharge.       Conjunctiva/sclera: Conjunctivae normal.   Neck:      Thyroid: No thyromegaly.   Cardiovascular:      Rate and Rhythm: Normal rate and regular rhythm.      Heart sounds: No murmur heard.  Pulmonary:      Effort: Pulmonary effort is normal. No respiratory distress.      Breath sounds: Normal breath sounds. No wheezing.   Abdominal:      General: There is no distension.      Palpations: Abdomen is soft.      Tenderness: There is no abdominal tenderness.   Skin:     General: Skin is warm and dry.   Neurological:      Mental Status: He is alert and oriented to person, place, and time.   Psychiatric:         Behavior: Behavior normal.         Thought Content: Thought content normal.         Assessment:       1. Primary hypertension    2. Hyperlipidemia LDL goal <70    3. Coronary artery disease involving native coronary artery of native heart without angina pectoris    4. Type 2 diabetes mellitus without complication, without long-term current use of insulin    5. Aortic atherosclerosis    6. Chronic combined systolic and diastolic congestive heart failure    7. Antiphospholipid syndrome    8. Class 1 obesity due to excess calories with serious comorbidity and body mass index (BMI) of 34.0 to 34.9 in adult    9. Tobacco abuse    10. Ventricular tachycardia, unspecified    11. Bradycardia    12. Prostate cancer screening        Plan:       1. Primary hypertension  Chronic controlled  Continue medications at same dose--will need to verify verapamil  Low Na diet  Exercise, weight loss  Check BP and keep log for next visit    -     CBC Auto Differential; Future; Expected date: 07/31/2024  -     Comprehensive Metabolic Panel; Future; Expected date: 07/31/2024  -     TSH; Future; Expected date: 07/31/2024  -     Lipid Panel; Future; Expected date: 07/31/2024  -     Hemoglobin A1C; Future; Expected date: 07/31/2024  -     Microalbumin/Creatinine Ratio, Urine; Future; Expected date: 07/31/2024    2. Hyperlipidemia LDL goal  <70  Chronic controlled  Cont statin same dose  -     CBC Auto Differential; Future; Expected date: 07/31/2024  -     Comprehensive Metabolic Panel; Future; Expected date: 07/31/2024  -     TSH; Future; Expected date: 07/31/2024  -     Lipid Panel; Future; Expected date: 07/31/2024  -     Hemoglobin A1C; Future; Expected date: 07/31/2024  -     Microalbumin/Creatinine Ratio, Urine; Future; Expected date: 07/31/2024    3. Coronary artery disease involving native coronary artery of native heart without angina pectoris  Chronic stable  Denies angina sx today but having dizziness/pre-syncope (see below)  Cont statin and HTN/DM control  -     CBC Auto Differential; Future; Expected date: 07/31/2024  -     Comprehensive Metabolic Panel; Future; Expected date: 07/31/2024  -     TSH; Future; Expected date: 07/31/2024  -     Lipid Panel; Future; Expected date: 07/31/2024  -     Hemoglobin A1C; Future; Expected date: 07/31/2024  -     Microalbumin/Creatinine Ratio, Urine; Future; Expected date: 07/31/2024    4. Type 2 diabetes mellitus without complication, without long-term current use of insulin  Chronic controlled  Cont meds same dose  ADA diet  Check sugars and keep log  -     CBC Auto Differential; Future; Expected date: 07/31/2024  -     Comprehensive Metabolic Panel; Future; Expected date: 07/31/2024  -     TSH; Future; Expected date: 07/31/2024  -     Lipid Panel; Future; Expected date: 07/31/2024  -     Hemoglobin A1C; Future; Expected date: 07/31/2024  -     Microalbumin/Creatinine Ratio, Urine; Future; Expected date: 07/31/2024    5. Aortic atherosclerosis  Noted on prior imaging  Cont statin  -     CBC Auto Differential; Future; Expected date: 07/31/2024  -     Comprehensive Metabolic Panel; Future; Expected date: 07/31/2024  -     TSH; Future; Expected date: 07/31/2024  -     Lipid Panel; Future; Expected date: 07/31/2024  -     Hemoglobin A1C; Future; Expected date: 07/31/2024  -     Microalbumin/Creatinine  Ratio, Urine; Future; Expected date: 07/31/2024    6. Chronic combined systolic and diastolic congestive heart failure  Chronic stable  No signs of volume overload today  Cont meds same dose  -     CBC Auto Differential; Future; Expected date: 07/31/2024  -     Comprehensive Metabolic Panel; Future; Expected date: 07/31/2024  -     TSH; Future; Expected date: 07/31/2024  -     Lipid Panel; Future; Expected date: 07/31/2024  -     Hemoglobin A1C; Future; Expected date: 07/31/2024  -     Microalbumin/Creatinine Ratio, Urine; Future; Expected date: 07/31/2024    7. Antiphospholipid syndrome  Chrnoic stable  Cont xarelto  -     CBC Auto Differential; Future; Expected date: 07/31/2024  -     Comprehensive Metabolic Panel; Future; Expected date: 07/31/2024  -     TSH; Future; Expected date: 07/31/2024  -     Lipid Panel; Future; Expected date: 07/31/2024  -     Hemoglobin A1C; Future; Expected date: 07/31/2024  -     Microalbumin/Creatinine Ratio, Urine; Future; Expected date: 07/31/2024    8. Class 1 obesity due to excess calories with serious comorbidity and body mass index (BMI) of 34.0 to 34.9 in adult  Chronic stable  Diet, exercise, weight loss  -     CBC Auto Differential; Future; Expected date: 07/31/2024  -     Comprehensive Metabolic Panel; Future; Expected date: 07/31/2024  -     TSH; Future; Expected date: 07/31/2024  -     Lipid Panel; Future; Expected date: 07/31/2024  -     Hemoglobin A1C; Future; Expected date: 07/31/2024  -     Microalbumin/Creatinine Ratio, Urine; Future; Expected date: 07/31/2024    9. Tobacco abuse  Smoking cessation needed    10. Ventricular tachycardia, unspecified  Chronic stable  Now s/p ablation 12/2023  On amiodarone  Follow up EP  -     CBC Auto Differential; Future; Expected date: 07/31/2024  -     Comprehensive Metabolic Panel; Future; Expected date: 07/31/2024  -     TSH; Future; Expected date: 07/31/2024  -     Lipid Panel; Future; Expected date: 07/31/2024  -     Hemoglobin  A1C; Future; Expected date: 07/31/2024  -     Microalbumin/Creatinine Ratio, Urine; Future; Expected date: 07/31/2024    11. Bradycardia  HR 60s and regular today. Asymptomatic  Having episodes of dizziness, lightheadedness and pre-synope. No LOC  Asked, if possible, check BP, HR and blood sugar during next spell and let me know  Reached out to EP doc as well to coordinate care  -     CBC Auto Differential; Future; Expected date: 07/31/2024  -     Comprehensive Metabolic Panel; Future; Expected date: 07/31/2024  -     TSH; Future; Expected date: 07/31/2024  -     Lipid Panel; Future; Expected date: 07/31/2024  -     Hemoglobin A1C; Future; Expected date: 07/31/2024  -     Microalbumin/Creatinine Ratio, Urine; Future; Expected date: 07/31/2024    12. Prostate cancer screening  Per orders for yearly screening  Last normal  -     PSA, Screening; Future; Expected date: 07/31/2024           RTC 6 months with labs for routine and PRN pending coordination with EP for new onset sx

## 2024-02-02 NOTE — TELEPHONE ENCOUNTER
Dr. Oconnor notified me patient was having dizzy spells and noting periodic low heart rates (40s-60s). Called patient and requested he stop verapamil. He found them in his pill pack and is removing them. He has an upcoming visit with me to discuss next steps. He wants to avoid ablation/PPM implant if possible. Will update his pharmacy.

## 2024-02-07 DIAGNOSIS — E11.65 UNCONTROLLED TYPE 2 DIABETES MELLITUS WITH HYPERGLYCEMIA: ICD-10-CM

## 2024-02-12 RX ORDER — TAMSULOSIN HYDROCHLORIDE 0.4 MG/1
0.4 CAPSULE ORAL DAILY
Qty: 30 CAPSULE | Refills: 5 | Status: SHIPPED | OUTPATIENT
Start: 2024-02-12

## 2024-02-12 RX ORDER — LISINOPRIL 5 MG/1
5 TABLET ORAL DAILY
Qty: 30 TABLET | Refills: 5 | Status: SHIPPED | OUTPATIENT
Start: 2024-02-12

## 2024-02-14 ENCOUNTER — CLINICAL SUPPORT (OUTPATIENT)
Dept: SMOKING CESSATION | Facility: CLINIC | Age: 64
End: 2024-02-14
Payer: COMMERCIAL

## 2024-02-14 DIAGNOSIS — F17.200 NICOTINE DEPENDENCE: ICD-10-CM

## 2024-02-14 PROCEDURE — 99403 PREV MED CNSL INDIV APPRX 45: CPT | Mod: S$GLB,,,

## 2024-02-14 PROCEDURE — 99999 PR PBB SHADOW E&M-EST. PATIENT-LVL I: CPT | Mod: PBBFAC,,,

## 2024-02-14 RX ORDER — VARENICLINE TARTRATE 1 MG/1
1 TABLET, FILM COATED ORAL 2 TIMES DAILY
Qty: 56 TABLET | Refills: 0 | Status: SHIPPED | OUTPATIENT
Start: 2024-02-14

## 2024-02-14 NOTE — PROGRESS NOTES
Individual Follow-Up Form    2/14/2024    Quit Date: TBD    Clinical Status of Patient: Outpatient    Length of Service: 45 minutes    Continuing Medication: yes  Chantix or Patches    Other Medications: none     Target Symptoms: Withdrawal and medication side effects. The following were  rated moderate (3) to severe (4) on TCRS:  Moderate (3): desire/crave tobacco  Severe (4): none    Comments: Patient seen in clinic for smoking cessation FU. Pt continues 1 mg chantix BID and 14 mg nicotine patch QD. DELLA noted at this time and pt denies any abnormally low moods or unusual changes in behaviors. Pt is down to 3/4 ppd at this time and smoking outside only. Commended pt for progress made thus far. New goals are to decrease to 10-12 cpd, consider new goals quit date, and make his vehicle a smoke-free area. Of note, Mr. Kat is attending a retreat on 3/8 and plans to not bring any cigarettes. Completion of TCRS (Tobacco Cessation Rating Scale) reviewed strategies, controlling environment, cues, triggers, new goals set. Introduced high risk situations with preparation interventions, understanding urges, cravings, stress and relaxation. Open discussion with intervention discussion. Pt's exhaled carbon monoxide level was 11 ppm as per Smokerlyzer. (non- smoker = 0-5 ppm.)  Pt to continue with counseling 3/6.    Diagnosis: F17.200    Next Visit: 2 weeks     no

## 2024-03-07 DIAGNOSIS — E11.65 UNCONTROLLED TYPE 2 DIABETES MELLITUS WITH HYPERGLYCEMIA: ICD-10-CM

## 2024-03-13 ENCOUNTER — CLINICAL SUPPORT (OUTPATIENT)
Dept: SMOKING CESSATION | Facility: CLINIC | Age: 64
End: 2024-03-13
Payer: COMMERCIAL

## 2024-03-13 DIAGNOSIS — F17.200 NICOTINE DEPENDENCE: Primary | ICD-10-CM

## 2024-03-13 PROCEDURE — 99999 PR PBB SHADOW E&M-EST. PATIENT-LVL I: CPT | Mod: PBBFAC,,,

## 2024-03-13 PROCEDURE — 99401 PREV MED CNSL INDIV APPRX 15: CPT | Mod: S$GLB,,,

## 2024-03-13 NOTE — PROGRESS NOTES
Individual Follow-Up Form    3/13/2024    Quit Date: TBD    Clinical Status of Patient: Outpatient    Length of Service: 15 minutes    Continuing Medication: no    Other Medications: none     Target Symptoms: Withdrawal and medication side effects. The following were  rated moderate (3) to severe (4) on TCRS:  Moderate (3): desire/crave tobacco  Severe (4): none    Comments: Spoke with patient regarding smoking cessation follow up. Pt reports that he is back up to 1 ppd. Of note, he recently attended a retreat and was able to make 1 pack of cigarettes last 5 days. Commended pt for effort. Pt struggles with taking cessation medications and NRT consistently. Mr North states that he recently retired and is trying to adjust to new routines. Reviewed learned addiction model, triggers, cues, and short/long term consequences of tobacco use. Notified pt of benefit end date 3/13/24 and eligibility for renewal in 7/2024. Encouraged pt to contact smoking cessation when ready to return to program.    Diagnosis: F17.200    Next Visit: prn

## 2024-03-20 ENCOUNTER — TELEPHONE (OUTPATIENT)
Dept: ELECTROPHYSIOLOGY | Facility: CLINIC | Age: 64
End: 2024-03-20
Payer: COMMERCIAL

## 2024-03-21 ENCOUNTER — PATIENT MESSAGE (OUTPATIENT)
Dept: ELECTROPHYSIOLOGY | Facility: CLINIC | Age: 64
End: 2024-03-21

## 2024-03-21 ENCOUNTER — LAB VISIT (OUTPATIENT)
Dept: LAB | Facility: HOSPITAL | Age: 64
End: 2024-03-21
Attending: INTERNAL MEDICINE
Payer: COMMERCIAL

## 2024-03-21 ENCOUNTER — HOSPITAL ENCOUNTER (OUTPATIENT)
Dept: CARDIOLOGY | Facility: CLINIC | Age: 64
Discharge: HOME | End: 2024-03-21
Payer: COMMERCIAL

## 2024-03-21 ENCOUNTER — OFFICE VISIT (OUTPATIENT)
Dept: ELECTROPHYSIOLOGY | Facility: CLINIC | Age: 64
End: 2024-03-21
Payer: COMMERCIAL

## 2024-03-21 VITALS
DIASTOLIC BLOOD PRESSURE: 58 MMHG | HEART RATE: 75 BPM | BODY MASS INDEX: 34.43 KG/M2 | WEIGHT: 268.31 LBS | HEIGHT: 74 IN | SYSTOLIC BLOOD PRESSURE: 118 MMHG

## 2024-03-21 DIAGNOSIS — I50.42 CHRONIC COMBINED SYSTOLIC AND DIASTOLIC CONGESTIVE HEART FAILURE: ICD-10-CM

## 2024-03-21 DIAGNOSIS — D68.61 ANTIPHOSPHOLIPID SYNDROME: ICD-10-CM

## 2024-03-21 DIAGNOSIS — Z79.899 AT RISK FOR AMIODARONE TOXICITY WITH LONG TERM USE: ICD-10-CM

## 2024-03-21 DIAGNOSIS — I49.3 PVC'S (PREMATURE VENTRICULAR CONTRACTIONS): ICD-10-CM

## 2024-03-21 DIAGNOSIS — I49.8 OTHER SPECIFIED CARDIAC ARRHYTHMIAS: ICD-10-CM

## 2024-03-21 DIAGNOSIS — I47.20 VENTRICULAR TACHYCARDIA: ICD-10-CM

## 2024-03-21 DIAGNOSIS — I10 PRIMARY HYPERTENSION: ICD-10-CM

## 2024-03-21 DIAGNOSIS — I50.42 CHRONIC COMBINED SYSTOLIC AND DIASTOLIC CONGESTIVE HEART FAILURE: Primary | ICD-10-CM

## 2024-03-21 DIAGNOSIS — I70.0 AORTIC ATHEROSCLEROSIS: ICD-10-CM

## 2024-03-21 DIAGNOSIS — Z91.89 AT RISK FOR AMIODARONE TOXICITY WITH LONG TERM USE: ICD-10-CM

## 2024-03-21 LAB
ALBUMIN SERPL BCP-MCNC: 4.2 G/DL (ref 3.5–5.2)
ALP SERPL-CCNC: 78 U/L (ref 55–135)
ALT SERPL W/O P-5'-P-CCNC: 33 U/L (ref 10–44)
ANION GAP SERPL CALC-SCNC: 10 MMOL/L (ref 8–16)
AST SERPL-CCNC: 21 U/L (ref 10–40)
BILIRUB SERPL-MCNC: 1.2 MG/DL (ref 0.1–1)
BUN SERPL-MCNC: 14 MG/DL (ref 8–23)
CALCIUM SERPL-MCNC: 9.9 MG/DL (ref 8.7–10.5)
CHLORIDE SERPL-SCNC: 107 MMOL/L (ref 95–110)
CO2 SERPL-SCNC: 23 MMOL/L (ref 23–29)
CREAT SERPL-MCNC: 0.8 MG/DL (ref 0.5–1.4)
EST. GFR  (NO RACE VARIABLE): >60 ML/MIN/1.73 M^2
GLUCOSE SERPL-MCNC: 137 MG/DL (ref 70–110)
OHS QRS DURATION: 114 MS
OHS QTC CALCULATION: 480 MS
POTASSIUM SERPL-SCNC: 4.2 MMOL/L (ref 3.5–5.1)
PROT SERPL-MCNC: 7.1 G/DL (ref 6–8.4)
SODIUM SERPL-SCNC: 140 MMOL/L (ref 136–145)
TSH SERPL DL<=0.005 MIU/L-ACNC: 1.72 UIU/ML (ref 0.4–4)

## 2024-03-21 PROCEDURE — 3008F BODY MASS INDEX DOCD: CPT | Mod: CPTII,S$GLB,, | Performed by: INTERNAL MEDICINE

## 2024-03-21 PROCEDURE — 36415 COLL VENOUS BLD VENIPUNCTURE: CPT | Performed by: INTERNAL MEDICINE

## 2024-03-21 PROCEDURE — 3044F HG A1C LEVEL LT 7.0%: CPT | Mod: CPTII,S$GLB,, | Performed by: INTERNAL MEDICINE

## 2024-03-21 PROCEDURE — 99214 OFFICE O/P EST MOD 30 MIN: CPT | Mod: S$GLB,,, | Performed by: INTERNAL MEDICINE

## 2024-03-21 PROCEDURE — 4010F ACE/ARB THERAPY RXD/TAKEN: CPT | Mod: CPTII,S$GLB,, | Performed by: INTERNAL MEDICINE

## 2024-03-21 PROCEDURE — 80053 COMPREHEN METABOLIC PANEL: CPT | Performed by: INTERNAL MEDICINE

## 2024-03-21 PROCEDURE — 84443 ASSAY THYROID STIM HORMONE: CPT | Performed by: INTERNAL MEDICINE

## 2024-03-21 PROCEDURE — 99999 PR PBB SHADOW E&M-EST. PATIENT-LVL III: CPT | Mod: PBBFAC,,, | Performed by: INTERNAL MEDICINE

## 2024-03-21 PROCEDURE — 1160F RVW MEDS BY RX/DR IN RCRD: CPT | Mod: CPTII,S$GLB,, | Performed by: INTERNAL MEDICINE

## 2024-03-21 PROCEDURE — 3078F DIAST BP <80 MM HG: CPT | Mod: CPTII,S$GLB,, | Performed by: INTERNAL MEDICINE

## 2024-03-21 PROCEDURE — 93005 ELECTROCARDIOGRAM TRACING: CPT | Mod: S$GLB,,, | Performed by: INTERNAL MEDICINE

## 2024-03-21 PROCEDURE — 3074F SYST BP LT 130 MM HG: CPT | Mod: CPTII,S$GLB,, | Performed by: INTERNAL MEDICINE

## 2024-03-21 PROCEDURE — 1159F MED LIST DOCD IN RCRD: CPT | Mod: CPTII,S$GLB,, | Performed by: INTERNAL MEDICINE

## 2024-03-21 PROCEDURE — 93010 ELECTROCARDIOGRAM REPORT: CPT | Mod: S$GLB,,, | Performed by: INTERNAL MEDICINE

## 2024-03-21 NOTE — PROGRESS NOTES
Subjective:   Patient ID:  North Najera is a 63 y.o. male who presents for follow-up of PVCs    Referring Cardiologist: Neno Constantino MD  Primary Care Provider: Irma Biswas NP    HPI  Prior Hx:  I had the pleasure of seeing Mr. Najera today in our electrophysiology clinic in consultation for his PVCs/VT. As you are aware he is a pleasant 63 year-old man with type 2 diabetes mellitus, obesity, anti-phospholipid syndrome with prior DVT and frequent PVCs/VT with LVEF of 40%. He was in his usual state of health until 6/2023 when he developed weakness/light-headedness at work. He went to the ER and was observed. He wore a holter monitor which showed very frequent PVCs (58% burden) and VT. He was instructed to go to [facility] ER. He was observed. He was seen by the EP consult team, Dr. Talley. ECHO noted a LVEF of 40%. He was initiated on metoprolol. PET stress test noted no significant indications of obstructive disease. PVCs were morphologically para-His by ECG. Recommendation was to change to verapamil and he was discharged. Repeat holter in August of 2023 noted PVC burden of 24% still with runs of VT and echo showed LVEF improved to 50-55%. He has a 10% PAC burden. Discussed adding anti-arrhythmic drug therapy versus mapping/ablation. I spent about a half hour discussing the nature of EP study and ablation, including possible retrograde aortic access. We discussed risks and benefits at length. Our discussion included, but was not limited to the risk of death, infection, bleeding, stroke, MI, cardiac perforation, vascular injury, valvular injury, embolism, cardiac tamponade, skin burns, and other organic injury including the possibility for need for surgery or pacemaker implantation. He understood and after a long conversation he would like to try AAD therapy first. Would add 100mg bid of flecainide and repeat a holter in 4 weeks. Will call him on the phone and discuss results. If he continues to have a high  "burden of PVCs/VT then he indicated he would proceed with ablation.    I reviewed available ECGs in Epic which show sinus rhythm. Only a few isolated PVCs are visible. One ECG has PVCs in limb leads that are inferiorly directed.    12/2023: Spoke with Mr. Najera regarding his repeat Holter. He is still having a high burden of PVCs and VT (35% PVC burden with runs of VT up to 48 beats). Elected to proceed with mapping/ablation. Unsuccessful PVC ablation (5 different PVC morphologies observed with the predominant focus located at the GCV/AIV region which could not be accessed with the ablation catheter. Mapping/ablating the endocardial aspect was not successful). Unsuccessful VT ablation (highly suspect VT is mid basal intraseptal near the conduction system. Ablation not performed at "earliest site" at the left his bundle region. Ablation just inferior at an early but not earliest site was unsuccessful). Started on amiodarone.    1/2024: Cardiac MRI normal, no LGE. LVEF 43%.    2/2024: Spoke on the phone, having dizzy spells with low pulse rates at times. Verapamil stopped.      Interim Hx:  Mr. Najera returns for follow-up. Feels well. Only notes rare dizzy spells.    My interpretation of today's ECG is sinus rhythm with PVCs (RBBB, concordant, mid and leftward axis)    Review of Systems   Constitutional: Negative for fever and malaise/fatigue.   HENT:  Negative for congestion and sore throat.    Eyes:  Negative for blurred vision and visual disturbance.   Cardiovascular:  Negative for chest pain, dyspnea on exertion, irregular heartbeat, near-syncope, palpitations and syncope.   Respiratory:  Negative for cough and shortness of breath.    Hematologic/Lymphatic: Negative for bleeding problem. Does not bruise/bleed easily.   Skin: Negative.    Musculoskeletal: Negative.    Gastrointestinal:  Negative for bloating, abdominal pain, hematochezia and melena.   Neurological:  Positive for light-headedness. Negative for " focal weakness and weakness.   Psychiatric/Behavioral: Negative.         Objective:   Physical Exam  Vitals reviewed.   Constitutional:       General: He is not in acute distress.     Appearance: He is well-developed. He is not diaphoretic.   HENT:      Head: Normocephalic and atraumatic.   Eyes:      General:         Right eye: No discharge.         Left eye: No discharge.      Conjunctiva/sclera: Conjunctivae normal.   Cardiovascular:      Rate and Rhythm: Normal rate and regular rhythm. Frequent Extrasystoles are present.     Heart sounds: No murmur heard.     No friction rub. No gallop.   Pulmonary:      Effort: Pulmonary effort is normal. No respiratory distress.      Breath sounds: Normal breath sounds. No wheezing or rales.   Abdominal:      General: Bowel sounds are normal. There is no distension.      Palpations: Abdomen is soft.      Tenderness: There is no abdominal tenderness.   Musculoskeletal:      Cervical back: Neck supple.   Skin:     General: Skin is warm and dry.   Neurological:      Mental Status: He is alert and oriented to person, place, and time.   Psychiatric:         Behavior: Behavior normal.         Thought Content: Thought content normal.         Judgment: Judgment normal.         Assessment:      1. Chronic combined systolic and diastolic congestive heart failure    2. Aortic atherosclerosis    3. PVC's (premature ventricular contractions)    4. Ventricular tachycardia    5. Primary hypertension    6. Antiphospholipid syndrome        Plan:   In summary, Mr. Najera is a pleasant 63 year-old man with type 2 diabetes mellitus, obesity, anti-phospholipid syndrome with prior DVT and frequent PVCs/VT with LVEF of 40%. EF improved to normal with reducing ectopic burden with verapamil but still has PVC burden of 24% and still with runs of VT. PVC burden not changed with flecainide. Underwent EP study and attempted ablation 12/2023 with 5 different PVC foci (most frequent inaccessible GCV/AIV  junction) and VT focus was para-Hisian with earliest site at the left sided His bundle region. Elected to not ablate there due to AV block risk. Cardiac MRI unremarkable. On amiodarone now. He prefers to avoid redo-ablation/PPM if possible. Will update PVC burden and ECHO and discuss on the phone. Otherwise continue amiodarone at 200mg daily. Will get LFTs/TSH.    Thank you for allowing me to participate in the care of this patient. Please do not hesitate to call me with any questions or concerns.    Norbert Mcbride MD, PhD  Cardiac Electrophysiology

## 2024-04-17 ENCOUNTER — TELEPHONE (OUTPATIENT)
Dept: ELECTROPHYSIOLOGY | Facility: CLINIC | Age: 64
End: 2024-04-17
Payer: COMMERCIAL

## 2024-04-22 ENCOUNTER — HOSPITAL ENCOUNTER (OUTPATIENT)
Dept: CARDIOLOGY | Facility: HOSPITAL | Age: 64
Discharge: HOME | End: 2024-04-22
Attending: INTERNAL MEDICINE
Payer: COMMERCIAL

## 2024-04-22 VITALS
DIASTOLIC BLOOD PRESSURE: 58 MMHG | WEIGHT: 268 LBS | HEART RATE: 75 BPM | SYSTOLIC BLOOD PRESSURE: 118 MMHG | HEIGHT: 74 IN | BODY MASS INDEX: 34.39 KG/M2

## 2024-04-22 DIAGNOSIS — I50.42 CHRONIC COMBINED SYSTOLIC AND DIASTOLIC CONGESTIVE HEART FAILURE: ICD-10-CM

## 2024-04-22 LAB
ASCENDING AORTA: 3.7 CM
AV INDEX (PROSTH): 0.66
AV MEAN GRADIENT: 3 MMHG
AV PEAK GRADIENT: 7 MMHG
AV VALVE AREA BY VELOCITY RATIO: 4.41 CM²
AV VALVE AREA: 4.48 CM²
AV VELOCITY RATIO: 0.65
BSA FOR ECHO PROCEDURE: 2.52 M2
CV ECHO LV RWT: 0.36 CM
DOP CALC AO PEAK VEL: 1.33 M/S
DOP CALC AO VTI: 28.44 CM
DOP CALC LVOT AREA: 6.7 CM2
DOP CALC LVOT DIAMETER: 2.93 CM
DOP CALC LVOT PEAK VEL: 0.87 M/S
DOP CALC LVOT STROKE VOLUME: 127.37 CM3
DOP CALCLVOT PEAK VEL VTI: 18.9 CM
E WAVE DECELERATION TIME: 297.98 MSEC
E/A RATIO: 1.35
E/E' RATIO: 7.73 M/S
ECHO LV POSTERIOR WALL: 1.08 CM (ref 0.6–1.1)
FRACTIONAL SHORTENING: 23 % (ref 28–44)
INTERVENTRICULAR SEPTUM: 0.84 CM (ref 0.6–1.1)
LA MAJOR: 5.14 CM
LA MINOR: 5.83 CM
LA WIDTH: 3.03 CM
LEFT ATRIUM SIZE: 5.39 CM
LEFT ATRIUM VOLUME INDEX MOD: 42.7 ML/M2
LEFT ATRIUM VOLUME INDEX: 30.8 ML/M2
LEFT ATRIUM VOLUME MOD: 105 CM3
LEFT ATRIUM VOLUME: 75.84 CM3
LEFT INTERNAL DIMENSION IN SYSTOLE: 4.65 CM (ref 2.1–4)
LEFT VENTRICLE DIASTOLIC VOLUME INDEX: 73.46 ML/M2
LEFT VENTRICLE DIASTOLIC VOLUME: 180.71 ML
LEFT VENTRICLE MASS INDEX: 95 G/M2
LEFT VENTRICLE SYSTOLIC VOLUME INDEX: 40.7 ML/M2
LEFT VENTRICLE SYSTOLIC VOLUME: 100 ML
LEFT VENTRICULAR INTERNAL DIMENSION IN DIASTOLE: 6.01 CM (ref 3.5–6)
LEFT VENTRICULAR MASS: 234.89 G
LV LATERAL E/E' RATIO: 7.25 M/S
LV SEPTAL E/E' RATIO: 8.29 M/S
MV PEAK A VEL: 0.43 M/S
MV PEAK E VEL: 0.58 M/S
OHS CV RV/LV RATIO: 0.72 CM
PISA TR MAX VEL: 2.3 M/S
RA MAJOR: 4.44 CM
RA PRESSURE ESTIMATED: 0 MMHG
RA WIDTH: 4.7 CM
RIGHT ATRIAL AREA: 18.2 CM2
RIGHT VENTRICULAR END-DIASTOLIC DIMENSION: 4.3 CM
RV TB RVSP: 2 MMHG
SINUS: 3.66 CM
STJ: 3 CM
TDI LATERAL: 0.08 M/S
TDI SEPTAL: 0.07 M/S
TDI: 0.08 M/S
TR MAX PG: 21 MMHG
TRICUSPID ANNULAR PLANE SYSTOLIC EXCURSION: 1.62 CM
TV REST PULMONARY ARTERY PRESSURE: 21 MMHG
Z-SCORE OF LEFT VENTRICULAR DIMENSION IN END DIASTOLE: -7.39
Z-SCORE OF LEFT VENTRICULAR DIMENSION IN END SYSTOLE: -3.77

## 2024-04-22 PROCEDURE — 93306 TTE W/DOPPLER COMPLETE: CPT

## 2024-04-22 PROCEDURE — 93306 TTE W/DOPPLER COMPLETE: CPT | Mod: 26,,, | Performed by: INTERNAL MEDICINE

## 2024-04-25 ENCOUNTER — PATIENT MESSAGE (OUTPATIENT)
Dept: ELECTROPHYSIOLOGY | Facility: CLINIC | Age: 64
End: 2024-04-25
Payer: COMMERCIAL

## 2024-05-01 ENCOUNTER — CLINICAL SUPPORT (OUTPATIENT)
Dept: CARDIOLOGY | Facility: HOSPITAL | Age: 64
End: 2024-05-01
Attending: INTERNAL MEDICINE
Payer: COMMERCIAL

## 2024-05-01 ENCOUNTER — DOCUMENTATION ONLY (OUTPATIENT)
Dept: CARDIOLOGY | Facility: HOSPITAL | Age: 64
End: 2024-05-01
Payer: COMMERCIAL

## 2024-05-01 DIAGNOSIS — I50.42 CHRONIC COMBINED SYSTOLIC AND DIASTOLIC CONGESTIVE HEART FAILURE: ICD-10-CM

## 2024-05-01 PROCEDURE — 93246 EXT ECG>7D<15D RECORDING: CPT

## 2024-05-01 PROCEDURE — 93248 EXT ECG>7D<15D REV&INTERPJ: CPT | Mod: ,,, | Performed by: INTERNAL MEDICINE

## 2024-05-01 NOTE — PROGRESS NOTES
[employer] monitoring was contacted for insurance verification on Mr. Najera 14 day monitor. I had the pleasure of speaking with Mary on today.The estimated out of pocket cost of $25 was informed to be the patient portion of responsibility for service stated by [employer]. Call reference number is as followed:[id number]  [id number]

## 2024-05-17 ENCOUNTER — TELEPHONE (OUTPATIENT)
Dept: CARDIOLOGY | Facility: HOSPITAL | Age: 64
End: 2024-05-17
Payer: COMMERCIAL

## 2024-05-17 LAB
OHS CV EVENT MONITOR DAY: 2
OHS CV HOLTER HOOKUP DATE: NORMAL
OHS CV HOLTER HOOKUP TIME: NORMAL
OHS CV HOLTER LENGTH DECIMAL HOURS: 70
OHS CV HOLTER LENGTH HOURS: 22
OHS CV HOLTER LENGTH MINUTES: 0
OHS CV HOLTER SCAN DATE: NORMAL
OHS CV HOLTER STUDY END DATE: NORMAL
OHS CV HOLTER STUDY END TIME: NORMAL

## 2024-05-17 NOTE — TELEPHONE ENCOUNTER
Spoke with Mr Najera regarding his monitor. Discussed re-attempt at ablation, again, with likely need for pacemaker after due to proximity of focus to the conduction system. He declines for now. Reports he feels well and has had no syncope. He does have a normal LVEF and normal cardiac MRI. His PET stress test showed no ischemia/infarct however he did have some resting heterogeneity in the septal region. Would like for him to consider a coronary angiogram. Will discuss with Dr. Constantino.

## 2024-05-29 ENCOUNTER — PATIENT MESSAGE (OUTPATIENT)
Dept: CARDIOLOGY | Facility: CLINIC | Age: 64
End: 2024-05-29
Payer: COMMERCIAL

## 2024-06-04 ENCOUNTER — OFFICE VISIT (OUTPATIENT)
Dept: INTERNAL MEDICINE | Facility: CLINIC | Age: 64
End: 2024-06-04
Payer: COMMERCIAL

## 2024-06-04 ENCOUNTER — PATIENT MESSAGE (OUTPATIENT)
Dept: CARDIOLOGY | Facility: CLINIC | Age: 64
End: 2024-06-04
Payer: COMMERCIAL

## 2024-06-04 VITALS
HEIGHT: 74 IN | DIASTOLIC BLOOD PRESSURE: 76 MMHG | WEIGHT: 268.75 LBS | SYSTOLIC BLOOD PRESSURE: 124 MMHG | OXYGEN SATURATION: 98 % | RESPIRATION RATE: 18 BRPM | BODY MASS INDEX: 34.49 KG/M2 | HEART RATE: 101 BPM

## 2024-06-04 DIAGNOSIS — I47.20 VENTRICULAR TACHYCARDIA: ICD-10-CM

## 2024-06-04 DIAGNOSIS — I49.3 PVC'S (PREMATURE VENTRICULAR CONTRACTIONS): Primary | ICD-10-CM

## 2024-06-04 DIAGNOSIS — I25.10 CORONARY ARTERY DISEASE INVOLVING NATIVE CORONARY ARTERY OF NATIVE HEART WITHOUT ANGINA PECTORIS: ICD-10-CM

## 2024-06-04 PROCEDURE — G2211 COMPLEX E/M VISIT ADD ON: HCPCS | Mod: S$GLB,,, | Performed by: INTERNAL MEDICINE

## 2024-06-04 PROCEDURE — 3074F SYST BP LT 130 MM HG: CPT | Mod: CPTII,S$GLB,, | Performed by: INTERNAL MEDICINE

## 2024-06-04 PROCEDURE — 3078F DIAST BP <80 MM HG: CPT | Mod: CPTII,S$GLB,, | Performed by: INTERNAL MEDICINE

## 2024-06-04 PROCEDURE — 99999 PR PBB SHADOW E&M-EST. PATIENT-LVL IV: CPT | Mod: PBBFAC,,, | Performed by: INTERNAL MEDICINE

## 2024-06-04 PROCEDURE — 3008F BODY MASS INDEX DOCD: CPT | Mod: CPTII,S$GLB,, | Performed by: INTERNAL MEDICINE

## 2024-06-04 PROCEDURE — 99214 OFFICE O/P EST MOD 30 MIN: CPT | Mod: S$GLB,,, | Performed by: INTERNAL MEDICINE

## 2024-06-04 PROCEDURE — 3044F HG A1C LEVEL LT 7.0%: CPT | Mod: CPTII,S$GLB,, | Performed by: INTERNAL MEDICINE

## 2024-06-04 PROCEDURE — 1159F MED LIST DOCD IN RCRD: CPT | Mod: CPTII,S$GLB,, | Performed by: INTERNAL MEDICINE

## 2024-06-04 PROCEDURE — 4010F ACE/ARB THERAPY RXD/TAKEN: CPT | Mod: CPTII,S$GLB,, | Performed by: INTERNAL MEDICINE

## 2024-06-04 PROCEDURE — 1160F RVW MEDS BY RX/DR IN RCRD: CPT | Mod: CPTII,S$GLB,, | Performed by: INTERNAL MEDICINE

## 2024-06-04 NOTE — Clinical Note
Just FYI- patient saw me today to discuss and get my opinion on angiogram per your recommendations. He feels ready to proceed with angiogram and will schedule with Dr. Constantino. Take care, gentlemen!

## 2024-06-04 NOTE — PROGRESS NOTES
"Subjective:       Patient ID: North Najera is a 64 y.o. male.    Chief Complaint: Follow-up (Pt is here for clarification on an upcoming angiogram procedure. )      HPI:  Patient is known to me and presents to discuss need for angiogram. He has history of PVCs/VT. He is following with Dr. Mcbride. Did heart monitor and ultimately underwent ablation. Per cards notes: " Underwent EP study and attempted ablation 12/2023 with 5 different PVC foci (most frequent inaccessible GCV/AIV junction) and VT focus was para-Hisian with earliest site at the left sided His bundle region. Elected to not ablate there due to AV block risk. " Started on amiodarone at that time. He then developed worsening dizzy spells and bradycardia; verapamil stopped. At his most recent cardiology visit 3/2024 eh was overall feeling well. Does still have intermittent dizziness but no syncope/LOC. Plan was to to continue medical management.    However, patient receive call from Dr. Mcbride May 2024. Per notes, Dr. Mcbride reviewed his studies and felt there was an area of reduced blood flow in the region of his ectopy. He was recommended to discuss angiogram with Dr. Constantino. Patient presents today to discuss risks/benefits of proceeding with procedure with me.             Past Medical History:   Diagnosis Date    Acute combined systolic and diastolic congestive heart failure 6/15/2023    APS (antiphospholipid syndrome)     Cancer     skin cancer- R. hand    Coronary artery disease involving native coronary artery of native heart without angina pectoris 6/9/2023    Elevated homocysteine     Screening for colon cancer 5/4/2017       Family History   Problem Relation Name Age of Onset    No Known Problems Mother      No Known Problems Father      Diabetes Maternal Grandmother         Social History     Socioeconomic History    Marital status: Single   Tobacco Use    Smoking status: Every Day     Current packs/day: 1.00     Average packs/day: 1 pack/day " for 36.3 years (37.9 ttl pk-yrs)     Types: Cigarettes     Start date: 3/28/1987     Last attempt to quit: 8/3/2019    Smokeless tobacco: Never    Tobacco comments:     Patient eligible to renew smoking cessation benefits on 7/10/24   Substance and Sexual Activity    Alcohol use: Yes     Comment: Occasionally 3-4 drinks per month at the most    Drug use: No    Sexual activity: Yes     Partners: Female     Comment: single-in a relationship     Social Determinants of Health     Financial Resource Strain: Low Risk  (11/7/2023)    Overall Financial Resource Strain (CARDIA)     Difficulty of Paying Living Expenses: Not very hard   Food Insecurity: No Food Insecurity (11/7/2023)    Hunger Vital Sign     Worried About Running Out of Food in the Last Year: Never true     Ran Out of Food in the Last Year: Never true   Transportation Needs: No Transportation Needs (11/7/2023)    PRAPARE - Transportation     Lack of Transportation (Medical): No     Lack of Transportation (Non-Medical): No   Physical Activity: Insufficiently Active (11/7/2023)    Exercise Vital Sign     Days of Exercise per Week: 2 days     Minutes of Exercise per Session: 30 min   Stress: No Stress Concern Present (11/7/2023)    [employer] [place] [place] [employer] - Occupational Stress Questionnaire     Feeling of Stress : Only a little   Housing Stability: High Risk (11/7/2023)    Housing Stability Vital Sign     Unable to Pay for Housing in the Last Year: Yes     Number of Places Lived in the Last Year: 1     Unstable Housing in the Last Year: No       Review of Systems   Constitutional:  Negative for activity change, fatigue, fever and unexpected weight change.   HENT:  Negative for congestion, ear pain, hearing loss, rhinorrhea and sore throat.    Eyes:  Negative for redness and visual disturbance.   Respiratory:  Negative for cough, shortness of breath and wheezing.    Cardiovascular:  Negative for chest pain, palpitations and leg swelling.    Gastrointestinal:  Negative for abdominal pain, constipation, diarrhea, nausea and vomiting.   Genitourinary:  Negative for decreased urine volume, dysuria, frequency and urgency.   Musculoskeletal:  Negative for back pain, joint swelling and neck pain.   Skin:  Negative for color change, rash and wound.   Neurological:  Positive for dizziness and light-headedness. Negative for headaches.         Objective:      Physical Exam  Vitals reviewed.   Constitutional:       General: He is not in acute distress.     Appearance: He is well-developed.   HENT:      Head: Normocephalic and atraumatic.      Right Ear: External ear normal.      Left Ear: External ear normal.   Neck:      Thyroid: No thyromegaly.   Cardiovascular:      Rate and Rhythm: Normal rate and regular rhythm.   Pulmonary:      Effort: Pulmonary effort is normal. No respiratory distress.   Abdominal:      Tenderness: There is no abdominal tenderness.   Neurological:      Mental Status: He is alert and oriented to person, place, and time.   Psychiatric:         Behavior: Behavior normal.         Thought Content: Thought content normal.         Assessment:       1. PVC's (premature ventricular contractions)    2. Ventricular tachycardia    3. Coronary artery disease involving native coronary artery of native heart without angina pectoris        Plan:       1. PVC's (premature ventricular contractions)  Chronic stable    2. Ventricular tachycardia  Chronic stable    3. Coronary artery disease involving native coronary artery of native heart without angina pectoris  Chronic stable    I spent > 30 mins in direct face to face consultation with the patient today. We discussed risks and benefits of moving forward with angiogram vs ablation/PPM placement vs continued medical management.  His sx are stable regarding dizziness/lightheadedness but they are not resolved. He does express concern that his sx could progress and worries he may intervene too late.  At this  point, I think proceeding with angiogram based on Dr. Munroe's recommendations is a reasonable option. I discussed the procedure to the best of my internal medicine knowledge with the patient today.   He will schedule follow up with Dr. Constantino to discuss and schedule procedure as well.    Copied Dr. Constantino and Dr. Munroe's to this note to make them aware.   I will defer to their expertise and told this to the patient if they feel another procedure vs medical management would be more advantageous at this point.          RTC as scheduled and PRN

## 2024-06-10 ENCOUNTER — PATIENT OUTREACH (OUTPATIENT)
Dept: ADMINISTRATIVE | Facility: HOSPITAL | Age: 64
End: 2024-06-10
Payer: COMMERCIAL

## 2024-06-10 LAB
LEFT EYE DM RETINOPATHY: NEGATIVE
RIGHT EYE DM RETINOPATHY: NEGATIVE

## 2024-06-12 ENCOUNTER — OFFICE VISIT (OUTPATIENT)
Dept: CARDIOLOGY | Facility: CLINIC | Age: 64
End: 2024-06-12
Payer: COMMERCIAL

## 2024-06-12 VITALS
WEIGHT: 268.94 LBS | DIASTOLIC BLOOD PRESSURE: 80 MMHG | OXYGEN SATURATION: 96 % | BODY MASS INDEX: 34.52 KG/M2 | HEIGHT: 74 IN | HEART RATE: 67 BPM | RESPIRATION RATE: 18 BRPM | SYSTOLIC BLOOD PRESSURE: 136 MMHG

## 2024-06-12 DIAGNOSIS — I47.20 VENTRICULAR TACHYCARDIA: ICD-10-CM

## 2024-06-12 DIAGNOSIS — I25.10 CORONARY ARTERY DISEASE INVOLVING NATIVE CORONARY ARTERY OF NATIVE HEART WITHOUT ANGINA PECTORIS: ICD-10-CM

## 2024-06-12 DIAGNOSIS — I10 PRIMARY HYPERTENSION: ICD-10-CM

## 2024-06-12 DIAGNOSIS — E66.09 CLASS 1 OBESITY DUE TO EXCESS CALORIES WITH SERIOUS COMORBIDITY AND BODY MASS INDEX (BMI) OF 34.0 TO 34.9 IN ADULT: ICD-10-CM

## 2024-06-12 DIAGNOSIS — I50.42 CHRONIC COMBINED SYSTOLIC AND DIASTOLIC CONGESTIVE HEART FAILURE: ICD-10-CM

## 2024-06-12 DIAGNOSIS — I70.0 AORTIC ATHEROSCLEROSIS: Primary | ICD-10-CM

## 2024-06-12 DIAGNOSIS — E11.9 DIABETES MELLITUS WITHOUT COMPLICATION: ICD-10-CM

## 2024-06-12 DIAGNOSIS — D68.61 ANTIPHOSPHOLIPID SYNDROME: ICD-10-CM

## 2024-06-12 DIAGNOSIS — Z72.0 TOBACCO ABUSE: ICD-10-CM

## 2024-06-12 DIAGNOSIS — E78.5 HYPERLIPIDEMIA LDL GOAL <70: ICD-10-CM

## 2024-06-12 PROCEDURE — 99214 OFFICE O/P EST MOD 30 MIN: CPT | Mod: S$GLB,,, | Performed by: INTERNAL MEDICINE

## 2024-06-12 PROCEDURE — 99999 PR PBB SHADOW E&M-EST. PATIENT-LVL IV: CPT | Mod: PBBFAC,,, | Performed by: INTERNAL MEDICINE

## 2024-06-12 PROCEDURE — 4010F ACE/ARB THERAPY RXD/TAKEN: CPT | Mod: CPTII,S$GLB,, | Performed by: INTERNAL MEDICINE

## 2024-06-12 PROCEDURE — 3075F SYST BP GE 130 - 139MM HG: CPT | Mod: CPTII,S$GLB,, | Performed by: INTERNAL MEDICINE

## 2024-06-12 PROCEDURE — 1159F MED LIST DOCD IN RCRD: CPT | Mod: CPTII,S$GLB,, | Performed by: INTERNAL MEDICINE

## 2024-06-12 PROCEDURE — 3008F BODY MASS INDEX DOCD: CPT | Mod: CPTII,S$GLB,, | Performed by: INTERNAL MEDICINE

## 2024-06-12 PROCEDURE — 1160F RVW MEDS BY RX/DR IN RCRD: CPT | Mod: CPTII,S$GLB,, | Performed by: INTERNAL MEDICINE

## 2024-06-12 PROCEDURE — 3044F HG A1C LEVEL LT 7.0%: CPT | Mod: CPTII,S$GLB,, | Performed by: INTERNAL MEDICINE

## 2024-06-12 PROCEDURE — 3079F DIAST BP 80-89 MM HG: CPT | Mod: CPTII,S$GLB,, | Performed by: INTERNAL MEDICINE

## 2024-06-12 NOTE — PROGRESS NOTES
[facility]     Subjective:    Patient ID:  [place] Najera is a 64 y.o. male who presents for follow-up of Vtach, Hyperlipidemia, Coronary Artery Disease, Diabetes Mellitus, Hypertension, and DVT HX    Review of patient's allergies indicates:   Allergen Reactions    Codeine Diarrhea     Patient can not tolerate Tylenol #3, states he does okay with Codeine with cough medications     Nickel sutures [surgical stainless steel] Rash      He is here today in follow-up with history of VT followed with electrophysiology.  The patient is diabetic.  He takes Jardiance.  Amiodarone was started based on Holter monitor showing periods of ventricular tachycardia.  Initially his EF was 43% by PET stress June 20, 2023.  Most recent echo showed normal ejection fraction.  He does have mild ischemia on PET stress with less than 10% myocardial involvement.  Calcification of the coronary arteries noted.    He does report shortness of breath on occasion.  He is still having periods of dizziness.  His most recent event monitor did confirm persistent VT runs despite amiodarone.  He is better overall.        Review of Systems   Constitutional: Negative for chills, decreased appetite, diaphoresis, fever, malaise/fatigue, night sweats, weight gain and weight loss.   HENT:  Negative for congestion, ear discharge, ear pain, hearing loss, hoarse voice, nosebleeds, odynophagia, sore throat, stridor and tinnitus.    Eyes:  Negative for blurred vision, discharge, double vision, pain, photophobia, redness, vision loss in left eye, vision loss in right eye, visual disturbance and visual halos.   Cardiovascular:  Positive for dyspnea on exertion and palpitations. Negative for chest pain, claudication, cyanosis, irregular heartbeat, leg swelling, near-syncope, orthopnea, paroxysmal nocturnal dyspnea and syncope.   Respiratory:  Positive for shortness of breath. Negative  "for cough, hemoptysis, sleep disturbances due to breathing, snoring, sputum production and wheezing.    Endocrine: Negative for cold intolerance, heat intolerance, polydipsia, polyphagia and polyuria.   Hematologic/Lymphatic: Negative for adenopathy and bleeding problem. Does not bruise/bleed easily.   Skin:  Negative for color change, dry skin, flushing, itching, nail changes, poor wound healing, rash, skin cancer, suspicious lesions and unusual hair distribution.   Musculoskeletal:  Negative for arthritis, back pain, falls, gout, joint pain, joint swelling, muscle cramps, muscle weakness, myalgias, neck pain and stiffness.   Gastrointestinal:  Negative for bloating, abdominal pain, anorexia, change in bowel habit, bowel incontinence, constipation, diarrhea, dysphagia, excessive appetite, flatus, heartburn, hematemesis, hematochezia, hemorrhoids, jaundice, melena, nausea and vomiting.   Genitourinary:  Negative for bladder incontinence, decreased libido, dysuria, flank pain, frequency, genital sores, hematuria, hesitancy, incomplete emptying, nocturia and urgency.   Neurological:  Positive for dizziness and headaches. Negative for aphonia, brief paralysis, difficulty with concentration, disturbances in coordination, excessive daytime sleepiness, focal weakness, light-headedness, loss of balance, numbness, paresthesias, seizures, sensory change, tremors, vertigo and weakness.   Psychiatric/Behavioral:  Negative for altered mental status, depression, hallucinations, memory loss, substance abuse, suicidal ideas and thoughts of violence. The patient does not have insomnia and is not nervous/anxious.    Allergic/Immunologic: Negative for hives and persistent infections.        Objective:       Vitals:    06/12/24 1022   BP: 136/80   Pulse: 67   Resp: 18   SpO2: 96%   Weight: 122 kg (268 lb 15.4 oz)   Height: 6' 2" (1.88 m)    Physical Exam  Constitutional:       General: He is not in acute distress.     Appearance: He " is well-developed. He is not diaphoretic.   HENT:      Head: Normocephalic and atraumatic.      Nose: Nose normal.   Eyes:      General: No scleral icterus.        Right eye: No discharge.      Conjunctiva/sclera: Conjunctivae normal.      Pupils: Pupils are equal, round, and reactive to light.   Neck:      Thyroid: No thyromegaly.      Vascular: No JVD.      Trachea: No tracheal deviation.   Cardiovascular:      Rate and Rhythm: Normal rate and regular rhythm.      Pulses:           Carotid pulses are 2+ on the right side and 2+ on the left side.       Radial pulses are 2+ on the right side and 2+ on the left side.        Dorsalis pedis pulses are 2+ on the right side and 2+ on the left side.        Posterior tibial pulses are 2+ on the right side and 2+ on the left side.      Heart sounds: Normal heart sounds. No murmur heard.     No friction rub. No gallop.   Pulmonary:      Effort: Pulmonary effort is normal. No respiratory distress.      Breath sounds: Normal breath sounds. No stridor. No wheezing or rales.   Chest:      Chest wall: No tenderness.   Abdominal:      General: Bowel sounds are normal. There is no distension.      Palpations: Abdomen is soft. There is no mass.      Tenderness: There is no abdominal tenderness. There is no guarding or rebound.   Musculoskeletal:         General: No tenderness. Normal range of motion.      Cervical back: Normal range of motion and neck supple.   Lymphadenopathy:      Cervical: No cervical adenopathy.   Skin:     General: Skin is warm and dry.      Coloration: Skin is not pale.      Findings: No erythema or rash.   Neurological:      Mental Status: He is alert and oriented to person, place, and time.      Cranial Nerves: No cranial nerve deficit.      Coordination: Coordination normal.   Psychiatric:         Behavior: Behavior normal.         Thought Content: Thought content normal.         Judgment: Judgment normal.           Assessment:       1. Aortic  atherosclerosis    2. Chronic combined systolic and diastolic congestive heart failure    3. Coronary artery disease involving native coronary artery of native heart without angina pectoris    4. Hyperlipidemia LDL goal <70    5. Primary hypertension    6. Ventricular tachycardia    7. Antiphospholipid syndrome    8. Class 1 obesity due to excess calories with serious comorbidity and body mass index (BMI) of 34.0 to 34.9 in adult    9. Diabetes mellitus without complication    10. Tobacco abuse      Results for orders placed or performed in visit on 06/10/24    DIABETES EYE EXAM   Result Value Ref Range    Left Eye DM Retinopathy Negative     Right Eye DM Retinopathy Negative          Current Outpatient Medications:     amiodarone (PACERONE) 200 MG Tab, Take 1 tablet (200 mg total) by mouth once daily., Disp: 90 tablet, Rfl: 3    atorvastatin (LIPITOR) 20 MG tablet, Take 1 tablet (20 mg total) by mouth once daily IN THE MORNING, Disp: 30 tablet, Rfl: 5    empagliflozin (JARDIANCE) 10 mg tablet, Take 1 tablet (10 mg total) by mouth once daily., Disp: 30 tablet, Rfl: 5    lancets (FREESTYLE LANCETS) 28 gauge Misc, 1 lancet by Misc.(Non-Drug; Combo Route) route Daily., Disp: 100 each, Rfl: 3    lancets (ONETOUCH ULTRASOFT LANCETS) Misc, 1 Stick by Misc.(Non-Drug; Combo Route) route 2 (two) times daily., Disp: 100 each, Rfl: 2    lisinopriL (PRINIVIL,ZESTRIL) 5 MG tablet, Take 1 tablet (5 mg total) by mouth once daily., Disp: 30 tablet, Rfl: 5    multivit-min-FA-lycopen-lutein (CEROVITE SENIOR) 0.4 mg-300 mcg- 250 mcg Tab, Take one a day, Disp: 28 tablet, Rfl: PRN    MULTIVIT-MIN/FA/LYCOPEN/LUTEIN (CENTRUM SILVER MEN ORAL), Take by mouth., Disp: , Rfl:     nicotine (NICODERM CQ) 14 mg/24 hr, Place 1 patch onto the skin once daily., Disp: 28 patch, Rfl: 0    nicotine, polacrilex, (NICORETTE) 2 mg Gum, Take 1 each (2 mg total) by mouth as needed (Do not exceed 10 pieces per day)., Disp: 100 each, Rfl: 0    nitroGLYCERIN  (NITROSTAT) 0.4 MG SL tablet, Place 1 tablet (0.4 mg total) under the tongue every 5 (five) minutes as needed for Chest pain., Disp: 25 each, Rfl: 1    rivaroxaban (XARELTO) 10 mg Tab, Take 1 tablet (10 mg total) by mouth once daily., Disp: 90 tablet, Rfl: 3    SITagliptin phosphate (JANUVIA) 50 MG Tab, Take 1 tablet (50 mg total) by mouth once daily., Disp: 30 tablet, Rfl: 3    tamsulosin (FLOMAX) 0.4 mg Cap, Take 1 capsule (0.4 mg total) by mouth once daily., Disp: 30 capsule, Rfl: 5    varenicline (CHANTIX) 1 mg Tab, Take 1 tablet (1 mg total) by mouth 2 (two) times daily., Disp: 56 tablet, Rfl: 0  No current facility-administered medications for this visit.    Facility-Administered Medications Ordered in Other Visits:     ceFAZolin 2 g in dextrose 5 % in water (D5W) 50 mL IVPB (MB+), 2 g, Intravenous, On Call Procedure, Carmencita Cuba, NP     Lab Results   Component Value Date    WBC 10.43 01/24/2024    RBC 5.13 01/24/2024    HGB 16.4 01/24/2024    HCT 49.7 01/24/2024    MCV 97 01/24/2024    MCH 32.0 (H) 01/24/2024    MCHC 33.0 01/24/2024    RDW 13.5 01/24/2024     01/24/2024    MPV 8.6 (L) 01/24/2024    GRAN 6.7 01/24/2024    GRAN 63.9 01/24/2024    LYMPH 2.6 01/24/2024    LYMPH 24.8 01/24/2024    MONO 0.9 01/24/2024    MONO 8.2 01/24/2024    EOS 0.2 01/24/2024    BASO 0.05 01/24/2024    EOSINOPHIL 1.7 01/24/2024    BASOPHIL 0.5 01/24/2024    MG 1.9 06/16/2023        CMP  Lab Results   Component Value Date     03/21/2024    K 4.2 03/21/2024     03/21/2024    CO2 23 03/21/2024     (H) 03/21/2024    BUN 14 03/21/2024    CREATININE 0.8 03/21/2024    CALCIUM 9.9 03/21/2024    PROT 7.1 03/21/2024    ALBUMIN 4.2 03/21/2024    BILITOT 1.2 (H) 03/21/2024    ALKPHOS 78 03/21/2024    AST 21 03/21/2024    ALT 33 03/21/2024    ANIONGAP 10 03/21/2024    ESTGFRAFRICA >60 04/12/2022    EGFRNONAA >60 04/12/2022        Lab Results   Component Value Date    LABURIN (A) 06/30/2021     METHICILLIN  RESISTANT STAPHYLOCOCCUS AUREUS  >100,000cfu/ml              Results for orders placed or performed during the hospital encounter of 01/11/24   EKG 12-lead    Collection Time: 01/11/24  3:13 PM    Narrative    Test Reason : I48.0,    Vent. Rate : 068 BPM     Atrial Rate : 068 BPM     P-R Int : 196 ms          QRS Dur : 118 ms      QT Int : 524 ms       P-R-T Axes : 046 -44 022 degrees     QTc Int : 557 ms    Sinus rhythm with occasional Premature ventricular complexes  Left axis deviation  Prolonged QT  Abnormal ECG  When compared with ECG of 21-DEC-2023 11:24,  QT has lengthened  Confirmed by Neno Constantino MD ([id number]) on 1/12/2024 7:27:01 AM    Referred By: NORBERT MCBRIDE           Confirmed By:Neno Constantino MD                  Plan:       Problem List Items Addressed This Visit          Cardiac/Vascular    Hyperlipidemia LDL goal <70     Atorvastatin to continue.  He takes 20 mg.  Most recent LDL 62 mg%.  Labs January 20, 2024.         Aortic atherosclerosis - Primary     Condition unchanged.         Coronary artery disease involving native coronary artery of native heart without angina pectoris     Cardiac catheterization planned because of persistent paroxysmal ventricular tachycardia. Management discussed with Dr. Norbert Mcbride.     July 28, 2024 procedure planned.    Currently he reports unexplained episodes of dyspnea on occasion.         Ventricular tachycardia     Electrophysiology evaluation appreciated.  Amiodarone tolerated well thus far.  He is still having episodes of dizziness as well as palpitation awareness.    Mapping carried out previously.  Ablation procedure being discussed pending cardiac catheterization findings.         Primary hypertension     Lisinopril 5 mg to continue.  Blood pressures appear controlled.  Condition stable.         Chronic combined systolic and diastolic congestive heart failure     His last echo confirmed normal ejection fraction.  Previous workup included EF 42% by PET  stress June 20, 2023.              Hematology    Antiphospholipid syndrome     Chronic low-dose Xarelto tolerated well.  A remote DVT documented previously.            Endocrine    Class 1 obesity due to excess calories with body mass index (BMI) of 34.0 to 34.9 in adult     Weight loss encouraged.         Diabetes mellitus without complication     Hemoglobin A1c 6.9.  Jardibeto Januvia to be continued.  Condition stable.            Other    Tobacco abuse     Cessation advised.               Cardiac catheterization discussed and will be scheduled for early August.  He has family issues currently and can not do the procedure ideally.  No medication changes made today.  Management to be discussed with electrophysiology.    A left heart catheterization has been advised.   The procedure has been explained to the patient and risks explained to the patient.  Risks include but not limited to death, myocardial infarction, bleeding and blood vessel injury, stroke, kidney injury, limb loss, allergic reaction to medications explained to the patient.  He requests to proceed.             Neno Constantino MD  06/13/2024   10:39 AM

## 2024-06-13 DIAGNOSIS — I47.20 V-TACH: Primary | ICD-10-CM

## 2024-06-13 RX ORDER — SODIUM CHLORIDE 0.9 % (FLUSH) 0.9 %
10 SYRINGE (ML) INJECTION
Status: SHIPPED | OUTPATIENT
Start: 2024-06-13

## 2024-06-13 RX ORDER — SODIUM CHLORIDE 9 MG/ML
INJECTION, SOLUTION INTRAVENOUS CONTINUOUS
OUTPATIENT
Start: 2024-06-13 | End: 2024-06-13

## 2024-06-13 NOTE — ASSESSMENT & PLAN NOTE
Cardiac catheterization planned because of persistent paroxysmal ventricular tachycardia. Management discussed with Dr. Norbert Mcbride.     July 28, 2024 procedure planned.    Currently he reports unexplained episodes of dyspnea on occasion.

## 2024-06-13 NOTE — ASSESSMENT & PLAN NOTE
His last echo confirmed normal ejection fraction.  Previous workup included EF 42% by PET stress June 20, 2023.

## 2024-06-13 NOTE — ASSESSMENT & PLAN NOTE
Electrophysiology evaluation appreciated.  Amiodarone tolerated well thus far.  He is still having episodes of dizziness as well as palpitation awareness.    Mapping carried out previously.  Ablation procedure being discussed pending cardiac catheterization findings.

## 2024-06-27 DIAGNOSIS — E11.65 UNCONTROLLED TYPE 2 DIABETES MELLITUS WITH HYPERGLYCEMIA: ICD-10-CM

## 2024-06-27 DIAGNOSIS — E78.5 HYPERLIPIDEMIA LDL GOAL <70: ICD-10-CM

## 2024-06-27 DIAGNOSIS — D68.61 ANTIPHOSPHOLIPID SYNDROME: ICD-10-CM

## 2024-06-28 RX ORDER — ATORVASTATIN CALCIUM 20 MG/1
20 TABLET, FILM COATED ORAL DAILY
Qty: 30 TABLET | Refills: 5 | Status: SHIPPED | OUTPATIENT
Start: 2024-06-28

## 2024-07-10 ENCOUNTER — CLINICAL SUPPORT (OUTPATIENT)
Dept: SMOKING CESSATION | Facility: CLINIC | Age: 64
End: 2024-07-10
Payer: COMMERCIAL

## 2024-07-10 DIAGNOSIS — F17.200 NICOTINE DEPENDENCE, UNCOMPLICATED: Primary | ICD-10-CM

## 2024-07-10 PROCEDURE — 99999 PR PBB SHADOW E&M-EST. PATIENT-LVL I: CPT | Mod: PBBFAC,,,

## 2024-07-10 PROCEDURE — 99407 BEHAV CHNG SMOKING > 10 MIN: CPT | Mod: S$GLB,,, | Performed by: GENERAL PRACTICE

## 2024-07-10 NOTE — PROGRESS NOTES
Spoke with patient today in regard to smoking cessation progress for 12 month follow up. He states he is still not tobacco free. Patient has scheduled appointment to return to the program for Quit attempt # 6. Informed patient of benefit period, future follow ups and contact information if any further help is needed. Will resolve smart form for 12 month follow up for Quit # 5.

## 2024-07-22 DIAGNOSIS — E11.65 UNCONTROLLED TYPE 2 DIABETES MELLITUS WITH HYPERGLYCEMIA: ICD-10-CM

## 2024-07-22 RX ORDER — LISINOPRIL 5 MG/1
5 TABLET ORAL DAILY
Qty: 30 TABLET | Refills: 5 | Status: SHIPPED | OUTPATIENT
Start: 2024-07-22

## 2024-07-22 RX ORDER — TAMSULOSIN HYDROCHLORIDE 0.4 MG/1
0.4 CAPSULE ORAL DAILY
Qty: 30 CAPSULE | Refills: 5 | Status: SHIPPED | OUTPATIENT
Start: 2024-07-22

## 2024-07-22 NOTE — TELEPHONE ENCOUNTER
Care Due:                  Date            Visit Type   Department     Provider  --------------------------------------------------------------------------------                                MYCHART                              FOLLOWUP/OF  [facility] INTERNAL  Last Visit: 06-      [facility] VISIT   MEDICINE II    Tessa Oconnor                              EP -                              PRIMARY      [facility] INTERNAL  Next Visit: 08-      CARE ([facility])   MEDICINE II    Tessa Oconnor                                                            Last  Test          Frequency    Reason                     Performed    Due Date  --------------------------------------------------------------------------------    HBA1C.......  6 months...  SITagliptin,               01- 07-                             empagliflozin............    Health [facility] Embedded Care Due Messages. Reference number: [id number].   7/22/2024 1:56:07 PM CDT

## 2024-07-26 ENCOUNTER — LAB VISIT (OUTPATIENT)
Dept: LAB | Facility: HOSPITAL | Age: 64
End: 2024-07-26
Attending: INTERNAL MEDICINE
Payer: COMMERCIAL

## 2024-07-26 DIAGNOSIS — I47.20 VENTRICULAR TACHYCARDIA, UNSPECIFIED: ICD-10-CM

## 2024-07-26 DIAGNOSIS — I10 PRIMARY HYPERTENSION: ICD-10-CM

## 2024-07-26 DIAGNOSIS — I70.0 AORTIC ATHEROSCLEROSIS: ICD-10-CM

## 2024-07-26 DIAGNOSIS — I25.10 CORONARY ARTERY DISEASE INVOLVING NATIVE CORONARY ARTERY OF NATIVE HEART WITHOUT ANGINA PECTORIS: ICD-10-CM

## 2024-07-26 DIAGNOSIS — E78.5 HYPERLIPIDEMIA LDL GOAL <70: ICD-10-CM

## 2024-07-26 DIAGNOSIS — R00.1 BRADYCARDIA: ICD-10-CM

## 2024-07-26 DIAGNOSIS — Z12.5 PROSTATE CANCER SCREENING: ICD-10-CM

## 2024-07-26 DIAGNOSIS — I50.42 CHRONIC COMBINED SYSTOLIC AND DIASTOLIC CONGESTIVE HEART FAILURE: ICD-10-CM

## 2024-07-26 DIAGNOSIS — E11.9 TYPE 2 DIABETES MELLITUS WITHOUT COMPLICATION, WITHOUT LONG-TERM CURRENT USE OF INSULIN: ICD-10-CM

## 2024-07-26 DIAGNOSIS — D68.61 ANTIPHOSPHOLIPID SYNDROME: ICD-10-CM

## 2024-07-26 DIAGNOSIS — E66.09 CLASS 1 OBESITY DUE TO EXCESS CALORIES WITH SERIOUS COMORBIDITY AND BODY MASS INDEX (BMI) OF 34.0 TO 34.9 IN ADULT: ICD-10-CM

## 2024-07-26 LAB
ALBUMIN SERPL BCP-MCNC: 4.4 G/DL (ref 3.5–5.2)
ALBUMIN/CREAT UR: 96 UG/MG (ref 0–30)
ALP SERPL-CCNC: 68 U/L (ref 55–135)
ALT SERPL W/O P-5'-P-CCNC: 34 U/L (ref 10–44)
ANION GAP SERPL CALC-SCNC: 8 MMOL/L (ref 8–16)
AST SERPL-CCNC: 20 U/L (ref 10–40)
BASOPHILS # BLD AUTO: 0.04 K/UL (ref 0–0.2)
BASOPHILS NFR BLD: 0.3 % (ref 0–1.9)
BILIRUB SERPL-MCNC: 1.9 MG/DL (ref 0.1–1)
BUN SERPL-MCNC: 11 MG/DL (ref 8–23)
CALCIUM SERPL-MCNC: 9.8 MG/DL (ref 8.7–10.5)
CHLORIDE SERPL-SCNC: 106 MMOL/L (ref 95–110)
CHOLEST SERPL-MCNC: 118 MG/DL (ref 120–199)
CHOLEST/HDLC SERPL: 3.6 {RATIO} (ref 2–5)
CO2 SERPL-SCNC: 25 MMOL/L (ref 23–29)
COMPLEXED PSA SERPL-MCNC: 0.56 NG/ML (ref 0–4)
CREAT SERPL-MCNC: 0.9 MG/DL (ref 0.5–1.4)
CREAT UR-MCNC: 134.4 MG/DL (ref 23–375)
DIFFERENTIAL METHOD BLD: ABNORMAL
EOSINOPHIL # BLD AUTO: 0.2 K/UL (ref 0–0.5)
EOSINOPHIL NFR BLD: 1.7 % (ref 0–8)
ERYTHROCYTE [DISTWIDTH] IN BLOOD BY AUTOMATED COUNT: 13.4 % (ref 11.5–14.5)
EST. GFR  (NO RACE VARIABLE): >60 ML/MIN/1.73 M^2
ESTIMATED AVG GLUCOSE: 151 MG/DL (ref 68–131)
GLUCOSE SERPL-MCNC: 131 MG/DL (ref 70–110)
HBA1C MFR BLD: 6.9 % (ref 4–5.6)
HCT VFR BLD AUTO: 50.6 % (ref 40–54)
HDLC SERPL-MCNC: 33 MG/DL (ref 40–75)
HDLC SERPL: 28 % (ref 20–50)
HGB BLD-MCNC: 17.1 G/DL (ref 14–18)
IMM GRANULOCYTES # BLD AUTO: 0.06 K/UL (ref 0–0.04)
IMM GRANULOCYTES NFR BLD AUTO: 0.5 % (ref 0–0.5)
LDLC SERPL CALC-MCNC: 59.8 MG/DL (ref 63–159)
LYMPHOCYTES # BLD AUTO: 2.4 K/UL (ref 1–4.8)
LYMPHOCYTES NFR BLD: 20.7 % (ref 18–48)
MCH RBC QN AUTO: 32.4 PG (ref 27–31)
MCHC RBC AUTO-ENTMCNC: 33.8 G/DL (ref 32–36)
MCV RBC AUTO: 96 FL (ref 82–98)
MICROALBUMIN UR DL<=1MG/L-MCNC: 129 UG/ML
MONOCYTES # BLD AUTO: 1 K/UL (ref 0.3–1)
MONOCYTES NFR BLD: 8.2 % (ref 4–15)
NEUTROPHILS # BLD AUTO: 8.1 K/UL (ref 1.8–7.7)
NEUTROPHILS NFR BLD: 68.6 % (ref 38–73)
NONHDLC SERPL-MCNC: 85 MG/DL
NRBC BLD-RTO: 0 /100 WBC
PLATELET # BLD AUTO: 191 K/UL (ref 150–450)
PMV BLD AUTO: 8.9 FL (ref 9.2–12.9)
POTASSIUM SERPL-SCNC: 4.5 MMOL/L (ref 3.5–5.1)
PROT SERPL-MCNC: 7.1 G/DL (ref 6–8.4)
RBC # BLD AUTO: 5.27 M/UL (ref 4.6–6.2)
SODIUM SERPL-SCNC: 139 MMOL/L (ref 136–145)
TRIGL SERPL-MCNC: 126 MG/DL (ref 30–150)
TSH SERPL DL<=0.005 MIU/L-ACNC: 1.61 UIU/ML (ref 0.4–4)
WBC # BLD AUTO: 11.79 K/UL (ref 3.9–12.7)

## 2024-07-26 PROCEDURE — 84443 ASSAY THYROID STIM HORMONE: CPT | Performed by: INTERNAL MEDICINE

## 2024-07-26 PROCEDURE — 80061 LIPID PANEL: CPT | Performed by: INTERNAL MEDICINE

## 2024-07-26 PROCEDURE — 36415 COLL VENOUS BLD VENIPUNCTURE: CPT | Performed by: INTERNAL MEDICINE

## 2024-07-26 PROCEDURE — 80053 COMPREHEN METABOLIC PANEL: CPT | Performed by: INTERNAL MEDICINE

## 2024-07-26 PROCEDURE — 84153 ASSAY OF PSA TOTAL: CPT | Performed by: INTERNAL MEDICINE

## 2024-07-26 PROCEDURE — 85025 COMPLETE CBC W/AUTO DIFF WBC: CPT | Performed by: INTERNAL MEDICINE

## 2024-07-26 PROCEDURE — 82570 ASSAY OF URINE CREATININE: CPT | Performed by: INTERNAL MEDICINE

## 2024-07-26 PROCEDURE — 82043 UR ALBUMIN QUANTITATIVE: CPT | Performed by: INTERNAL MEDICINE

## 2024-07-26 PROCEDURE — 83036 HEMOGLOBIN GLYCOSYLATED A1C: CPT | Performed by: INTERNAL MEDICINE

## 2024-08-02 ENCOUNTER — OFFICE VISIT (OUTPATIENT)
Dept: INTERNAL MEDICINE | Facility: CLINIC | Age: 64
End: 2024-08-02
Payer: COMMERCIAL

## 2024-08-02 VITALS
BODY MASS INDEX: 34.8 KG/M2 | RESPIRATION RATE: 20 BRPM | SYSTOLIC BLOOD PRESSURE: 90 MMHG | HEART RATE: 56 BPM | HEIGHT: 74 IN | DIASTOLIC BLOOD PRESSURE: 60 MMHG | OXYGEN SATURATION: 97 % | WEIGHT: 271.19 LBS

## 2024-08-02 DIAGNOSIS — E11.9 DIABETES MELLITUS WITHOUT COMPLICATION: ICD-10-CM

## 2024-08-02 DIAGNOSIS — I25.10 CORONARY ARTERY DISEASE INVOLVING NATIVE CORONARY ARTERY OF NATIVE HEART, UNSPECIFIED WHETHER ANGINA PRESENT: ICD-10-CM

## 2024-08-02 DIAGNOSIS — I49.3 PVC'S (PREMATURE VENTRICULAR CONTRACTIONS): ICD-10-CM

## 2024-08-02 DIAGNOSIS — I10 PRIMARY HYPERTENSION: Primary | ICD-10-CM

## 2024-08-02 DIAGNOSIS — I50.42 CHRONIC COMBINED SYSTOLIC AND DIASTOLIC CONGESTIVE HEART FAILURE: ICD-10-CM

## 2024-08-02 DIAGNOSIS — E78.5 HYPERLIPIDEMIA LDL GOAL <70: ICD-10-CM

## 2024-08-02 DIAGNOSIS — D68.61 ANTIPHOSPHOLIPID SYNDROME: ICD-10-CM

## 2024-08-02 DIAGNOSIS — Z72.0 TOBACCO ABUSE: ICD-10-CM

## 2024-08-02 DIAGNOSIS — E66.09 CLASS 1 OBESITY DUE TO EXCESS CALORIES WITH SERIOUS COMORBIDITY AND BODY MASS INDEX (BMI) OF 34.0 TO 34.9 IN ADULT: ICD-10-CM

## 2024-08-02 PROBLEM — R17 ELEVATED BILIRUBIN: Status: RESOLVED | Noted: 2023-06-15 | Resolved: 2024-08-02

## 2024-08-02 PROBLEM — E11.65 UNCONTROLLED TYPE 2 DIABETES MELLITUS WITH HYPERGLYCEMIA: Status: RESOLVED | Noted: 2020-01-31 | Resolved: 2024-08-02

## 2024-08-02 PROCEDURE — 99999 PR PBB SHADOW E&M-EST. PATIENT-LVL IV: CPT | Mod: PBBFAC,,, | Performed by: INTERNAL MEDICINE

## 2024-08-02 NOTE — PROGRESS NOTES
"Subjective:       Patient ID: [place] is a 64 y.o. male.    Chief Complaint: Follow-up (6 months)      HPI:  Patient is known tome and presents for follow up CAD, HTN, HLD, CHF, DM type 2, antiphospholipid syndrome, VT. Labs from 7/30/24 personally reviewed, interpreted and discussed today.      A1C 6.9%, stable  Microalb + ratio; on ACEi  PSA 0.56, normal 7/2024  LDL 59, at goal  GFR > 60, normal     HTN: on lisinopril; off verapamil. BP lower normal today. Denies med side effects. Denies dizziness, lightheadedness, syncope. Took BP meds as usual. Denies n/v/d; states staying hydrated     HLD: on atorvastatin. LDL. At goal. Denies med side effects     CAD: following with Dr. Woodruff. NTG added 10/18/23 for recurrent chest pain to assess for improvement. PET stress was completed.  LHC done 7/2024 with 3 blockages; planning repeat LHC after coordination of care with Dr. Mcbride for VT (see below)     Systolic CHF: EF 45%. On ACEi. No diuretics. He does get SOB a/w with VT and smoking but no reports of worsening swelling.   LHC EF 35%.      VT: following with EP and started on amiodarone s/p ablation 12/2023. He was loaded on amiodarone BID and decreased to once daily a few weeks ago.  Verapamil stopped after developed bradycardia.   He continues to get intermittent dizziness. LHC completed showing blockages. Done to see if CAD affecting arrythmia; further angiogram with intervention pending coordination of car with Dr. Mcbride.         DM type 2: on januvia and Jardiance. A1C 6.9%. Some spikes in the evening due to is "sweet tooth".   On acei and statin.      Antiphospholipid: diagnosed with DVT years ago (around 2018). ON Xarelto for DVT prophylaxis. No recurrent clots.         Tobacco use: working with smoking cessation clinic  EtOH use: no daily etoh use  Illicit drug use: denies use     Angiogram 7/2024:  " The 2nd Diag lesion was 80% stenosed.    The Mid LAD lesion was 60% stenosed.    The 1st Mrg lesion " "was 90% stenosed.    The ejection fraction was calculated to be 35%.    The pre-procedure left ventricular end diastolic pressure was 10.    The estimated blood loss was <50 mL."      Past Medical History:   Diagnosis Date    Acute combined systolic and diastolic congestive heart failure 6/15/2023    APS (antiphospholipid syndrome)     Cancer     skin cancer- R. hand    Coronary artery disease involving native coronary artery of native heart without angina pectoris 6/9/2023    Elevated homocysteine     Screening for colon cancer 5/4/2017       Family History   Problem Relation Name Age of Onset    No Known Problems Mother      No Known Problems Father      Diabetes Maternal Grandmother         Social History     Socioeconomic History    Marital status: Single   Tobacco Use    Smoking status: Every Day     Current packs/day: 1.00     Average packs/day: 1 pack/day for 36.4 years (38.1 ttl pk-yrs)     Types: Cigarettes     Start date: 3/28/1987     Last attempt to quit: 8/3/2019    Smokeless tobacco: Never    Tobacco comments:     Patient eligible to renew smoking cessation benefits on 7/10/24   Substance and Sexual Activity    Alcohol use: Yes     Comment: Occasionally 3-4 drinks per month at the most    Drug use: No    Sexual activity: Yes     Partners: Female     Comment: single-in a relationship     Social Determinants of Health     Financial Resource Strain: Low Risk  (7/5/2024)    Received from [facility]    Overall Financial Resource Strain (CARDIA)     Difficulty of Paying Living Expenses: Not very hard   Food Insecurity: No Food Insecurity (7/5/2024)    Received from [facility]    Hunger Vital Sign     Worried About Running Out of Food in the Last Year: Never true     Ran Out of Food in the Last Year: Never true   Transportation Needs: No Transportation Needs (7/5/2024)    Received from [facility]    TREVOR - Transportation     Lack of " Transportation (Medical): No     Lack of Transportation (Non-Medical): No   Physical Activity: Insufficiently Active (7/5/2024)    Received from [facility]    Exercise Vital Sign     Days of Exercise per Week: 3 days     Minutes of Exercise per Session: 20 min   Stress: No Stress Concern Present (7/5/2024)    Received from [facility]    [facility] - Occupational Stress Questionnaire     Feeling of Stress : Not at all   Housing Stability: High Risk (11/7/2023)    Housing Stability Vital Sign     Unable to Pay for Housing in the Last Year: Yes     Number of Places Lived in the Last Year: 1     Unstable Housing in the Last Year: No       Review of Systems   Constitutional:  Negative for activity change, fatigue, fever and unexpected weight change.   HENT:  Negative for congestion, ear pain, hearing loss, rhinorrhea, sore throat and tinnitus.    Eyes:  Negative for pain, redness and visual disturbance.   Respiratory:  Negative for cough, shortness of breath and wheezing.    Cardiovascular:  Negative for chest pain, palpitations and leg swelling.   Gastrointestinal:  Negative for abdominal pain, blood in stool, constipation, diarrhea, nausea and vomiting.   Genitourinary:  Negative for decreased urine volume, dysuria, frequency and urgency.   Musculoskeletal:  Negative for back pain, joint swelling and neck pain.   Skin:  Negative for color change, rash and wound.   Neurological:  Negative for dizziness, tremors, weakness, light-headedness and headaches.         Objective:      Physical Exam  Vitals reviewed.   Constitutional:       General: He is not in acute distress.     Appearance: He is well-developed.   HENT:      Head: Normocephalic and atraumatic.      Right Ear: External ear normal.      Left Ear: External ear normal.   Eyes:      General:         Right eye: No discharge.         Left eye: No discharge.      Conjunctiva/sclera:  Conjunctivae normal.   Neck:      Thyroid: No thyromegaly.   Cardiovascular:      Heart sounds: No murmur heard.     Comments: Regularly irregular  Pulmonary:      Effort: Pulmonary effort is normal. No respiratory distress.      Breath sounds: Wheezing (chronic) present.   Abdominal:      General: There is no distension.      Palpations: Abdomen is soft.      Tenderness: There is no abdominal tenderness.   Skin:     General: Skin is warm and dry.   Neurological:      Mental Status: He is alert and oriented to person, place, and time.   Psychiatric:         Behavior: Behavior normal.         Thought Content: Thought content normal.         Assessment:       1. Primary hypertension    2. Chronic combined systolic and diastolic congestive heart failure    3. Class 1 obesity due to excess calories with serious comorbidity and body mass index (BMI) of 34.0 to 34.9 in adult    4. Hyperlipidemia LDL goal <70    5. PVC's (premature ventricular contractions)    6. Antiphospholipid syndrome    7. Diabetes mellitus without complication    8. Tobacco abuse        Plan:       1. Primary hypertension  Chronic controlled  However BP is borderline low-normal today  He is asymptomatic  Continue medications at same dose  Low Na diet  Exercise, weight loss  Check BP and keep log for next visit    If home BP sustaines < 90/60 or symptomatic let me know ASAP for med adjustment    -     CBC Auto Differential; Future; Expected date: 01/29/2025  -     Comprehensive Metabolic Panel; Future; Expected date: 01/29/2025  -     TSH; Future; Expected date: 01/29/2025  -     Lipid Panel; Future; Expected date: 01/29/2025  -     Hemoglobin A1C; Future; Expected date: 01/29/2025    2. Chronic combined systolic and diastolic congestive heart failure  Chronic stable  No signs of volume overload  Cont meds same dose  EF 35%---pending further cardiac intervention maybe aldactone benefit. Would not advise today with borderline BP  Cont jardiance  -      CBC Auto Differential; Future; Expected date: 01/29/2025  -     Comprehensive Metabolic Panel; Future; Expected date: 01/29/2025  -     TSH; Future; Expected date: 01/29/2025  -     Lipid Panel; Future; Expected date: 01/29/2025  -     Hemoglobin A1C; Future; Expected date: 01/29/2025    3. Class 1 obesity due to excess calories with serious comorbidity and body mass index (BMI) of 34.0 to 34.9 in adult  Chronic stable  Diet, exercise, weight loss   -     CBC Auto Differential; Future; Expected date: 01/29/2025  -     Comprehensive Metabolic Panel; Future; Expected date: 01/29/2025  -     TSH; Future; Expected date: 01/29/2025  -     Lipid Panel; Future; Expected date: 01/29/2025  -     Hemoglobin A1C; Future; Expected date: 01/29/2025    4. Hyperlipidemia LDL goal <70  Chronic stable  Cont statin same dose  -     CBC Auto Differential; Future; Expected date: 01/29/2025  -     Comprehensive Metabolic Panel; Future; Expected date: 01/29/2025  -     TSH; Future; Expected date: 01/29/2025  -     Lipid Panel; Future; Expected date: 01/29/2025  -     Hemoglobin A1C; Future; Expected date: 01/29/2025    5. PVC's (premature ventricular contractions)  Chronic stable  Regularly irregular today  EKG confirms PAC/PVC which is baseline for him  Asymptomatic  Cont amidarone  F/u Dr. Mcbride as planned  -     CBC Auto Differential; Future; Expected date: 01/29/2025  -     Comprehensive Metabolic Panel; Future; Expected date: 01/29/2025  -     TSH; Future; Expected date: 01/29/2025  -     Lipid Panel; Future; Expected date: 01/29/2025  -     Hemoglobin A1C; Future; Expected date: 01/29/2025    6. Antiphospholipid syndrome  Chronic stable  Cont Xarelto  -     CBC Auto Differential; Future; Expected date: 01/29/2025  -     Comprehensive Metabolic Panel; Future; Expected date: 01/29/2025  -     TSH; Future; Expected date: 01/29/2025  -     Lipid Panel; Future; Expected date: 01/29/2025  -     Hemoglobin A1C; Future; Expected date:  01/29/2025    7. Diabetes mellitus without complication  Chronic controlled  Cont meds same dose  ADA diet  Check sugars and keep log  -     CBC Auto Differential; Future; Expected date: 01/29/2025  -     Comprehensive Metabolic Panel; Future; Expected date: 01/29/2025  -     TSH; Future; Expected date: 01/29/2025  -     Lipid Panel; Future; Expected date: 01/29/2025  -     Hemoglobin A1C; Future; Expected date: 01/29/2025    8. Tobacco abuse  Smoking cessation    9. Coronary artery disease involving native coronary artery of native heart, unspecified whether angina present  [facility] results reviewed and discussed today  Plans for repeat angio with intervention pending coordinatin for care with Dr. Mcbride for EP  Cont statin, ASA and Xarelto     RTC 6 months with labs and PRN pending further cardiac work up

## 2024-08-05 ENCOUNTER — PATIENT MESSAGE (OUTPATIENT)
Dept: CARDIOLOGY | Facility: CLINIC | Age: 64
End: 2024-08-05
Payer: COMMERCIAL

## 2024-08-05 ENCOUNTER — TELEPHONE (OUTPATIENT)
Dept: INTERNAL MEDICINE | Facility: CLINIC | Age: 64
End: 2024-08-05
Payer: COMMERCIAL

## 2024-08-05 DIAGNOSIS — I20.81 ANGINA PECTORIS WITH CORONARY MICROVASCULAR DYSFUNCTION: Primary | ICD-10-CM

## 2024-08-05 DIAGNOSIS — I47.20 V-TACH: ICD-10-CM

## 2024-08-05 DIAGNOSIS — R42 DIZZINESS: Primary | ICD-10-CM

## 2024-08-05 DIAGNOSIS — I25.10 CORONARY ARTERY DISEASE INVOLVING NATIVE CORONARY ARTERY OF NATIVE HEART WITHOUT ANGINA PECTORIS: ICD-10-CM

## 2024-08-05 RX ORDER — SODIUM CHLORIDE 0.9 % (FLUSH) 0.9 %
10 SYRINGE (ML) INJECTION
Status: SHIPPED | OUTPATIENT
Start: 2024-08-05

## 2024-08-05 RX ORDER — CLOPIDOGREL BISULFATE 75 MG/1
75 TABLET ORAL DAILY
Qty: 90 TABLET | Refills: 3 | Status: SHIPPED | OUTPATIENT
Start: 2024-08-05 | End: 2025-08-05

## 2024-08-05 RX ORDER — SODIUM CHLORIDE 9 MG/ML
INJECTION, SOLUTION INTRAVENOUS CONTINUOUS
OUTPATIENT
Start: 2024-08-05 | End: 2024-08-05

## 2024-08-15 ENCOUNTER — TELEPHONE (OUTPATIENT)
Dept: CARDIOLOGY | Facility: CLINIC | Age: 64
End: 2024-08-15
Payer: COMMERCIAL

## 2024-08-15 ENCOUNTER — EDUCATION (OUTPATIENT)
Dept: CARDIOLOGY | Facility: CLINIC | Age: 64
End: 2024-08-15
Payer: COMMERCIAL

## 2024-08-15 DIAGNOSIS — I25.10 CORONARY ARTERY DISEASE, UNSPECIFIED VESSEL OR LESION TYPE, UNSPECIFIED WHETHER ANGINA PRESENT, UNSPECIFIED WHETHER NATIVE OR TRANSPLANTED HEART: Primary | ICD-10-CM

## 2024-08-15 DIAGNOSIS — I25.10 CORONARY ARTERY DISEASE INVOLVING NATIVE CORONARY ARTERY OF NATIVE HEART WITHOUT ANGINA PECTORIS: Primary | ICD-10-CM

## 2024-08-15 RX ORDER — SODIUM CHLORIDE 0.9 % (FLUSH) 0.9 %
10 SYRINGE (ML) INJECTION
Status: SHIPPED | OUTPATIENT
Start: 2024-08-15

## 2024-08-15 RX ORDER — SODIUM CHLORIDE 9 MG/ML
INJECTION, SOLUTION INTRAVENOUS CONTINUOUS
OUTPATIENT
Start: 2024-08-15 | End: 2024-08-15

## 2024-08-15 RX ORDER — DIPHENHYDRAMINE HCL 50 MG
50 CAPSULE ORAL ONCE
OUTPATIENT
Start: 2024-08-15 | End: 2024-08-15

## 2024-08-15 NOTE — TELEPHONE ENCOUNTER
Patient referred by Dr. Constantino to Dr. Willett for RB/stent LAD.  Films reviewed and Dr. Willett ok with direct admit to SSCU.  Spoke to patient and scheduled procedure for Thursday September 12 with 7am admit time.  Instructions reviewed with patient, mailed and set via portal.  Verbalized understanding of all.  Patient scheduled for pre op labs at [facility] on 9/8/24.

## 2024-08-15 NOTE — PROGRESS NOTES
OUTPATIENT CATHETERIZATION INSTRUCTIONS    You have been scheduled for a procedure in the catheterization lab on Thursday, September 12, 2024.     Please report to the Cardiology Waiting Area on the Third floor of the hospital and check in at 7:00AM.  You will then be taken to the SSCU (Short Stay Cardiac Unit) and prepared for your procedure. Please be aware that this is not the time of your procedure but the time you are to arrive. The procedures are scheduled on an hourly basis; however, emergency cases take precedence over all other cases.       Nothing to eat or drink after MIDNIGHT 12 AM You may have clear liquids until the time of your admission which should be 2 hours prior to your procedure.          You are encouraged to drink at least 8 ounces of clear liquids prior to your admission to SSCU.          Clear liquids include water, black coffee, clear juices, and performance drinks - no pulp or milk.         .    2.   You may take your regular morning medications with water.       3.   Hold the following medications prior to your procedure:          XARELTO:  do not take it the day of procedure and 2 days prior.           Hold JARDIANCE day before and day of procedure.                 4.   Stay on Aspirin and Plavix.        The procedure will take 1-2 hours to perform. During the procedure you will receive IV sedation administered by a nurse.  Most patients will sleep through procedure.  After the procedure, you will either return to SSCU or spend some time in the cath lab recovery room.  If appropriate, your family will be able to stay with you during this time.      You should be discharged home that same day if you are stable and there are no complications.  Your doctor will determine whether you are discharged or kept overnight  based on your particular procedure and time that procedure is completed.   The results of your procedure will be discussed with you before you are discharged.     YOU WILL NOT  BE ABLE TO DRIVE YOURSELF HOME.  SOMEONE MUST BE ABLE TO DRIVE YOU HOME FROM HOSPITAL.       If you should have any questions, concerns, or need to change the date of your procedure, please call  [facility] @ [phone].          INSTRUCTIONS WERE GIVEN TO THE PATIENT VERBALLY AND THEY VERBALIZED UNDERSTANDING.  THEY DO NOT REQUIRE ANY SPECIAL NEEDS AND DO NOT HAVE ANY LEARNING BARRIERS.                  Directions for Reporting to Cardiology Waiting Area in the Hospital  If you park in the Parking Garage:  Take elevators to the1st floor of the parking garage.  Continue past the gift shop, coffee shop, and piano.  Take a right and go to the gold elevators. (Elevator B)  Take the elevator to the 3rd floor.  Follow the arrow on the sign on the wall that says Cath Lab Registration/EP Lab Registration.  Follow the long hallway all the way around until you come to a big open area.  This is the registration area.  Check in at Reception Desk.    OR    If family is dropping you off:  Have them drop you off at the front of the Hospital under the green overhang.  Enter through the doors and take a right.  Take the E elevators to the 3rd floor Cardiology Waiting Area.  Check in at the Reception Desk in the waiting room.

## 2024-09-09 ENCOUNTER — TELEPHONE (OUTPATIENT)
Dept: CARDIOLOGY | Facility: CLINIC | Age: 64
End: 2024-09-09
Payer: COMMERCIAL

## 2024-09-09 ENCOUNTER — LAB VISIT (OUTPATIENT)
Dept: LAB | Facility: HOSPITAL | Age: 64
End: 2024-09-09
Attending: INTERNAL MEDICINE
Payer: COMMERCIAL

## 2024-09-09 DIAGNOSIS — I25.10 CORONARY ARTERY DISEASE INVOLVING NATIVE CORONARY ARTERY OF NATIVE HEART WITHOUT ANGINA PECTORIS: ICD-10-CM

## 2024-09-09 LAB
ANION GAP SERPL CALC-SCNC: 11 MMOL/L (ref 8–16)
BUN SERPL-MCNC: 9 MG/DL (ref 8–23)
CALCIUM SERPL-MCNC: 9.1 MG/DL (ref 8.7–10.5)
CHLORIDE SERPL-SCNC: 104 MMOL/L (ref 95–110)
CO2 SERPL-SCNC: 22 MMOL/L (ref 23–29)
CREAT SERPL-MCNC: 0.9 MG/DL (ref 0.5–1.4)
ERYTHROCYTE [DISTWIDTH] IN BLOOD BY AUTOMATED COUNT: 13.7 % (ref 11.5–14.5)
EST. GFR  (NO RACE VARIABLE): >60 ML/MIN/1.73 M^2
GLUCOSE SERPL-MCNC: 164 MG/DL (ref 70–110)
HCT VFR BLD AUTO: 48.8 % (ref 40–54)
HGB BLD-MCNC: 16.4 G/DL (ref 14–18)
MCH RBC QN AUTO: 32.8 PG (ref 27–31)
MCHC RBC AUTO-ENTMCNC: 33.6 G/DL (ref 32–36)
MCV RBC AUTO: 98 FL (ref 82–98)
PLATELET # BLD AUTO: 196 K/UL (ref 150–450)
PMV BLD AUTO: 9 FL (ref 9.2–12.9)
POTASSIUM SERPL-SCNC: 4.1 MMOL/L (ref 3.5–5.1)
RBC # BLD AUTO: 5 M/UL (ref 4.6–6.2)
SODIUM SERPL-SCNC: 137 MMOL/L (ref 136–145)
WBC # BLD AUTO: 8.33 K/UL (ref 3.9–12.7)

## 2024-09-09 PROCEDURE — 36415 COLL VENOUS BLD VENIPUNCTURE: CPT | Performed by: INTERNAL MEDICINE

## 2024-09-09 PROCEDURE — 85027 COMPLETE CBC AUTOMATED: CPT | Performed by: INTERNAL MEDICINE

## 2024-09-09 PROCEDURE — 80048 BASIC METABOLIC PNL TOTAL CA: CPT | Performed by: INTERNAL MEDICINE

## 2024-09-09 NOTE — TELEPHONE ENCOUNTER
Follow up contact with patient RE: insurance peer to peer.  At this time, no change with his procedure plans and awaiting MD phone meeting to determine case approval or denial.  Pt voiced understanding and follow up with patient once meeting occurs.  Pt stated ok.

## 2024-09-09 NOTE — TELEPHONE ENCOUNTER
Peer to peer set up on Friday 9/6/24 however was not completed per Dr Willett.  Attempting to reschedule, called [facility] to make new appointment for peer to peer,  day of service is booked for 9/12/24.

## 2024-09-10 ENCOUNTER — TELEPHONE (OUTPATIENT)
Dept: CARDIOLOGY | Facility: CLINIC | Age: 64
End: 2024-09-10
Payer: COMMERCIAL

## 2024-09-10 ENCOUNTER — PATIENT MESSAGE (OUTPATIENT)
Dept: CARDIOLOGY | Facility: CLINIC | Age: 64
End: 2024-09-10
Payer: COMMERCIAL

## 2024-09-10 NOTE — TELEPHONE ENCOUNTER
Returned call.  No answer.  Left message on voicemail with direct number.       ----- Message from Moni Reyna MA sent at 9/10/2024  1:13 PM CDT -----  Patient is calling to reschedule his procedure 9-12-24. Patient can be reached at [phone]    Thank you

## 2024-09-12 ENCOUNTER — TELEPHONE (OUTPATIENT)
Dept: CARDIAC REHAB | Facility: CLINIC | Age: 64
End: 2024-09-12
Payer: COMMERCIAL

## 2024-09-12 ENCOUNTER — HOSPITAL ENCOUNTER (OUTPATIENT)
Facility: HOSPITAL | Age: 64
Discharge: HOME | End: 2024-09-12
Attending: INTERNAL MEDICINE | Admitting: INTERNAL MEDICINE
Payer: COMMERCIAL

## 2024-09-12 VITALS
BODY MASS INDEX: 34.65 KG/M2 | RESPIRATION RATE: 22 BRPM | SYSTOLIC BLOOD PRESSURE: 122 MMHG | HEART RATE: 56 BPM | DIASTOLIC BLOOD PRESSURE: 70 MMHG | OXYGEN SATURATION: 98 % | HEIGHT: 74 IN | WEIGHT: 270 LBS | TEMPERATURE: 98 F

## 2024-09-12 DIAGNOSIS — Z01.812 PRE-PROCEDURE LAB EXAM: ICD-10-CM

## 2024-09-12 DIAGNOSIS — I49.9 CARDIAC RHYTHM DISORDER OR DISTURBANCE OR CHANGE: ICD-10-CM

## 2024-09-12 DIAGNOSIS — I25.10 CORONARY ARTERY DISEASE, UNSPECIFIED VESSEL OR LESION TYPE, UNSPECIFIED WHETHER ANGINA PRESENT, UNSPECIFIED WHETHER NATIVE OR TRANSPLANTED HEART: ICD-10-CM

## 2024-09-12 DIAGNOSIS — I25.10 CORONARY ARTERY DISEASE INVOLVING NATIVE CORONARY ARTERY OF NATIVE HEART WITHOUT ANGINA PECTORIS: Primary | ICD-10-CM

## 2024-09-12 LAB
ABO + RH BLD: NORMAL
BLD GP AB SCN CELLS X3 SERPL QL: NORMAL
OHS QRS DURATION: 104 MS
OHS QRS DURATION: 112 MS
OHS QTC CALCULATION: 442 MS
OHS QTC CALCULATION: 446 MS
POCT GLUCOSE: 133 MG/DL (ref 70–110)
SPECIMEN OUTDATE: NORMAL

## 2024-09-12 PROCEDURE — 85347 COAGULATION TIME ACTIVATED: CPT | Performed by: INTERNAL MEDICINE

## 2024-09-12 PROCEDURE — C1769 GUIDE WIRE: HCPCS | Performed by: INTERNAL MEDICINE

## 2024-09-12 PROCEDURE — 99152 MOD SED SAME PHYS/QHP 5/>YRS: CPT | Performed by: INTERNAL MEDICINE

## 2024-09-12 PROCEDURE — 99153 MOD SED SAME PHYS/QHP EA: CPT | Performed by: INTERNAL MEDICINE

## 2024-09-12 PROCEDURE — 27201423 OPTIME MED/SURG SUP & DEVICES STERILE SUPPLY: Performed by: INTERNAL MEDICINE

## 2024-09-12 PROCEDURE — C9600 PERC DRUG-EL COR STENT SING: HCPCS | Mod: LC | Performed by: INTERNAL MEDICINE

## 2024-09-12 PROCEDURE — 86850 RBC ANTIBODY SCREEN: CPT | Performed by: INTERNAL MEDICINE

## 2024-09-12 PROCEDURE — 63600175 PHARM REV CODE 636 W HCPCS: Performed by: INTERNAL MEDICINE

## 2024-09-12 PROCEDURE — C1874 STENT, COATED/COV W/DEL SYS: HCPCS | Performed by: INTERNAL MEDICINE

## 2024-09-12 PROCEDURE — C1760 CLOSURE DEV, VASC: HCPCS | Performed by: INTERNAL MEDICINE

## 2024-09-12 PROCEDURE — 93010 ELECTROCARDIOGRAM REPORT: CPT | Mod: ,,, | Performed by: INTERNAL MEDICINE

## 2024-09-12 PROCEDURE — 25000003 PHARM REV CODE 250: Performed by: INTERNAL MEDICINE

## 2024-09-12 PROCEDURE — C1894 INTRO/SHEATH, NON-LASER: HCPCS | Performed by: INTERNAL MEDICINE

## 2024-09-12 PROCEDURE — 86901 BLOOD TYPING SEROLOGIC RH(D): CPT | Performed by: INTERNAL MEDICINE

## 2024-09-12 PROCEDURE — C1887 CATHETER, GUIDING: HCPCS | Performed by: INTERNAL MEDICINE

## 2024-09-12 PROCEDURE — 93005 ELECTROCARDIOGRAM TRACING: CPT

## 2024-09-12 PROCEDURE — C1725 CATH, TRANSLUMIN NON-LASER: HCPCS | Performed by: INTERNAL MEDICINE

## 2024-09-12 PROCEDURE — 92928 PRQ TCAT PLMT NTRAC ST 1 LES: CPT | Mod: LC,,, | Performed by: INTERNAL MEDICINE

## 2024-09-12 PROCEDURE — 25500020 PHARM REV CODE 255: Performed by: INTERNAL MEDICINE

## 2024-09-12 PROCEDURE — 25000003 PHARM REV CODE 250: Performed by: STUDENT IN AN ORGANIZED HEALTH CARE EDUCATION/TRAINING PROGRAM

## 2024-09-12 PROCEDURE — 99152 MOD SED SAME PHYS/QHP 5/>YRS: CPT | Mod: ,,, | Performed by: INTERNAL MEDICINE

## 2024-09-12 PROCEDURE — 86900 BLOOD TYPING SEROLOGIC ABO: CPT | Performed by: INTERNAL MEDICINE

## 2024-09-12 DEVICE — EVEROLIMUS-ELUTING PLATINUM CHROMIUM CORONARY STENT SYSTEM
Type: IMPLANTABLE DEVICE | Site: HEART | Status: FUNCTIONAL
Brand: SYNERGY™ XD

## 2024-09-12 RX ORDER — CLOPIDOGREL BISULFATE 75 MG/1
75 TABLET ORAL DAILY
Status: DISCONTINUED | OUTPATIENT
Start: 2024-09-12 | End: 2024-09-12 | Stop reason: HOSPADM

## 2024-09-12 RX ORDER — MIDAZOLAM HYDROCHLORIDE 1 MG/ML
INJECTION INTRAMUSCULAR; INTRAVENOUS
Status: DISCONTINUED | OUTPATIENT
Start: 2024-09-12 | End: 2024-09-12 | Stop reason: HOSPADM

## 2024-09-12 RX ORDER — LIDOCAINE HYDROCHLORIDE 20 MG/ML
INJECTION, SOLUTION EPIDURAL; INFILTRATION; INTRACAUDAL; PERINEURAL
Status: DISCONTINUED | OUTPATIENT
Start: 2024-09-12 | End: 2024-09-12 | Stop reason: HOSPADM

## 2024-09-12 RX ORDER — HEPARIN SOD,PORCINE/0.9 % NACL 1000/500ML
INTRAVENOUS SOLUTION INTRAVENOUS
Status: DISCONTINUED | OUTPATIENT
Start: 2024-09-12 | End: 2024-09-12 | Stop reason: HOSPADM

## 2024-09-12 RX ORDER — HEPARIN SODIUM 1000 [USP'U]/ML
INJECTION, SOLUTION INTRAVENOUS; SUBCUTANEOUS
Status: DISCONTINUED | OUTPATIENT
Start: 2024-09-12 | End: 2024-09-12 | Stop reason: HOSPADM

## 2024-09-12 RX ORDER — SODIUM CHLORIDE 9 MG/ML
INJECTION, SOLUTION INTRAVENOUS CONTINUOUS
Status: DISCONTINUED | OUTPATIENT
Start: 2024-09-12 | End: 2024-09-12 | Stop reason: HOSPADM

## 2024-09-12 RX ORDER — DIPHENHYDRAMINE HCL 50 MG
50 CAPSULE ORAL ONCE
Status: COMPLETED | OUTPATIENT
Start: 2024-09-12 | End: 2024-09-12

## 2024-09-12 RX ORDER — FENTANYL CITRATE 50 UG/ML
INJECTION, SOLUTION INTRAMUSCULAR; INTRAVENOUS
Status: DISCONTINUED | OUTPATIENT
Start: 2024-09-12 | End: 2024-09-12 | Stop reason: HOSPADM

## 2024-09-12 RX ORDER — NAPROXEN SODIUM 220 MG/1
81 TABLET, FILM COATED ORAL DAILY
Status: DISCONTINUED | OUTPATIENT
Start: 2024-09-12 | End: 2024-09-12 | Stop reason: HOSPADM

## 2024-09-12 RX ORDER — SODIUM CHLORIDE 9 MG/ML
INJECTION, SOLUTION INTRAVENOUS CONTINUOUS
Status: ACTIVE | OUTPATIENT
Start: 2024-09-12 | End: 2024-09-12

## 2024-09-12 RX ORDER — PROTAMINE SULFATE 10 MG/ML
INJECTION, SOLUTION INTRAVENOUS
Status: DISCONTINUED | OUTPATIENT
Start: 2024-09-12 | End: 2024-09-12 | Stop reason: HOSPADM

## 2024-09-12 RX ADMIN — SODIUM CHLORIDE: 9 INJECTION, SOLUTION INTRAVENOUS at 09:09

## 2024-09-12 RX ADMIN — SODIUM CHLORIDE: 9 INJECTION, SOLUTION INTRAVENOUS at 12:09

## 2024-09-12 RX ADMIN — ASPIRIN 81 MG CHEWABLE TABLET 81 MG: 81 TABLET CHEWABLE at 09:09

## 2024-09-12 RX ADMIN — DIPHENHYDRAMINE HYDROCHLORIDE 50 MG: 50 CAPSULE ORAL at 09:09

## 2024-09-12 RX ADMIN — CLOPIDOGREL BISULFATE 75 MG: 75 TABLET ORAL at 09:09

## 2024-09-12 NOTE — TELEPHONE ENCOUNTER
Letter regarding Phase II cardiac rehab was sent to patient, along with telephone # for [facility] cardiac rehab.  Will Irizarry, RN  Cardiac Rehab Nurse

## 2024-09-12 NOTE — NURSING
Pt and Pt's family given discharge paperwork and education reiterated. All questions and concerns address. Pt's right groin puncture site has gauze/tegaderm intact. Site is free from s/s of bleeding. Pt able to drink, eat, walk, and use restroom without issues. Discharge order is in place. Pt's ivs and tele removed. Pt waiting to wheel off unit in wheelchair with family.

## 2024-09-12 NOTE — DISCHARGE SUMMARY
Discharge Summary        Admit Date: 9/12/2024    Discharge Date:  9/12/2024    Attending Physician: Axel Calderon MD    Discharge Physician: Rishi Tipton MD    Principal Diagnoses: <principal problem not specified>  Indication for Admission: Stent, Drug Eluting, Single Vessel, Coronary (Left)    Discharged Condition: Good    Hospital Course:   Patient presented for outpatient coronary angiogram which went without complication. Coronary angiogram revealed severe CAD s/p stent  . See full cath report in Epic for details. Hemostasis of patient's R CFA access site was achieved with Perclosure. Patient was monitored per post-cath protocol, and his R groin access site was c/d/i with no hematoma. Patient was able to ambulate without difficulty. He was feeling well and anticipating discharge home today.     Outpatient Plan:  - There were no medication changes    Diet: Cardiac diet    Activity: Ad mariana, wound care instructions provided    Disposition: Home or Self Care    Follow Up:      Discharge Medications:      Medication List        CONTINUE taking these medications      amiodarone 200 MG Tab  Commonly known as: PACERONE  Take 1 tablet (200 mg total) by mouth once daily.     aspirin 81 MG EC tablet  Commonly known as: ECOTRIN  Take 1 tablet (81 mg total) by mouth once daily.     atorvastatin 20 MG tablet  Commonly known as: LIPITOR  Take 1 tablet (20 mg total) by mouth once daily IN THE MORNING     * CENTRUM SILVER MEN ORAL     * CEROVITE SENIOR 0.4 mg-300 mcg- 250 mcg Tab  Generic drug: multivit-min-FA-lycopen-lutein  Take one a day     clopidogreL 75 mg tablet  Commonly known as: PLAVIX  Take 1 tablet (75 mg total) by mouth once daily.     JANUVIA 50 mg Tab  Generic drug: SITagliptin phosphate  Take 1 tablet (50 mg total) by mouth once daily.     JARDIANCE 10 mg tablet  Generic drug: empagliflozin  Take 1 tablet (10 mg total) by mouth once daily.     * lancets 28 gauge Misc  Commonly known as: FREESTYLE  LANCETS  1 lancet by Misc.(Non-Drug; Combo Route) route Daily.     * lancets Misc  Commonly known as: ONETOUCH ULTRASOFT LANCETS  1 Stick by Misc.(Non-Drug; Combo Route) route 2 (two) times daily.     lisinopriL 5 MG tablet  Commonly known as: PRINIVIL,ZESTRIL  Take 1 tablet (5 mg total) by mouth once daily.     nitroGLYCERIN 0.4 MG SL tablet  Commonly known as: NITROSTAT  Place 1 tablet (0.4 mg total) under the tongue every 5 (five) minutes as needed for Chest pain.     tamsulosin 0.4 mg Cap  Commonly known as: FLOMAX  Take 1 capsule (0.4 mg total) by mouth once daily.     XARELTO 10 mg Tab  Generic drug: rivaroxaban  Take 1 tablet (10 mg total) by mouth once daily.           * This list has 4 medication(s) that are the same as other medications prescribed for you. Read the directions carefully, and ask your doctor or other care provider to review them with you.                ASK your doctor about these medications      nicotine 14 mg/24 hr  Commonly known as: NICODERM CQ  Place 1 patch onto the skin once daily.

## 2024-09-12 NOTE — NURSING
Report received from Procedural RN, Chase. Pt brought to room 11 on SSCU. Pt is free from s/s of intolerable pain/distress. Pt's right groin site has gauze/ tegaderm intact and site is free from s/s of bleeding. Pt verbalized understanding of when to call for assistance. Safety measures in place and pt's family at bedside.

## 2024-09-12 NOTE — SIGNIFICANT EVENT
Interventional    I came to assess the patient. After procedure was  finished and while I was talking to the  family,  the patient had a fall while the cath lab team was transferring him from the cath lab table  to the stretcher.    I  came and assessed  the patient who was alert and oriented X 3. No objective evidence of head trauma, neurological exam intact. He did report pain  in the right aspect of his upper back. No obvious crepitation or abnormal findings on exam.    A chest X-ray with rib protocol  was done which revealed no abnormalities.     The patient was observed without any further complications.     Will discharge home with acetaminophen PRN.      Axel Willett MD [facility]  Interventional Cardiology  Structural/Valvular heart disease  [phone]

## 2024-09-12 NOTE — Clinical Note
- CT C/A/P suggestive of cholecystitis. Also showing ascites and bilat pleural effusions. RUQ U/S performed and likewise concerning for acute em.  - Gen surg consulted. No surgical intervention planned given neutropenia. rec'd HIDA and IR cx for possible biliary drain placement. HIDA performed and IR consulted. Appreciate recs.  - POD 2 from acute cholecystostomy drain placement  - ID consulted 12/21 for persistent fevers on Zosyn. ID expects improvement in fever curve following drain placement, but they will follow along with us. Added daptomycin 12/23 per ID rec.  - Abdomen appears larger and is more taught today (12/23). T-bili is up to 5.1. Direct bili is 3.3.   - Gen surg consulted due to concern for peritonitis.  - CT CAP ordered.   -Gen surg does not feel that patient has peritonitis and does not feel that there is anything that they can offer at this time given her profound neutropenia.   - [facility] contacted. They will review images and radiology report as well, but they did express concern about her platelet count being a factor in terms of what they can offer.  - IR contacted. Reviewed images. Feel that drain is in place, so there is nothing they can offer at this time. Recommended BID flushing of drain, which was ordered.     The closure device was deployed in the right femoral artery.

## 2024-09-12 NOTE — ASSESSMENT & PLAN NOTE
-C +/- PCI, patient is a NAILA candidate  - Anti-platelet Therapy: ASA / Plavix  - Access: Right radial  - Creatinine/CrCl: 0.9  - Allergies: None  - Pre-Hydration: NS  - Pre-Op Med: Bendaryl 50mg pO   - All patient's questions were answered.  -The risks, benefits and alternatives of the procedure were explained to the patient.   -The risks of coronary angiography include but are not limited to: bleeding, infection, heart rhythm abnormalities, allergic reactions, kidney injury and potential need for dialysis, stroke and death.   - Should stenting be indicated, the patient has agreed to dual anti-platelet therapy for 1-consecutive year with a drug-eluting stent and a minimum of 1-month with the use of a bare metal stent  - Additionally, pt is aware that non-compliance is likely to result in stent clotting with heart attack, heart failure, and/or death  -The risks of moderate sedation include hypotension, respiratory depression, arrhythmias, bronchospasm, and death.   - Informed consent was obtained and the  patient is agreeable to proceed with the procedure.

## 2024-09-12 NOTE — PLAN OF CARE
Patient arrived to room. PIV placed, x2, labs sent. Admit assessment completed. Plan of care discussed with patient. Will monitor. Call light within reach, instructed in use.

## 2024-09-12 NOTE — LETTER
September 12, 2024    [place]  [address]  [facility] [address]             [facility]  [address] [facility]  [place] [address]  Phone: [phone]                                  [facility]   [address] [facility]   [place] [address]  [phone]         [facility]   [address] [place] [address] [phone]         [facility]    [address]  [place] [address] [phone]   Re: [place]  Clinic number: [id number]    Dear Mr. Najera:    You were recently admitted to an [facility] facility for cardiac (heart) problem.  Your physician has referred you to [facility].  Cardiac Rehab Phase 2 is an educational and exercise program, conducted in a outpatient setting, proven to help reduce your risk for recurrent heart events.    Cardiac rehab has two major parts:    1. Exercise training to help you achieve cardiovascular fitness while learning how to exercise safely and improve muscle strength and endurance.  Your exercise prescription will be based on the results of the cardiopulmonary stress test (CPX) which will be done before entering the program and at completion.  2. Education, counseling and training to help you understand your heart condition and find ways to reduce your risk of future heart problems.  The cardiac rehab team will help you learn how to cope with the stress of adjusting to a new lifestyle and to deal with your fears about the future.    Phase 2 is a 36-session program, meeting 3 times a week for 12 weeks.  Each session consists of an hour of exercise and half-hour dedicated to the educational topic of the day.  Class days vary per location.  Please contact your nearest facility for details.    Through cardiac rehab you will learn:  About your heart condition, medical therapies, and medication  Risk factors in y our lifestyle contributing to heart  disease  New strategies to modify your risk factors  About a healthy diet that can lower your blood cholesterol, control weight, help prevent or control high blood pressure, and diabetes  How to stop smoking  How to manage stress    If you are interested in getting started, call the [facility] of your choosing.     Sincerely,     [facility]

## 2024-09-12 NOTE — H&P
[facility]  Interventional Cardiology  H&P    Patient Name: [place]  MRN: [id number]  Admission Date: 9/12/2024  Code Status: Prior   Attending Provider:  Dr. Axel Willett  Primary Care Physician: Tessa Oconnor MD  Principal Problem:<principal problem not specified>    Patient information was obtained from patient, past medical records, and ER records.     Subjective:     Chief Complaint:  CAD     HPI:  [place] is a 64 y.o. male w/ PMH of CAD, HLD, HTN, NIDDM2, and Vtach who presents to undergo complicated PCI. Denies currently any CP, SOB, syncope, pre-syncope, palpitations.     Patient recently underwent LHC as part of evaluation for his recurrent VTACH and was 2 vessel obstructive CAD. He underwent successful PCI to the 2nd diagonal, however was not able to undergo PCI to the 1 Mrg lesion that was 90% stenosed.     LHC (8/12/24):     The Mid LAD lesion was 60% stenosed.  IFR to LAD mid vessel lesion, negative.    The 2nd Diag lesion was 80% stenosed with 0% stenosis post-intervention.    2nd Diag lesion: A stent was successfully placed.    The 1st Mrg lesion was 90% stenosed.  unable to cross lesion with PTCA balloon.    The estimated blood loss was <50 mL.    IVUS  utilized for coronary intervention to diagonal branch of LAD.    Past Medical History:   Diagnosis Date    Acute combined systolic and diastolic congestive heart failure 6/15/2023    APS (antiphospholipid syndrome)     Cancer     skin cancer- R. hand    Coronary artery disease involving native coronary artery of native heart without angina pectoris 6/9/2023    Elevated homocysteine     Screening for colon cancer 5/4/2017       Past Surgical History:   Procedure Laterality Date    ABLATION N/A 12/21/2023    Procedure: Ablation;  Surgeon: Norbert Mcbride MD;  Location: [facility] LAB;  Service: Cardiology;  Laterality: N/A;  - PVC, RFA ,Carto, +/- JOSE Pacheco, MAC, [place], 3 Prep    APPENDECTOMY  1976    CHOLECYSTECTOMY   1987    CLOSURE DEVICE  12/21/2023    Procedure: Placement of Closure Device;  Surgeon: Norbert Mcbride MD;  Location: [facility] EP LAB;  Service: Cardiology;;    COLONOSCOPY N/A 5/4/2017    Procedure: COLONOSCOPY;  Surgeon: Gabrile Saini MD;  Location: [facility] ENDO;  Service: Endoscopy;  Laterality: N/A;    COLONOSCOPY W/ BIOPSIES AND POLYPECTOMY  2011    CORONARY STENT PLACEMENT N/A 8/12/2024    Procedure: INSERTION, STENT, CORONARY ARTERY;  Surgeon: Neno Constantino MD;  Location: [facility] CATH LAB;  Service: Cardiology;  Laterality: N/A;    INSTANTANEOUS WAVE-FREE RATIO (IFR) N/A 8/12/2024    Procedure: Instantaneous Wave-Free Ratio (IFR);  Surgeon: Neno Constantino MD;  Location: [facility] CATH LAB;  Service: Cardiology;  Laterality: N/A;    IVUS, CORONARY  8/12/2024    Procedure: IVUS, Coronary;  Surgeon: Neno Constantino MD;  Location: [facility] CATH LAB;  Service: Cardiology;;    LEFT HEART CATHETERIZATION Left 7/30/2024    Procedure: Left heart cath;  Surgeon: Neno Constantino MD;  Location: [facility] CATH LAB;  Service: Cardiology;  Laterality: Left;    PERCUTANEOUS TRANSLUMINAL BALLOON ANGIOPLASTY OF CORONARY ARTERY  8/12/2024    Procedure: Angioplasty-coronary;  Surgeon: Neno Constantino MD;  Location: [facility] CATH LAB;  Service: Cardiology;;    SKIN SURGERY      piece of skin removed on R.hand- skin cancer     TONSILLECTOMY         Review of patient's allergies indicates:   Allergen Reactions    Codeine Diarrhea     Patient can not tolerate Tylenol #3, states he does okay with Codeine with cough medications     Nickel sutures [surgical stainless steel] Rash       No medications prior to admission.     Family History       Problem Relation (Age of Onset)    Diabetes Maternal Grandmother    No Known Problems Mother, Father          Tobacco Use    Smoking status: Every Day     Current packs/day: 1.00     Average packs/day: 1 pack/day for 36.6 years (38.2 ttl pk-yrs)     Types: Cigarettes     Start date: 3/28/1987     Last attempt  to quit: 8/3/2019    Smokeless tobacco: Never    Tobacco comments:     Patient eligible to renew smoking cessation benefits on 7/10/24   Substance and Sexual Activity    Alcohol use: Yes     Comment: Occasionally 3-4 drinks per month at the most    Drug use: No    Sexual activity: Yes     Partners: Female     Comment: single-in a relationship     ROS12 point ROS negative except for HPI.   Objective:     Vital Signs (Most Recent):    Vital Signs (24h Range):           There is no height or weight on file to calculate BMI.            No intake or output data in the 24 hours ending 09/12/24 0640    Lines/Drains/Airways       None                    Physical Exam  Constitutional:       General: He is not in acute distress.  Neck:      Vascular: No hepatojugular reflux or JVD.   Cardiovascular:      Rate and Rhythm: Normal rate and regular rhythm.      Pulses:           Radial pulses are 2+ on the right side and 2+ on the left side.        Femoral pulses are 2+ on the right side and 2+ on the left side.       Dorsalis pedis pulses are 2+ on the right side and 2+ on the left side.        Posterior tibial pulses are 0 on the left side.      Comments: Right PT heard through doppler. Left PT absent.   Pulmonary:      Effort: Pulmonary effort is normal.      Breath sounds: Normal breath sounds.   Abdominal:      General: Bowel sounds are normal.      Palpations: Abdomen is soft.   Skin:     General: Skin is warm.      Capillary Refill: Capillary refill takes less than 2 seconds.   Neurological:      Mental Status: He is alert and oriented to person, place, and time.            Significant Labs: None    Significant Imaging:  None  Assessment and Plan:     Cardiac/Vascular  Coronary artery disease involving native coronary artery of native heart without angina pectoris  -LHC +/- PCI, patient is a NAILA candidate  - Anti-platelet Therapy: ASA / Plavix  - Access: Right radial  - Creatinine/CrCl: 0.9  - Allergies: None  -  Pre-Hydration: NS  - Pre-Op Med: Bendaryl 50mg pO   - All patient's questions were answered.  -The risks, benefits and alternatives of the procedure were explained to the patient.   -The risks of coronary angiography include but are not limited to: bleeding, infection, heart rhythm abnormalities, allergic reactions, kidney injury and potential need for dialysis, stroke and death.   - Should stenting be indicated, the patient has agreed to dual anti-platelet therapy for 1-consecutive year with a drug-eluting stent and a minimum of 1-month with the use of a bare metal stent  - Additionally, pt is aware that non-compliance is likely to result in stent clotting with heart attack, heart failure, and/or death  -The risks of moderate sedation include hypotension, respiratory depression, arrhythmias, bronchospasm, and death.   - Informed consent was obtained and the  patient is agreeable to proceed with the procedure.          VTE Risk Mitigation (From admission, onward)      None              Tramaine Tellez MD  Interventional Cardiology   [facility]

## 2024-09-12 NOTE — HPI
[place] is a 64 y.o. male w/ PMH of CAD, HLD, HTN, NIDDM2, and Vtach who presents to undergo complicated PCI. Denies currently any CP, SOB, syncope, pre-syncope, palpitations.     Patient recently underwent LHC as part of evaluation for his recurrent VTACH and was 2 vessel obstructive CAD. He underwent successful PCI to the 2nd diagonal, however was not able to undergo PCI to the 1 Mrg lesion that was 90% stenosed.     LHC (8/12/24):     The Mid LAD lesion was 60% stenosed.  IFR to LAD mid vessel lesion, negative.    The 2nd Diag lesion was 80% stenosed with 0% stenosis post-intervention.    2nd Diag lesion: A stent was successfully placed.    The 1st Mrg lesion was 90% stenosed.  unable to cross lesion with PTCA balloon.    The estimated blood loss was <50 mL.    IVUS  utilized for coronary intervention to diagonal branch of LAD.

## 2024-09-12 NOTE — BRIEF OP NOTE
Brief Operative Note:    : Axel Calderon MD     Referring Physician: Axel Calderon     All Operators: Surgeon(s):  Surya Carolina III, MD Subramaniam, Venkat, MD Tafur Soto, Jose D., Tramaine Trevino MD  , MD Rishi     Preoperative Diagnosis: Coronary artery disease, unspecified vessel or lesion type, unspecified whether angina present, unspecified whether native or transplanted heart [I25.10]     Postop Diagnosis: Coronary artery disease, unspecified vessel or lesion type, unspecified whether angina present, unspecified whether native or transplanted heart [I25.10]    Treatments/Procedures: Procedure(s) (LRB):  Stent, Drug Eluting, Single Vessel, Coronary (Left)    Access: Right CFA    Findings:Severe coronary artery disease is present. S/p stent    See catheterization report for full details.    Intervention:    See catheterization report for full details.    Closure device: Perclose       Plan:  - Post cath protocol   - IVF @ 150 cc/kg/hr x 4 hours  - Bed rest x4 hours   - Continue aspirin 81 mg daily indefinitely  - Continue aspirin, plavix for minimum 6 months, ideally up to 1 year  - Continue high intensity statin therapy (LDL goal < 70)  - Risk factor reduction (BP <130/80 mmHg, glycemic control, etc)  - Cardiac rehab referral  - Follow up with outpatient cardiologist    Estimated Blood loss: 20 cc    Specimens removed: Fide Tellez MD

## 2024-09-12 NOTE — SUBJECTIVE & OBJECTIVE
Past Medical History:   Diagnosis Date    Acute combined systolic and diastolic congestive heart failure 6/15/2023    APS (antiphospholipid syndrome)     Cancer     skin cancer- R. hand    Coronary artery disease involving native coronary artery of native heart without angina pectoris 6/9/2023    Elevated homocysteine     Screening for colon cancer 5/4/2017       Past Surgical History:   Procedure Laterality Date    ABLATION N/A 12/21/2023    Procedure: Ablation;  Surgeon: Norbert Mcbride MD;  Location: [facility] EP LAB;  Service: Cardiology;  Laterality: N/A;  - PVC, RFA ,Carto, +/- dcPPM ,MDT, MAC, [place], 3 Prep    APPENDECTOMY  1976    CHOLECYSTECTOMY  1987    CLOSURE DEVICE  12/21/2023    Procedure: Placement of Closure Device;  Surgeon: Norbert Mcbride MD;  Location: [facility] EP LAB;  Service: Cardiology;;    COLONOSCOPY N/A 5/4/2017    Procedure: COLONOSCOPY;  Surgeon: Gabriel Saini MD;  Location: [facility];  Service: Endoscopy;  Laterality: N/A;    COLONOSCOPY W/ BIOPSIES AND POLYPECTOMY  2011    CORONARY STENT PLACEMENT N/A 8/12/2024    Procedure: INSERTION, STENT, CORONARY ARTERY;  Surgeon: Neno Constantino MD;  Location: [facility] CATH LAB;  Service: Cardiology;  Laterality: N/A;    INSTANTANEOUS WAVE-FREE RATIO (IFR) N/A 8/12/2024    Procedure: Instantaneous Wave-Free Ratio (IFR);  Surgeon: Neno Constantino MD;  Location: [facility] CATH LAB;  Service: Cardiology;  Laterality: N/A;    IVUS, CORONARY  8/12/2024    Procedure: IVUS, Coronary;  Surgeon: Neno Constantino MD;  Location: [facility] CATH LAB;  Service: Cardiology;;    LEFT HEART CATHETERIZATION Left 7/30/2024    Procedure: Left heart cath;  Surgeon: Neno Constantino MD;  Location: [facility] CATH LAB;  Service: Cardiology;  Laterality: Left;    PERCUTANEOUS TRANSLUMINAL BALLOON ANGIOPLASTY OF CORONARY ARTERY  8/12/2024    Procedure: Angioplasty-coronary;  Surgeon: Neno Constantino MD;  Location: [facility] CATH LAB;  Service: Cardiology;;    SKIN SURGERY      piece of skin removed  on R.hand- skin cancer     TONSILLECTOMY         Review of patient's allergies indicates:   Allergen Reactions    Codeine Diarrhea     Patient can not tolerate Tylenol #3, states he does okay with Codeine with cough medications     Nickel sutures [surgical stainless steel] Rash       No medications prior to admission.     Family History       Problem Relation (Age of Onset)    Diabetes Maternal Grandmother    No Known Problems Mother, Father          Tobacco Use    Smoking status: Every Day     Current packs/day: 1.00     Average packs/day: 1 pack/day for 36.6 years (38.2 ttl pk-yrs)     Types: Cigarettes     Start date: 3/28/1987     Last attempt to quit: 8/3/2019    Smokeless tobacco: Never    Tobacco comments:     Patient eligible to renew smoking cessation benefits on 7/10/24   Substance and Sexual Activity    Alcohol use: Yes     Comment: Occasionally 3-4 drinks per month at the most    Drug use: No    Sexual activity: Yes     Partners: Female     Comment: single-in a relationship     ROS12 point ROS negative except for HPI.   Objective:     Vital Signs (Most Recent):    Vital Signs (24h Range):           There is no height or weight on file to calculate BMI.            No intake or output data in the 24 hours ending 09/12/24 0640    Lines/Drains/Airways       None                    Physical Exam  Constitutional:       General: He is not in acute distress.  Neck:      Vascular: No hepatojugular reflux or JVD.   Cardiovascular:      Rate and Rhythm: Normal rate and regular rhythm.      Pulses:           Radial pulses are 2+ on the right side and 2+ on the left side.        Femoral pulses are 2+ on the right side and 2+ on the left side.       Dorsalis pedis pulses are 2+ on the right side and 2+ on the left side.        Posterior tibial pulses are 0 on the left side.      Comments: Right PT heard through doppler. Left PT absent.   Pulmonary:      Effort: Pulmonary effort is normal.      Breath sounds: Normal  breath sounds.   Abdominal:      General: Bowel sounds are normal.      Palpations: Abdomen is soft.   Skin:     General: Skin is warm.      Capillary Refill: Capillary refill takes less than 2 seconds.   Neurological:      Mental Status: He is alert and oriented to person, place, and time.            Significant Labs: None    Significant Imaging:  None

## 2024-09-13 ENCOUNTER — HOSPITAL ENCOUNTER (EMERGENCY)
Facility: HOSPITAL | Age: 64
Discharge: HOME | End: 2024-09-13
Attending: SURGERY
Payer: COMMERCIAL

## 2024-09-13 VITALS
OXYGEN SATURATION: 96 % | WEIGHT: 263.75 LBS | TEMPERATURE: 98 F | BODY MASS INDEX: 33.87 KG/M2 | SYSTOLIC BLOOD PRESSURE: 133 MMHG | DIASTOLIC BLOOD PRESSURE: 75 MMHG | HEART RATE: 60 BPM | RESPIRATION RATE: 20 BRPM
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
## 2024-09-13 DIAGNOSIS — S09.90XA INJURY OF HEAD, INITIAL ENCOUNTER: ICD-10-CM

## 2024-09-13 DIAGNOSIS — M54.50 ACUTE LOW BACK PAIN, UNSPECIFIED BACK PAIN LATERALITY, UNSPECIFIED WHETHER SCIATICA PRESENT: ICD-10-CM

## 2024-09-13 DIAGNOSIS — R07.89 CHEST WALL PAIN: ICD-10-CM

## 2024-09-13 DIAGNOSIS — M54.2 NECK PAIN: ICD-10-CM

## 2024-09-13 DIAGNOSIS — M25.512 ACUTE PAIN OF LEFT SHOULDER: ICD-10-CM

## 2024-09-13 LAB
POC ACTIVATED CLOTTING TIME K: 299 SEC (ref 74–137)
SAMPLE: ABNORMAL

## 2024-09-13 PROCEDURE — 99284 EMERGENCY DEPT VISIT MOD MDM: CPT | Mod: 25

## 2024-09-13 RX ORDER — TRAMADOL HYDROCHLORIDE 50 MG/1
50 TABLET ORAL EVERY 6 HOURS PRN
Qty: 15 TABLET | Refills: 0 | Status: SHIPPED | OUTPATIENT
Start: 2024-09-13 | End: 2024-09-17

## 2024-09-13 RX ORDER — TRAMADOL HYDROCHLORIDE 50 MG/1
50 TABLET ORAL EVERY 6 HOURS PRN
Qty: 15 TABLET | Refills: 0 | Status: SHIPPED | OUTPATIENT
Start: 2024-09-13 | End: 2024-09-13

## 2024-09-13 NOTE — ED PROVIDER NOTES
Encounter Date: 9/13/2024       History     Chief Complaint   Patient presents with    Fall     [place] is a 64 y.o. male with PMH of CHF, EPS, CAD presenting to the ED for evaluation of posterior neck, left shoulder, left rib, and lower back pain after fall.  Patient reports that he was having a procedure at an OSH yesterday when he fell off of the OR table. He fell onto his L side. There was no LOC. He presents with posterior neck, L shoulder, L anterior rib and lower back pain.  Pain is described as throbbing, exacerbated by movement and deep breaths, currently rated 8/10 in severity.  He reports that he feels sharp sensation radiating down both legs. He feels SOB. Denies saddle anesthesia, bowel or bladder dysfunction, or numbness or tingling to bilateral lower legs.    The history is provided by the patient.     Review of patient's allergies indicates:   Allergen Reactions    Codeine Diarrhea     Patient can not tolerate Tylenol #3, states he does okay with Codeine with cough medications     Nickel sutures [surgical stainless steel] Rash     Past Medical History:   Diagnosis Date    Acute combined systolic and diastolic congestive heart failure 6/15/2023    APS (antiphospholipid syndrome)     Cancer     skin cancer- R. hand    Coronary artery disease involving native coronary artery of native heart without angina pectoris 6/9/2023    Elevated homocysteine     Screening for colon cancer 5/4/2017     Past Surgical History:   Procedure Laterality Date    ABLATION N/A 12/21/2023    Procedure: Ablation;  Surgeon: Norbert Mcbride MD;  Location: [facility] EP LAB;  Service: Cardiology;  Laterality: N/A;  - PVC, RFA ,Carto, +/- JOSE Pacheco, [place], [place], 3 Prep    APPENDECTOMY  1976    CHOLECYSTECTOMY  1987    CLOSURE DEVICE  12/21/2023    Procedure: Placement of Closure Device;  Surgeon: Norbert Mcbride MD;  Location: [facility] EP LAB;  Service: Cardiology;;    COLONOSCOPY N/A 5/4/2017    Procedure: COLONOSCOPY;  Surgeon:  Gabriel Saini MD;  Location: [facility];  Service: Endoscopy;  Laterality: N/A;    COLONOSCOPY W/ BIOPSIES AND POLYPECTOMY  2011    CORONARY STENT PLACEMENT N/A 8/12/2024    Procedure: INSERTION, STENT, CORONARY ARTERY;  Surgeon: Neno Constantino MD;  Location: [facility] CATH LAB;  Service: Cardiology;  Laterality: N/A;    INSTANTANEOUS WAVE-FREE RATIO (IFR) N/A 8/12/2024    Procedure: Instantaneous Wave-Free Ratio (IFR);  Surgeon: Neno Constantino MD;  Location: [facility] CATH LAB;  Service: Cardiology;  Laterality: N/A;    IVUS, CORONARY  8/12/2024    Procedure: IVUS, Coronary;  Surgeon: Neno Constantino MD;  Location: [facility] CATH LAB;  Service: Cardiology;;    LEFT HEART CATHETERIZATION Left 7/30/2024    Procedure: Left heart cath;  Surgeon: Neno Constantino MD;  Location: [facility] CATH LAB;  Service: Cardiology;  Laterality: Left;    PERCUTANEOUS TRANSLUMINAL BALLOON ANGIOPLASTY OF CORONARY ARTERY  8/12/2024    Procedure: Angioplasty-coronary;  Surgeon: Neno Constantino MD;  Location: [facility] CATH LAB;  Service: Cardiology;;    SKIN SURGERY      piece of skin removed on R.hand- skin cancer     STENT, DRUG ELUTING, SINGLE VESSEL, CORONARY Left 9/12/2024    Procedure: Stent, Drug Eluting, Single Vessel, Coronary;  Surgeon: Axel Calderon MD;  Location: [facility] CATH LAB;  Service: Cardiology;  Laterality: Left;    TONSILLECTOMY       Family History   Problem Relation Name Age of Onset    No Known Problems Mother      No Known Problems Father      Diabetes Maternal Grandmother       Social History     Tobacco Use    Smoking status: Every Day     Current packs/day: 1.00     Average packs/day: 1 pack/day for 36.6 years (38.2 ttl pk-yrs)     Types: Cigarettes     Start date: 3/28/1987     Last attempt to quit: 8/3/2019    Smokeless tobacco: Never    Tobacco comments:     Patient eligible to renew smoking cessation benefits on 7/10/24   Substance Use Topics    Alcohol use: Yes     Comment: Occasionally 3-4 drinks per month at the  most    Drug use: No     Review of Systems   Constitutional:  Negative for appetite change, chills and fever.   HENT:  Negative for congestion, ear discharge, ear pain, postnasal drip, rhinorrhea and sore throat.    Respiratory:  Negative for cough, chest tightness and shortness of breath.    Cardiovascular:  Negative for chest pain.   Gastrointestinal:  Negative for abdominal distention, abdominal pain and nausea.   Endocrine: Negative.    Genitourinary:  Negative for dysuria, flank pain, hematuria and urgency.   Musculoskeletal:  Positive for arthralgias (L shoulder), back pain and neck pain.   Skin:  Negative for rash.   Neurological:  Negative for dizziness, weakness, numbness and headaches.   Hematological:  Does not bruise/bleed easily.       Physical Exam     Initial Vitals [09/13/24 1550]   BP Pulse Resp Temp SpO2   133/75 60 20 98.1 °F (36.7 °C) 96 %      MAP       --         Physical Exam    Nursing note and vitals reviewed.  Constitutional: He appears well-developed and well-nourished.   HENT:   Head: Normocephalic and atraumatic.   Eyes: Conjunctivae and EOM are normal. Pupils are equal, round, and reactive to light.   Neck: Neck supple.   Cardiovascular:  Normal rate, regular rhythm, normal heart sounds and intact distal pulses.           Pulmonary/Chest: Breath sounds normal.   Abdominal: Abdomen is soft. Bowel sounds are normal.   Musculoskeletal:         General: Normal range of motion.      Cervical back: Neck supple.     Neurological: He is alert and oriented to person, place, and time. He has normal strength.   Skin: Skin is warm and dry.   Psychiatric: He has a normal mood and affect. His behavior is normal. Judgment and thought content normal.         ED Course   Critical Care    Date/Time: 9/13/2024 5:08 PM    Performed by: Bc Garcia MD  Authorized by: Bc Garcia MD  Direct patient critical care time: 25 minutes  Additional history critical care time: 5 minutes  Ordering /  reviewing critical care time: 5 minutes  Documentation critical care time: 5 minutes  Total critical care time (exclusive of procedural time) : 40 minutes  Critical care was necessary to treat or prevent imminent or life-threatening deterioration of the following conditions: trauma.  Critical care was time spent personally by me on the following activities: obtaining history from patient or surrogate, ordering and review of radiographic studies, re-evaluation of patient's condition and examination of patient.        Labs Reviewed - No data to display       Imaging Results              X-Ray Shoulder 2 or More Views Left (Final result)  Result time 09/13/24 16:50:37      Final result by Fredrick Mota MD (09/13/24 16:50:37)                   Impression:      No acute osseous abnormality.      Electronically signed by: Fredrick Mota MD  Date:    09/13/2024  Time:    16:50               Narrative:    EXAMINATION:  XR SHOULDER COMPLETE 2 OR MORE VIEWS LEFT    CLINICAL HISTORY:  Fell off operating room table yesterday [facility] with residual pain;    TECHNIQUE:  Two or three views of the left shoulder were performed.    COMPARISON:  None    FINDINGS:  There is no fracture or dislocation.  There is mild AC joint arthrosis.  The soft tissues are unremarkable.                                       CT Lumbar Spine Without Contrast (Final result)  Result time 09/13/24 16:46:37      Final result by Fredrick Mota MD (09/13/24 16:46:37)                   Impression:      No acute lumbar spine injury.      Electronically signed by: Fredrick Mota MD  Date:    09/13/2024  Time:    16:46               Narrative:    EXAMINATION:  CT LUMBAR SPINE WITHOUT CONTRAST    CLINICAL HISTORY:  Low back pain, trauma;    TECHNIQUE:  Low-dose axial, sagittal and coronal reformations are obtained through the lumbar spine.  Contrast was not administered.    COMPARISON:  None.    FINDINGS:  No fracture or malalignment.   There are moderate upper to mid lumbar degenerative disc changes.  Moderate sigmoid diverticulosis is present.                                       CT Chest Without Contrast (Final result)  Result time 09/13/24 16:44:40      Final result by Fredrick Mota MD (09/13/24 16:44:40)                   Impression:      No acute intrathoracic process.    Mild ascending thoracic aortic ectasia.  Small hiatal hernia.  Cholecystectomy.      Electronically signed by: Fredrick Mota MD  Date:    09/13/2024  Time:    16:44               Narrative:    EXAMINATION:  CT CHEST WITHOUT CONTRAST    CLINICAL HISTORY:  Chest trauma, blunt;    TECHNIQUE:  Low dose axial images, sagittal and coronal reformations were obtained from the thoracic inlet to the lung bases. Contrast was not administered.    COMPARISON:  None.    FINDINGS:  Heart size is normal without pericardial effusion.  There is calcification of the coronary arteries.  There is ectasia of the ascending thoracic aorta 4.1 cm.    No pulmonary contusion, pleural effusion, or pneumothorax.  There are mild emphysematous changes.  A few old calcified granulomas are present.    There has been a cholecystectomy.  A small hiatal hernia is present.  There are old calcified granulomas of the spleen.    No acute bony abnormality.                                       CT Cervical Spine Without Contrast (Final result)  Result time 09/13/24 16:41:32      Final result by Fredrick Mota MD (09/13/24 16:41:32)                   Impression:      No acute cervical spine injury.      Electronically signed by: Fredrick Mota MD  Date:    09/13/2024  Time:    16:41               Narrative:    EXAMINATION:  CT CERVICAL SPINE WITHOUT CONTRAST    CLINICAL HISTORY:  Neck trauma, dangerous injury mechanism (Age 16-64y);    TECHNIQUE:  Low dose axial images, sagittal and coronal reformations were performed though the cervical spine.  Contrast was not  administered.    COMPARISON:  None    FINDINGS:  No fracture or malalignment.  There is mid to lower degenerative disc change range from mild to moderate.  Moderate multilevel facet arthropathy is present.                                       CT Head Without Contrast (Final result)  Result time 09/13/24 16:39:26      Final result by Fredrick Mota MD (09/13/24 16:39:26)                   Impression:      No acute intracranial process.      Electronically signed by: Fredrick Mota MD  Date:    09/13/2024  Time:    16:39               Narrative:    EXAMINATION:  CT HEAD WITHOUT CONTRAST    CLINICAL HISTORY:  Facial trauma, blunt;    TECHNIQUE:  Low dose axial images were obtained through the head.  Coronal and sagittal reformations were also performed. Contrast was not administered.    COMPARISON:  06/08/2023    FINDINGS:  Gray-white differentiation is well maintained.  There is no intracranial hemorrhage.  There is no mass or midline shift.  The ventricles are nondilated.  The calvarium is intact.                                       Medications - No data to display  Medical Decision Making  Differential includes ICH, CHI, concussion, skull fracture, soft tissue injury  Differential also includes pneumothorax, hemothorax, rib fracture, contusion  Also includes cervical sprain, strain, fracture, dislocation, ligament/tendon injury  Also includes lumbar sprain, strain, fracture, dislocation, ligament/tendon injury  Also includes left shoulder sprain, strain, fracture, dislocation, ligament injury    Problems Addressed:  Acute low back pain, unspecified back pain laterality, unspecified whether sciatica present: complicated acute illness or injury  Acute pain of left shoulder: acute illness or injury  Chest wall pain: complicated acute illness or injury  Fall, initial encounter: complicated acute illness or injury  Injury of head, initial encounter: complicated acute illness or injury  Neck pain: complicated  acute illness or injury    Amount and/or Complexity of Data Reviewed  Radiology: ordered and independent interpretation performed.    ED Management & Risks of Complication, Morbidity, & Mortality:  Follow-up with the procedural table yesterday at [facility]  CT head (-), CT cervical & lumbar spine (-) in the ER today  CT chest without abnormal findings, completely normal today  Left shoulder x-ray showed no acute findings in the emergency room  Ultram for pain prescribed on ER discharge today, PCP follow-up  Pt/Family counseled to return to the ER with any concerns on DC    Need for Hospitalization or Surgery with Social Determinants of Health:  This patient does not need to be hospitalized on ER evaluation today  The patient's diagnosis is not limited by social determinants of health  Does not require surgery or procedure (major or minor), no risk factors    Clinical Impression:  Final diagnoses:  [W19.XXXA] Fall, initial encounter (Primary)  [S09.90XA] Injury of head, initial encounter  [M54.50] Acute low back pain, unspecified back pain laterality, unspecified whether sciatica present  [M54.2] Neck pain  [R07.89] Chest wall pain  [M25.512] Acute pain of left shoulder          ED Disposition Condition    Discharge Stable          ED Prescriptions       Medication Sig Dispense Start Date End Date Auth. Provider    traMADoL (ULTRAM) 50 mg tablet  (Status: Discontinued) Take 1 tablet (50 mg total) by mouth every 6 (six) hours as needed for Pain. 15 tablet 9/13/2024 9/13/2024 Bc Garcia MD    traMADoL (ULTRAM) 50 mg tablet Take 1 tablet (50 mg total) by mouth every 6 (six) hours as needed for Pain. 15 tablet 9/13/2024 9/17/2024 Bc Garcia MD          Follow-up Information       Follow up With Specialties Details Why Contact Info    Tessa Oconnor MD Internal Medicine Schedule an appointment as soon as possible for a visit in 2 days  [facility] [id number]  [phone]                Bc Garcia MD  09/13/24 0680

## 2024-09-16 ENCOUNTER — TELEPHONE (OUTPATIENT)
Dept: ELECTROPHYSIOLOGY | Facility: CLINIC | Age: 64
End: 2024-09-16
Payer: COMMERCIAL

## 2024-09-16 DIAGNOSIS — I49.3 PVC'S (PREMATURE VENTRICULAR CONTRACTIONS): ICD-10-CM

## 2024-09-16 DIAGNOSIS — I47.20 VENTRICULAR TACHYCARDIA: Primary | ICD-10-CM

## 2024-09-25 ENCOUNTER — OFFICE VISIT (OUTPATIENT)
Dept: INTERNAL MEDICINE | Facility: CLINIC | Age: 64
End: 2024-09-25
Payer: COMMERCIAL

## 2024-09-25 VITALS
HEART RATE: 65 BPM | RESPIRATION RATE: 20 BRPM | WEIGHT: 264.56 LBS | HEIGHT: 74 IN | OXYGEN SATURATION: 97 % | BODY MASS INDEX: 33.95 KG/M2

## 2024-09-25 DIAGNOSIS — G89.29 CHRONIC BILATERAL LOW BACK PAIN WITHOUT SCIATICA: Primary | ICD-10-CM

## 2024-09-25 DIAGNOSIS — M54.50 CHRONIC BILATERAL LOW BACK PAIN WITHOUT SCIATICA: Primary | ICD-10-CM

## 2024-09-25 DIAGNOSIS — R07.81 RIB PAIN ON LEFT SIDE: ICD-10-CM

## 2024-09-25 DIAGNOSIS — L30.4 INTERTRIGO: ICD-10-CM

## 2024-09-25 PROCEDURE — 3060F POS MICROALBUMINURIA REV: CPT | Mod: CPTII,S$GLB,, | Performed by: NURSE PRACTITIONER

## 2024-09-25 PROCEDURE — 2023F DILAT RTA XM W/O RTNOPTHY: CPT | Mod: CPTII,S$GLB,, | Performed by: NURSE PRACTITIONER

## 2024-09-25 PROCEDURE — 1159F MED LIST DOCD IN RCRD: CPT | Mod: CPTII,S$GLB,, | Performed by: NURSE PRACTITIONER

## 2024-09-25 PROCEDURE — 3066F NEPHROPATHY DOC TX: CPT | Mod: CPTII,S$GLB,, | Performed by: NURSE PRACTITIONER

## 2024-09-25 PROCEDURE — 99999 PR PBB SHADOW E&M-EST. PATIENT-LVL V: CPT | Mod: PBBFAC,,, | Performed by: NURSE PRACTITIONER

## 2024-09-25 PROCEDURE — 3044F HG A1C LEVEL LT 7.0%: CPT | Mod: CPTII,S$GLB,, | Performed by: NURSE PRACTITIONER

## 2024-09-25 PROCEDURE — 3008F BODY MASS INDEX DOCD: CPT | Mod: CPTII,S$GLB,, | Performed by: NURSE PRACTITIONER

## 2024-09-25 PROCEDURE — 99213 OFFICE O/P EST LOW 20 MIN: CPT | Mod: S$GLB,,, | Performed by: NURSE PRACTITIONER

## 2024-09-25 PROCEDURE — 4010F ACE/ARB THERAPY RXD/TAKEN: CPT | Mod: CPTII,S$GLB,, | Performed by: NURSE PRACTITIONER

## 2024-09-25 RX ORDER — TRAMADOL HYDROCHLORIDE 50 MG/1
50 TABLET ORAL EVERY 8 HOURS PRN
Qty: 21 TABLET | Refills: 0 | Status: SHIPPED | OUTPATIENT
Start: 2024-09-25 | End: 2024-09-25 | Stop reason: SDUPTHER

## 2024-09-25 RX ORDER — CYCLOBENZAPRINE HCL 5 MG
5 TABLET ORAL 3 TIMES DAILY PRN
Qty: 30 TABLET | Refills: 0 | Status: SHIPPED | OUTPATIENT
Start: 2024-09-25 | End: 2024-10-06

## 2024-09-25 RX ORDER — CLOTRIMAZOLE AND BETAMETHASONE DIPROPIONATE 10; .64 MG/G; MG/G
CREAM TOPICAL 2 TIMES DAILY
Qty: 45 G | Refills: 2 | Status: SHIPPED | OUTPATIENT
Start: 2024-09-25

## 2024-09-25 RX ORDER — TRAMADOL HYDROCHLORIDE 50 MG/1
50 TABLET ORAL EVERY 8 HOURS PRN
Qty: 21 TABLET | Refills: 0 | Status: SHIPPED | OUTPATIENT
Start: 2024-09-25 | End: 2024-10-03

## 2024-09-26 NOTE — PROGRESS NOTES
Subjective:       Patient ID: [place] is a 64 y.o. male.    Chief Complaint: Follow-up (Stent placement done and after procedure he rolled off the table - having pain in his L. Rib area and neck and back PS:8)    Patient is known, to me and presents with   Chief Complaint   Patient presents with    Follow-up     Stent placement done and after procedure he rolled off the table - having pain in his L. Rib area and neck and back PS:8   .  Denies chest pain and shortness of breath.  Patient presents with follow up from fall at the hospital. Imaging  revealed no fractures. He is still having pain and would need to have some more refills. Also with a rash to groin area   HPI  Review of Systems   Constitutional: Negative.    Respiratory: Negative.     Cardiovascular: Negative.    Musculoskeletal:  Positive for arthralgias, back pain and neck pain.   Skin:  Positive for rash.       Objective:      Physical Exam  Constitutional:       General: He is not in acute distress.     Appearance: Normal appearance. He is obese. He is not ill-appearing, toxic-appearing or diaphoretic.   Neck:      Vascular: No carotid bruit.   Cardiovascular:      Rate and Rhythm: Regular rhythm.      Heart sounds: Normal heart sounds. No murmur heard.  Pulmonary:      Effort: Pulmonary effort is normal. No respiratory distress.      Breath sounds: Normal breath sounds. No stridor. No wheezing, rhonchi or rales.   Chest:      Chest wall: No tenderness.   Musculoskeletal:         General: Tenderness and signs of injury present. No swelling or deformity.      Cervical back: Neck supple. Tenderness present. No rigidity. Decreased range of motion.      Lumbar back: Tenderness present. Decreased range of motion.        Back:       Right lower leg: No edema.      Left lower leg: No edema.   Lymphadenopathy:      Cervical: No cervical adenopathy.   Skin:     General: Skin is warm and dry.      Capillary Refill: Capillary refill takes less than 2  "seconds.      Coloration: Skin is not jaundiced or pale.      Findings: Erythema and rash present. No bruising or lesion.          Neurological:      Mental Status: He is alert.         Assessment:       1. Chronic bilateral low back pain without sciatica    2. Rib pain on left side    3. Intertrigo        Plan:   1. Chronic bilateral low back pain without sciatica  -     Ambulatory referral/consult to Physical/Occupational Therapy; Future; Expected date: 10/02/2024  -     Discontinue: traMADoL (ULTRAM) 50 mg tablet; Take 1 tablet (50 mg total) by mouth every 8 (eight) hours as needed for Pain.  Dispense: 21 tablet; Refill: 0  -     cyclobenzaprine (FLEXERIL) 5 MG tablet; Take 1 tablet (5 mg total) by mouth 3 (three) times daily as needed for Muscle spasms.  Dispense: 30 tablet; Refill: 0  -     traMADoL (ULTRAM) 50 mg tablet; Take 1 tablet (50 mg total) by mouth every 8 (eight) hours as needed for Pain.  Dispense: 21 tablet; Refill: 0    2. Rib pain on left side  -     Ambulatory referral/consult to Physical/Occupational Therapy; Future; Expected date: 10/02/2024  -     Discontinue: traMADoL (ULTRAM) 50 mg tablet; Take 1 tablet (50 mg total) by mouth every 8 (eight) hours as needed for Pain.  Dispense: 21 tablet; Refill: 0  -     cyclobenzaprine (FLEXERIL) 5 MG tablet; Take 1 tablet (5 mg total) by mouth 3 (three) times daily as needed for Muscle spasms.  Dispense: 30 tablet; Refill: 0  -     traMADoL (ULTRAM) 50 mg tablet; Take 1 tablet (50 mg total) by mouth every 8 (eight) hours as needed for Pain.  Dispense: 21 tablet; Refill: 0    3. Intertrigo  -     clotrimazole-betamethasone 1-0.05% (LOTRISONE) cream; Apply topically 2 (two) times daily.  Dispense: 45 g; Refill: 2       "This note will not be shared with the patient."    Will send to PT    RTC as scheduled  "

## 2024-10-01 ENCOUNTER — CLINICAL SUPPORT (OUTPATIENT)
Dept: CARDIOLOGY | Facility: HOSPITAL | Age: 64
End: 2024-10-01
Attending: INTERNAL MEDICINE
Payer: COMMERCIAL

## 2024-10-01 DIAGNOSIS — I49.3 PVC'S (PREMATURE VENTRICULAR CONTRACTIONS): ICD-10-CM

## 2024-10-01 DIAGNOSIS — I47.20 VENTRICULAR TACHYCARDIA: ICD-10-CM

## 2024-10-01 PROCEDURE — 93226 XTRNL ECG REC<48 HR SCAN A/R: CPT

## 2024-10-11 ENCOUNTER — TELEPHONE (OUTPATIENT)
Dept: ELECTROPHYSIOLOGY | Facility: CLINIC | Age: 64
End: 2024-10-11
Payer: COMMERCIAL

## 2024-10-11 ENCOUNTER — OFFICE VISIT (OUTPATIENT)
Dept: CARDIOLOGY | Facility: CLINIC | Age: 64
End: 2024-10-11
Payer: COMMERCIAL

## 2024-10-11 ENCOUNTER — PATIENT MESSAGE (OUTPATIENT)
Dept: CARDIOLOGY | Facility: CLINIC | Age: 64
End: 2024-10-11

## 2024-10-11 VITALS
HEART RATE: 51 BPM | WEIGHT: 270 LBS | DIASTOLIC BLOOD PRESSURE: 64 MMHG | SYSTOLIC BLOOD PRESSURE: 106 MMHG | BODY MASS INDEX: 34.67 KG/M2

## 2024-10-11 DIAGNOSIS — I49.3 PVC'S (PREMATURE VENTRICULAR CONTRACTIONS): ICD-10-CM

## 2024-10-11 DIAGNOSIS — I50.42 CHRONIC COMBINED SYSTOLIC AND DIASTOLIC CONGESTIVE HEART FAILURE: ICD-10-CM

## 2024-10-11 DIAGNOSIS — D68.61 ANTIPHOSPHOLIPID SYNDROME: ICD-10-CM

## 2024-10-11 DIAGNOSIS — I47.20 VENTRICULAR TACHYCARDIA: Primary | ICD-10-CM

## 2024-10-11 DIAGNOSIS — Z86.718 HISTORY OF DVT (DEEP VEIN THROMBOSIS): ICD-10-CM

## 2024-10-11 DIAGNOSIS — E11.9 DIABETES MELLITUS WITHOUT COMPLICATION: ICD-10-CM

## 2024-10-11 DIAGNOSIS — R10.31 RIGHT INGUINAL PAIN: ICD-10-CM

## 2024-10-11 DIAGNOSIS — I70.0 AORTIC ATHEROSCLEROSIS: ICD-10-CM

## 2024-10-11 DIAGNOSIS — I25.10 CORONARY ARTERY DISEASE INVOLVING NATIVE CORONARY ARTERY OF NATIVE HEART WITHOUT ANGINA PECTORIS: ICD-10-CM

## 2024-10-11 DIAGNOSIS — I10 PRIMARY HYPERTENSION: ICD-10-CM

## 2024-10-11 PROCEDURE — 99999 PR PBB SHADOW E&M-EST. PATIENT-LVL IV: CPT | Mod: PBBFAC,,, | Performed by: INTERNAL MEDICINE

## 2024-10-11 NOTE — TELEPHONE ENCOUNTER
Called patient and scheduled rt. Groin pseudoaneurys eval for Tuesday 10/15 at 9am. Patient agrees to appointment.

## 2024-10-11 NOTE — PROGRESS NOTES
Subjective:   Patient ID:  North Najera is a 64 y.o. male who presents for follow-up of PVCs and Ventricular Tachycardia    Referring Cardiologist: Neno Constantino MD  Primary Care Provider: Irma Biswas NP    HPI  Prior Hx:  I had the pleasure of seeing Mr. Najera today in our electrophysiology clinic in consultation for his PVCs/VT. As you are aware he is a pleasant 63 year-old man with type 2 diabetes mellitus, obesity, anti-phospholipid syndrome with prior DVT and frequent PVCs/VT with LVEF of 40%. He was in his usual state of health until 6/2023 when he developed weakness/light-headedness at work. He went to the ER and was observed. He wore a holter monitor which showed very frequent PVCs (58% burden) and VT. He was instructed to go to [facility] ER. He was observed. He was seen by the EP consult team, Dr. Talley. ECHO noted a LVEF of 40%. He was initiated on metoprolol. PET stress test noted no significant indications of obstructive disease. PVCs were morphologically para-His by ECG. Recommendation was to change to verapamil and he was discharged. Repeat holter in August of 2023 noted PVC burden of 24% still with runs of VT and echo showed LVEF improved to 50-55%. He has a 10% PAC burden. Discussed adding anti-arrhythmic drug therapy versus mapping/ablation. I spent about a half hour discussing the nature of EP study and ablation, including possible retrograde aortic access. We discussed risks and benefits at length. Our discussion included, but was not limited to the risk of death, infection, bleeding, stroke, MI, cardiac perforation, vascular injury, valvular injury, embolism, cardiac tamponade, skin burns, and other organic injury including the possibility for need for surgery or pacemaker implantation. He understood and after a long conversation he would like to try AAD therapy first. Would add 100mg bid of flecainide and repeat a holter in 4 weeks. Will call him on the phone and discuss results. If  "he continues to have a high burden of PVCs/VT then he indicated he would proceed with ablation.    I reviewed available ECGs in Epic which show sinus rhythm. Only a few isolated PVCs are visible. One ECG has PVCs in limb leads that are inferiorly directed.    12/2023: Spoke with Mr. Najera regarding his repeat Holter. He is still having a high burden of PVCs and VT (35% PVC burden with runs of VT up to 48 beats). Elected to proceed with mapping/ablation. Unsuccessful PVC ablation (5 different PVC morphologies observed with the predominant focus located at the GCV/AIV region which could not be accessed with the ablation catheter. Mapping/ablating the endocardial aspect was not successful). Unsuccessful VT ablation (highly suspect VT is mid basal intraseptal near the conduction system. Ablation not performed at "earliest site" at the left his bundle region. Ablation just inferior at an early but not earliest site was unsuccessful). Started on amiodarone.    1/2024: Cardiac MRI normal, no LGE. LVEF 43%.    2/2024: Spoke on the phone, having dizzy spells with low pulse rates at times. Verapamil stopped.    3/2024: Mr. Najera returns for follow-up. Feels well. Only notes rare dizzy spells.    5/2024: Extended holter noted 11% PVC burden and episodes of nsVT and sustained VT (rate 115 bpm). ECHO noted normal LVEF. Spoke on the phone and recommend angiogram (septal heterogeneity on PET).    8/2024: Angiogram with Dr. Constantino. Obstructive disease in the 2nd diag and 1st OM. PCI was done to 2nd diagonal branch but was unable to stent the 1st OM    9/2024: PCI to first OM by Dr. Willett    Interim Hx:  Patient returns to discuss his PVCs/VT. Reports he fell off the cath table at [facility] when he was told to roll over for the defibrillator pad removal. Has had significant neck, back and rib pain since. He wants to get physical therapy however cannot afford the co-pay. He reports a lack of trust in his physician team now. Recent " holter monitor noted 25% PVC burden and continued salvos of nsVT. Per holter report there was a sustained episode lasting 217 beats in duration however there are no presented strips of this. Also reports right groin discomfort intermittently since his procedure. He does report symptomatic improvement in the dizzy spells since his PCI.    My interpretation of today's ECG is sinus rhythm with PVCs (RBBB, concordant, mid and leftward axis)    Review of Systems   Constitutional: Negative for fever and malaise/fatigue.   HENT:  Negative for congestion and sore throat.    Eyes:  Negative for blurred vision and visual disturbance.   Cardiovascular:  Negative for chest pain, dyspnea on exertion, irregular heartbeat, near-syncope, palpitations and syncope.   Respiratory:  Negative for cough and shortness of breath.    Hematologic/Lymphatic: Negative for bleeding problem. Does not bruise/bleed easily.   Skin: Negative.    Musculoskeletal: Negative.    Gastrointestinal:  Negative for bloating, abdominal pain, hematochezia and melena.   Neurological:  Positive for light-headedness. Negative for focal weakness and weakness.   Psychiatric/Behavioral: Negative.         Objective:   Physical Exam  Vitals reviewed.   Constitutional:       General: He is not in acute distress.     Appearance: He is well-developed. He is not diaphoretic.   HENT:      Head: Normocephalic and atraumatic.   Eyes:      General:         Right eye: No discharge.         Left eye: No discharge.      Conjunctiva/sclera: Conjunctivae normal.   Cardiovascular:      Rate and Rhythm: Normal rate and regular rhythm. Frequent Extrasystoles are present.     Heart sounds: No murmur heard.     No friction rub. No gallop.   Pulmonary:      Effort: Pulmonary effort is normal. No respiratory distress.      Breath sounds: Normal breath sounds. No wheezing or rales.   Abdominal:      General: Bowel sounds are normal. There is no distension.      Palpations: Abdomen is  soft.      Tenderness: There is no abdominal tenderness.   Musculoskeletal:      Cervical back: Neck supple.   Skin:     General: Skin is warm and dry.   Neurological:      Mental Status: He is alert and oriented to person, place, and time.   Psychiatric:         Behavior: Behavior normal.         Thought Content: Thought content normal.         Judgment: Judgment normal.         Assessment:      1. Ventricular tachycardia    2. PVC's (premature ventricular contractions)    3. Primary hypertension    4. Coronary artery disease involving native coronary artery of native heart without angina pectoris    5. Chronic combined systolic and diastolic congestive heart failure    6. Aortic atherosclerosis    7. Antiphospholipid syndrome    8. History of DVT (deep vein thrombosis)    9. Diabetes mellitus without complication        Plan:   In summary, Mr. Najera is a pleasant 64 year-old man with type 2 diabetes mellitus, obesity, anti-phospholipid syndrome with prior DVT and frequent PVCs/VT with LVEF of 40%. EF improved to normal with reducing ectopic burden with verapamil but still has very high PVC burden and runs of VT.  Underwent EP study and attempted ablation 12/2023 with 5 different PVC foci (most frequent inaccessible GCV/AIV junction) and VT focus was para-Hisian with earliest site at the left sided His bundle region. Elected to not ablate there due to AV block risk. Cardiac MRI unremarkable. Amiodarone started but still with high PVC burden and mostly nsVT but some runs of short sustained VT. To evaluate further a PET stress test indicated some flow heterogeneity and for definitive evaluation underwent a coronary angiogram which disclosed 2 vessel obstructive CAD. He prefers to avoid redo-ablation/PPM if possible. Therefore underwent PCI with some symptomatic improvement but still high burden of ventricular ectopy. He has had no syncope.    Unfortunately he suffered an adverse event after his PCI at [facility] where he  fell off the cath table when lab staff were removing the AED back patch. Imaging disclosed no fractures however he is still in significant musculoskeletal pain and is frustrated with the lack of improvement in this and frustrated with not being directed in how to improve it. Also having groin discomfort post-cath procedure. I told him how sorry I was all of this happened to him and completely understood his lack of trust now in the [facility] care system. I told him I would reach out to patient advocacy regarding this. I also ordered a right groin US. For now will continue amiodarone as he has had some symptomatic improvement with this and post-PCI. He is not currently interested in another procedure however understands at some point it may be needed.     RTC in 2 months, sooner if needed. Will call with US results.    Thank you for allowing me to participate in the care of this patient. Please do not hesitate to call me with any questions or concerns.    Norbert Mcbride MD, PhD  Cardiac Electrophysiology

## 2024-10-11 NOTE — TELEPHONE ENCOUNTER
----- Message from Norbert Mcbride MD sent at 10/11/2024 12:13 PM CDT -----  Can we arrange for right groin pseudoaneurysm study? Order placed

## 2024-10-14 ENCOUNTER — TELEPHONE (OUTPATIENT)
Dept: ELECTROPHYSIOLOGY | Facility: CLINIC | Age: 64
End: 2024-10-14
Payer: COMMERCIAL

## 2024-10-14 ENCOUNTER — PATIENT MESSAGE (OUTPATIENT)
Dept: CARDIOLOGY | Facility: CLINIC | Age: 64
End: 2024-10-14
Payer: COMMERCIAL

## 2024-10-14 NOTE — TELEPHONE ENCOUNTER
----- Message from Valentin sent at 10/14/2024  3:51 PM CDT -----  Patient is calling to speak with you regarding about his procedure on tomorrow. He can be reached at [phone].    Thank you

## 2024-10-14 NOTE — TELEPHONE ENCOUNTER
Spoke to patient. He is having a pseudoaneurysm study done tomorrow. He is being charged for it, and doesn't feel it is fair. He said during a procedure he was dropped off the table. He feels like the reason he needs the study tomorrow is related to being dropped. He said he hasn't heard from patient advocacy. Offered to give him the phone # and said I would reach out to my manager to assist. Patient was upset and said don't worry about it. He feels it will be swept under the rug.

## 2024-10-15 ENCOUNTER — DOCUMENTATION ONLY (OUTPATIENT)
Dept: CARDIOLOGY | Facility: CLINIC | Age: 64
End: 2024-10-15
Payer: COMMERCIAL

## 2024-10-15 DIAGNOSIS — G89.18 POST-OP PAIN: Primary | ICD-10-CM

## 2024-10-15 NOTE — PROGRESS NOTES
Spoke to Mr. Najera regarding c/o soreness to right groin that has been present since heart cath on 09/12/24.  Dr. Willett recommended doing an US/pseudo rule out at [facility] and to see him directly after.  Patient stated that he cannot travel to [place] and would like to do US at [place].  I called and spoke to Ultrasound Parmjit zendejas, at [place] and he can do scan tomorrow 10/16/24 at 10:45am.  Patient is agreeable to date and time.  Will contact him following Ultrasound results reviewed by Dr. Willett and make plan accordingly.  Patient pleased with plan and confirmed appt tomorrow for US.

## 2024-10-16 ENCOUNTER — HOSPITAL ENCOUNTER (OUTPATIENT)
Dept: RADIOLOGY | Facility: HOSPITAL | Age: 64
Discharge: HOME | End: 2024-10-16
Attending: INTERNAL MEDICINE
Payer: COMMERCIAL

## 2024-10-16 DIAGNOSIS — G89.18 POST-OP PAIN: ICD-10-CM

## 2024-10-16 PROCEDURE — 93926 LOWER EXTREMITY STUDY: CPT | Mod: TC,RT

## 2024-10-16 PROCEDURE — 93926 LOWER EXTREMITY STUDY: CPT | Mod: 26,RT,, | Performed by: RADIOLOGY

## 2024-10-17 ENCOUNTER — DOCUMENTATION ONLY (OUTPATIENT)
Dept: CARDIOLOGY | Facility: CLINIC | Age: 64
End: 2024-10-17
Payer: COMMERCIAL

## 2024-10-17 DIAGNOSIS — E11.65 UNCONTROLLED TYPE 2 DIABETES MELLITUS WITH HYPERGLYCEMIA: ICD-10-CM

## 2024-10-17 NOTE — PROGRESS NOTES
"Spoke to patient regarding US results of right extremity.  Informed patient that Dr. Willett had seen the results and there is no pseudoaneurysm present and US in normal.  Soreness in groin is likely due to hematoma from cath procedure.  Per Dr. Willett offered him appointment to see Royer Willett at any time that is convenient to him.  Patient very angry on phone and stated that he has "no trust in Dr. Willett and [facility]"  and "feels that he is being used for money grabbing only".  Patient disconnected phone call.    "

## 2024-10-30 ENCOUNTER — OFFICE VISIT (OUTPATIENT)
Dept: INTERNAL MEDICINE | Facility: CLINIC | Age: 64
End: 2024-10-30
Payer: COMMERCIAL

## 2024-10-30 VITALS
BODY MASS INDEX: 34.21 KG/M2 | DIASTOLIC BLOOD PRESSURE: 68 MMHG | HEIGHT: 74 IN | OXYGEN SATURATION: 98 % | WEIGHT: 266.56 LBS | RESPIRATION RATE: 16 BRPM | HEART RATE: 66 BPM | SYSTOLIC BLOOD PRESSURE: 92 MMHG

## 2024-10-30 DIAGNOSIS — S13.4XXA WHIPLASH INJURIES, INITIAL ENCOUNTER: Primary | ICD-10-CM

## 2024-10-30 DIAGNOSIS — T14.8XXA MUSCLE STRAIN: ICD-10-CM

## 2024-10-30 DIAGNOSIS — R07.89 LEFT-SIDED CHEST WALL PAIN: ICD-10-CM

## 2024-10-30 PROCEDURE — 3078F DIAST BP <80 MM HG: CPT | Mod: CPTII,S$GLB,, | Performed by: INTERNAL MEDICINE

## 2024-10-30 PROCEDURE — 3060F POS MICROALBUMINURIA REV: CPT | Mod: CPTII,S$GLB,, | Performed by: INTERNAL MEDICINE

## 2024-10-30 PROCEDURE — 3044F HG A1C LEVEL LT 7.0%: CPT | Mod: CPTII,S$GLB,, | Performed by: INTERNAL MEDICINE

## 2024-10-30 PROCEDURE — 3066F NEPHROPATHY DOC TX: CPT | Mod: CPTII,S$GLB,, | Performed by: INTERNAL MEDICINE

## 2024-10-30 PROCEDURE — 3008F BODY MASS INDEX DOCD: CPT | Mod: CPTII,S$GLB,, | Performed by: INTERNAL MEDICINE

## 2024-10-30 PROCEDURE — 2023F DILAT RTA XM W/O RTNOPTHY: CPT | Mod: CPTII,S$GLB,, | Performed by: INTERNAL MEDICINE

## 2024-10-30 PROCEDURE — 1160F RVW MEDS BY RX/DR IN RCRD: CPT | Mod: CPTII,S$GLB,, | Performed by: INTERNAL MEDICINE

## 2024-10-30 PROCEDURE — 99999 PR PBB SHADOW E&M-EST. PATIENT-LVL V: CPT | Mod: PBBFAC,,, | Performed by: INTERNAL MEDICINE

## 2024-10-30 PROCEDURE — G2211 COMPLEX E/M VISIT ADD ON: HCPCS | Mod: S$GLB,,, | Performed by: INTERNAL MEDICINE

## 2024-10-30 PROCEDURE — 99214 OFFICE O/P EST MOD 30 MIN: CPT | Mod: S$GLB,,, | Performed by: INTERNAL MEDICINE

## 2024-10-30 PROCEDURE — 4010F ACE/ARB THERAPY RXD/TAKEN: CPT | Mod: CPTII,S$GLB,, | Performed by: INTERNAL MEDICINE

## 2024-10-30 PROCEDURE — 3074F SYST BP LT 130 MM HG: CPT | Mod: CPTII,S$GLB,, | Performed by: INTERNAL MEDICINE

## 2024-10-30 PROCEDURE — 1159F MED LIST DOCD IN RCRD: CPT | Mod: CPTII,S$GLB,, | Performed by: INTERNAL MEDICINE

## 2024-10-30 RX ORDER — VARENICLINE TARTRATE 0.5 (11)-1
KIT ORAL
Qty: 53 EACH | Refills: 0 | Status: SHIPPED | OUTPATIENT
Start: 2024-10-30

## 2024-10-30 RX ORDER — GABAPENTIN 100 MG/1
100 CAPSULE ORAL 3 TIMES DAILY
Qty: 90 CAPSULE | Refills: 11 | Status: SHIPPED | OUTPATIENT
Start: 2024-10-30 | End: 2025-10-30

## 2024-10-30 RX ORDER — METHOCARBAMOL 500 MG/1
500 TABLET, FILM COATED ORAL NIGHTLY PRN
Qty: 10 TABLET | Refills: 0 | Status: SHIPPED | OUTPATIENT
Start: 2024-10-30 | End: 2024-11-09

## 2024-11-13 ENCOUNTER — CLINICAL SUPPORT (OUTPATIENT)
Dept: REHABILITATION | Facility: HOSPITAL | Age: 64
End: 2024-11-13
Attending: NURSE PRACTITIONER
Payer: COMMERCIAL

## 2024-11-13 DIAGNOSIS — R07.81 RIB PAIN ON LEFT SIDE: ICD-10-CM

## 2024-11-13 DIAGNOSIS — M54.50 CHRONIC BILATERAL LOW BACK PAIN WITHOUT SCIATICA: ICD-10-CM

## 2024-11-13 DIAGNOSIS — G89.29 CHRONIC BILATERAL LOW BACK PAIN WITHOUT SCIATICA: ICD-10-CM

## 2024-11-13 PROCEDURE — 97161 PT EVAL LOW COMPLEX 20 MIN: CPT | Mod: PN

## 2024-11-13 PROCEDURE — 20560 NDL INSJ W/O NJX 1 OR 2 MUSC: CPT | Mod: PN,CG

## 2024-11-13 PROCEDURE — 97112 NEUROMUSCULAR REEDUCATION: CPT | Mod: PN

## 2024-11-13 NOTE — PLAN OF CARE
[facility]   Physical Therapy Initial Evaluation     Date: 11/13/2024   Name: North Najera  Clinic Number: [id number]    Therapy Diagnosis:   Encounter Diagnoses   Name Primary?    Chronic bilateral low back pain without sciatica     Rib pain on left side      Physician: Irma Biswas NP    Physician Orders: PT Eval and Treat   Medical Diagnosis from Referral:   Diagnosis   M54.50,G89.29 (ICD-10-CM) - Chronic bilateral low back pain without sciatica   R07.81 (ICD-10-CM) - Rib pain on left side     Evaluation Date: 11/13/2024  Authorization Period Expiration: 9/25/2025  Plan of Care Expiration: 12/25/2024  Progress Note Due: visit 4  Visit # / Visits authorized: 1/ 1   FOTO: 1/3    Precautions: Standard     Time In: 1415  Time Out: 1455  Total Appointment Time (timed & untimed codes): 40 minutes      SUBJECTIVE     Date of onset: 9-14-24 fell off table while transferring after heart procedure    History of current condition - North reports: increased neck, ribs, and lower back pain after rolling off of table after heart procedure. He is having increased tightness and muscle guarding throughout his spine since the incident. He is taking medication with little relief of symptoms. He is also going to the chiropractor weekly for manipulation and modalities.     Falls: none    Imaging, CT scan films:    FINDINGS:  No fracture or malalignment.  There are moderate upper to mid lumbar degenerative disc changes.  Moderate sigmoid diverticulosis is present.     Impression:     No acute lumbar spine injury.    Prior Therapy: chiropractor  Social History:  lives alone  Occupation: Retired  Prior Level of Function: Independent without diffiuclty  Current Level of Function: Indepnednet with difficulty and pain    Pain:  Current 7/10, worst 8/10, best 7/10   Location: left back    Description: Sharp  Aggravating Factors: Standing and Walking  Easing Factors: heating pad and TENS  unit      Patients goals: Patient to return to previous level of function without pain.     Medical History:   Past Medical History:   Diagnosis Date    Acute combined systolic and diastolic congestive heart failure 6/15/2023    APS (antiphospholipid syndrome)     Cancer     skin cancer- R. hand    Coronary artery disease involving native coronary artery of native heart without angina pectoris 6/9/2023    Elevated homocysteine     Screening for colon cancer 5/4/2017       Surgical History:   North Najera  has a past surgical history that includes Cholecystectomy (1987); Tonsillectomy; Colonoscopy w/ biopsies and polypectomy (2011); Appendectomy (1976); Colonoscopy (N/A, 5/4/2017); Skin surgery; Ablation (N/A, 12/21/2023); closure device (12/21/2023); Left heart catheterization (Left, 7/30/2024); ivus, coronary (8/12/2024); instantaneous wave-free ratio (ifr) (N/A, 8/12/2024); Percutaneous transluminal balloon angioplasty of coronary artery (8/12/2024); Coronary stent placement (N/A, 8/12/2024); and stent, drug eluting, single vessel, coronary (Left, 9/12/2024).    Medications:   North has a current medication list which includes the following prescription(s): amiodarone, aspirin, atorvastatin, clopidogrel, clotrimazole-betamethasone 1-0.05%, empagliflozin, gabapentin, lancets, lancets, lisinopril, methocarbamol, cerovite senior, mv-min/folic/k1/lycopen/lutein, nicotine, nitroglycerin, rivaroxaban, sitagliptin phosphate, tamsulosin, and varenicline, and the following Facility-Administered Medications: 0.9% nacl, ceFAZolin 2 g in dextrose 5 % in water (D5W) 50 mL IVPB (MB+), fentanyl, heparin (porcine), midazolam, and sodium chloride 0.9%.    Allergies:   Review of patient's allergies indicates:   Allergen Reactions    Codeine Diarrhea     Patient can not tolerate Tylenol #3, states he does okay with Codeine with cough medications     Nickel sutures [surgical stainless steel] Rash        OBJECTIVE     MOVEMENT  EXAMINATION   Flexion 75% +   Extension 50% +   Right Rotation 75% +   Left Rotation 50% +     LOWER EXTREMITY STRENGTH    LEFT RIGHT   Knee Extension 4/5 4-/5   Knee Flexion 4/5 4-/5     Hip Flexion 4/5 4-/5     FLEXIBILITY   Hamstring  Moderate restrictions   Prone Quadriceps Severe restrictions   Hip Extension Severe restrictions   Hip Internal Rotation Moderate restrictions   Hip External Rotation Moderate restrictions   Piriformis Moderate restrictions   Dorsiflexion Moderate restrictions       DTR's:   Left Right   Patella Tendon 2+ 2+   Achilles Tendon 2+ 2+     DERMATOMES: Sensation: Light Touch: Intact  MYOTOMES: WNL    SPECIAL TESTS    LEFT RIGHT   Hip Scour (hip involvement) - -   Straight Leg Raise (passive) Aggravates symptoms Aggravates symptoms   Active Straight Leg Raise  - -   Ender - -   Obers - -   Prone Instability - -   CHRISTOPHER - -   Slump - -     PALPATION:  Patient reports primary pain region along lumbar paraspinals, B UT     PASSIVE ACCESSORY SEGMENTAL TESTING:  CPA:  Concordant symptoms hypomobile  UPA: Concordant symptoms hypomobile    GAIT: North ambulates with no assistive device with independently.     GAIT DEVIATIONS: North displays steady gait    Limitation/Restriction for FOTO Lumbar Survey    Therapist reviewed FOTO scores for North Najera on 11/13/2024.   FOTO documents entered into [employer] - see Media section.    Limitation Score: 43%       TREATMENT     Total Treatment time (time-based codes) separate from Evaluation: 25 minutes      North received the treatments listed below:      therapeutic exercises to develop core stabilization for 5 minutes including:  -1 x 10 LTR    manual therapy techniques: Soft tissue Mobilization were applied to the: lumbar spine for 10 minutes, including:  -See EMR under MEDIA for written consent provided.    Palpation Assessment to determine the necessity for Functional Dry Needling  .     North received the following manual therapy techniques: 2- 40  mm B UT, 3-75 mm right QL, B L4 PVM     neuromuscular re-education activities to improve:  for  minutes. The following activities were included:      therapeutic activities to improve functional performance for   minutes, including:      gait training to improve functional mobility and safety for   minutes, including:      direct contact modalities after being cleared for contraindications:     supervised modalities after being cleared for contradictions:     hot pack for 8 minutes to cervical and lumbar spine.    cold pack for  minutes to .    PATIENT EDUCATION AND HOME EXERCISES     Education provided:   - Aftercare following FDN  - Pt/family was provided educational information, including: role of PT, goals for PT, scheduling - pt verbalized understanding. Discussed insurance limitations with pt.     Written Home Exercises Provided: yes. Exercises were reviewed and North was able to demonstrate them prior to the end of the session.  North demonstrated good  understanding of the education provided. See EMR under Patient Instructions for exercises provided during therapy sessions.    ASSESSMENT     North is a 64 y.o. male referred to outpatient Physical Therapy with a medical diagnosis of   Diagnosis   M54.50,G89.29 (ICD-10-CM) - Chronic bilateral low back pain without sciatica   R07.81 (ICD-10-CM) - Rib pain on left side   . Patient presents with decreased lumbar ROM, increased muscle guarding, decreased B hip flexibility and increased trigger points throughout spine. Patient would benefit from skilled physical therapy to address the above mentioned deficits.    Medical necessity is demonstrated by the following IMPAIRMENTS/PROBLEMS:  -Decreased function (MARIO)   -Increased pain (NPS)   -Decreased pain free lumbar AROM   -Impaired standing posture  -Decreased LE strength (MMT)   -Decreased LE mobility   -Decreased core stability (plank)  -Decreased participation in regular exercise routine  - (+) TTP:      Intervention strategies designed to address these impairments will be instrumental in achieving the stated functional goals, including her ability to safely perform mobility tasks. Based on the examination, the patient's rehabilitation potential to achieve functional goals is good.    Patient prognosis is Good.   Patient will benefit from skilled outpatient Physical Therapy to address the deficits stated above and in the chart below, provide patient /family education, and to maximize patientt's level of independence.     Plan of care discussed with patient: Yes  Patient's spiritual, cultural and educational needs considered and patient is agreeable to the plan of care and goals as stated below:     Anticipated Barriers for therapy: none    Medical Necessity is demonstrated by the following  History  Co-morbidities and personal factors that may impact the plan of care Co-morbidities:   Heart disease, tobacco use    Personal Factors:   no deficits     low   Examination  Body Structures and Functions, activity limitations and participation restrictions that may impact the plan of care Body Regions:   back    Body Systems:    gross symmetry  ROM  strength         low   Clinical Presentation stable and uncomplicated low   Decision Making/ Complexity Score: low     Goals:  Short Term Goals: 2 weeks   1. Patient demonstrates independence with HEP.   2. Patient demonstrates independence with Postural Awareness.   3. Patient demonstrates independence with body mechanics.       Long Term Goals: 4 weeks   1. Patient demonstrates increased flexibility in B hips to minimal restrictions to improve tolerance to functional activities.   2. Patient demonstrates decreased trigger points throughout spine to minimal to nil.  3. Patient demonstrates improved overall function per Oswestry to 44% or less.       PLAN   Plan of care Certification: 11/13/2024 to 12/26/2024.    Outpatient Physical Therapy 2 times weekly for 4 weeks to  include the following interventions: Electrical Stimulation  , Manual Therapy, Moist Heat/ Ice, Neuromuscular Re-ed, Patient Education, Therapeutic Activities, and Therapeutic Exercise.     Geneva Alexandra, PT    Pt may be seen by PTA as part of the rehabilitation team.     Therapist: Geneva Alexandra, PT  11/13/2024    I CERTIFY THE NEED FOR THESE SERVICES FURNISHED UNDER THIS PLAN OF TREATMENT AND WHILE UNDER MY CARE   Physician's comments:     Physician's Signature: ___________________________________________________

## 2024-11-13 NOTE — TELEPHONE ENCOUNTER
Care Due:                  Date            Visit Type   Department     Provider  --------------------------------------------------------------------------------                                EP -                              PRIMARY      [facility]  Last Visit: 10-      CARE ([facility])   MEDICINE II    Tsesa Oconnor                               -                              PRIMARY      [facility]  Next Visit: 01-      CARE ([facility])   MEDICINE II    Tessa Oconnor                                                            Last  Test          Frequency    Reason                     Performed    Due Date  --------------------------------------------------------------------------------    HBA1C.......  6 months...  SITagliptin,               07- 01-                             empagliflozin............    [facility] Embedded Care Due Messages. Reference number: [id number].   11/13/2024 1:55:07 PM CST

## 2024-11-13 NOTE — TELEPHONE ENCOUNTER
Refill Routing Note   Medication(s) are not appropriate for processing by [facility] for the following reason(s):        Drug-disease interaction    [facility] action(s):  Defer        Medication Therapy Plan: empagliflozin and Post-op pain      Appointments  past 12m or future 3m with PCP    Date Provider   Last Visit   10/30/2024 Tessa Oconnor MD   Next Visit   1/31/2025 Tessa Oconnor MD   ED visits in past 90 days: 1        Note composed:1:55 PM 11/13/2024

## 2024-11-13 NOTE — TELEPHONE ENCOUNTER
Refill Decision Note   North Reinaldo  is requesting a refill authorization.  Brief Assessment and Rationale for Refill:  Approve     Medication Therapy Plan:  FLOS 1/24/25      Comments:     Note composed:2:31 PM 11/13/2024

## 2024-11-20 ENCOUNTER — CLINICAL SUPPORT (OUTPATIENT)
Dept: REHABILITATION | Facility: HOSPITAL | Age: 64
End: 2024-11-20
Payer: COMMERCIAL

## 2024-11-20 DIAGNOSIS — G89.29 CHRONIC BILATERAL LOW BACK PAIN WITHOUT SCIATICA: Primary | ICD-10-CM

## 2024-11-20 DIAGNOSIS — M54.50 CHRONIC BILATERAL LOW BACK PAIN WITHOUT SCIATICA: Primary | ICD-10-CM

## 2024-11-20 DIAGNOSIS — R07.81 RIB PAIN ON LEFT SIDE: ICD-10-CM

## 2024-11-20 PROCEDURE — 97110 THERAPEUTIC EXERCISES: CPT | Mod: PN

## 2024-11-20 PROCEDURE — 97112 NEUROMUSCULAR REEDUCATION: CPT | Mod: PN

## 2024-11-20 PROCEDURE — 97032 APPL MODALITY 1+ESTIM EA 15: CPT | Mod: PN

## 2024-11-20 PROCEDURE — 20561 NDL INSJ W/O NJX 3+ MUSC: CPT | Mod: PN,CG

## 2024-11-20 NOTE — PROGRESS NOTES
[facility]   Physical Therapy Treatment Note      Name: North Najera  Clinic Number: [id number]    Therapy Diagnosis: No diagnosis found.  Physician: Irma Biswas NP    Visit Date: 11/20/2024    Physician Orders: PT Eval and Treat   Medical Diagnosis from Referral:   Diagnosis   M54.50,G89.29 (ICD-10-CM) - Chronic bilateral low back pain without sciatica   R07.81 (ICD-10-CM) - Rib pain on left side      Evaluation Date: 11/13/2024  Authorization Period Expiration: 9/25/2025  Plan of Care Expiration: 12/25/2024  Progress Note Due: visit 4  Visit # / Visits authorized: 2/6   FOTO: 1/3     Precautions: Standard      Time In: 1020  Time Out: 1100  Total Appointment Time (timed & untimed codes): 40 minutes    PTA Visit #: -/5       Subjective     Patient reports: he continues to have soreness throughout his back and left rib area.  He was compliant with home exercise program.  Response to previous treatment: positive  Functional change: minimal    Pain: 6/10  Location: bilateral back      Objective      Objective Measures updated at progress report unless specified.     Treatment     North received the treatments listed below:      therapeutic exercises to develop strength and ROM for 10 minutes including:  -2 x 10 BKTC with SB  -2 x 10 lower trunk rotations with SB    manual therapy techniques: Soft tissue Mobilization were applied to the: lumbar spine for 10 minutes, including:  -See EMR under MEDIA for written consent provided.     Palpation Assessment to determine the necessity for Functional Dry Needling  .      North received the following manual therapy techniques: 2- 40 mm B UT, 3-75 mm right QL, B L4 PVM     neuromuscular re-education activities to improve:  for  minutes. The following activities were included:      therapeutic activities to improve functional performance for   minutes, including:      gait training to improve functional mobility and safety for   minutes,  including:      direct contact modalities after being cleared for contraindications:     supervised modalities after being cleared for contradictions:     hot pack for 10 minutes to lumbar spine.    cold pack for  minutes to .    Patient Education and Home Exercises       Education provided:   - HEP    Written Home Exercises Provided: yes. Exercises were reviewed and North was able to demonstrate them prior to the end of the session.  North demonstrated fair  understanding of the education provided. See Electronic Medical Record under Patient Instructions for exercises provided during therapy sessions    Assessment     Patient with increased TTP and hypersensitivity throughout spine. Patient tolerated FDN well with no increased reports of pain.    North Is progressing well towards his goals.   Patient prognosis is Good.     Patient will continue to benefit from skilled outpatient physical therapy to address the deficits listed in the problem list box on initial evaluation, provide pt/family education and to maximize pt's level of independence in the home and community environment.     Patient's spiritual, cultural and educational needs considered and pt agreeable to plan of care and goals.     Anticipated barriers to physical therapy: none    Goals: Short Term Goals: 2 weeks   1. Patient demonstrates independence with HEP.   2. Patient demonstrates independence with Postural Awareness.   3. Patient demonstrates independence with body mechanics.         Long Term Goals: 4 weeks   1. Patient demonstrates increased flexibility in B hips to minimal restrictions to improve tolerance to functional activities.   2. Patient demonstrates decreased trigger points throughout spine to minimal to nil.  3. Patient demonstrates improved overall function per Oswestry to 44% or less.         PLAN   Plan of care Certification: 11/13/2024 to 12/26/2024.     Outpatient Physical Therapy 2 times weekly for 4 weeks to include the  following interventions: Electrical Stimulation  , Manual Therapy, Moist Heat/ Ice, Neuromuscular Re-ed, Patient Education, Therapeutic Activities, and Therapeutic Exercise.         Geneva Alexandra, PT

## 2024-11-22 ENCOUNTER — CLINICAL SUPPORT (OUTPATIENT)
Dept: REHABILITATION | Facility: HOSPITAL | Age: 64
End: 2024-11-22
Payer: COMMERCIAL

## 2024-11-22 DIAGNOSIS — G89.29 CHRONIC BILATERAL LOW BACK PAIN WITHOUT SCIATICA: Primary | ICD-10-CM

## 2024-11-22 DIAGNOSIS — R07.81 RIB PAIN ON LEFT SIDE: ICD-10-CM

## 2024-11-22 DIAGNOSIS — M54.50 CHRONIC BILATERAL LOW BACK PAIN WITHOUT SCIATICA: Primary | ICD-10-CM

## 2024-11-22 PROCEDURE — 20560 NDL INSJ W/O NJX 1 OR 2 MUSC: CPT | Mod: PN,CG

## 2024-11-22 PROCEDURE — 97110 THERAPEUTIC EXERCISES: CPT | Mod: PN

## 2024-11-22 PROCEDURE — 97032 APPL MODALITY 1+ESTIM EA 15: CPT | Mod: PN
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
## 2024-12-02 ENCOUNTER — TELEPHONE (OUTPATIENT)
Dept: INTERNAL MEDICINE | Facility: CLINIC | Age: 64
End: 2024-12-02
Payer: COMMERCIAL

## 2024-12-02 ENCOUNTER — CLINICAL SUPPORT (OUTPATIENT)
Dept: REHABILITATION | Facility: HOSPITAL | Age: 64
End: 2024-12-02
Payer: COMMERCIAL

## 2024-12-02 DIAGNOSIS — M54.50 CHRONIC BILATERAL LOW BACK PAIN WITHOUT SCIATICA: Primary | ICD-10-CM

## 2024-12-02 DIAGNOSIS — R07.81 RIB PAIN ON LEFT SIDE: ICD-10-CM

## 2024-12-02 DIAGNOSIS — G89.29 CHRONIC BILATERAL LOW BACK PAIN WITHOUT SCIATICA: Primary | ICD-10-CM

## 2024-12-02 PROCEDURE — 97032 APPL MODALITY 1+ESTIM EA 15: CPT | Mod: PN

## 2024-12-02 PROCEDURE — 20560 NDL INSJ W/O NJX 1 OR 2 MUSC: CPT | Mod: PN,CG

## 2024-12-02 PROCEDURE — 97110 THERAPEUTIC EXERCISES: CPT | Mod: PN

## 2024-12-02 NOTE — PROGRESS NOTES
[facility]   Physical Therapy Treatment Note      Name: North Najera  Clinic Number: [id number]    Therapy Diagnosis: No diagnosis found.  Physician: Irma Biswas NP    Visit Date: 12/2/2024    Physician Orders: PT Eval and Treat   Medical Diagnosis from Referral:   Diagnosis   M54.50,G89.29 (ICD-10-CM) - Chronic bilateral low back pain without sciatica   R07.81 (ICD-10-CM) - Rib pain on left side      Evaluation Date: 11/13/2024  Authorization Period Expiration: 9/25/2025  Plan of Care Expiration: 12/25/2024  Progress Note Due: visit 4  Visit # / Visits authorized: 3/6   FOTO: 1/3     Precautions: Standard      Time In: 1345  Time Out: 1425  Total Appointment Time (timed & untimed codes): 40 minutes    PTA Visit #: -/5       Subjective     Patient reports: he saw his chiropractor this morning. His symptoms are still throughout his entire back mostly in upper back at this point.  He was compliant with home exercise program.  Response to previous treatment: positive  Functional change: minimal    Pain: 6/10  Location: bilateral back      Objective      Objective Measures updated at progress report unless specified.     Treatment     North received the treatments listed below:      therapeutic exercises to develop strength and ROM for 10 minutes including:    Not performed:  -seated trunk flexion with SB 1 x 10  -2 x 10 BKTC with SB  -2 x 10 lower trunk rotations with SB    manual therapy techniques: Soft tissue Mobilization were applied to the: lumbar spine for 10 minutes, including:  -See EMR under MEDIA for written consent provided.     Palpation Assessment to determine the necessity for Functional Dry Needling  .      North received the following manual therapy techniques: 2- 40 mm B UT, 3-75 mm right QL, B L4 PVM     neuromuscular re-education activities to improve:  for  minutes. The following activities were included:      therapeutic activities to improve functional  performance for   minutes, including:      gait training to improve functional mobility and safety for   minutes, including:      direct contact modalities after being cleared for contraindications:     supervised modalities after being cleared for contradictions: IFC to B upper traps with  moist heat for pain relief.     hot pack for 10 minutes to lumbar spine.    cold pack for  minutes to .    Patient Education and Home Exercises       Education provided:   - HEP    Written Home Exercises Provided: yes. Exercises were reviewed and North was able to demonstrate them prior to the end of the session.  North demonstrated fair  understanding of the education provided. See Electronic Medical Record under Patient Instructions for exercises provided during therapy sessions    Assessment     Patient with increased TTP and hypersensitivity throughout spine. Patient tolerated FDN well with no increased reports of pain. Spoke to patient about following up with primary care if neck pain persists.    North Is progressing well towards his goals.   Patient prognosis is Good.     Patient will continue to benefit from skilled outpatient physical therapy to address the deficits listed in the problem list box on initial evaluation, provide pt/family education and to maximize pt's level of independence in the home and community environment.     Patient's spiritual, cultural and educational needs considered and pt agreeable to plan of care and goals.     Anticipated barriers to physical therapy: none    Goals: Short Term Goals: 2 weeks   1. Patient demonstrates independence with HEP.   2. Patient demonstrates independence with Postural Awareness.   3. Patient demonstrates independence with body mechanics.         Long Term Goals: 4 weeks   1. Patient demonstrates increased flexibility in B hips to minimal restrictions to improve tolerance to functional activities.   2. Patient demonstrates decreased trigger points throughout spine to  minimal to nil.  3. Patient demonstrates improved overall function per Oswestry to 44% or less.         PLAN   Plan of care Certification: 11/13/2024 to 12/26/2024.     Outpatient Physical Therapy 2 times weekly for 4 weeks to include the following interventions: Electrical Stimulation  , Manual Therapy, Moist Heat/ Ice, Neuromuscular Re-ed, Patient Education, Therapeutic Activities, and Therapeutic Exercise.         Geneva Alexandra, PT
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
## 2024-12-02 NOTE — TELEPHONE ENCOUNTER
Called patient advised him that Irma was the ordering provider for the PT not . Pt stated he was confused and will reach out to Irma's office about referral.

## 2024-12-05 ENCOUNTER — OFFICE VISIT (OUTPATIENT)
Dept: INTERNAL MEDICINE | Facility: CLINIC | Age: 64
End: 2024-12-05
Payer: COMMERCIAL

## 2024-12-05 VITALS
HEART RATE: 70 BPM | WEIGHT: 263.88 LBS | HEIGHT: 74 IN | DIASTOLIC BLOOD PRESSURE: 72 MMHG | OXYGEN SATURATION: 99 % | RESPIRATION RATE: 20 BRPM | SYSTOLIC BLOOD PRESSURE: 116 MMHG | BODY MASS INDEX: 33.86 KG/M2

## 2024-12-05 DIAGNOSIS — G89.29 CHRONIC LEFT-SIDED LOW BACK PAIN WITHOUT SCIATICA: ICD-10-CM
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
## 2024-12-05 DIAGNOSIS — M54.50 CHRONIC LEFT-SIDED LOW BACK PAIN WITHOUT SCIATICA: ICD-10-CM

## 2024-12-05 DIAGNOSIS — M54.2 NECK PAIN: Primary | ICD-10-CM

## 2024-12-05 PROCEDURE — 4010F ACE/ARB THERAPY RXD/TAKEN: CPT | Mod: CPTII,S$GLB,, | Performed by: NURSE PRACTITIONER

## 2024-12-05 PROCEDURE — 99214 OFFICE O/P EST MOD 30 MIN: CPT | Mod: S$GLB,,, | Performed by: NURSE PRACTITIONER

## 2024-12-05 PROCEDURE — 99999 PR PBB SHADOW E&M-EST. PATIENT-LVL V: CPT | Mod: PBBFAC,,, | Performed by: NURSE PRACTITIONER

## 2024-12-05 PROCEDURE — 2023F DILAT RTA XM W/O RTNOPTHY: CPT | Mod: CPTII,S$GLB,, | Performed by: NURSE PRACTITIONER

## 2024-12-05 PROCEDURE — 1159F MED LIST DOCD IN RCRD: CPT | Mod: CPTII,S$GLB,, | Performed by: NURSE PRACTITIONER

## 2024-12-05 PROCEDURE — 3044F HG A1C LEVEL LT 7.0%: CPT | Mod: CPTII,S$GLB,, | Performed by: NURSE PRACTITIONER

## 2024-12-05 PROCEDURE — 3060F POS MICROALBUMINURIA REV: CPT | Mod: CPTII,S$GLB,, | Performed by: NURSE PRACTITIONER

## 2024-12-05 PROCEDURE — 3078F DIAST BP <80 MM HG: CPT | Mod: CPTII,S$GLB,, | Performed by: NURSE PRACTITIONER

## 2024-12-05 PROCEDURE — 3074F SYST BP LT 130 MM HG: CPT | Mod: CPTII,S$GLB,, | Performed by: NURSE PRACTITIONER

## 2024-12-05 PROCEDURE — 3008F BODY MASS INDEX DOCD: CPT | Mod: CPTII,S$GLB,, | Performed by: NURSE PRACTITIONER

## 2024-12-05 PROCEDURE — 3066F NEPHROPATHY DOC TX: CPT | Mod: CPTII,S$GLB,, | Performed by: NURSE PRACTITIONER
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
## 2024-12-09 ENCOUNTER — CLINICAL SUPPORT (OUTPATIENT)
Dept: REHABILITATION | Facility: HOSPITAL | Age: 64
End: 2024-12-09
Payer: COMMERCIAL

## 2024-12-09 DIAGNOSIS — M54.2 NECK PAIN: ICD-10-CM

## 2024-12-09 DIAGNOSIS — G89.29 CHRONIC LEFT-SIDED LOW BACK PAIN WITHOUT SCIATICA: ICD-10-CM

## 2024-12-09 DIAGNOSIS — M54.50 CHRONIC LEFT-SIDED LOW BACK PAIN WITHOUT SCIATICA: ICD-10-CM

## 2024-12-09 PROCEDURE — 97032 APPL MODALITY 1+ESTIM EA 15: CPT | Mod: PN

## 2024-12-09 PROCEDURE — 97112 NEUROMUSCULAR REEDUCATION: CPT | Mod: PN

## 2024-12-09 PROCEDURE — 97110 THERAPEUTIC EXERCISES: CPT | Mod: PN

## 2024-12-09 PROCEDURE — 20560 NDL INSJ W/O NJX 1 OR 2 MUSC: CPT | Mod: PN,CG

## 2024-12-09 NOTE — PROGRESS NOTES
[facility]   Physical Therapy Treatment Note      Name: North Najera  Clinic Number: [id number]    Therapy Diagnosis:   Encounter Diagnoses   Name Primary?    Neck pain     Chronic left-sided low back pain without sciatica      Physician: Irma Biswas NP    Visit Date: 12/9/2024    Physician Orders: PT Eval and Treat   Medical Diagnosis from Referral:   Diagnosis   M54.50,G89.29 (ICD-10-CM) - Chronic bilateral low back pain without sciatica   R07.81 (ICD-10-CM) - Rib pain on left side      Evaluation Date: 11/13/2024  Authorization Period Expiration: 9/25/2025  Plan of Care Expiration: 12/25/2024  Progress Note Due: visit 4  Visit # / Visits authorized: 3/6   FOTO: 1/3     Precautions: Standard      Time In: 1345  Time Out: 1425  Total Appointment Time (timed & untimed codes): 40 minutes    PTA Visit #: -/5       Subjective     Patient reports: he saw his chiropractor this morning. His symptoms are still throughout his entire back mostly in upper back at this point.  He was compliant with home exercise program.  Response to previous treatment: positive  Functional change: minimal    Pain: 6/10  Location: bilateral back      Objective      Objective Measures updated at progress report unless specified.     Treatment     North received the treatments listed below:      therapeutic exercises to develop strength and ROM for 10 minutes including:    Not performed:  -seated trunk flexion with SB 1 x 10  -2 x 10 BKTC with SB  -2 x 10 lower trunk rotations with SB    manual therapy techniques: Soft tissue Mobilization were applied to the: lumbar spine for 10 minutes, including:  -See EMR under MEDIA for written consent provided.     Palpation Assessment to determine the necessity for Functional Dry Needling  .      North received the following manual therapy techniques: 2- 40 mm B UT, 3-75 mm right QL, B L4 PVM     neuromuscular re-education activities to improve:  for  minutes. The  following activities were included:      therapeutic activities to improve functional performance for   minutes, including:      gait training to improve functional mobility and safety for   minutes, including:      direct contact modalities after being cleared for contraindications:     supervised modalities after being cleared for contradictions: IFC to B upper traps with  moist heat for pain relief.     hot pack for 10 minutes to lumbar spine.    cold pack for  minutes to .    Patient Education and Home Exercises       Education provided:   - HEP    Written Home Exercises Provided: yes. Exercises were reviewed and North was able to demonstrate them prior to the end of the session.  North demonstrated fair  understanding of the education provided. See Electronic Medical Record under Patient Instructions for exercises provided during therapy sessions    Assessment     Patient with increased TTP and hypersensitivity throughout spine. Patient tolerated FDN well with no increased reports of pain.   North Is progressing well towards his goals.   Patient prognosis is Good.     Patient will continue to benefit from skilled outpatient physical therapy to address the deficits listed in the problem list box on initial evaluation, provide pt/family education and to maximize pt's level of independence in the home and community environment.     Patient's spiritual, cultural and educational needs considered and pt agreeable to plan of care and goals.     Anticipated barriers to physical therapy: none    Goals: Short Term Goals: 2 weeks   1. Patient demonstrates independence with HEP.   2. Patient demonstrates independence with Postural Awareness.   3. Patient demonstrates independence with body mechanics.         Long Term Goals: 4 weeks   1. Patient demonstrates increased flexibility in B hips to minimal restrictions to improve tolerance to functional activities.   2. Patient demonstrates decreased trigger points throughout  spine to minimal to nil.  3. Patient demonstrates improved overall function per Oswestry to 44% or less.         PLAN   Plan of care Certification: 11/13/2024 to 12/26/2024.     Outpatient Physical Therapy 2 times weekly for 4 weeks to include the following interventions: Electrical Stimulation  , Manual Therapy, Moist Heat/ Ice, Neuromuscular Re-ed, Patient Education, Therapeutic Activities, and Therapeutic Exercise.     Geneva Alexandra, PT

## 2024-12-12 DIAGNOSIS — E78.5 HYPERLIPIDEMIA LDL GOAL <70: ICD-10-CM

## 2024-12-12 RX ORDER — ATORVASTATIN CALCIUM 20 MG/1
20 TABLET, FILM COATED ORAL DAILY
Qty: 90 TABLET | Refills: 2 | Status: SHIPPED | OUTPATIENT
Start: 2024-12-12

## 2024-12-13 NOTE — TELEPHONE ENCOUNTER
No care due was identified.  [facility] Embedded Care Due Messages. Reference number: [id number].   12/12/2024 10:12:43 PM CST

## 2024-12-13 NOTE — TELEPHONE ENCOUNTER
Refill Decision Note   Northhugo Rushlateshaolivia  is requesting a refill authorization.  Brief Assessment and Rationale for Refill:  Approve     Medication Therapy Plan:         Comments:     Note composed:11:50 PM 12/12/2024

## 2025-01-09 DIAGNOSIS — E11.65 UNCONTROLLED TYPE 2 DIABETES MELLITUS WITH HYPERGLYCEMIA: ICD-10-CM

## 2025-01-09 RX ORDER — LISINOPRIL 5 MG/1
5 TABLET ORAL DAILY
Qty: 90 TABLET | Refills: 2 | Status: SHIPPED | OUTPATIENT
Start: 2025-01-09

## 2025-01-09 RX ORDER — AMIODARONE HYDROCHLORIDE 200 MG/1
200 TABLET ORAL DAILY
Qty: 90 TABLET | Refills: 3 | Status: SHIPPED | OUTPATIENT
Start: 2025-01-09

## 2025-01-09 RX ORDER — TAMSULOSIN HYDROCHLORIDE 0.4 MG/1
0.4 CAPSULE ORAL DAILY
Qty: 90 CAPSULE | Refills: 3 | Status: SHIPPED | OUTPATIENT
Start: 2025-01-09

## 2025-01-09 NOTE — TELEPHONE ENCOUNTER
No care due was identified.  [facility] Embedded Care Due Messages. Reference number: [id number].   1/09/2025 1:03:02 PM CST

## 2025-01-09 NOTE — TELEPHONE ENCOUNTER
Refill Decision Note   North Reinaldo  is requesting a refill authorization.  Brief Assessment and Rationale for Refill:  Approve     Medication Therapy Plan:        Comments:     Note composed:2:07 PM 01/09/2025

## 2025-01-10 RX ORDER — MULTIVIT-MIN/FA/LYCOPEN/LUTEIN .4-300-25
TABLET ORAL
Qty: 28 TABLET | Refills: 12 | OUTPATIENT
Start: 2025-01-10

## 2025-01-10 NOTE — TELEPHONE ENCOUNTER
Refill Decision Note   North Reinaldo  is requesting a refill authorization.  Brief Assessment and Rationale for Refill:  Approve     Medication Therapy Plan:         Comments:     Note composed:8:51 PM 01/09/2025

## 2025-01-30 ENCOUNTER — LAB VISIT (OUTPATIENT)
Dept: LAB | Facility: HOSPITAL | Age: 65
End: 2025-01-30
Attending: INTERNAL MEDICINE
Payer: COMMERCIAL

## 2025-01-30 DIAGNOSIS — E66.09 CLASS 1 OBESITY DUE TO EXCESS CALORIES WITH SERIOUS COMORBIDITY AND BODY MASS INDEX (BMI) OF 34.0 TO 34.9 IN ADULT: ICD-10-CM

## 2025-01-30 DIAGNOSIS — I49.3 PVC'S (PREMATURE VENTRICULAR CONTRACTIONS): ICD-10-CM

## 2025-01-30 DIAGNOSIS — I10 PRIMARY HYPERTENSION: ICD-10-CM

## 2025-01-30 DIAGNOSIS — E66.811 CLASS 1 OBESITY DUE TO EXCESS CALORIES WITH SERIOUS COMORBIDITY AND BODY MASS INDEX (BMI) OF 34.0 TO 34.9 IN ADULT: ICD-10-CM

## 2025-01-30 DIAGNOSIS — I50.42 CHRONIC COMBINED SYSTOLIC AND DIASTOLIC CONGESTIVE HEART FAILURE: ICD-10-CM

## 2025-01-30 DIAGNOSIS — E11.9 DIABETES MELLITUS WITHOUT COMPLICATION: ICD-10-CM

## 2025-01-30 DIAGNOSIS — E78.5 HYPERLIPIDEMIA LDL GOAL <70: ICD-10-CM

## 2025-01-30 DIAGNOSIS — D68.61 ANTIPHOSPHOLIPID SYNDROME: ICD-10-CM

## 2025-01-30 LAB
ALBUMIN SERPL BCP-MCNC: 4.3 G/DL (ref 3.5–5.2)
ALP SERPL-CCNC: 57 U/L (ref 40–150)
ALT SERPL W/O P-5'-P-CCNC: 31 U/L (ref 10–44)
ANION GAP SERPL CALC-SCNC: 8 MMOL/L (ref 8–16)
AST SERPL-CCNC: 23 U/L (ref 10–40)
BASOPHILS # BLD AUTO: 0.07 K/UL (ref 0–0.2)
BASOPHILS NFR BLD: 0.6 % (ref 0–1.9)
BILIRUB SERPL-MCNC: 1.4 MG/DL (ref 0.1–1)
BUN SERPL-MCNC: 10 MG/DL (ref 8–23)
CALCIUM SERPL-MCNC: 9.3 MG/DL (ref 8.7–10.5)
CHLORIDE SERPL-SCNC: 107 MMOL/L (ref 95–110)
CHOLEST SERPL-MCNC: 125 MG/DL (ref 120–199)
CHOLEST/HDLC SERPL: 3.7 {RATIO} (ref 2–5)
CO2 SERPL-SCNC: 23 MMOL/L (ref 23–29)
CREAT SERPL-MCNC: 1 MG/DL (ref 0.5–1.4)
DIFFERENTIAL METHOD BLD: ABNORMAL
EOSINOPHIL # BLD AUTO: 0.2 K/UL (ref 0–0.5)
EOSINOPHIL NFR BLD: 1.4 % (ref 0–8)
ERYTHROCYTE [DISTWIDTH] IN BLOOD BY AUTOMATED COUNT: 13.2 % (ref 11.5–14.5)
EST. GFR  (NO RACE VARIABLE): >60 ML/MIN/1.73 M^2
ESTIMATED AVG GLUCOSE: 154 MG/DL (ref 68–131)
GLUCOSE SERPL-MCNC: 151 MG/DL (ref 70–110)
HBA1C MFR BLD: 7 % (ref 4–5.6)
HCT VFR BLD AUTO: 52.5 % (ref 40–54)
HDLC SERPL-MCNC: 34 MG/DL (ref 40–75)
HDLC SERPL: 27.2 % (ref 20–50)
HGB BLD-MCNC: 17.3 G/DL (ref 14–18)
IMM GRANULOCYTES # BLD AUTO: 0.08 K/UL (ref 0–0.04)
IMM GRANULOCYTES NFR BLD AUTO: 0.7 % (ref 0–0.5)
LDLC SERPL CALC-MCNC: 66.4 MG/DL (ref 63–159)
LYMPHOCYTES # BLD AUTO: 2.5 K/UL (ref 1–4.8)
LYMPHOCYTES NFR BLD: 23.2 % (ref 18–48)
MCH RBC QN AUTO: 32.2 PG (ref 27–31)
MCHC RBC AUTO-ENTMCNC: 33 G/DL (ref 32–36)
MCV RBC AUTO: 98 FL (ref 82–98)
MONOCYTES # BLD AUTO: 1 K/UL (ref 0.3–1)
MONOCYTES NFR BLD: 8.9 % (ref 4–15)
NEUTROPHILS # BLD AUTO: 7.1 K/UL (ref 1.8–7.7)
NEUTROPHILS NFR BLD: 65.2 % (ref 38–73)
NONHDLC SERPL-MCNC: 91 MG/DL
NRBC BLD-RTO: 0 /100 WBC
PLATELET # BLD AUTO: 214 K/UL (ref 150–450)
PMV BLD AUTO: 9.1 FL (ref 9.2–12.9)
POTASSIUM SERPL-SCNC: 4.8 MMOL/L (ref 3.5–5.1)
PROT SERPL-MCNC: 7.5 G/DL (ref 6–8.4)
RBC # BLD AUTO: 5.37 M/UL (ref 4.6–6.2)
SODIUM SERPL-SCNC: 138 MMOL/L (ref 136–145)
TRIGL SERPL-MCNC: 123 MG/DL (ref 30–150)
TSH SERPL DL<=0.005 MIU/L-ACNC: 1.78 UIU/ML (ref 0.4–4)
WBC # BLD AUTO: 10.87 K/UL (ref 3.9–12.7)

## 2025-01-30 PROCEDURE — 84443 ASSAY THYROID STIM HORMONE: CPT | Performed by: INTERNAL MEDICINE

## 2025-01-30 PROCEDURE — 80061 LIPID PANEL: CPT | Performed by: INTERNAL MEDICINE

## 2025-01-30 PROCEDURE — 80053 COMPREHEN METABOLIC PANEL: CPT | Performed by: INTERNAL MEDICINE

## 2025-01-30 PROCEDURE — 83036 HEMOGLOBIN GLYCOSYLATED A1C: CPT | Performed by: INTERNAL MEDICINE

## 2025-01-30 PROCEDURE — 36415 COLL VENOUS BLD VENIPUNCTURE: CPT | Performed by: INTERNAL MEDICINE

## 2025-01-30 PROCEDURE — 85025 COMPLETE CBC W/AUTO DIFF WBC: CPT | Performed by: INTERNAL MEDICINE

## 2025-01-31 ENCOUNTER — OFFICE VISIT (OUTPATIENT)
Dept: INTERNAL MEDICINE | Facility: CLINIC | Age: 65
End: 2025-01-31
Payer: COMMERCIAL

## 2025-01-31 VITALS
OXYGEN SATURATION: 96 % | HEART RATE: 69 BPM | BODY MASS INDEX: 33.79 KG/M2 | SYSTOLIC BLOOD PRESSURE: 108 MMHG | WEIGHT: 263.25 LBS | DIASTOLIC BLOOD PRESSURE: 68 MMHG | RESPIRATION RATE: 18 BRPM | HEIGHT: 74 IN

## 2025-01-31 DIAGNOSIS — E11.9 DIABETES MELLITUS WITHOUT COMPLICATION: Primary | ICD-10-CM

## 2025-01-31 DIAGNOSIS — E66.09 CLASS 1 OBESITY DUE TO EXCESS CALORIES WITH SERIOUS COMORBIDITY AND BODY MASS INDEX (BMI) OF 33.0 TO 33.9 IN ADULT: ICD-10-CM

## 2025-01-31 DIAGNOSIS — I25.10 CORONARY ARTERY DISEASE INVOLVING NATIVE CORONARY ARTERY OF NATIVE HEART WITHOUT ANGINA PECTORIS: ICD-10-CM

## 2025-01-31 DIAGNOSIS — E66.811 CLASS 1 OBESITY DUE TO EXCESS CALORIES WITH SERIOUS COMORBIDITY AND BODY MASS INDEX (BMI) OF 33.0 TO 33.9 IN ADULT: ICD-10-CM

## 2025-01-31 DIAGNOSIS — E78.5 HYPERLIPIDEMIA LDL GOAL <70: ICD-10-CM

## 2025-01-31 DIAGNOSIS — I50.42 CHRONIC COMBINED SYSTOLIC AND DIASTOLIC CONGESTIVE HEART FAILURE: ICD-10-CM

## 2025-01-31 DIAGNOSIS — Z12.5 PROSTATE CANCER SCREENING: ICD-10-CM

## 2025-01-31 DIAGNOSIS — I47.20 VENTRICULAR TACHYCARDIA: ICD-10-CM

## 2025-01-31 DIAGNOSIS — Z23 NEED FOR VACCINATION: ICD-10-CM

## 2025-01-31 PROCEDURE — 3078F DIAST BP <80 MM HG: CPT | Mod: CPTII,S$GLB,, | Performed by: INTERNAL MEDICINE

## 2025-01-31 PROCEDURE — 3008F BODY MASS INDEX DOCD: CPT | Mod: CPTII,S$GLB,, | Performed by: INTERNAL MEDICINE

## 2025-01-31 PROCEDURE — 3074F SYST BP LT 130 MM HG: CPT | Mod: CPTII,S$GLB,, | Performed by: INTERNAL MEDICINE

## 2025-01-31 PROCEDURE — 90677 PCV20 VACCINE IM: CPT | Mod: S$GLB,,, | Performed by: INTERNAL MEDICINE

## 2025-01-31 PROCEDURE — G2211 COMPLEX E/M VISIT ADD ON: HCPCS | Mod: S$GLB,,, | Performed by: INTERNAL MEDICINE

## 2025-01-31 PROCEDURE — 1160F RVW MEDS BY RX/DR IN RCRD: CPT | Mod: CPTII,S$GLB,, | Performed by: INTERNAL MEDICINE

## 2025-01-31 PROCEDURE — 90471 IMMUNIZATION ADMIN: CPT | Mod: S$GLB,,, | Performed by: INTERNAL MEDICINE

## 2025-01-31 PROCEDURE — 1159F MED LIST DOCD IN RCRD: CPT | Mod: CPTII,S$GLB,, | Performed by: INTERNAL MEDICINE

## 2025-01-31 PROCEDURE — 99999 PR PBB SHADOW E&M-EST. PATIENT-LVL V: CPT | Mod: PBBFAC,,, | Performed by: INTERNAL MEDICINE

## 2025-01-31 PROCEDURE — 4010F ACE/ARB THERAPY RXD/TAKEN: CPT | Mod: CPTII,S$GLB,, | Performed by: INTERNAL MEDICINE

## 2025-01-31 PROCEDURE — 99214 OFFICE O/P EST MOD 30 MIN: CPT | Mod: 25,S$GLB,, | Performed by: INTERNAL MEDICINE

## 2025-01-31 PROCEDURE — 3051F HG A1C>EQUAL 7.0%<8.0%: CPT | Mod: CPTII,S$GLB,, | Performed by: INTERNAL MEDICINE

## 2025-01-31 NOTE — PROGRESS NOTES
"Subjective:       Patient ID: North Najera is a 64 y.o. male.    Chief Complaint: Follow-up (6 mo f/u)      HPI:  Patient is known to me and presents for follow up CAD, HTN, HLD, CHF, DM type 2, antiphospholipid syndrome, VT. Labs from 13/0/25 personally reviewed, interpreted and discussed today.      A1C 7%, stable  Microalb + ratio; on ACEi  PSA 0.56, normal 7/2024  LDL 66, at goal  GFR > 60, normal     HTN: on lisinopril; off verapamil. BP lower normal today. Denies med side effects. Denies dizziness, lightheadedness, syncope. Took BP meds as usual. Denies n/v/d; states staying hydrated     HLD: on atorvastatin. LDL. At goal. Denies med side effects     CAD: following with Dr. Woodruff. NTG added 10/18/23 for recurrent chest pain to assess for improvement. PET stress was completed.  LHC done 7/2024 with 3 blockages; repeat LHC 10/2024 with PCI to 1st Mrg.  Denies chest pain today.      Systolic CHF: EF 45%. On ACEi. No diuretics. He does get SOB a/w with VT and smoking but no reports of worsening swelling.   LHC EF 35%.      VT: following with EP and started on amiodarone s/p ablation 12/2023. He was loaded on amiodarone BID and decreased to once daily dosing which he remainson today.  Verapamil stopped after developed bradycardia.   Holter 10/2024 report reviewed: "Sinus rhythm  Normal heart rate behavior  Very rare atrial ectopy  Very frequent PVC, non-sustained VT and short sustained VT: 25% PVC burden with 1265 runs of NSVT and sustained VT as long as 217 beat"        DM type 2: on januvia and Jardiance. A1C 7%. Some spikes in the evening due to is "sweet tooth".   On acei and statin.      Antiphospholipid: diagnosed with DVT years ago (around 2018). ON Xarelto for DVT prophylaxis. No recurrent clots.         Tobacco use: working with smoking cessation clinic  EtOH use: no daily etoh use  Illicit drug use: denies use     Angiogram 7/2024:  " The 2nd Diag lesion was 80% stenosed.    The Mid LAD lesion was " "60% stenosed.    The 1st Mrg lesion was 90% stenosed.    The ejection fraction was calculated to be 35%.    The pre-procedure left ventricular end diastolic pressure was 10.    The estimated blood loss was <50 mL."       Past Medical History:   Diagnosis Date    Acute combined systolic and diastolic congestive heart failure 6/15/2023    APS (antiphospholipid syndrome)     Cancer     skin cancer- R. hand    Coronary artery disease involving native coronary artery of native heart without angina pectoris 6/9/2023    Elevated homocysteine     Screening for colon cancer 5/4/2017       Family History   Problem Relation Name Age of Onset    No Known Problems Mother      No Known Problems Father      Diabetes Maternal Grandmother         Social History     Socioeconomic History    Marital status: Single   Tobacco Use    Smoking status: Every Day     Current packs/day: 1.00     Average packs/day: 1 pack/day for 36.9 years (38.6 ttl pk-yrs)     Types: Cigarettes     Start date: 3/28/1987     Last attempt to quit: 8/3/2019    Smokeless tobacco: Never    Tobacco comments:     Patient eligible to renew smoking cessation benefits on 7/10/24   Substance and Sexual Activity    Alcohol use: Yes     Comment: Occasionally 3-4 drinks per month at the most    Drug use: No    Sexual activity: Yes     Partners: Female     Comment: single-in a relationship     Social Drivers of Health     Financial Resource Strain: Medium Risk (10/11/2024)    Overall Financial Resource Strain (CARDIA)     Difficulty of Paying Living Expenses: Somewhat hard   Food Insecurity: No Food Insecurity (10/11/2024)    Hunger Vital Sign     Worried About Running Out of Food in the Last Year: Never true     Ran Out of Food in the Last Year: Never true   Transportation Needs: No Transportation Needs (7/5/2024)    Received from [facility] Transportation     Lack of Transportation (Medical): No     Lack of Transportation " (Non-Medical): No   Physical Activity: Unknown (10/11/2024)    Exercise Vital Sign     Days of Exercise per Week: 1 day   Recent Concern: Physical Activity - Insufficiently Active (10/11/2024)    Exercise Vital Sign     Days of Exercise per Week: 1 day     Minutes of Exercise per Session: 30 min   Stress: Stress Concern Present (10/11/2024)    [facility] - Occupational Stress Questionnaire     Feeling of Stress : To some extent   Housing Stability: Unknown (10/11/2024)    Housing Stability Vital Sign     Unable to Pay for Housing in the Last Year: No       Review of Systems   Constitutional:  Negative for activity change, fatigue, fever and unexpected weight change.   HENT:  Negative for congestion, ear pain, hearing loss, rhinorrhea, sore throat and tinnitus.    Eyes:  Negative for pain, redness and visual disturbance.   Respiratory:  Negative for cough, shortness of breath and wheezing.    Cardiovascular:  Negative for chest pain, palpitations and leg swelling.   Gastrointestinal:  Negative for abdominal pain, blood in stool, constipation, diarrhea, nausea and vomiting.   Genitourinary:  Negative for decreased urine volume, dysuria, frequency and urgency.   Musculoskeletal:  Negative for back pain, joint swelling and neck pain.   Skin:  Negative for color change, rash and wound.   Neurological:  Negative for dizziness, tremors, weakness, light-headedness and headaches.         Objective:      Physical Exam  Vitals reviewed.   Constitutional:       General: He is not in acute distress.     Appearance: He is well-developed.   HENT:      Head: Normocephalic and atraumatic.      Right Ear: External ear normal.      Left Ear: External ear normal.   Eyes:      General:         Right eye: No discharge.         Left eye: No discharge.      Extraocular Movements: Extraocular movements intact.      Conjunctiva/sclera: Conjunctivae normal.      Pupils: Pupils are equal, round, and reactive to  light.   Neck:      Thyroid: No thyromegaly.   Cardiovascular:      Rate and Rhythm: Normal rate and regular rhythm.      Heart sounds: No murmur heard.     No friction rub. No gallop.   Pulmonary:      Effort: Pulmonary effort is normal. No respiratory distress.      Breath sounds: Normal breath sounds. No wheezing or rales.   Abdominal:      General: Bowel sounds are normal. There is no distension.      Palpations: Abdomen is soft.      Tenderness: There is no abdominal tenderness.   Skin:     General: Skin is warm and dry.   Neurological:      Mental Status: He is alert and oriented to person, place, and time.      Cranial Nerves: No cranial nerve deficit.   Psychiatric:         Behavior: Behavior normal.         Thought Content: Thought content normal.         Assessment:       1. Diabetes mellitus without complication    2. Chronic combined systolic and diastolic congestive heart failure    3. Ventricular tachycardia    4. Need for vaccination    5. Prostate cancer screening    6. Class 1 obesity due to excess calories with serious comorbidity and body mass index (BMI) of 33.0 to 33.9 in adult    7. Coronary artery disease involving native coronary artery of native heart without angina pectoris    8. Hyperlipidemia LDL goal <70        Plan:       1. Diabetes mellitus without complication  Chronic controlled  Continue medications at same dose  ADA diet  Exercise, weight loss  Check BP and keep log for next visit    -     CBC Auto Differential; Future; Expected date: 07/30/2025  -     Comprehensive Metabolic Panel; Future; Expected date: 07/30/2025  -     TSH; Future; Expected date: 07/30/2025  -     Lipid Panel; Future; Expected date: 07/30/2025  -     Hemoglobin A1C; Future; Expected date: 07/30/2025  -     Microalbumin/Creatinine Ratio, Urine; Future; Expected date: 07/30/2025  -     PSA, Screening; Future; Expected date: 07/30/2025    2. Chronic combined systolic and diastolic congestive heart  failure  Chronic stable  No volume overload  Cont meds same dose  He would like to establish with [facility] for cardiology care-referred today  -     CBC Auto Differential; Future; Expected date: 07/30/2025  -     Comprehensive Metabolic Panel; Future; Expected date: 07/30/2025  -     TSH; Future; Expected date: 07/30/2025  -     Lipid Panel; Future; Expected date: 07/30/2025  -     Hemoglobin A1C; Future; Expected date: 07/30/2025  -     Microalbumin/Creatinine Ratio, Urine; Future; Expected date: 07/30/2025  -     PSA, Screening; Future; Expected date: 07/30/2025    3. Ventricular tachycardia  Chronic stable  Reports sx are improved  Cont amiodarone  Bradycardia with CCB in the past-on hold  Holter reviewed  Referred to [facility] as he would like to establish care there  -     Ambulatory referral/consult to Cardiology; Future; Expected date: 02/07/2025  -     CBC Auto Differential; Future; Expected date: 07/30/2025  -     Comprehensive Metabolic Panel; Future; Expected date: 07/30/2025  -     TSH; Future; Expected date: 07/30/2025  -     Lipid Panel; Future; Expected date: 07/30/2025  -     Hemoglobin A1C; Future; Expected date: 07/30/2025  -     Microalbumin/Creatinine Ratio, Urine; Future; Expected date: 07/30/2025  -     PSA, Screening; Future; Expected date: 07/30/2025    4. Need for vaccination  Done today  -     pneumoc 20-chery conj-dip cr(PF) (PREVNAR-20 (PF)) injection Syrg 0.5 mL    5. Prostate cancer screening  Update labs per orders  -     CBC Auto Differential; Future; Expected date: 07/30/2025  -     Comprehensive Metabolic Panel; Future; Expected date: 07/30/2025  -     TSH; Future; Expected date: 07/30/2025  -     Lipid Panel; Future; Expected date: 07/30/2025  -     Hemoglobin A1C; Future; Expected date: 07/30/2025  -     Microalbumin/Creatinine Ratio, Urine; Future; Expected date: 07/30/2025  -     PSA, Screening; Future; Expected date: 07/30/2025    6. Class 1 obesity due to excess calories with serious  comorbidity and body mass index (BMI) of 33.0 to 33.9 in adult  Chronic stable  Diet, exercise  -     CBC Auto Differential; Future; Expected date: 07/30/2025  -     Comprehensive Metabolic Panel; Future; Expected date: 07/30/2025  -     TSH; Future; Expected date: 07/30/2025  -     Lipid Panel; Future; Expected date: 07/30/2025  -     Hemoglobin A1C; Future; Expected date: 07/30/2025  -     Microalbumin/Creatinine Ratio, Urine; Future; Expected date: 07/30/2025  -     PSA, Screening; Future; Expected date: 07/30/2025    7. Coronary artery disease involving native coronary artery of native heart without angina pectoris  Chronic stable  Denies chest pain  Cont plavis, asa, statin  -     CBC Auto Differential; Future; Expected date: 07/30/2025  -     Comprehensive Metabolic Panel; Future; Expected date: 07/30/2025  -     TSH; Future; Expected date: 07/30/2025  -     Lipid Panel; Future; Expected date: 07/30/2025  -     Hemoglobin A1C; Future; Expected date: 07/30/2025  -     Microalbumin/Creatinine Ratio, Urine; Future; Expected date: 07/30/2025  -     PSA, Screening; Future; Expected date: 07/30/2025    8. Hyperlipidemia LDL goal <70  Chronic stable  Cont statin  -     CBC Auto Differential; Future; Expected date: 07/30/2025  -     Comprehensive Metabolic Panel; Future; Expected date: 07/30/2025  -     TSH; Future; Expected date: 07/30/2025  -     Lipid Panel; Future; Expected date: 07/30/2025  -     Hemoglobin A1C; Future; Expected date: 07/30/2025  -     Microalbumin/Creatinine Ratio, Urine; Future; Expected date: 07/30/2025  -     PSA, Screening; Future; Expected date: 07/30/2025       RTC 6 months with labs and PRN

## 2025-02-04 DIAGNOSIS — E11.65 UNCONTROLLED TYPE 2 DIABETES MELLITUS WITH HYPERGLYCEMIA: ICD-10-CM

## 2025-02-04 NOTE — TELEPHONE ENCOUNTER
No care due was identified.  [facility] Embedded Care Due Messages. Reference number: [id number].   2/04/2025 3:35:15 PM CST

## 2025-02-05 NOTE — TELEPHONE ENCOUNTER
Refill Decision Note   [place] Reinaldo  is requesting a refill authorization.    Brief Assessment and Rationale for Refill:   Approve       Medication Therapy Plan:         Comments:     Note composed:7:13 PM 02/04/2025

## 2025-02-05 NOTE — TELEPHONE ENCOUNTER
Refill Authorization Note     Refill Decision Note   North Najera  is requesting a refill authorization.  Brief Assessment and Rationale for Refill:  Approve     Medication Therapy Plan:  Drug-Disease: SITagliptin phosphate and Chronic combined systolic and diastolic congestive heart failure    Medication Reconciliation Completed: No   Comments:     No Care Gaps recommended.     Note composed:8:26 PM 02/04/2025

## 2025-02-05 NOTE — TELEPHONE ENCOUNTER
Refill Routing Note   Medication(s) are not appropriate for processing by [facility] for the following reason(s):        Drug-disease interaction    [facility] action(s):  Defer        Medication Therapy Plan: Drug-Disease: SITagliptin phosphate and Chronic combined systolic and diastolic congestive heart failure    Pharmacist review requested: Yes     Appointments  past 12m or future 3m with PCP    Date Provider   Last Visit   1/31/2025 Tessa Oconnor MD   Next Visit   7/30/2025 Tessa Oconnor MD   ED visits in past 90 days: 0        Note composed:7:16 PM 02/04/2025

## 2025-02-05 NOTE — TELEPHONE ENCOUNTER
No care due was identified.  [facility] Embedded Care Due Messages. Reference number: [id number].   2/04/2025 7:14:25 PM CST

## 2025-03-20 LAB
BUN BLD-MCNC: 12 MG/DL (ref 4–21)
CALCIUM SERPL-MCNC: 9.1 MG/DL
CHLORIDE SERPL-SCNC: 104 MMOL/L (ref 99–108)
CHOLEST SERPL-MSCNC: 131 MG/DL (ref 0–200)
CHOLEST/HDLC SERPL: 3.4 {RATIO}
CO2 SERPL-SCNC: 24 MMOL/L
CREAT SERPL-MCNC: 0.8 MG/DL
EGFR AFRICAN AMERICAN EXT: 118.5
EGFR: 98
GLUCOSE SERPL-MCNC: 136 MG/DL
HBA1C MFR BLD: 7.2 % (ref 4–6)
HDLC SERPL-MCNC: 39 MG/DL (ref 35–70)
LDL CHOLESTEROL DIRECT: 79 MG/DL
NONHDLC SERPL-MCNC: 92 MG/DL
POTASSIUM SERPL-SCNC: 4.3 MMOL/L
SODIUM BLD-SCNC: 137 MMOL/L (ref 137–147)
TRIGL SERPL-MCNC: 160 MG/DL (ref 40–160)
VLDLC SERPL-MCNC: 32 MG/DL

## 2025-03-21 ENCOUNTER — TELEPHONE (OUTPATIENT)
Dept: INTERNAL MEDICINE | Facility: CLINIC | Age: 65
End: 2025-03-21
Payer: COMMERCIAL

## 2025-03-21 DIAGNOSIS — E11.9 DIABETES MELLITUS WITHOUT COMPLICATION: Primary | ICD-10-CM

## 2025-03-21 RX ORDER — SITAGLIPTIN 100 MG/1
1 TABLET ORAL DAILY
Qty: 90 TABLET | Refills: 3 | Status: SHIPPED | OUTPATIENT
Start: 2025-03-21 | End: 2026-03-21

## 2025-03-21 NOTE — TELEPHONE ENCOUNTER
I want to increase his januvia to 100mg from 50mg for better control. Sent new prescription. Cont jardiance same dose   
Lab results received from [facility]. Patient's A1c is 7.2 and glucose level of 136  
Patient notified. Verbalized understanding    
no suicidal ideation/no depression/no anxiety/no insomnia

## 2025-04-11 ENCOUNTER — PATIENT OUTREACH (OUTPATIENT)
Dept: ADMINISTRATIVE | Facility: HOSPITAL | Age: 65
End: 2025-04-11
Payer: COMMERCIAL

## 2025-04-15 ENCOUNTER — PATIENT OUTREACH (OUTPATIENT)
Dept: ADMINISTRATIVE | Facility: OTHER | Age: 65
End: 2025-04-15
Payer: COMMERCIAL

## 2025-04-15 VITALS — OXYGEN SATURATION: 96 % | SYSTOLIC BLOOD PRESSURE: 100 MMHG | HEART RATE: 98 BPM | DIASTOLIC BLOOD PRESSURE: 60 MMHG

## 2025-05-26 DIAGNOSIS — D68.61 ANTIPHOSPHOLIPID SYNDROME: ICD-10-CM

## 2025-06-16 LAB
LEFT EYE DM RETINOPATHY: NEGATIVE
RIGHT EYE DM RETINOPATHY: NEGATIVE

## 2025-06-18 ENCOUNTER — PATIENT OUTREACH (OUTPATIENT)
Dept: ADMINISTRATIVE | Facility: OTHER | Age: 65
End: 2025-06-18
Payer: COMMERCIAL

## 2025-06-18 VITALS — HEART RATE: 79 BPM | OXYGEN SATURATION: 97 % | DIASTOLIC BLOOD PRESSURE: 80 MMHG | SYSTOLIC BLOOD PRESSURE: 136 MMHG

## 2025-06-18 DIAGNOSIS — E11.9 TYPE 2 DIABETES MELLITUS WITHOUT COMPLICATION, UNSPECIFIED WHETHER LONG TERM INSULIN USE: ICD-10-CM

## 2025-06-18 DIAGNOSIS — I25.10 CORONARY ARTERY DISEASE INVOLVING NATIVE CORONARY ARTERY OF NATIVE HEART WITHOUT ANGINA PECTORIS: ICD-10-CM

## 2025-06-18 RX ORDER — CLOPIDOGREL BISULFATE 75 MG/1
75 TABLET ORAL DAILY
Qty: 90 TABLET | Refills: 3 | OUTPATIENT
Start: 2025-06-18 | End: 2026-06-18

## 2025-06-20 ENCOUNTER — PATIENT OUTREACH (OUTPATIENT)
Dept: ADMINISTRATIVE | Facility: HOSPITAL | Age: 65
End: 2025-06-20
Payer: COMMERCIAL

## 2025-06-20 NOTE — LETTER
AUTHORIZATION FOR RELEASE OF   CONFIDENTIAL INFORMATION        We are seeing North Najera, date of birth 1960, in the clinic at [facility]. Tessa Oconnor MD is the patient's PCP. North Najera has an outstanding lab/procedure at the time we reviewed his chart. In order to help keep his health information updated, he has authorized us to request the following medical record(s):                                    ( X )  EYE EXAM 20            Please fax records to [facility], Knight, Sarah, MD,  at [phone] or email to [email or web address].       Patient Name: North Najera  : 1960  Patient Phone #: [phone]              North Najera  MRN: [id number]  : 1960  Age: 64 y.o.  Sex: male         Patient/Legal Guardian Signature  This signature was collected at 10/29/2024           _______________________________   Printed Name/Relationship to Patient      Consent for Examination and Treatment: I hereby authorize the providers and employees of [facility] ([facility]) to provide medical treatment/services which includes, but is not limited to, performing and administering tests and diagnostic procedures that are deemed necessary, including, but not limited to, imaging examinations, blood tests and other laboratory procedures as may be required by the hospital, clinic, or may be ordered by my physician(s) or persons working under the general and/or special instructions of my physician(s).      I understand and agree that this consent covers all authorized persons, including but not limited to physicians, residents, nurse practitioners, physicians' assistants, specialists, consultants, student nurses, and independently contracted physicians, who are called upon by the physician in charge, to carry out the diagnostic procedures and medical or surgical treatment.     I hereby authorize [facility] to retain or dispose of any specimens or tissue, should  there be such remaining from any test or procedure.     I hereby authorize and give consent for [facility] providers and employees to take photographs, images or videotapes of such diagnostic, surgical or treatment procedures of Patient as may be required by [facility] or as may be ordered by a physician. I further acknowledge and agree that [facility] may use cameras or other devices for patient monitoring.     I am aware that the practice of medicine is not an exact science, and I acknowledge that no guarantees have been made to me as to the outcome of any tests, procedures or treatment.     Authorization for Release of Information: I understand that my insurance company and/or their agents may need information necessary to make determinations about payment/reimbursement. I hereby provide authorization to release to all insurance companies, their successors, assignees, other parties with whom they may have contracted, or others acting on their behalf, that are involved with payment for any hospital and/or clinic charges incurred by the patient, any information that they request and deem necessary for payment/reimbursement, and/or quality review.  I further authorize the release of my health information to physicians or other health care practitioners on staff who are involved in my health care now and in the future, and to other health care providers, entities, or institutions for the purpose of my continued care and treatment, including referrals.     REGISTRATION AUTHORIZATION  Form No. 45175 (Rev. 3/25/2024)    Page 1 of 3                       Medicare Patient's Certification and Authorization to Release Information and Payment Request:  I certify that the information given by me in applying for payment under Title XVIII of the Social Security Act is correct. I authorize any zaragoza of medical or other information about me to release to the Social SecurityAdministration, or its intermediaries or carriers, any  information needed for this or a related Medicare claim. I request that payment of authorized benefits be made on my behalf.     Assignment of Insurance Benefits:   I hereby authorize any and all insurance companies, health plans, defined   benefit plans, health insurers or any entity that is or may be responsible for payment of my medical expenses to pay all hospital and medical benefits now due, and to become due and payable to me under any hospital benefits, sick benefits, injury benefits or any other benefit for services rendered to me, including Major Medical Benefits, direct to [facility] and all independently contracted physicians. I assign any and all rights that I may have against any and all insurance companies, health plans, defined benefit plans, health insurers or any entity that is or may be responsible for payment of my medical expenses, including, but not limited to any right to appeal a denial of a claim, any right to bring any action, lawsuit, administrative proceeding, or other cause of action on my behalf. I specifically assign my right to pursue litigation against any and all insurance companies, health plans, defined benefit plans, health insurers or any entity that is or may be responsible for payment of my medical expenses based upon a refusal to pay charges.            E. Valuables: It is understood and agreed that [facility] is not liable for the damage to or loss of any money, jewelry,   documents, dentures, eye glasses, hearing aids, prosthetics, or other property of value.     F. Computer Equipment: I understand and agree that should I choose to use computer equipment owned by [facility] or if I choose to access the Internet via [facility]s network, I do so at my own risk. [facility] is not responsible for any damage to my computer equipment or to any damages of any type that might arise from my loss of equipment or data.     G. Acceptance of Financial Responsibility:  I agree that in consideration of  the services and   supplies that have been   or will be furnished to the patient, I am hereby obligated to pay all charges made for or on the account of the patient according to the standard rates (in effect at the time the services and supplies are delivered) established by [facility], including its Patient Financial Assistance Policy to the extent it is applicable. I understand that I am responsible for all charges, or portions thereof, not covered by insurance or other sources. Patient refunds will be distributed only after balances at all [facility] facilities are paid.     H. Communication Authorization:  I hereby authorize [facility] and its representatives, along with any billing service   or  who may work on their behalf, to contact me on   my cell phone and/or home phone using pre- recorded messages, artificial voice messages, automatic telephone dialing devices or other computer assisted technology, or by electronic      mail, text messaging, or by any other form of electronic communication. This includes, but is not limited to, appointment reminders, yearly physical exam reminders, preventive care reminders, patient campaigns, welcome calls, and calls about account balances on my account or any account on which I am listed as a guarantor. I understand I have the right to opt out of these communications at any time.      Relationship  Between  Facility and  Provider:      I understand that some, but not all, providers furnishing services to the patient are not employees or agents of [facility]. The patient is under the care and supervision of his/her attending physician, and it is the responsibility of the facility and its nursing staff to carry out the instructions of such physicians. It is the responsibility of the patient's physician/designee to obtain the patient's informed consent, when required, for medical or surgical treatment, special diagnostic or therapeutic procedures, or hospital services  rendered for the patient under the special instructions of the physician/designee.           REGISTRATION AUTHORIZATION  Form No. 71530 (Rev. 3/25/2024)    Page 2 of 3                       Immunizations: [facility] shares immunization information with state sponsored health departments to help you and your doctor keep track of your immunization records. By signing, you consent to have this information shared with the health department in your state:                                [place] - LINKS ([place] Immunization Network for Kids Statewide)                                [place] - MIIX ([place] Immunization Information eXchange)                                [place] - ImmPRINT (Immunization Patient Registry with Integrated Technology)     TERM: This authorization is valid for this and subsequent care/treatment I receive at [facility] and will remain valid unless/until revoked in writing by me.     [facility]: As used in this document, [facility] means all [facility] owned and managed facilities, including, but not limited to, all health centers, surgery centers, clinics, urgent care centers, and hospitals.         [facility] complies with applicable Federal civil rights laws and does not discriminate on the basis of race, color, national origin, age, disability, or sex.  ATENCIÓN: si habla joe, tiene a felipe disposición servicios gratuitos de asistencia lingüística. Sheyla al [phone].  CHÚ Ý: N?u b?n nói Ti?ng Vi?t, có các d?ch v? h? tr? ngôn ng? mi?n phí dành cho b?n. G?i s? [phone].        REGISTRATION AUTHORIZATION  Form No. 27941 (Rev. 3/25/2024)   Page 3 of 3     Patient      On Close send

## 2025-06-20 NOTE — LETTER
AUTHORIZATION FOR RELEASE OF   CONFIDENTIAL INFORMATION      We are seeing North Najera, date of birth 1960, in the clinic at [facility] INTERNAL MEDICINE II. Tessa Oconnor MD is the patient's PCP. North Najera has an outstanding lab/procedure at the time we reviewed his chart. In order to help keep his health information updated, he has authorized us to request the following medical record(s):                                            ( X )  EYE EXAM   [id number]           Please fax records to [facility], Knight, Sarah, MD,  at [phone] or email to [email or web address].         Patient Name: North Najera  : 1960  Patient Phone #: [phone]              North Najera  MRN: [id number]  : 1960  Age: 64 y.o.  Sex: male         Patient/Legal Guardian Signature  This signature was collected at 10/29/2024           _______________________________   Printed Name/Relationship to Patient      Consent for Examination and Treatment: I hereby authorize the providers and employees of [facility] ([facility]) to provide medical treatment/services which includes, but is not limited to, performing and administering tests and diagnostic procedures that are deemed necessary, including, but not limited to, imaging examinations, blood tests and other laboratory procedures as may be required by the hospital, clinic, or may be ordered by my physician(s) or persons working under the general and/or special instructions of my physician(s).      I understand and agree that this consent covers all authorized persons, including but not limited to physicians, residents, nurse practitioners, physicians' assistants, specialists, consultants, student nurses, and independently contracted physicians, who are called upon by the physician in charge, to carry out the diagnostic procedures and medical or surgical treatment.     I hereby authorize [facility] to retain or dispose of any specimens or  tissue, should there be such remaining from any test or procedure.     I hereby authorize and give consent for [facility] providers and employees to take photographs, images or videotapes of such diagnostic, surgical or treatment procedures of Patient as may be required by [facility] or as may be ordered by a physician. I further acknowledge and agree that [facility] may use cameras or other devices for patient monitoring.     I am aware that the practice of medicine is not an exact science, and I acknowledge that no guarantees have been made to me as to the outcome of any tests, procedures or treatment.     Authorization for Release of Information: I understand that my insurance company and/or their agents may need information necessary to make determinations about payment/reimbursement. I hereby provide authorization to release to all insurance companies, their successors, assignees, other parties with whom they may have contracted, or others acting on their behalf, that are involved with payment for any hospital and/or clinic charges incurred by the patient, any information that they request and deem necessary for payment/reimbursement, and/or quality review.  I further authorize the release of my health information to physicians or other health care practitioners on staff who are involved in my health care now and in the future, and to other health care providers, entities, or institutions for the purpose of my continued care and treatment, including referrals.     REGISTRATION AUTHORIZATION  Form No. 06024 (Rev. 3/25/2024)    Page 1 of 3                       Medicare Patient's Certification and Authorization to Release Information and Payment Request:  I certify that the information given by me in applying for payment under Title XVIII of the Social Security Act is correct. I authorize any zaragoza of medical or other information about me to release to the Social SecurityAdministration, or its intermediaries or carriers,  any information needed for this or a related Medicare claim. I request that payment of authorized benefits be made on my behalf.     Assignment of Insurance Benefits:   I hereby authorize any and all insurance companies, health plans, defined   benefit plans, health insurers or any entity that is or may be responsible for payment of my medical expenses to pay all hospital and medical benefits now due, and to become due and payable to me under any hospital benefits, sick benefits, injury benefits or any other benefit for services rendered to me, including Major Medical Benefits, direct to [facility] and all independently contracted physicians. I assign any and all rights that I may have against any and all insurance companies, health plans, defined benefit plans, health insurers or any entity that is or may be responsible for payment of my medical expenses, including, but not limited to any right to appeal a denial of a claim, any right to bring any action, lawsuit, administrative proceeding, or other cause of action on my behalf. I specifically assign my right to pursue litigation against any and all insurance companies, health plans, defined benefit plans, health insurers or any entity that is or may be responsible for payment of my medical expenses based upon a refusal to pay charges.            E. Valuables: It is understood and agreed that [facility] is not liable for the damage to or loss of any money, jewelry,   documents, dentures, eye glasses, hearing aids, prosthetics, or other property of value.     F. Computer Equipment: I understand and agree that should I choose to use computer equipment owned by [facility] or if I choose to access the Internet via [facility]s network, I do so at my own risk. [facility] is not responsible for any damage to my computer equipment or to any damages of any type that might arise from my loss of equipment or data.     G. Acceptance of Financial Responsibility:  I agree that in  consideration of the services and   supplies that have been   or will be furnished to the patient, I am hereby obligated to pay all charges made for or on the account of the patient according to the standard rates (in effect at the time the services and supplies are delivered) established by [facility], including its Patient Financial Assistance Policy to the extent it is applicable. I understand that I am responsible for all charges, or portions thereof, not covered by insurance or other sources. Patient refunds will be distributed only after balances at all [facility] facilities are paid.     H. Communication Authorization:  I hereby authorize [facility] and its representatives, along with any billing service   or  who may work on their behalf, to contact me on   my cell phone and/or home phone using pre- recorded messages, artificial voice messages, automatic telephone dialing devices or other computer assisted technology, or by electronic      mail, text messaging, or by any other form of electronic communication. This includes, but is not limited to, appointment reminders, yearly physical exam reminders, preventive care reminders, patient campaigns, welcome calls, and calls about account balances on my account or any account on which I am listed as a guarantor. I understand I have the right to opt out of these communications at any time.      Relationship  Between  Facility and  Provider:      I understand that some, but not all, providers furnishing services to the patient are not employees or agents of [facility]. The patient is under the care and supervision of his/her attending physician, and it is the responsibility of the facility and its nursing staff to carry out the instructions of such physicians. It is the responsibility of the patient's physician/designee to obtain the patient's informed consent, when required, for medical or surgical treatment, special diagnostic or therapeutic procedures, or  hospital services rendered for the patient under the special instructions of the physician/designee.           REGISTRATION AUTHORIZATION  Form No. 43480 (Rev. 3/25/2024)    Page 2 of 3                       Immunizations: [facility] shares immunization information with state sponsored health departments to help you and your doctor keep track of your immunization records. By signing, you consent to have this information shared with the health department in your state:                                [place] - LINKS ([place] Immunization Network for Kids Statewide)                                [place] - MIIX ([place] Immunization Information eXchange)                                [place] - ImmPRINT (Immunization Patient Registry with Integrated Technology)     TERM: This authorization is valid for this and subsequent care/treatment I receive at [facility] and will remain valid unless/until revoked in writing by me.     [facility]: As used in this document, [facility] means all [facility] owned and managed facilities, including, but not limited to, all health centers, surgery centers, clinics, urgent care centers, and hospitals.         [facility] complies with applicable Federal civil rights laws and does not discriminate on the basis of race, color, national origin, age, disability, or sex.  ATENCIÓN: si habla joe, tiene a felipe disposición servicios gratuitos de asistencia lingüística. Sheyla al [phone].  CHÚ Ý: N?u b?n nói Ti?ng Vi?t, có các d?ch v? h? tr? ngôn ng? mi?n phí dành cho b?n. G?i s? [phone].        REGISTRATION AUTHORIZATION  Form No. 13921 (Rev. 3/25/2024)   Page 3 of 3     Patient      On Close send

## 2025-06-20 NOTE — PROGRESS NOTES
External Records received -Uploaded and Hyper Linked Diabetic Eye exam  in [facility]       [place] for eye exam claims

## 2025-06-23 DIAGNOSIS — I25.10 CORONARY ARTERY DISEASE INVOLVING NATIVE CORONARY ARTERY OF NATIVE HEART WITHOUT ANGINA PECTORIS: ICD-10-CM

## 2025-06-26 RX ORDER — CLOPIDOGREL BISULFATE 75 MG/1
75 TABLET ORAL DAILY
Qty: 90 TABLET | Refills: 3 | OUTPATIENT
Start: 2025-06-26 | End: 2026-06-26

## 2025-07-09 ENCOUNTER — PATIENT OUTREACH (OUTPATIENT)
Dept: ADMINISTRATIVE | Facility: OTHER | Age: 65
End: 2025-07-09
Payer: COMMERCIAL

## 2025-07-09 VITALS — DIASTOLIC BLOOD PRESSURE: 70 MMHG | OXYGEN SATURATION: 96 % | SYSTOLIC BLOOD PRESSURE: 114 MMHG | HEART RATE: 101 BPM

## 2025-07-22 RX ORDER — EMPAGLIFLOZIN 10 MG/1
10 TABLET, FILM COATED ORAL DAILY
Qty: 90 TABLET | Refills: 1 | OUTPATIENT
Start: 2025-07-22

## 2025-07-22 NOTE — TELEPHONE ENCOUNTER
Care Due:                  Date            Visit Type   Department     Provider  --------------------------------------------------------------------------------                                EP -                              PRIMARY      [facility] INTERNAL  Last Visit: 01-      CARE ([facility])   MEDICINE II    Tessa Oconnor                               -                              PRIMARY      [facility] INTERNAL  Next Visit: 07-      CARE ([facility])   MEDICINE II    Tessa Oconnor                                                            Last  Test          Frequency    Reason                     Performed    Due Date  --------------------------------------------------------------------------------    HBA1C.......  6 months...  SITagliptin,               03- 09-                             empagliflozin............    [facility] Embedded Care Due Messages. Reference number: [id number].   7/22/2025 9:56:38 AM CDT

## 2025-08-19 DIAGNOSIS — E78.5 HYPERLIPIDEMIA LDL GOAL <70: ICD-10-CM

## 2025-08-19 RX ORDER — ATORVASTATIN CALCIUM 20 MG/1
20 TABLET, FILM COATED ORAL DAILY
Qty: 90 TABLET | Refills: 1 | Status: SHIPPED | OUTPATIENT
Start: 2025-08-19

## 2025-08-26 ENCOUNTER — LAB VISIT (OUTPATIENT)
Dept: LAB | Facility: HOSPITAL | Age: 65
End: 2025-08-26
Attending: INTERNAL MEDICINE
Payer: COMMERCIAL

## 2025-08-27 ENCOUNTER — OFFICE VISIT (OUTPATIENT)
Dept: INTERNAL MEDICINE | Facility: CLINIC | Age: 65
End: 2025-08-27
Payer: COMMERCIAL

## 2025-08-27 VITALS
OXYGEN SATURATION: 97 % | WEIGHT: 264.13 LBS | HEIGHT: 74 IN | RESPIRATION RATE: 16 BRPM | BODY MASS INDEX: 33.9 KG/M2 | DIASTOLIC BLOOD PRESSURE: 76 MMHG | HEART RATE: 55 BPM | SYSTOLIC BLOOD PRESSURE: 130 MMHG

## 2025-08-27 DIAGNOSIS — D68.61 ANTIPHOSPHOLIPID SYNDROME: ICD-10-CM

## 2025-08-27 DIAGNOSIS — H92.02 LEFT EAR PAIN: ICD-10-CM

## 2025-08-27 DIAGNOSIS — E78.5 HYPERLIPIDEMIA LDL GOAL <70: ICD-10-CM

## 2025-08-27 DIAGNOSIS — I47.20 VENTRICULAR TACHYCARDIA: ICD-10-CM

## 2025-08-27 DIAGNOSIS — Z72.0 TOBACCO ABUSE: ICD-10-CM

## 2025-08-27 DIAGNOSIS — E11.65 TYPE 2 DIABETES MELLITUS WITH HYPERGLYCEMIA, WITHOUT LONG-TERM CURRENT USE OF INSULIN: ICD-10-CM

## 2025-08-27 DIAGNOSIS — I10 PRIMARY HYPERTENSION: Primary | ICD-10-CM

## 2025-08-27 DIAGNOSIS — E66.09 CLASS 1 OBESITY DUE TO EXCESS CALORIES WITH SERIOUS COMORBIDITY AND BODY MASS INDEX (BMI) OF 33.0 TO 33.9 IN ADULT: ICD-10-CM

## 2025-08-27 DIAGNOSIS — E66.811 CLASS 1 OBESITY DUE TO EXCESS CALORIES WITH SERIOUS COMORBIDITY AND BODY MASS INDEX (BMI) OF 33.0 TO 33.9 IN ADULT: ICD-10-CM

## 2025-08-27 DIAGNOSIS — I70.0 AORTIC ATHEROSCLEROSIS: ICD-10-CM

## 2025-08-27 DIAGNOSIS — I50.42 CHRONIC COMBINED SYSTOLIC AND DIASTOLIC CONGESTIVE HEART FAILURE: ICD-10-CM

## 2025-08-27 DIAGNOSIS — I25.10 CORONARY ARTERY DISEASE INVOLVING NATIVE CORONARY ARTERY OF NATIVE HEART WITHOUT ANGINA PECTORIS: ICD-10-CM

## 2025-08-27 PROCEDURE — 3060F POS MICROALBUMINURIA REV: CPT | Mod: CPTII,S$GLB,, | Performed by: INTERNAL MEDICINE

## 2025-08-27 PROCEDURE — G2211 COMPLEX E/M VISIT ADD ON: HCPCS | Mod: S$GLB,,, | Performed by: INTERNAL MEDICINE

## 2025-08-27 PROCEDURE — 1159F MED LIST DOCD IN RCRD: CPT | Mod: CPTII,S$GLB,, | Performed by: INTERNAL MEDICINE

## 2025-08-27 PROCEDURE — 1160F RVW MEDS BY RX/DR IN RCRD: CPT | Mod: CPTII,S$GLB,, | Performed by: INTERNAL MEDICINE

## 2025-08-27 PROCEDURE — 3008F BODY MASS INDEX DOCD: CPT | Mod: CPTII,S$GLB,, | Performed by: INTERNAL MEDICINE

## 2025-08-27 PROCEDURE — 3051F HG A1C>EQUAL 7.0%<8.0%: CPT | Mod: CPTII,S$GLB,, | Performed by: INTERNAL MEDICINE

## 2025-08-27 PROCEDURE — 3078F DIAST BP <80 MM HG: CPT | Mod: CPTII,S$GLB,, | Performed by: INTERNAL MEDICINE

## 2025-08-27 PROCEDURE — 3075F SYST BP GE 130 - 139MM HG: CPT | Mod: CPTII,S$GLB,, | Performed by: INTERNAL MEDICINE

## 2025-08-27 PROCEDURE — 3066F NEPHROPATHY DOC TX: CPT | Mod: CPTII,S$GLB,, | Performed by: INTERNAL MEDICINE

## 2025-08-27 PROCEDURE — 4010F ACE/ARB THERAPY RXD/TAKEN: CPT | Mod: CPTII,S$GLB,, | Performed by: INTERNAL MEDICINE

## 2025-08-27 PROCEDURE — 99215 OFFICE O/P EST HI 40 MIN: CPT | Mod: S$GLB,,, | Performed by: INTERNAL MEDICINE

## 2025-08-27 PROCEDURE — 99999 PR PBB SHADOW E&M-EST. PATIENT-LVL V: CPT | Mod: PBBFAC,,, | Performed by: INTERNAL MEDICINE

## 2025-08-27 PROCEDURE — 2023F DILAT RTA XM W/O RTNOPTHY: CPT | Mod: CPTII,S$GLB,, | Performed by: INTERNAL MEDICINE

## 2025-08-27 RX ORDER — SEMAGLUTIDE 0.68 MG/ML
0.5 INJECTION, SOLUTION SUBCUTANEOUS
Qty: 9 ML | Refills: 1 | Status: SHIPPED | OUTPATIENT
Start: 2025-08-27

## 2025-08-27 RX ORDER — SEMAGLUTIDE 0.68 MG/ML
0.25 INJECTION, SOLUTION SUBCUTANEOUS
Qty: 3 ML | Refills: 0 | Status: SHIPPED | OUTPATIENT
Start: 2025-08-27

## (undated) DEVICE — KIT CUSTOM MANIFOLD

## (undated) DEVICE — COVER PROBE US 5.5X58L NON LTX

## (undated) DEVICE — INTRODUCER CATH 4F 11CM

## (undated) DEVICE — SPIKE SHORT LG BORE 1-WAY 2IN

## (undated) DEVICE — CATH THERMOCOOL SMTCH SF D F

## (undated) DEVICE — INTRO 8.5FR 63CM SRO

## (undated) DEVICE — KIT MICROINTRO 4F .018X40X7CM

## (undated) DEVICE — GUIDEWIRE EMERALD 150CM PTFE

## (undated) DEVICE — SHEATH HEMOSTASIS 8.5FR

## (undated) DEVICE — INTRODUCER HEMOSTASIS 7.5F

## (undated) DEVICE — PAD DEFIB CADENCE ADULT R2

## (undated) DEVICE — MICROCATHETER MAMBA FLEX 135CM

## (undated) DEVICE — KIT PROBE COVER WITH GEL

## (undated) DEVICE — GUIDEWIRE RUNTHROUGH NS 180CM

## (undated) DEVICE — GUIDEWIRE SUPRA CORE 035 190CM

## (undated) DEVICE — CATH EMERGE MR 12 X 2.00

## (undated) DEVICE — INFLATOR ENCORE 26 BLLN INFL

## (undated) DEVICE — TRANSDUCER ADULT DISP

## (undated) DEVICE — PACK EP DRAPE OMC

## (undated) DEVICE — KIT COPILOT VALVE HEMO TOOL

## (undated) DEVICE — SHEATH BRITE TIP 9F 35CM

## (undated) DEVICE — R CATH BIDIRECTIONL DF CRV 7FR

## (undated) DEVICE — ELECTRODE REM PLYHSV RETURN 9

## (undated) DEVICE — INTRODUCER BRITE TIP 8F 35CM

## (undated) DEVICE — DEVICE PERCLOSE SUT CLSR 6FR

## (undated) DEVICE — OMNIPAQUE 350MG 150ML VIAL

## (undated) DEVICE — PATCH CARTO REFERENCE

## (undated) DEVICE — R CATH RESPONS QPLR JSN 6F 120

## (undated) DEVICE — GUIDE LAUNCHER 8FR EBU 4.0

## (undated) DEVICE — CATH NC EMERGE MR 2.5X15MM

## (undated) DEVICE — SHEATH PINNACLE 8FR

## (undated) DEVICE — SET SMARTABLATE IRR TUBE

## (undated) DEVICE — TRAY CATH LAB OMC

## (undated) DEVICE — WIRE DOC EXTENSION

## (undated) DEVICE — LINE PRESSURE MONITORING 96IN